# Patient Record
Sex: MALE | Race: WHITE | NOT HISPANIC OR LATINO | Employment: UNEMPLOYED | URBAN - METROPOLITAN AREA
[De-identification: names, ages, dates, MRNs, and addresses within clinical notes are randomized per-mention and may not be internally consistent; named-entity substitution may affect disease eponyms.]

---

## 2018-03-27 LAB
ABSOL LYMPHOCYTES (HISTORICAL): 1.2 K/UL (ref 0.5–4)
ALBUMIN SERPL BCP-MCNC: 3.5 G/DL (ref 3–5.2)
ALP SERPL-CCNC: 61 U/L (ref 43–122)
ALT SERPL W P-5'-P-CCNC: 19 U/L (ref 9–52)
ANION GAP SERPL CALCULATED.3IONS-SCNC: 11 MMOL/L (ref 5–14)
AST SERPL W P-5'-P-CCNC: 13 U/L (ref 17–59)
BASOPHILS # BLD AUTO: 0 % (ref 0–1)
BASOPHILS # BLD AUTO: 0 K/UL (ref 0–0.1)
BILIRUB SERPL-MCNC: 0.6 MG/DL
BUN SERPL-MCNC: 11 MG/DL (ref 5–25)
CALCIUM SERPL-MCNC: 9.6 MG/DL (ref 8.4–10.2)
CHLORIDE SERPL-SCNC: 100 MEQ/L (ref 97–108)
CO2 SERPL-SCNC: 25 MMOL/L (ref 22–30)
CREATINE, SERUM (HISTORICAL): 0.95 MG/DL (ref 0.7–1.5)
DEPRECATED RDW RBC AUTO: 15 %
EGFR (HISTORICAL): >60 ML/MIN/1.73 M2
EOSINOPHIL # BLD AUTO: 0.1 K/UL (ref 0–0.4)
EOSINOPHIL NFR BLD AUTO: 1 % (ref 0–6)
GLUCOSE SERPL-MCNC: 182 MG/DL (ref 70–99)
HCT VFR BLD AUTO: 33.3 % (ref 41–53)
HGB BLD-MCNC: 11.1 G/DL (ref 13.5–17.5)
LYMPHOCYTES NFR BLD AUTO: 9 % (ref 25–45)
MCH RBC QN AUTO: 29.1 PG (ref 26–34)
MCHC RBC AUTO-ENTMCNC: 33.2 % (ref 31–36)
MCV RBC AUTO: 87 FL (ref 80–100)
MONOCYTES # BLD AUTO: 0.7 K/UL (ref 0.2–0.9)
MONOCYTES NFR BLD AUTO: 5 % (ref 1–10)
NEUTROPHILS ABS COUNT (HISTORICAL): 11.8 K/UL (ref 1.8–7.8)
NEUTS SEG NFR BLD AUTO: 85 % (ref 45–65)
PLATELET # BLD AUTO: 305 K/MCL (ref 150–450)
POTASSIUM SERPL-SCNC: 4.1 MEQ/L (ref 3.6–5)
RBC # BLD AUTO: 3.81 M/MCL (ref 4.5–5.9)
SODIUM SERPL-SCNC: 136 MEQ/L (ref 137–147)
TOTAL PROTEIN (HISTORICAL): 6.6 G/DL (ref 5.9–8.4)
WBC # BLD AUTO: 13.9 K/MCL (ref 4.5–11)

## 2018-03-30 LAB
ABSOL LYMPHOCYTES (HISTORICAL): 1.6 K/UL (ref 0.5–4)
ANION GAP SERPL CALCULATED.3IONS-SCNC: 11 MMOL/L (ref 5–14)
BASOPHILS # BLD AUTO: 0 % (ref 0–1)
BASOPHILS # BLD AUTO: 0.1 K/UL (ref 0–0.1)
BUN SERPL-MCNC: 13 MG/DL (ref 5–25)
CALCIUM SERPL-MCNC: 9.4 MG/DL (ref 8.4–10.2)
CHLORIDE SERPL-SCNC: 102 MEQ/L (ref 97–108)
CO2 SERPL-SCNC: 26 MMOL/L (ref 22–30)
CREATINE, SERUM (HISTORICAL): 1.19 MG/DL (ref 0.7–1.5)
DEPRECATED RDW RBC AUTO: 15 %
EGFR (HISTORICAL): >60 ML/MIN/1.73 M2
EOSINOPHIL # BLD AUTO: 0.1 K/UL (ref 0–0.4)
EOSINOPHIL NFR BLD AUTO: 1 % (ref 0–6)
GLUCOSE SERPL-MCNC: 153 MG/DL (ref 70–99)
GLUCOSE SERPL-MCNC: 194 MG/DL (ref 70–99)
HCT VFR BLD AUTO: 35.2 % (ref 41–53)
HGB BLD-MCNC: 11.8 G/DL (ref 13.5–17.5)
LYMPHOCYTES NFR BLD AUTO: 13 % (ref 25–45)
MCH RBC QN AUTO: 29 PG (ref 26–34)
MCHC RBC AUTO-ENTMCNC: 33.5 % (ref 31–36)
MCV RBC AUTO: 87 FL (ref 80–100)
MONOCYTES # BLD AUTO: 0.8 K/UL (ref 0.2–0.9)
MONOCYTES NFR BLD AUTO: 7 % (ref 1–10)
NEUTROPHILS ABS COUNT (HISTORICAL): 10 K/UL (ref 1.8–7.8)
NEUTS SEG NFR BLD AUTO: 79 % (ref 45–65)
PLATELET # BLD AUTO: 324 K/MCL (ref 150–450)
POTASSIUM SERPL-SCNC: 4.3 MEQ/L (ref 3.6–5)
RBC # BLD AUTO: 4.06 M/MCL (ref 4.5–5.9)
SODIUM SERPL-SCNC: 139 MEQ/L (ref 137–147)
TROPONIN I SERPL-MCNC: <0.01 NG/ML (ref 0–0.03)
WBC # BLD AUTO: 12.6 K/MCL (ref 4.5–11)

## 2018-04-06 ENCOUNTER — HOSPITAL ENCOUNTER (INPATIENT)
Facility: HOSPITAL | Age: 63
LOS: 10 days | Discharge: HOME HEALTH | DRG: 025 | End: 2018-04-16
Attending: INTERNAL MEDICINE | Admitting: NEUROLOGICAL SURGERY
Payer: COMMERCIAL

## 2018-04-06 DIAGNOSIS — C34.90 MALIGNANT NEOPLASM OF LUNG, UNSPECIFIED LATERALITY, UNSPECIFIED PART OF LUNG (HCC): ICD-10-CM

## 2018-04-06 DIAGNOSIS — I26.09 OTHER ACUTE PULMONARY EMBOLISM WITH ACUTE COR PULMONALE (HCC): ICD-10-CM

## 2018-04-06 DIAGNOSIS — G93.89 BRAIN MASS: Primary | ICD-10-CM

## 2018-04-06 DIAGNOSIS — G93.89 BRAIN MASS: ICD-10-CM

## 2018-04-06 PROBLEM — F31.9 BIPOLAR DISORDER (HCC): Status: ACTIVE | Noted: 2018-04-06

## 2018-04-06 PROBLEM — Z72.0 TOBACCO ABUSE: Status: ACTIVE | Noted: 2018-04-06

## 2018-04-06 PROBLEM — I10 HYPERTENSION: Status: ACTIVE | Noted: 2018-04-06

## 2018-04-06 PROBLEM — F10.10 ALCOHOL ABUSE: Status: ACTIVE | Noted: 2018-04-06

## 2018-04-06 PROBLEM — E11.9 TYPE 2 DIABETES MELLITUS (HCC): Status: ACTIVE | Noted: 2018-04-06

## 2018-04-06 LAB
ABSOL LYMPHOCYTES (HISTORICAL): 1.6 K/UL (ref 0.5–4)
ALBUMIN SERPL BCP-MCNC: 4 G/DL (ref 3–5.2)
ALP SERPL-CCNC: 73 U/L (ref 43–122)
ALT SERPL W P-5'-P-CCNC: 23 U/L (ref 9–52)
AMPHETAMINE URINE (HISTORICAL): NEGATIVE
ANION GAP SERPL CALCULATED.3IONS-SCNC: 11 MMOL/L (ref 5–14)
ANION GAP SERPL CALCULATED.3IONS-SCNC: 6 MMOL/L (ref 4–13)
ANISOCYTOSIS BLD QL SMEAR: PRESENT
APTT PPP: 25 SEC (ref 23–31)
AST SERPL W P-5'-P-CCNC: 18 U/L (ref 17–59)
BARBITURATE URINE (HISTORICAL): NEGATIVE
BASOPHILS # BLD AUTO: 0.2 K/UL (ref 0–0.1)
BASOPHILS # BLD AUTO: 1 % (ref 0–1)
BASOPHILS # BLD MANUAL: 0 THOUSAND/UL (ref 0–0.1)
BASOPHILS NFR MAR MANUAL: 0 % (ref 0–1)
BENZODIAZEPINE URINE (HISTORICAL): NEGATIVE
BILIRUB SERPL-MCNC: 0.6 MG/DL
BILIRUB UR QL STRIP: NEGATIVE MG/DL
BUN SERPL-MCNC: 21 MG/DL (ref 5–25)
BUN SERPL-MCNC: 26 MG/DL (ref 5–25)
BURR CELLS BLD QL SMEAR: PRESENT
CA-I BLD-SCNC: 5 MG/DL (ref 4.5–5.3)
CALCIUM SERPL-MCNC: 10.3 MG/DL (ref 8.4–10.2)
CALCIUM SERPL-MCNC: 9.1 MG/DL (ref 8.3–10.1)
CHLORIDE SERPL-SCNC: 101 MMOL/L (ref 100–108)
CHLORIDE SERPL-SCNC: 98 MEQ/L (ref 97–108)
CK SERPL-CCNC: 77 U/L (ref 55–170)
CLARITY UR: CLEAR
CO2 SERPL-SCNC: 23 MMOL/L (ref 21–32)
CO2 SERPL-SCNC: 24 MMOL/L (ref 22–30)
COCAINE (METAB.), URINE (HISTORICAL): POSITIVE
COLOR UR: YELLOW
COMMENT (HISTORICAL): ABNORMAL
CREAT SERPL-MCNC: 1.29 MG/DL (ref 0.6–1.3)
CREATINE, SERUM (HISTORICAL): 0.91 MG/DL (ref 0.7–1.5)
DEPRECATED RDW RBC AUTO: 15.1 %
DRUG COMMENT (HISTORICAL): ABNORMAL
EGFR (HISTORICAL): >60 ML/MIN/1.73 M2
EOSINOPHIL # BLD AUTO: 0.1 K/UL (ref 0–0.4)
EOSINOPHIL # BLD MANUAL: 0 THOUSAND/UL (ref 0–0.4)
EOSINOPHIL NFR BLD AUTO: 1 % (ref 0–6)
EOSINOPHIL NFR BLD MANUAL: 0 % (ref 0–6)
ERYTHROCYTE [DISTWIDTH] IN BLOOD BY AUTOMATED COUNT: 14.2 % (ref 11.6–15.1)
EST. AVERAGE GLUCOSE BLD GHB EST-MCNC: 183 MG/DL
GFR SERPL CREATININE-BSD FRML MDRD: 60 ML/MIN/1.73SQ M
GLUCOSE SERPL-MCNC: 209 MG/DL (ref 70–99)
GLUCOSE SERPL-MCNC: 223 MG/DL (ref 70–99)
GLUCOSE SERPL-MCNC: 261 MG/DL (ref 70–99)
GLUCOSE SERPL-MCNC: 353 MG/DL (ref 65–140)
GLUCOSE SERPL-MCNC: 377 MG/DL (ref 65–140)
GLUCOSE UR STRIP-MCNC: NEGATIVE MG/DL
HBA1C MFR BLD: 8 % (ref 4.2–6.3)
HCT VFR BLD AUTO: 33.6 % (ref 36.5–49.3)
HCT VFR BLD AUTO: 35.8 % (ref 41–53)
HCT VFR BLD CALC: 33 % (ref 38–51)
HGB BLD-MCNC: 11.3 G/DL (ref 12–17)
HGB BLD-MCNC: 11.8 G/DL (ref 13.5–17.5)
HGB BLDA-MCNC: 11.2 G/DL (ref 12–17)
HGB UR QL STRIP.AUTO: ABNORMAL
I-STAT TROPONIN (HISTORICAL): 0.01 NG/ML (ref 0–0.08)
KETONES UR STRIP-MCNC: NEGATIVE MG/DL
LACTATE SERPL-SCNC: 1.4 MMOL/L (ref 0.7–2.1)
LEUKOCYTE ESTERASE UR QL STRIP: NEGATIVE
LYMPHOCYTES # BLD AUTO: 0.37 THOUSAND/UL (ref 0.6–4.47)
LYMPHOCYTES # BLD AUTO: 3 % (ref 14–44)
LYMPHOCYTES NFR BLD AUTO: 12 % (ref 25–45)
Lab: 1 MG/DL (ref 0.6–1.3)
Lab: 102 MEQ/L (ref 97–108)
Lab: 22 MG/DL (ref 5–25)
MAGNESIUM SERPL-MCNC: 1.5 MG/DL (ref 1.6–2.6)
MCH RBC QN AUTO: 28.9 PG (ref 26.8–34.3)
MCH RBC QN AUTO: 28.9 PG (ref 26–34)
MCHC RBC AUTO-ENTMCNC: 32.9 % (ref 31–36)
MCHC RBC AUTO-ENTMCNC: 33.6 G/DL (ref 31.4–37.4)
MCV RBC AUTO: 86 FL (ref 82–98)
MCV RBC AUTO: 88 FL (ref 80–100)
MDMA (GC/MS) (HISTORICAL): NEGATIVE
METHADONE URINE (HISTORICAL): NEGATIVE
METHAMPHETAMINE URINE (HISTORICAL): NEGATIVE
METHYL ALCOHOL (HISTORICAL): NEGATIVE MG/DL
MONOCYTES # BLD AUTO: 0.25 THOUSAND/UL (ref 0–1.22)
MONOCYTES # BLD AUTO: 1 K/UL (ref 0.2–0.9)
MONOCYTES NFR BLD AUTO: 7 % (ref 1–10)
MONOCYTES NFR BLD: 2 % (ref 4–12)
NEUTROPHILS # BLD MANUAL: 11.85 THOUSAND/UL (ref 1.85–7.62)
NEUTROPHILS ABS COUNT (HISTORICAL): 10.6 K/UL (ref 1.8–7.8)
NEUTS SEG NFR BLD AUTO: 79 % (ref 45–65)
NEUTS SEG NFR BLD AUTO: 95 % (ref 43–75)
NITRITE UR QL STRIP: NEGATIVE
NRBC BLD AUTO-RTO: 0 /100 WBCS
OPIATES (HISTORICAL): NEGATIVE
OVALOCYTES BLD QL SMEAR: PRESENT
OXYCODONE (HISTORICAL): NEGATIVE
PCO2 BLD: 25 MMOL/L (ref 24–34)
PH UR STRIP.AUTO: 5 [PH] (ref 4.5–8)
PHENCYCLIDINE URINE (HISTORICAL): NEGATIVE
PLATELET # BLD AUTO: 307 K/MCL (ref 150–450)
PLATELET # BLD AUTO: 330 THOUSANDS/UL (ref 149–390)
PLATELET BLD QL SMEAR: ADEQUATE
PMV BLD AUTO: 11.4 FL (ref 8.9–12.7)
POIKILOCYTOSIS BLD QL SMEAR: PRESENT
POTASSIUM BLD-SCNC: 5.5 MEQ/L (ref 3.6–5)
POTASSIUM SERPL-SCNC: 5.1 MEQ/L (ref 3.6–5)
POTASSIUM SERPL-SCNC: 5.7 MMOL/L (ref 3.5–5.3)
PROT UR STRIP-MCNC: 15 MG/DL
PT - I.N. RATIO (HISTORICAL): 1 RATIO(INR)
PT, PATIENT (HISTORICAL): 10.2 SEC (ref 9.2–11.1)
RBC # BLD AUTO: 3.91 MILLION/UL (ref 3.88–5.62)
RBC # BLD AUTO: 4.07 M/MCL (ref 4.5–5.9)
RBC MORPH BLD: PRESENT
SODIUM BLD-SCNC: 134 MEQ/L (ref 137–147)
SODIUM SERPL-SCNC: 130 MMOL/L (ref 136–145)
SODIUM SERPL-SCNC: 133 MEQ/L (ref 137–147)
SP GR UR STRIP.AUTO: 1.01 (ref 1–1.04)
THC URINE (HISTORICAL): NEGATIVE
TOTAL PROTEIN (HISTORICAL): 7.3 G/DL (ref 5.9–8.4)
TRICYCLICS URINE (HISTORICAL): NEGATIVE
TROPONIN I SERPL-MCNC: 0.03 NG/ML (ref 0–0.03)
TROPONIN I SERPL-MCNC: <0.02 NG/ML
UROBILINOGEN UR QL STRIP.AUTO: NEGATIVE MG/DL (ref 0–1)
WBC # BLD AUTO: 12.47 THOUSAND/UL (ref 4.31–10.16)
WBC # BLD AUTO: 13.5 K/MCL (ref 4.5–11)

## 2018-04-06 PROCEDURE — 85027 COMPLETE CBC AUTOMATED: CPT | Performed by: PHYSICIAN ASSISTANT

## 2018-04-06 PROCEDURE — 93005 ELECTROCARDIOGRAM TRACING: CPT | Performed by: INTERNAL MEDICINE

## 2018-04-06 PROCEDURE — 83735 ASSAY OF MAGNESIUM: CPT | Performed by: PHYSICIAN ASSISTANT

## 2018-04-06 PROCEDURE — 82948 REAGENT STRIP/BLOOD GLUCOSE: CPT

## 2018-04-06 PROCEDURE — 84484 ASSAY OF TROPONIN QUANT: CPT | Performed by: PHYSICIAN ASSISTANT

## 2018-04-06 PROCEDURE — 83036 HEMOGLOBIN GLYCOSYLATED A1C: CPT | Performed by: PHYSICIAN ASSISTANT

## 2018-04-06 PROCEDURE — 80048 BASIC METABOLIC PNL TOTAL CA: CPT | Performed by: PHYSICIAN ASSISTANT

## 2018-04-06 PROCEDURE — 85007 BL SMEAR W/DIFF WBC COUNT: CPT | Performed by: PHYSICIAN ASSISTANT

## 2018-04-06 PROCEDURE — 99223 1ST HOSP IP/OBS HIGH 75: CPT | Performed by: INTERNAL MEDICINE

## 2018-04-06 RX ORDER — BUPROPION HYDROCHLORIDE 75 MG/1
75 TABLET ORAL 2 TIMES DAILY
COMMUNITY

## 2018-04-06 RX ORDER — FOLIC ACID 1 MG/1
1 TABLET ORAL DAILY
Status: DISCONTINUED | OUTPATIENT
Start: 2018-04-07 | End: 2018-04-16 | Stop reason: HOSPADM

## 2018-04-06 RX ORDER — THIAMINE MONONITRATE (VIT B1) 100 MG
100 TABLET ORAL DAILY
Status: DISCONTINUED | OUTPATIENT
Start: 2018-04-07 | End: 2018-04-16 | Stop reason: HOSPADM

## 2018-04-06 RX ORDER — LISINOPRIL 5 MG/1
5 TABLET ORAL DAILY
COMMUNITY

## 2018-04-06 RX ORDER — LISINOPRIL 5 MG/1
5 TABLET ORAL DAILY
Status: DISCONTINUED | OUTPATIENT
Start: 2018-04-07 | End: 2018-04-16 | Stop reason: HOSPADM

## 2018-04-06 RX ORDER — BUDESONIDE AND FORMOTEROL FUMARATE DIHYDRATE 160; 4.5 UG/1; UG/1
2 AEROSOL RESPIRATORY (INHALATION) 2 TIMES DAILY
COMMUNITY

## 2018-04-06 RX ORDER — OMEPRAZOLE 20 MG/1
20 CAPSULE, DELAYED RELEASE ORAL DAILY
Status: ON HOLD | COMMUNITY
End: 2018-04-16

## 2018-04-06 RX ORDER — ARIPIPRAZOLE 10 MG/1
10 TABLET ORAL DAILY
COMMUNITY

## 2018-04-06 RX ORDER — SODIUM CHLORIDE 1000 MG
1 TABLET, SOLUBLE MISCELLANEOUS 2 TIMES DAILY
COMMUNITY

## 2018-04-06 RX ORDER — SODIUM CHLORIDE 1000 MG
1 TABLET, SOLUBLE MISCELLANEOUS 2 TIMES DAILY
Status: DISCONTINUED | OUTPATIENT
Start: 2018-04-06 | End: 2018-04-16 | Stop reason: HOSPADM

## 2018-04-06 RX ORDER — DEXAMETHASONE SODIUM PHOSPHATE 4 MG/ML
4 INJECTION, SOLUTION INTRA-ARTICULAR; INTRALESIONAL; INTRAMUSCULAR; INTRAVENOUS; SOFT TISSUE EVERY 6 HOURS SCHEDULED
Status: DISCONTINUED | OUTPATIENT
Start: 2018-04-07 | End: 2018-04-09

## 2018-04-06 RX ORDER — MULTIVITAMIN
1 CAPSULE ORAL DAILY
COMMUNITY

## 2018-04-06 RX ORDER — LANOLIN ALCOHOL/MO/W.PET/CERES
800 CREAM (GRAM) TOPICAL DAILY
Status: DISCONTINUED | OUTPATIENT
Start: 2018-04-07 | End: 2018-04-06

## 2018-04-06 RX ORDER — THIAMINE MONONITRATE (VIT B1) 100 MG
100 TABLET ORAL DAILY
Status: ON HOLD | COMMUNITY
End: 2018-04-16

## 2018-04-06 RX ORDER — BUPROPION HYDROCHLORIDE 75 MG/1
75 TABLET ORAL EVERY 12 HOURS SCHEDULED
Status: DISCONTINUED | OUTPATIENT
Start: 2018-04-06 | End: 2018-04-13

## 2018-04-06 RX ORDER — ACETAMINOPHEN 325 MG/1
650 TABLET ORAL EVERY 6 HOURS PRN
Status: DISCONTINUED | OUTPATIENT
Start: 2018-04-06 | End: 2018-04-16 | Stop reason: HOSPADM

## 2018-04-06 RX ORDER — ASPIRIN 81 MG/1
81 TABLET, CHEWABLE ORAL DAILY
Status: DISCONTINUED | OUTPATIENT
Start: 2018-04-07 | End: 2018-04-09

## 2018-04-06 RX ORDER — ALBUTEROL SULFATE 90 UG/1
2 AEROSOL, METERED RESPIRATORY (INHALATION) EVERY 6 HOURS PRN
COMMUNITY

## 2018-04-06 RX ORDER — ALBUTEROL SULFATE 90 UG/1
2 AEROSOL, METERED RESPIRATORY (INHALATION) EVERY 6 HOURS PRN
Status: DISCONTINUED | OUTPATIENT
Start: 2018-04-06 | End: 2018-04-16 | Stop reason: HOSPADM

## 2018-04-06 RX ORDER — BUDESONIDE AND FORMOTEROL FUMARATE DIHYDRATE 160; 4.5 UG/1; UG/1
2 AEROSOL RESPIRATORY (INHALATION) EVERY 12 HOURS SCHEDULED
Status: DISCONTINUED | OUTPATIENT
Start: 2018-04-06 | End: 2018-04-16 | Stop reason: HOSPADM

## 2018-04-06 RX ORDER — NICOTINE 21 MG/24HR
1 PATCH, TRANSDERMAL 24 HOURS TRANSDERMAL DAILY
Status: DISCONTINUED | OUTPATIENT
Start: 2018-04-06 | End: 2018-04-16 | Stop reason: HOSPADM

## 2018-04-06 RX ORDER — ASPIRIN 81 MG/1
81 TABLET, CHEWABLE ORAL DAILY
COMMUNITY
End: 2018-04-16 | Stop reason: HOSPADM

## 2018-04-06 RX ORDER — CALCIUM CARBONATE 200(500)MG
1000 TABLET,CHEWABLE ORAL DAILY PRN
Status: DISCONTINUED | OUTPATIENT
Start: 2018-04-06 | End: 2018-04-16 | Stop reason: HOSPADM

## 2018-04-06 RX ORDER — LORAZEPAM 2 MG/ML
1 INJECTION INTRAMUSCULAR EVERY 4 HOURS PRN
Status: DISCONTINUED | OUTPATIENT
Start: 2018-04-06 | End: 2018-04-16 | Stop reason: HOSPADM

## 2018-04-06 RX ORDER — DOCUSATE SODIUM 100 MG/1
100 CAPSULE, LIQUID FILLED ORAL 2 TIMES DAILY
Status: DISCONTINUED | OUTPATIENT
Start: 2018-04-06 | End: 2018-04-13 | Stop reason: SDUPTHER

## 2018-04-06 RX ORDER — ARIPIPRAZOLE 10 MG/1
10 TABLET ORAL DAILY
Status: DISCONTINUED | OUTPATIENT
Start: 2018-04-07 | End: 2018-04-16 | Stop reason: HOSPADM

## 2018-04-06 RX ORDER — PANTOPRAZOLE SODIUM 40 MG/1
40 TABLET, DELAYED RELEASE ORAL
Status: DISCONTINUED | OUTPATIENT
Start: 2018-04-07 | End: 2018-04-16 | Stop reason: HOSPADM

## 2018-04-06 RX ADMIN — SODIUM CHLORIDE TAB 1 GM 1 G: 1 TAB at 20:21

## 2018-04-06 RX ADMIN — NICOTINE 1 PATCH: 21 PATCH, EXTENDED RELEASE TRANSDERMAL at 20:21

## 2018-04-06 RX ADMIN — LORAZEPAM 1 MG: 2 INJECTION INTRAMUSCULAR; INTRAVENOUS at 22:13

## 2018-04-06 RX ADMIN — BUDESONIDE AND FORMOTEROL FUMARATE DIHYDRATE 2 PUFF: 160; 4.5 AEROSOL RESPIRATORY (INHALATION) at 22:04

## 2018-04-06 RX ADMIN — INSULIN LISPRO 3 UNITS: 100 INJECTION, SOLUTION INTRAVENOUS; SUBCUTANEOUS at 22:03

## 2018-04-06 RX ADMIN — BUPROPION HYDROCHLORIDE 75 MG: 75 TABLET, FILM COATED ORAL at 22:04

## 2018-04-06 RX ADMIN — DOCUSATE SODIUM 100 MG: 100 CAPSULE, LIQUID FILLED ORAL at 20:21

## 2018-04-06 NOTE — ASSESSMENT & PLAN NOTE
· 2 5 cm mass left parietal lobe with surrounding vasogenic edema  · ? Metastatic from lung cancer vs primary  · Decadron  · Neuro checks  · Neurosurgery evaluation

## 2018-04-06 NOTE — ASSESSMENT & PLAN NOTE
· Started on Wellbutrin and Abilify earlier this week- hasn't gotten them filled yet  Will start  · Also started on third medication ?hydroxyzine  Patient unsure  Would need to confirm

## 2018-04-06 NOTE — ASSESSMENT & PLAN NOTE
· Diagnosed December 2017- Stage IV with mets to lymph nodes and pelvis per patient  · Currently receiving immunotherapy  · Follows with Dr Mabel Cunningham

## 2018-04-06 NOTE — H&P
H&P- Aris Nearing 1955, 63 y o  male MRN: [id number]    Unit/Bed#: [facility] 909-01 Encounter: [id number]    Primary Care Provider: No primary care provider on file  Date and time admitted to hospital: 4/6/2018  5:10 PM    * Brain mass   Assessment & Plan    · 2 5 cm mass left parietal lobe with surrounding vasogenic edema  · ? Metastatic from lung cancer vs primary  · Decadron  · Neuro checks  · Neurosurgery evaluation        Lung cancer [facility])   Assessment & Plan    · Diagnosed December 2017- Stage IV with mets to lymph nodes and pelvis per patient  · Currently receiving immunotherapy  · Follows with Dr Allison White        Bipolar disorder [facility])   Assessment & Plan    · Started on Wellbutrin and Abilify earlier this week- hasn't gotten them filled yet  Will start  · Also started on third medication ?hydroxyzine  Patient unsure  Would need to confirm  Type 2 diabetes mellitus (HCC)   Assessment & Plan    · Takes only Humalog sliding scale at home  · Check A1C  · Sliding scale for now- adjust as needed        Hypertension   Assessment & Plan    · Continue lisinopril        Alcohol abuse   Assessment & Plan    · 2L liquor consumed weekly- last drink 4/5  · Thiamine, folic acid, multivitamin  · Ativan PRN        Tobacco abuse   Assessment & Plan    · Nicotine patch          VTE Prophylaxis: Pharmacologic VTE Prophylaxis contraindicated due to brain mets  / sequential compression device   Code Status: DNR/DNI  POLST: There is no POLST form on file for this patient (pre-hospital)      Anticipated Length of Stay:  Patient will be admitted on an Inpatient basis with an anticipated length of stay of  > 2 midnights  Justification for Hospital Stay: Brain mass requiring further workup    Total Time for Visit, including Counseling / Coordination of Care: 1 hour  Greater than 50% of this total time spent on direct patient counseling and coordination of care  Chief Complaint:   Right-sided numbness      History of Present Illness:    Bibiana Marcos is a 63 y o  male with a history of HTN, DM, tobacco abuse, alcohol abuse, and metastatic lung cancer who presents with right-sided numbness starting this morning  He thought he was having a stroke  Patient presented originally at [facility]  Imaging reviewed 2 5 cm mass in the left parietal lobe with vasogenic edema and patient was transferred to [facility] for neurosurgery evaluation  Patient tells me he was diagnosed with lung cancer in Dec 2017  At time of presentation, it was stage IV and had metastasized lymph nodes and pelvis  He started immunotherapy (not sure of name) and has been getting that every 3 weeks over the past 9-10 weeks  He follows with Dr Precious Mireles  Patient has been smoking 1-2 ppd since age 17 and continues to smoke daily  He also reports frequent alcohol abuse and has been in and out of rehab  He drinks 2L of liquor weekly  He doesn't always drink daily- sometimes he skips a few days  He does get withdrawal symptoms but has never had a withdrawal seizure  Last drink was yesterday  His UDS at [facility] was positive for cocaine  Reports history of bipolar disorder and was recently prescribed Wellbutrin, Abilify, and ?hydroxyzine  He has not yet filled these prescriptions  Currently, his numbness is resolved  He complains only of right sided rib pain  States he fell earlier this week when he was drunk  Imaging and [place]:  CT brain: 2 5 cm mass left parietal lobe with surrounding vasogenic edema  CT c/a/p showed: bulky mediastinal adenopathy, right hilar mass with obstructive atelectasis of right middle lobe mass which has progressed in the interval   Review of Systems:    Review of Systems   Constitutional: Negative  HENT: Negative  Eyes: Negative for visual disturbance  Respiratory: Positive for shortness of breath  Chronic SOB at baseline   Cardiovascular: Negative  Gastrointestinal: Negative  Endocrine: Negative  Genitourinary: Negative  Musculoskeletal:        Right-sided rib pain s/p fall   Skin: Negative  Allergic/Immunologic: Negative  Neurological: Positive for numbness  Negative for facial asymmetry, speech difficulty and weakness  Hematological: Negative  Psychiatric/Behavioral: Negative  Past Medical and Surgical History:     No past medical history on file  No past surgical history on file  Meds/Allergies:    Prior to Admission medications    Not on File     I have reviewed home medications with patient personally  Allergies: Allergies not on file    Social History:     Marital Status: Single   Occupation: None  Patient Pre-hospital Living Situation: Lives at home alone  Patient Pre-hospital Level of Mobility: Ambulates independently  Patient Pre-hospital Diet Restrictions: None  Substance Use History:   History   Alcohol use Not on file     History   Smoking Status    Not on file   Smokeless Tobacco    Not on file     History   Drug use: Unknown       Family History:    non-contributory    Physical Exam:     Vitals:   Blood Pressure: (!) 186/78 (04/06/18 1715)  Pulse: 84 (04/06/18 1715)  Temperature: 98 °F (36 7 °C) (04/06/18 1715)  Temp Source: Oral (04/06/18 1715)  Respirations: 17 (04/06/18 1715)  SpO2: 97 % (04/06/18 1715)    Physical Exam   Constitutional: He is oriented to person, place, and time  No distress  HENT:   Poor dentition   Eyes: EOM are normal  Pupils are equal, round, and reactive to light  No scleral icterus  Neck: Normal range of motion  Neck supple  Cardiovascular: Normal rate, regular rhythm and normal heart sounds  Pulmonary/Chest: Effort normal and breath sounds normal  No respiratory distress  He has no wheezes  He has no rales  Port left chest wall   Abdominal: Soft  Bowel sounds are normal  He exhibits no distension  There is no tenderness  There is no rebound     Musculoskeletal:   Right lower ribs tender to palpation   Neurological: He is alert and oriented to person, place, and time  No cranial nerve deficit  Coordination normal    Normal strength throughout  Sensation to light touch in tact  Slight resting tremor B/L (chronic per patient from prior Abilify use)  Skin: Skin is warm and dry  No rash noted  He is not diaphoretic  No erythema  Psychiatric: He has a normal mood and affect  His behavior is normal        Additional Data:     Lab Results: Reviewed labs from [address] input(s): LABALBU        Imaging: Reviewed all imaging from [facility]    No orders to display       EKG, Pathology, and Other Studies Reviewed on Admission:   · EKG: Sinus rhythm with occasional PACs    Allscripts / Epic Records Reviewed: Yes     ** Please Note: This note has been constructed using a voice recognition system   **

## 2018-04-07 ENCOUNTER — APPOINTMENT (INPATIENT)
Dept: RADIOLOGY | Facility: HOSPITAL | Age: 63
DRG: 025 | End: 2018-04-07
Payer: COMMERCIAL

## 2018-04-07 PROBLEM — E87.5 HYPERKALEMIA: Status: ACTIVE | Noted: 2018-04-07

## 2018-04-07 PROBLEM — D72.829 LEUKOCYTOSIS: Status: ACTIVE | Noted: 2018-04-07

## 2018-04-07 LAB
ANION GAP SERPL CALCULATED.3IONS-SCNC: 8 MMOL/L (ref 4–13)
ATRIAL RATE: 69 BPM
BUN SERPL-MCNC: 31 MG/DL (ref 5–25)
CALCIUM SERPL-MCNC: 9.3 MG/DL (ref 8.3–10.1)
CHLORIDE SERPL-SCNC: 102 MMOL/L (ref 100–108)
CO2 SERPL-SCNC: 23 MMOL/L (ref 21–32)
CREAT SERPL-MCNC: 1.23 MG/DL (ref 0.6–1.3)
ERYTHROCYTE [DISTWIDTH] IN BLOOD BY AUTOMATED COUNT: 14.2 % (ref 11.6–15.1)
GFR SERPL CREATININE-BSD FRML MDRD: 63 ML/MIN/1.73SQ M
GLUCOSE SERPL-MCNC: 266 MG/DL (ref 65–140)
GLUCOSE SERPL-MCNC: 397 MG/DL (ref 65–140)
GLUCOSE SERPL-MCNC: 429 MG/DL (ref 65–140)
GLUCOSE SERPL-MCNC: 482 MG/DL (ref 65–140)
GLUCOSE SERPL-MCNC: 485 MG/DL (ref 65–140)
HCT VFR BLD AUTO: 33.5 % (ref 36.5–49.3)
HGB BLD-MCNC: 11.4 G/DL (ref 12–17)
MAGNESIUM SERPL-MCNC: 1.6 MG/DL (ref 1.6–2.6)
MCH RBC QN AUTO: 29.5 PG (ref 26.8–34.3)
MCHC RBC AUTO-ENTMCNC: 34 G/DL (ref 31.4–37.4)
MCV RBC AUTO: 87 FL (ref 82–98)
P AXIS: 29 DEGREES
PLATELET # BLD AUTO: 311 THOUSANDS/UL (ref 149–390)
PMV BLD AUTO: 11.5 FL (ref 8.9–12.7)
POTASSIUM SERPL-SCNC: 5 MMOL/L (ref 3.5–5.3)
PR INTERVAL: 130 MS
QRS AXIS: 10 DEGREES
QRSD INTERVAL: 102 MS
QT INTERVAL: 412 MS
QTC INTERVAL: 441 MS
RBC # BLD AUTO: 3.87 MILLION/UL (ref 3.88–5.62)
SODIUM SERPL-SCNC: 133 MMOL/L (ref 136–145)
T WAVE AXIS: 16 DEGREES
VENTRICULAR RATE: 69 BPM
WBC # BLD AUTO: 12.26 THOUSAND/UL (ref 4.31–10.16)

## 2018-04-07 PROCEDURE — 80048 BASIC METABOLIC PNL TOTAL CA: CPT | Performed by: PHYSICIAN ASSISTANT

## 2018-04-07 PROCEDURE — A9577 INJ MULTIHANCE: HCPCS | Performed by: INTERNAL MEDICINE

## 2018-04-07 PROCEDURE — 85027 COMPLETE CBC AUTOMATED: CPT | Performed by: PHYSICIAN ASSISTANT

## 2018-04-07 PROCEDURE — 82948 REAGENT STRIP/BLOOD GLUCOSE: CPT

## 2018-04-07 PROCEDURE — 70553 MRI BRAIN STEM W/O & W/DYE: CPT

## 2018-04-07 PROCEDURE — 99233 SBSQ HOSP IP/OBS HIGH 50: CPT | Performed by: INTERNAL MEDICINE

## 2018-04-07 PROCEDURE — 83735 ASSAY OF MAGNESIUM: CPT | Performed by: PHYSICIAN ASSISTANT

## 2018-04-07 RX ORDER — GUAIFENESIN/DEXTROMETHORPHAN 100-10MG/5
10 SYRUP ORAL EVERY 4 HOURS PRN
Status: DISCONTINUED | OUTPATIENT
Start: 2018-04-07 | End: 2018-04-16 | Stop reason: HOSPADM

## 2018-04-07 RX ORDER — INSULIN GLARGINE 100 [IU]/ML
5 INJECTION, SOLUTION SUBCUTANEOUS
Status: DISCONTINUED | OUTPATIENT
Start: 2018-04-07 | End: 2018-04-08

## 2018-04-07 RX ADMIN — DEXAMETHASONE SODIUM PHOSPHATE 4 MG: 4 INJECTION, SOLUTION INTRAMUSCULAR; INTRAVENOUS at 12:04

## 2018-04-07 RX ADMIN — INSULIN LISPRO 15 UNITS: 100 INJECTION, SOLUTION INTRAVENOUS; SUBCUTANEOUS at 18:07

## 2018-04-07 RX ADMIN — INSULIN GLARGINE 5 UNITS: 100 INJECTION, SOLUTION SUBCUTANEOUS at 21:19

## 2018-04-07 RX ADMIN — INSULIN LISPRO 2 UNITS: 100 INJECTION, SOLUTION INTRAVENOUS; SUBCUTANEOUS at 21:20

## 2018-04-07 RX ADMIN — DOCUSATE SODIUM 100 MG: 100 CAPSULE, LIQUID FILLED ORAL at 18:06

## 2018-04-07 RX ADMIN — INSULIN LISPRO 5 UNITS: 100 INJECTION, SOLUTION INTRAVENOUS; SUBCUTANEOUS at 12:04

## 2018-04-07 RX ADMIN — LORAZEPAM 1 MG: 2 INJECTION INTRAMUSCULAR; INTRAVENOUS at 21:30

## 2018-04-07 RX ADMIN — ASPIRIN 81 MG 81 MG: 81 TABLET ORAL at 09:59

## 2018-04-07 RX ADMIN — ARIPIPRAZOLE 10 MG: 10 TABLET ORAL at 10:00

## 2018-04-07 RX ADMIN — PANTOPRAZOLE SODIUM 40 MG: 40 TABLET, DELAYED RELEASE ORAL at 05:58

## 2018-04-07 RX ADMIN — INSULIN LISPRO 4 UNITS: 100 INJECTION, SOLUTION INTRAVENOUS; SUBCUTANEOUS at 10:01

## 2018-04-07 RX ADMIN — FOLIC ACID 1 MG: 1 TABLET ORAL at 09:59

## 2018-04-07 RX ADMIN — DOCUSATE SODIUM 100 MG: 100 CAPSULE, LIQUID FILLED ORAL at 09:59

## 2018-04-07 RX ADMIN — GUAIFENESIN AND DEXTROMETHORPHAN 10 ML: 100; 10 SYRUP ORAL at 18:51

## 2018-04-07 RX ADMIN — SODIUM CHLORIDE TAB 1 GM 1 G: 1 TAB at 09:59

## 2018-04-07 RX ADMIN — GADOBENATE DIMEGLUMINE 18 ML: 529 INJECTION, SOLUTION INTRAVENOUS at 08:35

## 2018-04-07 RX ADMIN — BUPROPION HYDROCHLORIDE 75 MG: 75 TABLET, FILM COATED ORAL at 20:33

## 2018-04-07 RX ADMIN — Medication 100 MG: at 09:59

## 2018-04-07 RX ADMIN — Medication 1 TABLET: at 09:59

## 2018-04-07 RX ADMIN — SODIUM CHLORIDE TAB 1 GM 1 G: 1 TAB at 18:06

## 2018-04-07 RX ADMIN — DEXAMETHASONE SODIUM PHOSPHATE 4 MG: 4 INJECTION, SOLUTION INTRAMUSCULAR; INTRAVENOUS at 05:58

## 2018-04-07 RX ADMIN — DEXAMETHASONE SODIUM PHOSPHATE 4 MG: 4 INJECTION, SOLUTION INTRAMUSCULAR; INTRAVENOUS at 00:09

## 2018-04-07 RX ADMIN — NICOTINE 1 PATCH: 21 PATCH, EXTENDED RELEASE TRANSDERMAL at 10:00

## 2018-04-07 RX ADMIN — BUDESONIDE AND FORMOTEROL FUMARATE DIHYDRATE 2 PUFF: 160; 4.5 AEROSOL RESPIRATORY (INHALATION) at 20:33

## 2018-04-07 RX ADMIN — BUDESONIDE AND FORMOTEROL FUMARATE DIHYDRATE 2 PUFF: 160; 4.5 AEROSOL RESPIRATORY (INHALATION) at 10:00

## 2018-04-07 RX ADMIN — LORAZEPAM 1 MG: 2 INJECTION INTRAMUSCULAR; INTRAVENOUS at 08:10

## 2018-04-07 RX ADMIN — ACETAMINOPHEN 650 MG: 325 TABLET, FILM COATED ORAL at 21:19

## 2018-04-07 RX ADMIN — LISINOPRIL 5 MG: 5 TABLET ORAL at 09:59

## 2018-04-07 RX ADMIN — BUPROPION HYDROCHLORIDE 75 MG: 75 TABLET, FILM COATED ORAL at 10:00

## 2018-04-07 RX ADMIN — DEXAMETHASONE SODIUM PHOSPHATE 4 MG: 4 INJECTION, SOLUTION INTRAMUSCULAR; INTRAVENOUS at 18:51

## 2018-04-07 NOTE — ASSESSMENT & PLAN NOTE
- neurosurgery following - await decision on intervention during this week - MRI reveals a 2 4 cm mass of the left parietal vertex  - c/w IV Decadron for vasogenic edema/swelling (optimize insulin regimen due to history of diabetes mellitus)   - patient notes right-sided weakness improved  - PRN pain control and supportive care otherwise  -has underlying NSCLC, s/p chemo last Feb 2018  -filling defect in RLL - suggests PE  -patient refused to have CTA of chest today-will await the brain mass procedure  -no AC for now since he is awaiting surgery

## 2018-04-07 NOTE — ASSESSMENT & PLAN NOTE
- per patient, lung and his metastatic to lymph nodes and pelvis  - PRN pain control/supportive care   -patient confirms he is interested in having brain mass resected, as well as chemo for further tx

## 2018-04-07 NOTE — ASSESSMENT & PLAN NOTE
- HgA1c of 8 0- poorly controlled    - optimize SSI coverage to higher algorithm due to concurrent IV steroid administration -Lantus 15U QHS

## 2018-04-07 NOTE — CONSULTS
Consultation - Neurosurgery   Wilbert Dung 63 y o  male MRN: [id number]  Unit/Bed#: [facility] 909-01 Encounter: [id number]      Assessment/Plan     Assessment:  1  2 5cm left parietal brain mass with vasogenic edema  2  Metastatic lung cancer  3  ETOH abuse  4  Tobacco use  5  Diabetes  andHTN    Plan:  - solitary brain mass in the setting of known metastatic lung cancer  - he is treated at [facility] and the records are not available  - his symptoms have resolved except he has a right sided pronator drift  - consider surgical intervention if he is an acceptable candidate medically  - check coags -   - review with neurosurgery attending- see attestation    History of Present Illness   Inpatient consult to Neurosurgery  Consult performed by: Alireza Streeter ordered by: Joe Burr        HPI: Asim Couch is a 63y o  year old male with a history of metastatic lung cancer  He presented to the [facility] ER with an episode of right sided numbness which lasted for about 15 minutes  He thought he was having a stroke  Which is why  He came in  He drink 2 quarts of whiskey per week ( used to be 4 quarts ) and smokes 1-2 pack of cigarettes per day  The cat scan and subsequent MRI shows a 2 5 cm left parietal enhancing mass with vasogenic edema  Consults    Review of Systems   Constitutional: Negative  HENT: Negative  Eyes: Negative  Respiratory: Negative  Cardiovascular: Negative  Gastrointestinal: Negative  Endocrine: Negative  Genitourinary: Negative  Musculoskeletal: Negative  Skin: Negative  Allergic/Immunologic: Negative  Neurological: Positive for numbness  Hematological: Negative  Psychiatric/Behavioral: Negative          Historical Information   Past Medical History:   Diagnosis Date    Diabetes mellitus ([facility] [facility] )     Hypertension     [facility] ([facility] )     Psychiatric disorder      Past Surgical History:   Procedure Laterality Date    TONSILLECTOMY History   Alcohol Use    Yes     Comment: 2L liquor weekly     History   Drug use: Unknown     History   Smoking Status    Current Every Day Smoker    Packs/day: 1 50    Years: 37 00   Smokeless Tobacco    Never Used     History reviewed  No pertinent family history  Meds/Allergies   all current active meds have been reviewed  Allergies   Allergen Reactions    Other      Bee stings       Objective     Intake/Output Summary (Last 24 hours) at 04/07/18 1133  Last data filed at 04/07/18 1105   Gross per 24 hour   Intake             1080 ml   Output              700 ml   Net              380 ml       Physical Exam   Constitutional: He is oriented to person, place, and time  He appears well-developed and well-nourished  HENT:   Head: Normocephalic and atraumatic  Eyes: EOM are normal  Pupils are equal, round, and reactive to light  Neck: Normal range of motion  Cardiovascular: Normal rate and regular rhythm  Pulmonary/Chest: Effort normal    Abdominal: Soft  Musculoskeletal: Normal range of motion  Neurological: He is alert and oriented to person, place, and time  He has normal strength  He has a normal Finger-Nose-Finger Test and a normal Heel to [employer]    Skin: Skin is warm and dry  Psychiatric: He has a normal mood and affect  His speech is normal      Neurologic Exam     Mental Status   Oriented to person, place, and time  Attention: normal  Concentration: normal    Speech: speech is normal   Level of consciousness: alert  Knowledge: good  Cranial Nerves   Cranial nerves II through XII intact  CN III, IV, VI   Pupils are equal, round, and reactive to light  Extraocular motions are normal      Motor Exam   Overall muscle tone: normal  Right arm tone: normal  Left arm tone: normal  Right arm pronator drift: present    Strength   Strength 5/5 throughout       Sensory Exam   Light touch normal      Gait, Coordination, and Reflexes     Coordination   Finger to nose coordination: normal  Heel to shin coordination: normal       Vitals:Blood pressure 165/86, pulse 84, temperature 98 1 °F (36 7 °C), temperature source Oral, resp  rate 18, SpO2 96 %  ,There is no height or weight on file to calculate BMI  Lab Results: I have personally reviewed pertinent results        Imaging Studies: I have personally reviewed pertinent films in PACS        VTE Prophylaxis: Sequential compression device (Venodyne)

## 2018-04-07 NOTE — SOCIAL WORK
CM met Pt with an introduction and explanation of role  Pt reported residing alone in a 2nd floor apt with no use of DME and has a flight of steps to enter  Pt reported being independent with ADLs with no hx of VNA, SNF, or drug/alcohol placements  Pt reported being treated for Bipolar disorder, in the process of changing is psychiatrist and a recent psychiatric placement in 12/17 at [address]  Pt reported using [facility] in [facility]  CM reviewed d/c planning process including the following: identifying help at home, patient preference for d/c planning needs, Discharge Lounge, [facility] Meds to Bed program, availability of treatment team to discuss questions or concerns patient and/or family may have regarding understanding medications and recognizing signs and symptoms once discharged  CM also encouraged patient to follow up with all recommended appointments after discharge  Patient advised of importance for patient and family to participate in managing patients medical well being

## 2018-04-07 NOTE — PROGRESS NOTES
[facility] Hospitalist Service - Internal Medicine Progress Note       PATIENT INFORMATION      Patient: Pryor Baumgarten 63 y o  male   MRN: [id number]  PCP: No primary care provider on file    Unit/Bed#: [facility] 909-01 Encounter: [id number]  Date Of Visit: 04/07/18       ASSESSMENTS & PLAN     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    Assessment & Plan    - neurosurgery following - await decision on intervention - MRI reveals a 2 4 cm mass of the left parietal vertex  - c/w IV Decadron for vasogenic edema/swelling (optimize insulin regimen due to history of diabetes mellitus)   - patient notes right-sided weakness improved today  - PRN pain control and supportive care otherwise  - possibility of solitary metastasis from pre-existing metastatic lung cancer is possible      Lung cancer    Assessment & Plan    - per patient, lung and his metastatic to lymph nodes and pelvis  - PRN pain control/supportive care   - will appreciate oncology evaluation      Tobacco abuse   Assessment & Plan    - still smokes despite metastatic lung cancer diagnosis  - counseled on cessation - transdermal nicotine patch on board      Leukocytosis   Assessment & Plan    - likely fluctuating secondary to IV Decadron - monitor WBC count  - remains afebrile      Alcohol abuse   Assessment & Plan    - counseled on cessation although unlikely to comply - per patient, last drink was on 4/5  - monitor for signs/symptoms of withdrawal - PRN IV Ativan on board   - started on folic acid/thiamine supplementation       Hyperkalemia   Assessment & Plan    - serum potassium normalized today  - monitor BMP      Insulin-dependent diabetes mellitus   Assessment & Plan    - HgA1c of 8 0  - optimize SSI coverage to higher algorithm due to concurrent IV steroid administration - add low-dose Lantus QHS       Hypertension   Assessment & Plan    - low-sodium diet encouraged  - continue Zestril      Bipolar disorder   Assessment & Plan    - continue Abilify/Wellbutrin regimen        VTE Prophylaxis:  SCDs      SUBJECTIVE     Seen and examined earlier today  States his right-sided weakness has improved today  He remains in hopeful spirits regarding his long-term prognosis  Denies any headaches or visual disturbances at this time  He is somewhat anxious  OBJECTIVE     Vitals:   Temp (24hrs), Av °F (36 7 °C), Min:98 °F (36 7 °C), Max:98 1 °F (36 7 °C)    HR:  [84-90] 84  Resp:  [17-18] 18  BP: (144-186)/(67-86) 165/86  SpO2:  [96 %-97 %] 96 %  There is no height or weight on file to calculate BMI  Input and Output Summary (last 24 hours):        Intake/Output Summary (Last 24 hours) at 18 1316  Last data filed at 18 1105   Gross per 24 hour   Intake             1080 ml   Output              700 ml   Net              380 ml       Physical Exam:     GENERAL:  Disheveled but well-developed/nourished - no current distress  HEAD:  Normocephalic - atraumatic  EYES: PERRL - EOMI   MOUTH:  Mucosa moist  NECK:  Supple - full range of motion  CARDIAC:  Regular rate/rhythm - S1/S2 positive  PULMONARY:  Clear to auscultation bilaterally - nonlabored respirations  ABDOMEN:  Soft - nontender/nondistended - active bowel sounds  MUSCULOSKELETAL:  Motor strength/range of motion intact  NEUROLOGIC:  Alert/oriented x 3 - no nystagmus noted  SKIN:  Chronic wrinkles/blemishes   PSYCHIATRIC:  Mood/affect flat      ADDITIONAL DATA       Labs & Recent Cultures:       Results from last 7 days  Lab Units 18  0445 18  2120   WBC Thousand/uL 12 26* 12 47*   HEMOGLOBIN g/dL 11 4* 11 3*   HEMATOCRIT % 33 5* 33 6*   PLATELETS Thousands/uL 311 330   LYMPHO PCT %  --  3*   MONO PCT MAN %  --  2*   EOSINO PCT MANUAL %  --  0       Results from last 7 days  Lab Units 18  0445   SODIUM mmol/L 133*   POTASSIUM mmol/L 5 0   CHLORIDE mmol/L 102   CO2 mmol/L 23   BUN mg/dL 31*   CREATININE mg/dL 1 23   CALCIUM mg/dL 9 3   GLUCOSE RANDOM mg/dL 429* Last 24 Hours Medication List:     Current Facility-Administered Medications:  acetaminophen 650 mg Oral Q6H PRN Nieves Shepherd PA-C   albuterol 2 puff Inhalation Q6H PRN Nieves Shepherd PA-C   ARIPiprazole 10 mg Oral Daily Nieves Shepherd PA-C   aspirin 81 mg Oral Daily Nieves Shepherd PA-C   budesonide-formoterol 2 puff Inhalation Q12H [facility] Nieves Shepherd PA-C   buPROPion 75 mg Oral Q12H [facility] Nieves Shepherd PA-C   calcium carbonate 1,000 mg Oral Daily PRN Nieves Shepherd PA-C   dexamethasone 4 mg Intravenous Q6H [facility] Nieves Shepherd PA-C   docusate sodium 100 mg Oral BID Nieves Shepherd PA-C   folic acid 1 mg Oral Daily Yamini Johnson MD   insulin glargine 5 Units Subcutaneous HS Yamini Johnson MD   insulin lispro 1-5 Units Subcutaneous 4x Daily (AC & HS) Yamini Johnson MD   lisinopril 5 mg Oral Daily Nieves Shepherd PA-C   LORazepam 1 mg Intravenous Q4H PRN Nieves Shepherd PA-C   multivitamin-minerals 1 tablet Oral Daily Nieves Shepherd PA-C   nicotine 1 patch Transdermal Daily Nieves Shepherd PA-C   pantoprazole 40 mg Oral Early Morning Nieves Shepherd PA-C   sodium chloride 1 g Oral BID Nieves Shepherd PA-C   thiamine 100 mg Oral Daily Nieves Shepherd PA-C          Time Spent for Care:  38 minutes  More than 50% of total time spent on counseling and coordination of care as described above  Current Length of Stay: 1 day(s)      Code Status: Level 3 - DNAR and DNI         ** Please Note: This note is constructed using a voice recognition dictation system   **

## 2018-04-07 NOTE — ASSESSMENT & PLAN NOTE
- still smokes despite metastatic lung cancer diagnosis  - counseled on cessation - transdermal nicotine patch

## 2018-04-07 NOTE — ASSESSMENT & PLAN NOTE
- counseled on cessation although unlikely to comply - per patient, last drink was on 4/5  - monitor for signs/symptoms of withdrawal - PRN IV Ativan   - started on folic acid/thiamine supplementation   -no signs of Alcohol withdrawal

## 2018-04-08 LAB
APTT PPP: 22 SECONDS (ref 23–35)
GLUCOSE SERPL-MCNC: 263 MG/DL (ref 65–140)
GLUCOSE SERPL-MCNC: 313 MG/DL (ref 65–140)
GLUCOSE SERPL-MCNC: 351 MG/DL (ref 65–140)
GLUCOSE SERPL-MCNC: 382 MG/DL (ref 65–140)
INR PPP: 1.03 (ref 0.86–1.16)
PROTHROMBIN TIME: 13.5 SECONDS (ref 12.1–14.4)

## 2018-04-08 PROCEDURE — 85730 THROMBOPLASTIN TIME PARTIAL: CPT | Performed by: PHYSICIAN ASSISTANT

## 2018-04-08 PROCEDURE — 99233 SBSQ HOSP IP/OBS HIGH 50: CPT | Performed by: INTERNAL MEDICINE

## 2018-04-08 PROCEDURE — 82948 REAGENT STRIP/BLOOD GLUCOSE: CPT

## 2018-04-08 PROCEDURE — 85610 PROTHROMBIN TIME: CPT | Performed by: PHYSICIAN ASSISTANT

## 2018-04-08 RX ORDER — INSULIN GLARGINE 100 [IU]/ML
10 INJECTION, SOLUTION SUBCUTANEOUS
Status: DISCONTINUED | OUTPATIENT
Start: 2018-04-08 | End: 2018-04-09

## 2018-04-08 RX ADMIN — BUDESONIDE AND FORMOTEROL FUMARATE DIHYDRATE 2 PUFF: 160; 4.5 AEROSOL RESPIRATORY (INHALATION) at 08:21

## 2018-04-08 RX ADMIN — DOCUSATE SODIUM 100 MG: 100 CAPSULE, LIQUID FILLED ORAL at 18:05

## 2018-04-08 RX ADMIN — INSULIN LISPRO 2 UNITS: 100 INJECTION, SOLUTION INTRAVENOUS; SUBCUTANEOUS at 21:22

## 2018-04-08 RX ADMIN — FOLIC ACID 1 MG: 1 TABLET ORAL at 08:21

## 2018-04-08 RX ADMIN — DEXAMETHASONE SODIUM PHOSPHATE 4 MG: 4 INJECTION, SOLUTION INTRAMUSCULAR; INTRAVENOUS at 00:31

## 2018-04-08 RX ADMIN — INSULIN LISPRO 4 UNITS: 100 INJECTION, SOLUTION INTRAVENOUS; SUBCUTANEOUS at 18:07

## 2018-04-08 RX ADMIN — INSULIN GLARGINE 10 UNITS: 100 INJECTION, SOLUTION SUBCUTANEOUS at 21:23

## 2018-04-08 RX ADMIN — DOCUSATE SODIUM 100 MG: 100 CAPSULE, LIQUID FILLED ORAL at 08:21

## 2018-04-08 RX ADMIN — GUAIFENESIN AND DEXTROMETHORPHAN 10 ML: 100; 10 SYRUP ORAL at 18:28

## 2018-04-08 RX ADMIN — DEXAMETHASONE SODIUM PHOSPHATE 4 MG: 4 INJECTION, SOLUTION INTRAMUSCULAR; INTRAVENOUS at 06:37

## 2018-04-08 RX ADMIN — SODIUM CHLORIDE TAB 1 GM 1 G: 1 TAB at 18:05

## 2018-04-08 RX ADMIN — ASPIRIN 81 MG 81 MG: 81 TABLET ORAL at 08:21

## 2018-04-08 RX ADMIN — DEXAMETHASONE SODIUM PHOSPHATE 4 MG: 4 INJECTION, SOLUTION INTRAMUSCULAR; INTRAVENOUS at 18:28

## 2018-04-08 RX ADMIN — INSULIN LISPRO 3 UNITS: 100 INJECTION, SOLUTION INTRAVENOUS; SUBCUTANEOUS at 08:22

## 2018-04-08 RX ADMIN — LISINOPRIL 5 MG: 5 TABLET ORAL at 08:21

## 2018-04-08 RX ADMIN — SODIUM CHLORIDE TAB 1 GM 1 G: 1 TAB at 08:21

## 2018-04-08 RX ADMIN — INSULIN LISPRO 5 UNITS: 100 INJECTION, SOLUTION INTRAVENOUS; SUBCUTANEOUS at 12:02

## 2018-04-08 RX ADMIN — BUDESONIDE AND FORMOTEROL FUMARATE DIHYDRATE 2 PUFF: 160; 4.5 AEROSOL RESPIRATORY (INHALATION) at 21:22

## 2018-04-08 RX ADMIN — ACETAMINOPHEN 650 MG: 325 TABLET, FILM COATED ORAL at 16:07

## 2018-04-08 RX ADMIN — NICOTINE 1 PATCH: 21 PATCH, EXTENDED RELEASE TRANSDERMAL at 08:21

## 2018-04-08 RX ADMIN — LORAZEPAM 1 MG: 2 INJECTION INTRAMUSCULAR; INTRAVENOUS at 08:28

## 2018-04-08 RX ADMIN — LORAZEPAM 1 MG: 2 INJECTION INTRAMUSCULAR; INTRAVENOUS at 16:08

## 2018-04-08 RX ADMIN — INSULIN LISPRO 4 UNITS: 100 INJECTION, SOLUTION INTRAVENOUS; SUBCUTANEOUS at 12:02

## 2018-04-08 RX ADMIN — Medication 1 TABLET: at 08:21

## 2018-04-08 RX ADMIN — Medication 100 MG: at 08:21

## 2018-04-08 RX ADMIN — ARIPIPRAZOLE 10 MG: 10 TABLET ORAL at 08:21

## 2018-04-08 RX ADMIN — PANTOPRAZOLE SODIUM 40 MG: 40 TABLET, DELAYED RELEASE ORAL at 06:37

## 2018-04-08 RX ADMIN — BUPROPION HYDROCHLORIDE 75 MG: 75 TABLET, FILM COATED ORAL at 08:21

## 2018-04-08 RX ADMIN — INSULIN LISPRO 5 UNITS: 100 INJECTION, SOLUTION INTRAVENOUS; SUBCUTANEOUS at 08:22

## 2018-04-08 RX ADMIN — BUPROPION HYDROCHLORIDE 75 MG: 75 TABLET, FILM COATED ORAL at 21:23

## 2018-04-08 RX ADMIN — DEXAMETHASONE SODIUM PHOSPHATE 4 MG: 4 INJECTION, SOLUTION INTRAMUSCULAR; INTRAVENOUS at 12:02

## 2018-04-08 NOTE — PROGRESS NOTES
[address] Hospitalist Service - Internal Medicine Progress Note       PATIENT INFORMATION      Patient: Fabinaa Burr 63 y o  male   MRN: [id number]  PCP: No primary care provider on file    Unit/Bed#: [facility] 909-01 Encounter: [id number]  Date Of Visit: 04/08/18       ASSESSMENTS & PLAN     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    Assessment & Plan    - neurosurgery following - awaiting oncology records from [facility] prior to termination of treatment/course of action   - MRI reveals a 2 4 cm mass of the left parietal vertex  - on IV Decadron for vasogenic edema/swelling (optimized insulin regimen due to history of diabetes mellitus)    - patient notes right-sided weakness improves daily  - PRN pain control and supportive care otherwise  - possibility of solitary metastasis from pre-existing metastatic lung cancer is possible      Lung cancer    Assessment & Plan    - per patient, he has metastasis to his lymph nodes and pelvis and has only received immunotherapy in the past   - PRN pain control/supportive care   - will appreciate our [facility] oncology evaluation and awaiting oncology records from [employer]    - still smokes despite metastatic lung cancer diagnosis  - counseled on cessation - transdermal nicotine patch on board      Leukocytosis   Assessment & Plan    - likely fluctuating secondary to IV Decadron - monitor WBC count  - remains afebrile      Alcohol abuse   Assessment & Plan    - counseled on cessation although unlikely to comply - per patient, last drink was on 4/5  - monitor for signs/symptoms of withdrawal - PRN IV Ativan on board   - started on folic acid/thiamine supplementation       Hyperkalemia   Assessment & Plan    - serum potassium normalized yesterday  - monitor BMP      Insulin-dependent diabetes mellitus   Assessment & Plan    - HgA1c of 8 0  - will continue to optimize SSI coverage as needed due to concurrent IV steroid administration with addition of fixed prandial short-acting insulin doses - added long-acting basal Lantus QHS as well      Hypertension   Assessment & Plan    - low-sodium diet encouraged  - continue Zestril      Bipolar disorder   Assessment & Plan    - continue Abilify/Wellbutrin regimen        VTE Prophylaxis:  SCDs      SUBJECTIVE     Seen/examined this morning  No new complaints at this time  Continues to report improvement in his right-sided weakness  He is somewhat anxious about his future prognosis and course of action for the brain mass  He understands that we are awaiting records from [facility] in regards to his metastatic lung cancer  OBJECTIVE     Vitals:   Temp (24hrs), Av 4 °F (36 9 °C), Min:98 °F (36 7 °C), Max:98 7 °F (37 1 °C)    HR:  [] 95  Resp:  [19-20] 20  BP: (149-156)/(75-92) 150/76  SpO2:  [92 %-93 %] 93 %  Body mass index is 28 65 kg/m²  Input and Output Summary (last 24 hours):        Intake/Output Summary (Last 24 hours) at 18 1301  Last data filed at 18 1217   Gross per 24 hour   Intake              540 ml   Output             3425 ml   Net            -2885 ml       Physical Exam:     GENERAL:  Disheveled but well-developed/nourished - no active distress  HEAD:  Normocephalic - atraumatic  EYES: PERRL - EOMI   MOUTH:  Mucosa moist  NECK:  Supple - full range of motion  CARDIAC:  Regular rate/rhythm - S1/S2 positive  PULMONARY:  Clear breath sounds bilaterally - nonlabored respirations  ABDOMEN:  Soft - nontender/nondistended - active bowel sounds  MUSCULOSKELETAL:  Motor strength/range of motion currently intact  NEUROLOGIC:  Alert/oriented x 3   SKIN:  Chronic wrinkles/blemishes       ADDITIONAL DATA       Labs & Recent Cultures:       Results from last 7 days  Lab Units 18  0445 18  2120   WBC Thousand/uL 12 26* 12 47*   HEMOGLOBIN g/dL 11 4* 11 3*   HEMATOCRIT % 33 5* 33 6*   PLATELETS Thousands/uL 311 330   LYMPHO PCT %  --  3*   MONO PCT MAN %  --  2*   EOSINO PCT MANUAL %  --  0       Results from last 7 days  Lab Units 04/07/18  0445   SODIUM mmol/L 133*   POTASSIUM mmol/L 5 0   CHLORIDE mmol/L 102   CO2 mmol/L 23   BUN mg/dL 31*   CREATININE mg/dL 1 23   CALCIUM mg/dL 9 3   GLUCOSE RANDOM mg/dL 429*         Last 24 Hours Medication List:     Current Facility-Administered Medications:  acetaminophen 650 mg Oral Q6H PRN [facility], PA-TARSHA   albuterol 2 puff Inhalation Q6H PRN [facility], PA-C   ARIPiprazole 10 mg Oral Daily [facility], PA-TARSHA   aspirin 81 mg Oral Daily [facility], PA-TARSHA   budesonide-formoterol 2 puff Inhalation Q12H [address] [facility], PA-TARSHA   buPROPion 75 mg Oral Q12H [address] [facility], PA-TARSHA   calcium carbonate 1,000 mg Oral Daily PRN [facility], PA-TARSHA   dexamethasone 4 mg Intravenous Q6H [address] [facility], PA-TARSHA   dextromethorphan-guaiFENesin 10 mL Oral Q4H PRN Mary Sewell MD   docusate sodium 100 mg Oral BID [facility]   folic acid 1 mg Oral Daily Mary Sewell MD   insulin glargine 10 Units Subcutaneous HS Mary Sewell MD   insulin lispro 1-5 Units Subcutaneous 4x Daily (AC & HS) Mary Sewell MD   insulin lispro 10 Units Subcutaneous TID With Meals Mary Sewell MD   lisinopril 5 mg [facility]   LORazepam 1 mg Intravenous Q4H PRN [facility], PA-TARSHA   multivitamin-minerals 1 tablet Oral Daily [facility], PA-TARSHA   nicotine 1 patch Transdermal [facility]   pantoprazole 40 mg Oral Early Morning [facility], PA-C   sodium chloride 1 g Oral BID [facility], PA-TARSHA   thiamine 100 mg Oral [facility]          Time Spent for Care:  37 minutes  More than 50% of total time spent on counseling and coordination of care as described above        Current Length of Stay: 2 day(s)      Code Status: Level 3 - DNAR and DNI         ** Please Note: This note is constructed using a voice recognition dictation system   **

## 2018-04-08 NOTE — PROGRESS NOTES
Progress Note - Neurosurgery   Wilbert Razo 63 y o  male MRN: [id number]  Unit/Bed#: [facility] 909-01 Encounter: [id number]    Assessment:  1  2 5 cm left parietal brain mass with vasogenic edema  2  Metastatic lung cancer  3  ETOH abuse  4  Tobacco use  5  Diabetes  and HTN      Plan:  - seen with Dr Amador Vargas  - need records from [facility] oncologist  - medical and cardiology clearence   - blood glucose control  - consideration for surgical intervention later this week vs out patient follow up for XRT / chemo      Subjective/Objective     "They are not giving me enough insulin"        Invasive Devices     Peripheral Intravenous Line            Peripheral IV 04/06/18 Left Antecubital 1 day    Peripheral IV 04/06/18 Right Antecubital 1 day                Physical Exam:     Vitals: Blood pressure 150/76, pulse 95, temperature 98 °F (36 7 °C), temperature source Oral, resp  rate 20, height 5' 9" (1 753 m), weight 88 kg (194 lb), SpO2 93 %  ,Body mass index is 28 65 kg/m²  Awake and alert  Moves all extremities  Pronator drift less prominent  Facial features symmetric  Tongue midline  PERRLA  Lungs clear  Heart regular  Abdomen soft        Lab Results: I have personally reviewed pertinent results        Imaging Studies: I have personally reviewed pertinent films in PACS      VTE Pharmacologic Prophylaxis: Reason for no pharmacologic prophylaxis brain mass    VTE Mechanical Prophylaxis: sequential compression device

## 2018-04-08 NOTE — PROGRESS NOTES
Rounding with Dr Disha Johnson, awaiting medical records from [facility], neurosx recommendations   BS still elevated, will adjust insulin doses

## 2018-04-08 NOTE — CASE MANAGEMENT
Initial Clinical Review    Admission: Date/Time/Statement: 4/6/18 @ 1710     Orders Placed This Encounter   Procedures    Inpatient Admission     Standing Status:   Standing     Number of Occurrences:   1     Order Specific Question:   Admitting Physician     Answer:   Brandy Tong     Order Specific Question:   Level of Care     Answer:   Med Surg [16]     Order Specific Question:   Estimated length of stay     Answer:   More than 2 Midnights     Order Specific Question:   Certification     Answer:   I certify that inpatient services are medically necessary for this patient for a duration of greater than two midnights  See H&P and MD Progress Notes for additional information about the patient's course of treatment  ED: Date/Time/Mode of Arrival:  Tx from [facility] 4/6 ~ 5 pm    Chief Complaint: Right-sided numbness  History of Illness:  63 y o  male with a history of HTN, DM, tobacco abuse, alcohol abuse, and metastatic lung cancer who presents with right-sided numbness starting this morning  He thought he was having a stroke  Patient presented originally at [facility]  Imaging reviewed 2 5 cm mass in the left parietal lobe with vasogenic edema and patient was transferred to [facility] for neurosurgery evaluation       Patient tells me he was diagnosed with lung cancer in Dec 2017  At time of presentation, it was stage IV and had metastasized to lymph nodes and pelvis  He started immunotherapy (not sure of name) and has been getting that every 3 weeks over the past 9-10 weeks  He follows with Dr Maris Juares      Patient has been smoking 1-2 ppd since age 17 and continues to smoke daily  He also reports frequent alcohol abuse and has been in and out of rehab  He drinks 2L of liquor weekly  He doesn't always drink daily- sometimes he skips a few days  He does get withdrawal symptoms but has never had a withdrawal seizure  Last drink was yesterday   His UDS at [facility] was positive for cocaine  Reports history of bipolar disorder and was recently prescribed Wellbutrin, Abilify, and ?hydroxyzine  He has not yet filled these prescriptions      Currently, his numbness is resolved  He complains only of right sided rib pain  States he fell earlier this week when he was drunk  Exam:  Musculoskeletal: Right lower ribs tender to palpation   Neurological: He is alert and oriented to person, place, and time  No cranial nerve deficit  Coordination normal    Normal strength throughout  Sensation to light touch in tact  Slight resting tremor B/L (chronic per patient from prior Abilify use)  Vital Signs:  04/07 0701  04/08 0700 04/08 0701  04/08 1513  Most Recent     Temperature (°F) 98  598 7 98  98 (36 7)    Pulse 94101 95  95    Respirations 1920 20  20    Blood Pressure 149/92156/75 150/76  150/76    SpO2 (%) 9293 93  93        Abnormal Labs/Diagnostic Test Results:     Imaging and [place]:  CT brain: 2 5 cm mass left parietal lobe with surrounding vasogenic edema  CT c/a/p showed: bulky mediastinal adenopathy, right hilar mass with obstructive atelectasis of right middle lobe mass which has progressed in the interval  EKG: Sinus rhythm with occasional PACs    4/7 -- MRI brain -- Enhancing 2 4 cm mass with vasogenic edema in the left parietal vertex centered at the gray-white matter junction extending to the cortical surface  Findings may represent a solitary metastasis in this patient with a history of lung cancer  Differential diagnosis includes primary brain tumor  Neurosurgical assessment recommended  Past Medical/Surgical History:   Past Medical History:   Diagnosis Date    [facility] mellitus [facility] [address] )     Hypertension     Lung cancer [facility] [address] [facility]     Psychiatric disorder        Admitting Diagnosis: Brain mass [G93 9]    Age/Sex: 63 y o  male    Assessment/Plan:   * Brain mass   Assessment & Plan     · 2 5 cm mass left parietal lobe with surrounding vasogenic edema  · ? Metastatic from lung cancer vs primary  · Decadron  · Neuro checks  · Neurosurgery evaluation          Lung cancer [facility])   Assessment & Plan     · Diagnosed December 2017- Stage IV with mets to lymph nodes and pelvis per patient  · Currently receiving immunotherapy  · Follows with Dr Fang Tavares          Bipolar disorder [facility])   Assessment & Plan     · Started on Wellbutrin and Abilify earlier this week- hasn't gotten them filled yet  Will start  · Also started on third medication ?hydroxyzine  Patient unsure   Would need to confirm           Type 2 diabetes mellitus ([facility] [address] )   Assessment & Plan     · Takes only Humalog sliding scale at home  · Check A1C  · Sliding scale for now- adjust as needed          Hypertension   Assessment & Plan     · Continue lisinopril          Alcohol abuse   Assessment & Plan     · 2L liquor consumed weekly- last drink 4/5  · Thiamine, folic acid, multivitamin  · Ativan PRN          Tobacco abuse   Assessment & Plan     · Nicotine patch         Admission Orders:  Admit to M/S unit  Consult oncology, Neurosurgery  Cons carb diet  accuchecks qid w/ ssi  SCD's  Up & oob as tolerated    Scheduled Meds:   Current Facility-Administered Medications:  acetaminophen 650 mg Oral Q6H PRN Tristian Hernandez PA-C   albuterol 2 puff Inhalation Q6H PRN Tristian Hernandez PA-C   ARIPiprazole 10 mg Oral Daily Tristian Hernandez PA-C   aspirin 81 mg Oral Daily Tristian Hernandez PA-C   budesonide-formoterol 2 puff Inhalation Q12H [address] Tristian Hernandez PA-C   buPROPion 75 mg Oral Q12H [address] Tristian Hernandez PA-C   calcium carbonate 1,000 mg Oral Daily PRN Tristian Hernandez PA-C   dexamethasone 4 mg Intravenous Q6H [address] Tristian Hernandez PA-C   dextromethorphan-guaiFENesin 10 mL Oral Q4H PRN Eliu Mercado MD   docusate sodium 100 mg Oral BID Tristian Hernandez PA-C   folic acid 1 mg Oral Daily Eliu Mercado MD   insulin glargine 10 Units Subcutaneous HS Eliu Mercado MD   insulin lispro 1-5 Units Subcutaneous 4x Daily (AC & HS) Christin Bocanegra MD   insulin lispro 10 Units Subcutaneous TID With Meals Christin Bocanegra MD   lisinopril 5 mg Oral Daily Triciaana GT Dasilva   LORazepam 1 mg Intravenous Q4H PRN Llana Dasilva, PA-C   multivitamin-minerals 1 tablet Oral Daily Llana Dasilva, PA-C   nicotine 1 patch Transdermal Daily Llana Dasilva, PA-C   pantoprazole 40 mg Oral Early Morning Llana Dasilva, PA-C   sodium chloride 1 g Oral BID Llana Dasilva, PA-C   thiamine 100 mg Oral Daily Llana Dasilva, PA-C     PRN Meds:   acetaminophen    albuterol    calcium carbonate    dextromethorphan-guaiFENesin    LORazepam      NS consult 4/7 --  Assessment:  1  2 5cm left parietal brain mass with vasogenic edema  2  Metastatic lung cancer  3  ETOH abuse  4  Tobacco use  5   Diabetes  andHTN     Plan:  - solitary brain mass in the setting of known metastatic lung cancer  - he is treated at [address] [place] [address] and the records are not available  - his symptoms have resolved except he has a right sided pronator drift  - consider surgical intervention if he is an acceptable candidate medically  - check coags -   - review with neurosurgery attending- see attestation

## 2018-04-09 ENCOUNTER — APPOINTMENT (INPATIENT)
Dept: RADIOLOGY | Facility: HOSPITAL | Age: 63
DRG: 025 | End: 2018-04-09
Payer: COMMERCIAL

## 2018-04-09 LAB
ANION GAP SERPL CALCULATED.3IONS-SCNC: 8 MMOL/L (ref 4–13)
BASOPHILS # BLD MANUAL: 0 THOUSAND/UL (ref 0–0.1)
BASOPHILS NFR MAR MANUAL: 0 % (ref 0–1)
BUN SERPL-MCNC: 35 MG/DL (ref 5–25)
CALCIUM SERPL-MCNC: 9.1 MG/DL (ref 8.3–10.1)
CHLORIDE SERPL-SCNC: 98 MMOL/L (ref 100–108)
CO2 SERPL-SCNC: 24 MMOL/L (ref 21–32)
CREAT SERPL-MCNC: 1.1 MG/DL (ref 0.6–1.3)
EOSINOPHIL # BLD MANUAL: 0 THOUSAND/UL (ref 0–0.4)
EOSINOPHIL NFR BLD MANUAL: 0 % (ref 0–6)
ERYTHROCYTE [DISTWIDTH] IN BLOOD BY AUTOMATED COUNT: 14.2 % (ref 11.6–15.1)
GFR SERPL CREATININE-BSD FRML MDRD: 73 ML/MIN/1.73SQ M
GLUCOSE SERPL-MCNC: 112 MG/DL (ref 65–140)
GLUCOSE SERPL-MCNC: 295 MG/DL (ref 65–140)
GLUCOSE SERPL-MCNC: 300 MG/DL (ref 65–140)
GLUCOSE SERPL-MCNC: 353 MG/DL (ref 65–140)
GLUCOSE SERPL-MCNC: 70 MG/DL (ref 65–140)
HCT VFR BLD AUTO: 33.6 % (ref 36.5–49.3)
HGB BLD-MCNC: 11.3 G/DL (ref 12–17)
LYMPHOCYTES # BLD AUTO: 1.19 THOUSAND/UL (ref 0.6–4.47)
LYMPHOCYTES # BLD AUTO: 6 % (ref 14–44)
MCH RBC QN AUTO: 29 PG (ref 26.8–34.3)
MCHC RBC AUTO-ENTMCNC: 33.6 G/DL (ref 31.4–37.4)
MCV RBC AUTO: 86 FL (ref 82–98)
MONOCYTES # BLD AUTO: 0.2 THOUSAND/UL (ref 0–1.22)
MONOCYTES NFR BLD: 1 % (ref 4–12)
NEUTROPHILS # BLD MANUAL: 18.52 THOUSAND/UL (ref 1.85–7.62)
NEUTS SEG NFR BLD AUTO: 93 % (ref 43–75)
NRBC BLD AUTO-RTO: 0 /100 WBCS
PLATELET # BLD AUTO: 284 THOUSANDS/UL (ref 149–390)
PLATELET BLD QL SMEAR: ADEQUATE
PMV BLD AUTO: 11.6 FL (ref 8.9–12.7)
POTASSIUM SERPL-SCNC: 4.8 MMOL/L (ref 3.5–5.3)
RBC # BLD AUTO: 3.89 MILLION/UL (ref 3.88–5.62)
SODIUM SERPL-SCNC: 130 MMOL/L (ref 136–145)
WBC # BLD AUTO: 19.91 THOUSAND/UL (ref 4.31–10.16)

## 2018-04-09 PROCEDURE — 82948 REAGENT STRIP/BLOOD GLUCOSE: CPT

## 2018-04-09 PROCEDURE — 85027 COMPLETE CBC AUTOMATED: CPT | Performed by: INTERNAL MEDICINE

## 2018-04-09 PROCEDURE — 80048 BASIC METABOLIC PNL TOTAL CA: CPT | Performed by: INTERNAL MEDICINE

## 2018-04-09 PROCEDURE — 85007 BL SMEAR W/DIFF WBC COUNT: CPT | Performed by: INTERNAL MEDICINE

## 2018-04-09 PROCEDURE — 99253 IP/OBS CNSLTJ NEW/EST LOW 45: CPT | Performed by: INTERNAL MEDICINE

## 2018-04-09 PROCEDURE — 74177 CT ABD & PELVIS W/CONTRAST: CPT

## 2018-04-09 PROCEDURE — 99232 SBSQ HOSP IP/OBS MODERATE 35: CPT | Performed by: PHYSICIAN ASSISTANT

## 2018-04-09 PROCEDURE — 99233 SBSQ HOSP IP/OBS HIGH 50: CPT | Performed by: INTERNAL MEDICINE

## 2018-04-09 PROCEDURE — 71260 CT THORAX DX C+: CPT

## 2018-04-09 RX ORDER — INSULIN GLARGINE 100 [IU]/ML
15 INJECTION, SOLUTION SUBCUTANEOUS
Status: DISCONTINUED | OUTPATIENT
Start: 2018-04-09 | End: 2018-04-16 | Stop reason: HOSPADM

## 2018-04-09 RX ORDER — DEXAMETHASONE SODIUM PHOSPHATE 4 MG/ML
4 INJECTION, SOLUTION INTRA-ARTICULAR; INTRALESIONAL; INTRAMUSCULAR; INTRAVENOUS; SOFT TISSUE EVERY 12 HOURS SCHEDULED
Status: DISCONTINUED | OUTPATIENT
Start: 2018-04-09 | End: 2018-04-13

## 2018-04-09 RX ADMIN — BUPROPION HYDROCHLORIDE 75 MG: 75 TABLET, FILM COATED ORAL at 08:44

## 2018-04-09 RX ADMIN — BUDESONIDE AND FORMOTEROL FUMARATE DIHYDRATE 2 PUFF: 160; 4.5 AEROSOL RESPIRATORY (INHALATION) at 20:11

## 2018-04-09 RX ADMIN — FOLIC ACID 1 MG: 1 TABLET ORAL at 08:44

## 2018-04-09 RX ADMIN — BUPROPION HYDROCHLORIDE 75 MG: 75 TABLET, FILM COATED ORAL at 20:11

## 2018-04-09 RX ADMIN — LORAZEPAM 1 MG: 2 INJECTION INTRAMUSCULAR; INTRAVENOUS at 08:54

## 2018-04-09 RX ADMIN — Medication 1 TABLET: at 08:44

## 2018-04-09 RX ADMIN — PANTOPRAZOLE SODIUM 40 MG: 40 TABLET, DELAYED RELEASE ORAL at 05:23

## 2018-04-09 RX ADMIN — LORAZEPAM 1 MG: 2 INJECTION INTRAMUSCULAR; INTRAVENOUS at 20:16

## 2018-04-09 RX ADMIN — DEXAMETHASONE SODIUM PHOSPHATE 4 MG: 4 INJECTION, SOLUTION INTRAMUSCULAR; INTRAVENOUS at 12:27

## 2018-04-09 RX ADMIN — DEXAMETHASONE SODIUM PHOSPHATE 4 MG: 4 INJECTION, SOLUTION INTRAMUSCULAR; INTRAVENOUS at 00:54

## 2018-04-09 RX ADMIN — BUDESONIDE AND FORMOTEROL FUMARATE DIHYDRATE 2 PUFF: 160; 4.5 AEROSOL RESPIRATORY (INHALATION) at 08:45

## 2018-04-09 RX ADMIN — DEXAMETHASONE SODIUM PHOSPHATE 4 MG: 4 INJECTION, SOLUTION INTRAMUSCULAR; INTRAVENOUS at 05:22

## 2018-04-09 RX ADMIN — INSULIN LISPRO 1 UNITS: 100 INJECTION, SOLUTION INTRAVENOUS; SUBCUTANEOUS at 18:29

## 2018-04-09 RX ADMIN — SODIUM CHLORIDE TAB 1 GM 1 G: 1 TAB at 08:44

## 2018-04-09 RX ADMIN — DOCUSATE SODIUM 100 MG: 100 CAPSULE, LIQUID FILLED ORAL at 08:44

## 2018-04-09 RX ADMIN — DEXAMETHASONE SODIUM PHOSPHATE 4 MG: 4 INJECTION, SOLUTION INTRAMUSCULAR; INTRAVENOUS at 23:54

## 2018-04-09 RX ADMIN — LISINOPRIL 5 MG: 5 TABLET ORAL at 08:44

## 2018-04-09 RX ADMIN — DOCUSATE SODIUM 100 MG: 100 CAPSULE, LIQUID FILLED ORAL at 18:27

## 2018-04-09 RX ADMIN — NICOTINE 1 PATCH: 21 PATCH, EXTENDED RELEASE TRANSDERMAL at 08:50

## 2018-04-09 RX ADMIN — INSULIN LISPRO 4 UNITS: 100 INJECTION, SOLUTION INTRAVENOUS; SUBCUTANEOUS at 08:46

## 2018-04-09 RX ADMIN — GUAIFENESIN AND DEXTROMETHORPHAN 10 ML: 100; 10 SYRUP ORAL at 08:44

## 2018-04-09 RX ADMIN — Medication 100 MG: at 08:44

## 2018-04-09 RX ADMIN — INSULIN LISPRO 3 UNITS: 100 INJECTION, SOLUTION INTRAVENOUS; SUBCUTANEOUS at 12:26

## 2018-04-09 RX ADMIN — ARIPIPRAZOLE 10 MG: 10 TABLET ORAL at 08:43

## 2018-04-09 RX ADMIN — IOHEXOL 100 ML: 350 INJECTION, SOLUTION INTRAVENOUS at 17:05

## 2018-04-09 RX ADMIN — SODIUM CHLORIDE TAB 1 GM 1 G: 1 TAB at 18:27

## 2018-04-09 NOTE — PROGRESS NOTES
[address] Hospitalist Service - Internal Medicine Progress Note       PATIENT INFORMATION      Patient: Eli Phan 63 y o  male   MRN: [id number]  PCP: Luis Alfredo Hunt MD  Unit/Bed#: [facility] 909-01 Encounter: [id number]  Date Of Visit: 04/09/18       ASSESSMENTS & PLAN     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    Assessment & Plan    - neurosurgery following - anticipated plan for mass resection later this week   - MRI reveals a 2 4 cm mass of the left parietal vertex  - on IV Decadron for vasogenic edema/swelling (optimizing insulin regimen daily due to history of diabetes mellitus)    - patient notes right-sided weakness progressively resolving  - PRN pain control and supportive care otherwise  - possibility of solitary metastasis from pre-existing metastatic lung cancer is possible - await resection/biopsy      Lung cancer    Assessment & Plan    - per patient, he has metastasis to his lymph nodes and pelvis and has only received immunotherapy in the past (Keytruda)  - PRN pain control/supportive care   - appreciate [facility] oncology evaluation - per his outpatient oncologist at [facility] (per neurosurgery documentation today), his lung cancer pathology is non small cell in nature with characteristics of squamous cell carcinoma with right-sided med/pleural effusion       Tobacco abuse   Assessment & Plan    - still smokes despite metastatic lung cancer diagnosis  - counseled on cessation - transdermal nicotine patch on board      Leukocytosis   Assessment & Plan    - likely fluctuating secondary to IV Decadron - monitor WBC count  - remains afebrile      Alcohol abuse   Assessment & Plan    - counseled on cessation although unlikely to comply - per patient, last drink was on 4/5  - monitor for signs/symptoms of withdrawal - PRN IV Ativan on board   - started on folic acid/thiamine supplementation       Hyperkalemia   Assessment & Plan    - serum potassium normalized on 4/7   - monitor BMP Insulin-dependent diabetes mellitus   Assessment & Plan    - HgA1c of 8 0  - will continue to optimize SSI coverage as needed on a daily basis due to concurrent IV steroid administration with addition of fixed prandial short-acting insulin doses - added long-acting basal Lantus QHS as well      Hypertension   Assessment & Plan    - low-sodium diet encouraged  - continue Zestril      Bipolar disorder   Assessment & Plan    - continue Abilify/Wellbutrin regimen        VTE Prophylaxis:  SCDs      SUBJECTIVE     Seen/examined this morning  No new complaints this time  Denies dizziness or visual disturbances  He states his right-sided weakness continues to improve each day with the Decadron regimen  Anticipating mass resection later in the week  OBJECTIVE     Vitals:   Temp (24hrs), Av 9 °F (36 6 °C), Min:97 6 °F (36 4 °C), Max:98 1 °F (36 7 °C)    HR:  [] 100  Resp:  [18-20] 18  BP: (141-165)/(72-96) 142/81  SpO2:  [96 %-98 %] 97 %  Body mass index is 28 65 kg/m²  Input and Output Summary (last 24 hours):        Intake/Output Summary (Last 24 hours) at 18 1819  Last data filed at 18 1330   Gross per 24 hour   Intake             1060 ml   Output             1650 ml   Net             -590 ml       Physical Exam:     GENERAL:  Disheveled but well-developed/nourished - no immediate distress  HEAD:  Normocephalic - atraumatic  EYES: PERRL - EOMI   MOUTH:  Mucosa moist  NECK:  Supple - full range of motion  CARDIAC:  Regular rate/rhythm - S1/S2 positive  PULMONARY:  Clear to auscultation bilaterally - nonlabored respirations  ABDOMEN:  Soft - nontender/nondistended - active bowel sounds  MUSCULOSKELETAL:  Motor strength/range of motion remains intact  NEUROLOGIC:  Alert/oriented x 3   SKIN:  Age-appropriate wrinkles/blemishes - mild excoriation/redness on tip of nose      ADDITIONAL DATA       Labs & Recent Cultures:       Results from last 7 days  Lab Units 18  0451   WBC Thousand/uL 19 91*   HEMOGLOBIN g/dL 11 3*   HEMATOCRIT % 33 6*   PLATELETS Thousands/uL 284   LYMPHO PCT % 6*   MONO PCT MAN % 1*   EOSINO PCT MANUAL % 0       Results from last 7 days  Lab Units 04/09/18  0451   SODIUM mmol/L 130*   POTASSIUM mmol/L 4 8   CHLORIDE mmol/L 98*   CO2 mmol/L 24   BUN mg/dL 35*   CREATININE mg/dL 1 10   CALCIUM mg/dL 9 1   GLUCOSE RANDOM mg/dL 300*         Last 24 Hours Medication List:     Current Facility-Administered Medications:  acetaminophen 650 mg Oral Q6H PRN Cj Wesley PA-C   albuterol 2 puff Inhalation Q6H PRN Cj Wesley PA-C   ARIPiprazole 10 mg Oral Daily Cj Wesley PA-C   budesonide-formoterol 2 puff Inhalation Q12H [facility] Cj Wesley PA-C   buPROPion 75 mg Oral Q12H [facility] Cj Wesley PA-C   calcium carbonate 1,000 mg Oral Daily PRN Cj Wesley PA-C   [START ON 4/10/2018] dexamethasone 4 mg Intravenous Q12H [facility] Gabriela Grewal MD   dextromethorphan-guaiFENesin 10 mL Oral Q4H PRN Gabriela Grewal MD   docusate sodium 100 mg Oral BID Cj Wesley PA-C   folic acid 1 mg Oral Daily Gabriela Grewal MD   insulin glargine 15 Units Subcutaneous HS Gabriela Grewal MD   insulin lispro 1-5 Units Subcutaneous 4x Daily (AC & HS) Gabriela Grewal MD   insulin lispro 20 Units Subcutaneous TID With Meals Gabriela Grewal MD   lisinopril 5 mg Oral Daily Cj Wesley PA-C   LORazepam 1 mg Intravenous Q4H PRN Cj Wesley PA-C   multivitamin-minerals 1 tablet Oral Daily Cj Wesley PA-C   nicotine 1 patch Transdermal Daily Cj Wesley PA-C   pantoprazole 40 mg Oral Early Morning Cj Wesley PA-C   sodium chloride 1 g Oral BID Cj Wesley PA-C   thiamine 100 mg Oral Daily Cj Wesley PA-C          Time Spent for Care:  36 minutes  More than 50% of total time spent on counseling and coordination of care as described above        Current Length of Stay: 3 day(s)      Code Status: Level 3 - DNAR and DNI         ** Please Note: This note is constructed using a voice recognition dictation system   **

## 2018-04-09 NOTE — PROGRESS NOTES
Pt alert and able to make needs known  In bed with call bell and bedside table within reach  Voices no c/o's pain or discomfort at this time  Will continue to monitor

## 2018-04-09 NOTE — CONSULTS
Oncology Consult Note  Ash Balderas 63 y o  male MRN: [id number]  Unit/Bed#: [facility] 909-01 Encounter: [id number]      Presenting Complaint:  Newly diagnosed 2 5 cm left parietal enhancing mass with vasogenic edema in the setting of stage IV lung cancer    History of Presenting Illness: The patient is a pleasant 12year-old male with metastatic lung cancer currently on immunotherapy every 3 weeks who had some right-sided numbness lasting about 15 minutes  He ended up having imaging which showed a 2 5 cm left parietal enhancing mass with vasogenic edema  Neurosurgery consulted  Right now he is at baseline  Apparently has a heavy drinking history having around 2 quarts of whiskey per week and smokes I to 2 packs of cigarettes per day  Denies any nausea denies any vomiting currently denies any constipation  The rest of his 14 point review of systems today was negative  Review of Systems - As stated in the HPI otherwise the fourteen point review of systems was negative  Past Medical History:   Diagnosis Date    Diabetes mellitus (RUST 75 )     Hypertension     Lung cancer (RUST 75 )     Psychiatric disorder        Social History     Social History    Marital status: Single     Spouse name: N/A    Number of children: N/A    Years of education: N/A     Social History Main Topics    Smoking status: Current Every Day Smoker     Packs/day: 1 50     Years: 37 00    Smokeless tobacco: Never Used    Alcohol use Yes      Comment: 2L liquor weekly    Drug use: Unknown    Sexual activity: Not Asked     Other Topics Concern    None     Social History Narrative    None       History reviewed  No pertinent family history      Allergies   Allergen Reactions    Other      Bee stings         Current Facility-Administered Medications:     acetaminophen (TYLENOL) tablet 650 mg, 650 mg, Oral, Q6H PRN, Cinthia Bence, PA-C, 650 mg at 04/08/18 1607    albuterol (PROVENTIL HFA,VENTOLIN HFA) inhaler 2 puff, 2 puff, Inhalation, Q6H PRN, Oliver Villanueva PA-C    ARIPiprazole (ABILIFY) tablet 10 mg, 10 mg, Oral, Daily, Oliver Dole, PA-C, 10 mg at 04/09/18 0843    budesonide-formoterol (SYMBICORT) 160-4 5 mcg/act inhaler 2 puff, 2 puff, Inhalation, Q12H [address], Oliver Dole, PA-C, 2 puff at 04/09/18 0845    buPROPion [facility]) tablet 75 mg, 75 mg, Oral, Q12H [address], Oliver Dole, PA-C, 75 mg at 04/09/18 0844    calcium carbonate (TUMS) chewable tablet 1,000 mg, 1,000 mg, Oral, Daily PRN, Oliverneetu Villanueva PA-C    dexamethasone (DECADRON) injection 4 mg, 4 mg, Intravenous, Q6H [address], Oliver Rich PA-C, 4 mg at 04/09/18 0522    dextromethorphan-guaiFENesin (ROBITUSSIN DM)  mg/5 mL oral syrup 10 mL, 10 mL, Oral, Q4H PRN, Vallorie Fabry, MD, 10 mL at 04/09/18 0844    docusate sodium (COLACE) capsule 100 mg, 100 mg, Oral, BID, Oliver Rich, PA-C, 100 mg at 66/67/11 2371    folic acid (FOLVITE) tablet 1 mg, 1 mg, Oral, Daily, Vallorie Fabry, MD, 1 mg at 04/09/18 0844    insulin glargine (LANTUS) subcutaneous injection 15 Units, 15 Units, Subcutaneous, HS, Vallorie Fabry, MD    insulin lispro (HumaLOG) 100 units/mL subcutaneous injection 1-5 Units, 1-5 Units, Subcutaneous, 4x Daily (AC & HS), 4 Units at 04/09/18 0846 **AND** Fingerstick Glucose (POCT), , , 4x Daily AC and at bedtime, Vallorie Fabry, MD    insulin lispro (HumaLOG) 100 units/mL subcutaneous injection 20 Units, 20 Units, Subcutaneous, TID With Meals, Vallorie Fabry, MD    lisinopril (ZESTRIL) tablet 5 mg, 5 mg, Oral, Daily, Oliver Villanueva PA-C, 5 mg at 04/09/18 0844    LORazepam (ATIVAN) 2 mg/mL injection 1 mg, 1 mg, Intravenous, Q4H PRN, Oliver Villanueva PA-C, 1 mg at 04/09/18 0854    multivitamin-minerals (CENTRUM) tablet 1 tablet, 1 tablet, Oral, Daily, BALTAZAR Saucedo-C, 1 tablet at 04/09/18 0844    nicotine (NICODERM CQ) 21 mg/24 hr TD 24 hr patch 1 patch, 1 patch, Transdermal, Daily, Oliver Villanueva PA-C, 1 patch at 04/09/18 0850    pantoprazole (PROTONIX) EC tablet 40 mg, 40 mg, Oral, Early Morning, BALTAZAR Barth-C, 40 mg at 04/09/18 8099    sodium chloride tablet 1 g, 1 g, Oral, BID, BALTAZAR Barth-C, 1 g at 04/09/18 0844    thiamine (VITAMIN B1) tablet 100 mg, 100 mg, Oral, Daily, BALTAZAR Barth-C, 100 mg at 04/09/18 0844      /96 (BP Location: Right arm)   Pulse 76   Temp 97 7 °F (36 5 °C) (Oral)   Resp 18   Ht 5' 9" (1 753 m)   Wt 88 kg (194 lb)   SpO2 98%   BMI 28 65 kg/m²     General Appearance:    Alert, oriented        Eyes:    PERRL   Ears:    Normal external ear canals, both ears   Nose:   Nares normal, septum midline   Throat:   Mucosa moist  Pharynx without injection  Neck:   Supple       Lungs:     Clear to auscultation bilaterally   Chest Wall:    No tenderness or deformity    Heart:    Regular rate and rhythm       Abdomen:     Soft, non-tender, bowel sounds +, no organomegaly           Extremities:   Extremities no cyanosis or edema       Skin:   no rash or icterus      Lymph nodes:   Cervical, supraclavicular, and axillary nodes normal   Neurologic:   CNII-XII intact, normal strength, sensation and reflexes     Throughout               Recent Results (from the past 48 hour(s))   Fingerstick Glucose (POCT)    Collection Time: 04/07/18  5:09 PM   Result Value Ref Range    POC Glucose 485 (H) 65 - 140 mg/dl   Fingerstick Glucose (POCT)    Collection Time: 04/07/18  9:10 PM   Result Value Ref Range    POC Glucose 266 (H) 65 - 140 mg/dl   Protime-INR    Collection Time: 04/08/18  4:47 AM   Result Value Ref Range    Protime 13 5 12 1 - 14 4 seconds    INR 1 03 0 86 - 1 16   APTT    Collection Time: 04/08/18  4:47 AM   Result Value Ref Range    PTT 22 (L) 23 - 35 seconds   Fingerstick Glucose (POCT)    Collection Time: 04/08/18  7:40 AM   Result Value Ref Range    POC Glucose 313 (H) 65 - 140 mg/dl   Fingerstick Glucose (POCT)    Collection Time: 04/08/18 11:27 AM Result Value Ref Range    POC Glucose 351 (H) 65 - 140 mg/dl   Fingerstick Glucose (POCT)    Collection Time: 04/08/18  5:09 PM   Result Value Ref Range    POC Glucose 382 (H) 65 - 140 mg/dl   Fingerstick Glucose (POCT)    Collection Time: 04/08/18  8:26 PM   Result Value Ref Range    POC Glucose 263 (H) 65 - 140 mg/dl   CBC and differential    Collection Time: 04/09/18  4:51 AM   Result Value Ref Range    WBC 19 91 (H) 4 31 - 10 16 Thousand/uL    RBC 3 89 3 88 - 5 62 Million/uL    Hemoglobin 11 3 (L) 12 0 - 17 0 g/dL    Hematocrit 33 6 (L) 36 5 - 49 3 %    MCV 86 82 - 98 fL    MCH 29 0 26 8 - 34 3 pg    MCHC 33 6 31 4 - 37 4 g/dL    RDW 14 2 11 6 - 15 1 %    MPV 11 6 8 9 - 12 7 fL    Platelets 040 290 - 197 Thousands/uL    nRBC 0 /100 WBCs   Basic metabolic panel    Collection Time: 04/09/18  4:51 AM   Result Value Ref Range    Sodium 130 (L) 136 - 145 mmol/L    Potassium 4 8 3 5 - 5 3 mmol/L    Chloride 98 (L) 100 - 108 mmol/L    CO2 24 21 - 32 mmol/L    Anion Gap 8 4 - 13 mmol/L    BUN 35 (H) 5 - 25 mg/dL    Creatinine 1 10 0 60 - 1 30 mg/dL    Glucose 300 (H) 65 - 140 mg/dL    Calcium 9 1 8 3 - 10 1 mg/dL    eGFR 73 ml/min/1 73sq m   Manual Differential(PHLEBS Do Not Order)    Collection Time: 04/09/18  4:51 AM   Result Value Ref Range    Segmented % 93 (H) 43 - 75 %    Lymphocytes % 6 (L) 14 - 44 %    Monocytes % 1 (L) 4 - 12 %    Eosinophils % 0 0 - 6 %    Basophils % 0 0 - 1 %    Absolute Neutrophils 18 52 (H) 1 85 - 7 62 Thousand/uL    Lymphocytes Absolute 1 19 0 60 - 4 47 Thousand/uL    Monocytes Absolute 0 20 0 00 - 1 22 Thousand/uL    Eosinophils Absolute 0 00 0 00 - 0 40 Thousand/uL    Basophils Absolute 0 00 0 00 - 0 10 Thousand/uL    Total Counted      Platelet Estimate Adequate Adequate   Fingerstick Glucose (POCT)    Collection Time: 04/09/18  8:22 AM   Result Value Ref Range    POC Glucose 353 (H) 65 - 140 mg/dl         Mri Brain W Wo Contrast    Result Date: 4/7/2018  Narrative: MRI BRAIN WITH AND WITHOUT CONTRAST INDICATION:   mass  Abnormal head CT COMPARISON:  10/3/2008 TECHNIQUE: Sagittal T1, axial T2, axial FLAIR, axial T1, axial [place], axial diffusion  Sagittal, axial and coronal T1 postcontrast   Axial BRAVO post contrast   IV Contrast:  gadobenate dimeglumine (MULTIHANCE) injection (MULTI-DOSE) 18 mL   IMAGE QUALITY:   Diagnostic  FINDINGS: BRAIN PARENCHYMA:  In the left parietal vertex at the gray-white matter junction there is an irregularly enhancing, centrally necrotic intra-axial mass measuring 2 4 cm  The tumor extends to the cortical surface and has associated local vasogenic edema with local sulcal effacement in the parasagittal left parietal lobe posteriorly  No additional enhancing masses lesions detected  Diffusion imaging demonstrates high signal in the periphery of the tumor likely reflective of associated internal cellularity  Susceptibility weighted imaging demonstrates small fluid fluid levels, suggesting internal hemorrhagic elements  Separately in the parasagittal aspect of the right frontal lobe superiorly there is a somewhat stellate region of blooming artifact on image 80, series 6, possibly sequela of previous hemorrhage  No abnormal enhancement associated with the 2nd area of blooming artifact  No extra-axial fluid collection  Cerebellar tonsils normally positioned  VENTRICLES:  Normal  SELLA AND PITUITARY GLAND:  Normal  ORBITS:  Normal  PARANASAL SINUSES:  Normal  VASCULATURE:  Evaluation of the major intracranial vasculature demonstrates appropriate flow voids  CALVARIUM AND SKULL BASE:  Normal  EXTRACRANIAL SOFT TISSUES:  Normal      Impression: Enhancing 2 4 cm mass with vasogenic edema in the left parietal vertex centered at the gray-white matter junction extending to the cortical surface  Findings may represent a solitary metastasis in this patient with a history of lung cancer  Differential diagnosis includes primary brain tumor    Neurosurgical assessment recommended  Workstation performed: [id number]       Assessment and Plan: This is a pleasant 27-year-old male with what appears to be metastatic lung cancer who is currently on immunotherapy every 3 weeks with his primary oncologist at [facility]  He was admitted to the hospital after an episode of right-sided numbness lasting 15 minutes after which a workup was done and 2 5 cm left parietal enhancing mass with vasogenic edema was found  This is likely metastatic disease from his lung cancer  At this point he is being seen by Neurosurgery and they will decide how to address this mass i e  resection versus possibly SRS although it is 2 5 cm  Once the mass has been addressed for plan has been made he will be discharged to follow up with his outpatient oncologist   It appears he is due for imaging and this will be done as an outpatient  I suspect if his disease below the brain is stable immunotherapy will be continued  If not therapy will obviously be changed  There is no new recommendation from an oncologic standpoint for his systemic disease at this point and this will all be managed by his primary oncologist upon discharge after reimaging  Acute management of the brain lesion will be per Neurosurgery

## 2018-04-09 NOTE — PROGRESS NOTES
Round with Dr Fabienne Cardenas  Awaiting oncology consult, discussed insulin adjustments for increased blood sugars  No other changes at this time  Possible surgical intervention later this week vs outpatient depending oncology input

## 2018-04-09 NOTE — PROGRESS NOTES
Progress Note - Neurosurgery   Wilbert Razo 63 y o  male MRN: [id number]  Unit/Bed#: [facility] 909-01 Encounter: [id number]    Assessment:  1  Left parietal enhancing brain mass  2  Cerebral edema  3  Metastatic squamous cell carcinoma of the lung  4  Alcohol abuse  5  Tobacco abuse  6  Diabetes  7  Hypertension  8  History of hepatitis-C    Plan:  · Exam reveals GCS 15  No focal findings  Continue frequent neurological checks  · Imaging reviewed personally and by attending  Final results as below  · MRI brain with without contrast 4/7/18:  Solitary irregularly enhancing 2 4 cm left parietal mass with associated vasogenic edema  · Ordered CT chest abdomen pelvis for restaging  · Personally contacted patient's oncologist, Dr Chiara Hutson,  office at [facility] to obtain last of this note and pathology  · Bronchial biopsies 12/12/2017:  Non-small cell carcinoma with features consistent with moderately to poorly differentiated squamous cell carcinoma  Staining for P63 is negative while staining for TTF-1 is negative  · Patient had stage in Imaging on January 15, 2018 which showed 6 5 cm right middle lobe hypermetabolic mass with a 1 3 cm anterior right lobe region pulmonary metastasis with trace right pleural effusion  He was also noted to have adenopathy in the right subclavicular, right superior mediastinum, right anterior mediastinal, right hilar, left mid thoracic and paraesophageal all consistent with metastatic disease  There is also a lesion noted in his right supra-acetabular iliac bone suspicious for osseous metastasis  · As documented, patient underwent an MRI brain on January 25, 2018 which was negative for metastatic disease  · Patient refused any type of palliative chemotherapy    He was started on immunotherapy with [facility] first cycle on February 16, 2018 along with Kandace Schwab  · Pain control per primary team  · Mobilize as tolerated with assistance  · DVT PPX:  Bilateral SCDs on during exam   · Hold all anti-platelet anticoagulation medications  Patient was on aspirin 81 mg which was discontinued today  Last dose 4/8/18 at 0821  · Continue decadron 4mg Q 6H   · Will continue to follow  Tentative surgical resection of brain mass later this week  Subjective/Objective   Chief Complaint: "I'm good"/follow-up brain metastasis    Subjective:  Patient admits to significant improvement from the time of presentation  He is currently without complaints  Denies any headache, vision or speech changes  Denies any weakness  Denies any difficulties with ambulation  Tolerating oral intake  Denies chest pain or shortness of breath  Patient admits to baseline left upper extremity tremors which he relates to prior high dose of Abilify for extended period of time which was used to treat bipolar  Patient does have a outpatient  he wishes to keep updated of his plan of care  Objective:  Lying in bed  NAD  I/O       04/07 0701 - 04/08 0700 04/08 0701 - 04/09 0700 04/09 0701 - 04/10 0700    P  O  1260 1200 580    Total Intake(mL/kg) 1260 (14 3) 1200 (13 6) 580 (6 6)    Urine (mL/kg/hr) 3425 (1 6) 2350 (1 1) 400 (0 5)    Total Output 3425 2350 400    Net -2165 -1150 +180                 Invasive Devices     Peripheral Intravenous Line            Peripheral IV 04/06/18 Left Antecubital 2 days                Physical Exam:  Vitals: Blood pressure 142/81, pulse 100, temperature 98 °F (36 7 °C), temperature source Oral, resp  rate 18, height 5' 9" (1 753 m), weight 88 kg (194 lb), SpO2 97 %  ,Body mass index is 28 65 kg/m²      General appearance: alert, appears stated age, cooperative and no distress  Head: Normocephalic, without obvious abnormality, atraumatic  Eyes: EOMI, PERRL  Neck: supple, symmetrical, trachea midline   Lungs: non labored breathing  Heart: regular heart rate  Neurologic:   Mental status: Alert, oriented, thought content appropriate, good calculations  Cranial nerves: grossly intact (Cranial nerves II-XII)  Sensory: normal to LT  Motor: moving all extremities without focal weakness   Coordination: finger to nose difficulties bilaterally, no drift bilaterally    Lab Results:    Results from last 7 days  Lab Units 04/09/18 0451 04/07/18 0445 04/06/18  2120   WBC Thousand/uL 19 91* 12 26* 12 47*   HEMOGLOBIN g/dL 11 3* 11 4* 11 3*   HEMATOCRIT % 33 6* 33 5* 33 6*   PLATELETS Thousands/uL 284 311 330   MONO PCT MAN % 1*  --  2*       Results from last 7 days  Lab Units 04/09/18  0451 04/07/18  0445 04/06/18  2120   SODIUM mmol/L 130* 133* 130*   POTASSIUM mmol/L 4 8 5 0 5 7*   CHLORIDE mmol/L 98* 102 101   CO2 mmol/L 24 23 23   BUN mg/dL 35* 31* 26*   CREATININE mg/dL 1 10 1 23 1 29   CALCIUM mg/dL 9 1 9 3 9 1   GLUCOSE RANDOM mg/dL 300* 429* 377*       Results from last 7 days  Lab Units 04/07/18 0445 04/06/18 2120   MAGNESIUM mg/dL 1 6 1 5*           Results from last 7 days  Lab Units 04/08/18  0447   INR  1 03   PTT seconds 22*     No results found for: TROPONINT  ABG:No results found for: PHART, QMT5TFK, PO2ART, QJP0DYT, S0LVKHCN, BEART, SOURCE    Imaging Studies: I have personally reviewed pertinent reports  and I have personally reviewed pertinent films in PACS    Mri Brain W Wo Contrast    Result Date: 4/7/2018  Narrative: MRI BRAIN WITH AND WITHOUT CONTRAST INDICATION:   mass  Abnormal head CT COMPARISON:  10/3/2008 TECHNIQUE: Sagittal T1, axial T2, axial FLAIR, axial T1, axial [place], axial diffusion  Sagittal, axial and coronal T1 postcontrast   Axial BRAVO post contrast   IV Contrast:  gadobenate dimeglumine (MULTIHANCE) injection (MULTI-DOSE) 18 mL   IMAGE QUALITY:   Diagnostic  FINDINGS: BRAIN PARENCHYMA:  In the left parietal vertex at the gray-white matter junction there is an irregularly enhancing, centrally necrotic intra-axial mass measuring 2 4 cm    The tumor extends to the cortical surface and has associated local vasogenic edema with local sulcal effacement in the parasagittal left parietal lobe posteriorly  No additional enhancing masses lesions detected  Diffusion imaging demonstrates high signal in the periphery of the tumor likely reflective of associated internal cellularity  Susceptibility weighted imaging demonstrates small fluid fluid levels, suggesting internal hemorrhagic elements  Separately in the parasagittal aspect of the right frontal lobe superiorly there is a somewhat stellate region of blooming artifact on image 80, series 6, possibly sequela of previous hemorrhage  No abnormal enhancement associated with the 2nd area of blooming artifact  No extra-axial fluid collection  Cerebellar tonsils normally positioned  VENTRICLES:  Normal  SELLA AND PITUITARY GLAND:  Normal  ORBITS:  Normal  PARANASAL SINUSES:  Normal  VASCULATURE:  Evaluation of the major intracranial vasculature demonstrates appropriate flow voids  CALVARIUM AND SKULL BASE:  Normal  EXTRACRANIAL SOFT TISSUES:  Normal      Impression: Enhancing 2 4 cm mass with vasogenic edema in the left parietal vertex centered at the gray-white matter junction extending to the cortical surface  Findings may represent a solitary metastasis in this patient with a history of lung cancer  Differential diagnosis includes primary brain tumor  Neurosurgical assessment recommended  Workstation performed: [id number]     EKG, Pathology, and Other Studies: I have personally reviewed pertinent reports  VTE Pharmacologic Prophylaxis:  None currently ordered      VTE Mechanical Prophylaxis: sequential compression device

## 2018-04-10 ENCOUNTER — APPOINTMENT (INPATIENT)
Dept: NON INVASIVE DIAGNOSTICS | Facility: HOSPITAL | Age: 63
DRG: 025 | End: 2018-04-10
Payer: COMMERCIAL

## 2018-04-10 ENCOUNTER — APPOINTMENT (INPATIENT)
Dept: RADIOLOGY | Facility: HOSPITAL | Age: 63
DRG: 025 | End: 2018-04-10
Payer: COMMERCIAL

## 2018-04-10 PROBLEM — E87.5 HYPERKALEMIA: Status: RESOLVED | Noted: 2018-04-07 | Resolved: 2018-04-10

## 2018-04-10 LAB
ANION GAP SERPL CALCULATED.3IONS-SCNC: 5 MMOL/L (ref 4–13)
BASOPHILS # BLD MANUAL: 0 THOUSAND/UL (ref 0–0.1)
BASOPHILS NFR MAR MANUAL: 0 % (ref 0–1)
BUN SERPL-MCNC: 33 MG/DL (ref 5–25)
CALCIUM SERPL-MCNC: 9.5 MG/DL
CHLORIDE SERPL-SCNC: 100 MMOL/L (ref 100–108)
CO2 SERPL-SCNC: 27 MMOL/L (ref 21–32)
CREAT SERPL-MCNC: 1.08 MG/DL (ref 0.6–1.3)
EOSINOPHIL # BLD MANUAL: 0 THOUSAND/UL (ref 0–0.4)
EOSINOPHIL NFR BLD MANUAL: 0 % (ref 0–6)
ERYTHROCYTE [DISTWIDTH] IN BLOOD BY AUTOMATED COUNT: 14.1 % (ref 11.6–15.1)
GFR SERPL CREATININE-BSD FRML MDRD: 74 ML/MIN/1.73SQ M
GLUCOSE SERPL-MCNC: 186 MG/DL (ref 65–140)
GLUCOSE SERPL-MCNC: 192 MG/DL (ref 65–140)
GLUCOSE SERPL-MCNC: 193 MG/DL (ref 65–140)
GLUCOSE SERPL-MCNC: 219 MG/DL (ref 65–140)
HCT VFR BLD AUTO: 35.2 % (ref 36.5–49.3)
HGB BLD-MCNC: 11.9 G/DL (ref 12–17)
LYMPHOCYTES # BLD AUTO: 0.41 THOUSAND/UL (ref 0.6–4.47)
LYMPHOCYTES # BLD AUTO: 2 % (ref 14–44)
MCH RBC QN AUTO: 29.2 PG (ref 26.8–34.3)
MCHC RBC AUTO-ENTMCNC: 33.8 G/DL (ref 31.4–37.4)
MCV RBC AUTO: 86 FL (ref 82–98)
MONOCYTES # BLD AUTO: 0.62 THOUSAND/UL (ref 0–1.22)
MONOCYTES NFR BLD: 3 % (ref 4–12)
NEUTROPHILS # BLD MANUAL: 19.55 THOUSAND/UL (ref 1.85–7.62)
NEUTS SEG NFR BLD AUTO: 95 % (ref 43–75)
NRBC BLD AUTO-RTO: 0 /100 WBCS
PLATELET # BLD AUTO: 294 THOUSANDS/UL (ref 149–390)
PLATELET BLD QL SMEAR: ADEQUATE
PMV BLD AUTO: 11.6 FL (ref 8.9–12.7)
POTASSIUM SERPL-SCNC: 4.9 MMOL/L (ref 3.5–5.3)
RBC # BLD AUTO: 4.08 MILLION/UL (ref 3.88–5.62)
RBC MORPH BLD: NORMAL
SODIUM SERPL-SCNC: 132 MMOL/L (ref 136–145)
WBC # BLD AUTO: 20.58 THOUSAND/UL (ref 4.31–10.16)

## 2018-04-10 PROCEDURE — 93970 EXTREMITY STUDY: CPT

## 2018-04-10 PROCEDURE — 85027 COMPLETE CBC AUTOMATED: CPT | Performed by: INTERNAL MEDICINE

## 2018-04-10 PROCEDURE — 99232 SBSQ HOSP IP/OBS MODERATE 35: CPT | Performed by: HOSPITALIST

## 2018-04-10 PROCEDURE — 85007 BL SMEAR W/DIFF WBC COUNT: CPT | Performed by: INTERNAL MEDICINE

## 2018-04-10 PROCEDURE — 99232 SBSQ HOSP IP/OBS MODERATE 35: CPT | Performed by: PHYSICIAN ASSISTANT

## 2018-04-10 PROCEDURE — 71275 CT ANGIOGRAPHY CHEST: CPT

## 2018-04-10 PROCEDURE — 82948 REAGENT STRIP/BLOOD GLUCOSE: CPT

## 2018-04-10 PROCEDURE — 80048 BASIC METABOLIC PNL TOTAL CA: CPT | Performed by: INTERNAL MEDICINE

## 2018-04-10 PROCEDURE — 99232 SBSQ HOSP IP/OBS MODERATE 35: CPT | Performed by: INTERNAL MEDICINE

## 2018-04-10 RX ORDER — SODIUM CHLORIDE 9 MG/ML
75 INJECTION, SOLUTION INTRAVENOUS CONTINUOUS
Status: DISCONTINUED | OUTPATIENT
Start: 2018-04-10 | End: 2018-04-13

## 2018-04-10 RX ADMIN — LORAZEPAM 1 MG: 2 INJECTION INTRAMUSCULAR; INTRAVENOUS at 11:58

## 2018-04-10 RX ADMIN — IOHEXOL 85 ML: 350 INJECTION, SOLUTION INTRAVENOUS at 16:55

## 2018-04-10 RX ADMIN — Medication 1 TABLET: at 08:22

## 2018-04-10 RX ADMIN — FOLIC ACID 1 MG: 1 TABLET ORAL at 08:22

## 2018-04-10 RX ADMIN — DEXAMETHASONE SODIUM PHOSPHATE 4 MG: 4 INJECTION, SOLUTION INTRAMUSCULAR; INTRAVENOUS at 23:06

## 2018-04-10 RX ADMIN — PANTOPRAZOLE SODIUM 40 MG: 40 TABLET, DELAYED RELEASE ORAL at 05:31

## 2018-04-10 RX ADMIN — INSULIN LISPRO 1 UNITS: 100 INJECTION, SOLUTION INTRAVENOUS; SUBCUTANEOUS at 08:23

## 2018-04-10 RX ADMIN — GUAIFENESIN AND DEXTROMETHORPHAN 10 ML: 100; 10 SYRUP ORAL at 08:22

## 2018-04-10 RX ADMIN — SODIUM CHLORIDE 75 ML/HR: 0.9 INJECTION, SOLUTION INTRAVENOUS at 17:26

## 2018-04-10 RX ADMIN — ARIPIPRAZOLE 10 MG: 10 TABLET ORAL at 08:22

## 2018-04-10 RX ADMIN — SODIUM CHLORIDE 500 ML: 0.9 INJECTION, SOLUTION INTRAVENOUS at 14:25

## 2018-04-10 RX ADMIN — INSULIN GLARGINE 15 UNITS: 100 INJECTION, SOLUTION SUBCUTANEOUS at 23:06

## 2018-04-10 RX ADMIN — LISINOPRIL 5 MG: 5 TABLET ORAL at 08:22

## 2018-04-10 RX ADMIN — Medication 100 MG: at 08:22

## 2018-04-10 RX ADMIN — LORAZEPAM 1 MG: 2 INJECTION INTRAMUSCULAR; INTRAVENOUS at 23:13

## 2018-04-10 RX ADMIN — BUPROPION HYDROCHLORIDE 75 MG: 75 TABLET, FILM COATED ORAL at 23:07

## 2018-04-10 RX ADMIN — DEXAMETHASONE SODIUM PHOSPHATE 4 MG: 4 INJECTION, SOLUTION INTRAMUSCULAR; INTRAVENOUS at 11:59

## 2018-04-10 RX ADMIN — SODIUM CHLORIDE TAB 1 GM 1 G: 1 TAB at 17:26

## 2018-04-10 RX ADMIN — DOCUSATE SODIUM 100 MG: 100 CAPSULE, LIQUID FILLED ORAL at 17:27

## 2018-04-10 RX ADMIN — BUDESONIDE AND FORMOTEROL FUMARATE DIHYDRATE 2 PUFF: 160; 4.5 AEROSOL RESPIRATORY (INHALATION) at 08:21

## 2018-04-10 RX ADMIN — BUDESONIDE AND FORMOTEROL FUMARATE DIHYDRATE 2 PUFF: 160; 4.5 AEROSOL RESPIRATORY (INHALATION) at 23:07

## 2018-04-10 RX ADMIN — NICOTINE 1 PATCH: 21 PATCH, EXTENDED RELEASE TRANSDERMAL at 08:27

## 2018-04-10 RX ADMIN — SODIUM CHLORIDE TAB 1 GM 1 G: 1 TAB at 08:22

## 2018-04-10 RX ADMIN — BUPROPION HYDROCHLORIDE 75 MG: 75 TABLET, FILM COATED ORAL at 08:21

## 2018-04-10 RX ADMIN — INSULIN LISPRO 2 UNITS: 100 INJECTION, SOLUTION INTRAVENOUS; SUBCUTANEOUS at 11:59

## 2018-04-10 NOTE — PROGRESS NOTES
Patient had CT chest, abdomen, and pelvis with IV contrast done today for an indication of brain cancer/neoplasm, staging  Per the results report, there is an infiltrating right middle lobe lung mass with mediastinal metastatic lymph nodes and questionable right lower lobe pulmonary artery filling defect/embolism which could also be artifactual  I personally discussed the above results with the patient this evening  He reports that he feels short of breath but that he is a smoker and has some chronic shortness of breath, worse with walking uphill, and denies any acute changes in his respiratory status  He reports right-sided chest wall pain and states that he has had this pain since he fell and hit this area and denies any new chest pain symptoms  He is satting well on room air  I discussed with the patient that the questionable embolism could be artifactual per the results report but that we could consider a CT PE study for definitive diagnosis  However, the patient's CT of the chest, abdomen, and pelvis today was with IV contrast, and the risks and benefits of another CT with IV contrast were discussed with the patient  We also discussed that if the patient was discovered to have a pulmonary embolism, treatment for which would be anticoagulation, and given his brain mass this would put him at risk for intracranial bleeding  The risks of anticoagulation, including but not limited to intracranial hemorrhage and death were discussed with the patient  The patient verbalized understanding of the risks and benefits discussed  He told me that he does not want a CT tonight to evaluate for possible pulmonary embolism and wants to focus on his brain mass  He expressed that he would like to think about his options overnight and discuss them tomorrow   The patient already has a repeat BMP ordered for the AM  If renal function stable and patient decides at that time that he would like to pursue work up of questionable embolism, consider CT PE study

## 2018-04-10 NOTE — PROGRESS NOTES
Oncology Progress Note  Wilbert Pirl 63 y o  male MRN: [id number]  Unit/Bed#: [facility] 909-01 Encounter: [id number]      /76 (BP Location: Right arm)   Pulse 86   Temp 98 7 °F (37 1 °C) (Oral)   Resp 18   Ht 5' 9" (1 753 m)   Wt 88 kg (194 lb)   SpO2 99%   BMI 28 65 kg/m²     Subjective:  No new complaints    Objective:    General Appearance:    Alert, oriented        Eyes:    PERRL   Ears:    Normal external ear canals, both ears   Nose:   Nares normal, septum midline   Throat:   Mucosa moist  Pharynx without injection  Neck:   Supple       Lungs:     Clear to auscultation bilaterally   Chest Wall:    No tenderness or deformity    Heart:    Regular rate and rhythm       Abdomen:     Soft, non-tender, bowel sounds +, no organomegaly           Extremities:   Extremities no cyanosis or edema       Skin:   no rash or icterus      Lymph nodes:   Cervical, supraclavicular, and axillary nodes normal   Neurologic:   CNII-XII intact, normal strength, sensation and reflexes     throughout        Recent Results (from the past 48 hour(s))   Fingerstick Glucose (POCT)    Collection Time: 04/08/18  5:09 PM   Result Value Ref Range    POC Glucose 382 (H) 65 - 140 mg/dl   Fingerstick Glucose (POCT)    Collection Time: 04/08/18  8:26 PM   Result Value Ref Range    POC Glucose 263 (H) 65 - 140 mg/dl   CBC and differential    Collection Time: 04/09/18  4:51 AM   Result Value Ref Range    WBC 19 91 (H) 4 31 - 10 16 Thousand/uL    RBC 3 89 3 88 - 5 62 Million/uL    Hemoglobin 11 3 (L) 12 0 - 17 0 g/dL    Hematocrit 33 6 (L) 36 5 - 49 3 %    MCV 86 82 - 98 fL    MCH 29 0 26 8 - 34 3 pg    MCHC 33 6 31 4 - 37 4 g/dL    RDW 14 2 11 6 - 15 1 %    MPV 11 6 8 9 - 12 7 fL    Platelets 694 662 - 291 Thousands/uL    nRBC 0 /100 WBCs   Basic metabolic panel    Collection Time: 04/09/18  4:51 AM   Result Value Ref Range    Sodium 130 (L) 136 - 145 mmol/L    Potassium 4 8 3 5 - 5 3 mmol/L    Chloride 98 (L) 100 - 108 mmol/L    CO2 24 21 - 32 mmol/L    Anion Gap 8 4 - 13 mmol/L    BUN 35 (H) 5 - 25 mg/dL    Creatinine 1 10 0 60 - 1 30 mg/dL    Glucose 300 (H) 65 - 140 mg/dL    Calcium 9 1 8 3 - 10 1 mg/dL    eGFR 73 ml/min/1 73sq m   Manual Differential(PHLEBS Do Not Order)    Collection Time: 04/09/18  4:51 AM   Result Value Ref Range    Segmented % 93 (H) 43 - 75 %    Lymphocytes % 6 (L) 14 - 44 %    Monocytes % 1 (L) 4 - 12 %    Eosinophils % 0 0 - 6 %    Basophils % 0 0 - 1 %    Absolute Neutrophils 18 52 (H) 1 85 - 7 62 Thousand/uL    Lymphocytes Absolute 1 19 0 60 - 4 47 Thousand/uL    Monocytes Absolute 0 20 0 00 - 1 22 Thousand/uL    Eosinophils Absolute 0 00 0 00 - 0 40 Thousand/uL    Basophils Absolute 0 00 0 00 - 0 10 Thousand/uL    Total Counted      Platelet Estimate Adequate Adequate   Fingerstick Glucose (POCT)    Collection Time: 04/09/18  8:22 AM   Result Value Ref Range    POC Glucose 353 (H) 65 - 140 mg/dl   Fingerstick Glucose (POCT)    Collection Time: 04/09/18 12:00 PM   Result Value Ref Range    POC Glucose 295 (H) 65 - 140 mg/dl   Fingerstick Glucose (POCT)    Collection Time: 04/09/18  6:23 PM   Result Value Ref Range    POC Glucose 192 (H) 65 - 140 mg/dl   Fingerstick Glucose (POCT)    Collection Time: 04/09/18  9:41 PM   Result Value Ref Range    POC Glucose 70 65 - 140 mg/dl   Fingerstick Glucose (POCT)    Collection Time: 04/09/18 10:55 PM   Result Value Ref Range    POC Glucose 112 65 - 140 mg/dl   CBC and differential    Collection Time: 04/10/18  4:32 AM   Result Value Ref Range    WBC 20 58 (H) 4 31 - 10 16 Thousand/uL    RBC 4 08 3 88 - 5 62 Million/uL    Hemoglobin 11 9 (L) 12 0 - 17 0 g/dL    Hematocrit 35 2 (L) 36 5 - 49 3 %    MCV 86 82 - 98 fL    MCH 29 2 26 8 - 34 3 pg    MCHC 33 8 31 4 - 37 4 g/dL    RDW 14 1 11 6 - 15 1 %    MPV 11 6 8 9 - 12 7 fL    Platelets 117 375 - 955 Thousands/uL    nRBC 0 /100 WBCs   Basic metabolic panel    Collection Time: 04/10/18  4:32 AM   Result Value Ref Range    Sodium 132 (L) 136 - 145 mmol/L    Potassium 4 9 3 5 - 5 3 mmol/L    Chloride 100 100 - 108 mmol/L    CO2 27 21 - 32 mmol/L    Anion Gap 5 4 - 13 mmol/L    BUN 33 (H) 5 - 25 mg/dL    Creatinine 1 08 0 60 - 1 30 mg/dL    Glucose 186 (H) 65 - 140 mg/dL    Calcium 9 5 mg/dL    eGFR 74 ml/min/1 73sq m   Manual Differential(PHLEBS Do Not Order)    Collection Time: 04/10/18  4:32 AM   Result Value Ref Range    Segmented % 95 (H) 43 - 75 %    Lymphocytes % 2 (L) 14 - 44 %    Monocytes % 3 (L) 4 - 12 %    Eosinophils % 0 0 - 6 %    Basophils % 0 0 - 1 %    Absolute Neutrophils 19 55 (H) 1 85 - 7 62 Thousand/uL    Lymphocytes Absolute 0 41 (L) 0 60 - 4 47 Thousand/uL    Monocytes Absolute 0 62 0 00 - 1 22 Thousand/uL    Eosinophils Absolute 0 00 0 00 - 0 40 Thousand/uL    Basophils Absolute 0 00 0 00 - 0 10 Thousand/uL    Total Counted      RBC Morphology Normal     Platelet Estimate Adequate Adequate   Fingerstick Glucose (POCT)    Collection Time: 04/10/18  7:45 AM   Result Value Ref Range    POC Glucose 193 (H) 65 - 140 mg/dl   Fingerstick Glucose (POCT)    Collection Time: 04/10/18 11:14 AM   Result Value Ref Range    POC Glucose 219 (H) 65 - 140 mg/dl         Mri Brain W Wo Contrast    Result Date: 4/7/2018  Narrative: MRI BRAIN WITH AND WITHOUT CONTRAST INDICATION:   mass  Abnormal head CT COMPARISON:  10/3/2008 TECHNIQUE: Sagittal T1, axial T2, axial FLAIR, axial T1, axial [place], axial diffusion  Sagittal, axial and coronal T1 postcontrast   Axial BRAVO post contrast   IV Contrast:  gadobenate dimeglumine (MULTIHANCE) injection (MULTI-DOSE) 18 mL   IMAGE QUALITY:   Diagnostic  FINDINGS: BRAIN PARENCHYMA:  In the left parietal vertex at the gray-white matter junction there is an irregularly enhancing, centrally necrotic intra-axial mass measuring 2 4 cm  The tumor extends to the cortical surface and has associated local vasogenic edema with local sulcal effacement in the parasagittal left parietal lobe posteriorly     No additional enhancing masses lesions detected  Diffusion imaging demonstrates high signal in the periphery of the tumor likely reflective of associated internal cellularity  Susceptibility weighted imaging demonstrates small fluid fluid levels, suggesting internal hemorrhagic elements  Separately in the parasagittal aspect of the right frontal lobe superiorly there is a somewhat stellate region of blooming artifact on image 80, series 6, possibly sequela of previous hemorrhage  No abnormal enhancement associated with the 2nd area of blooming artifact  No extra-axial fluid collection  Cerebellar tonsils normally positioned  VENTRICLES:  Normal  SELLA AND PITUITARY GLAND:  Normal  ORBITS:  Normal  PARANASAL SINUSES:  Normal  VASCULATURE:  Evaluation of the major intracranial vasculature demonstrates appropriate flow voids  CALVARIUM AND SKULL BASE:  Normal  EXTRACRANIAL SOFT TISSUES:  Normal      Impression: Enhancing 2 4 cm mass with vasogenic edema in the left parietal vertex centered at the gray-white matter junction extending to the cortical surface  Findings may represent a solitary metastasis in this patient with a history of lung cancer  Differential diagnosis includes primary brain tumor  Neurosurgical assessment recommended  Workstation performed: [id number]     Ct Chest Abdomen Pelvis W Contrast    Result Date: 4/9/2018  Narrative: CT CHEST, ABDOMEN AND PELVIS WITH IV CONTRAST INDICATION:   Brain cancer/neoplasm, staging  COMPARISON: None  TECHNIQUE: CT examination of the chest, abdomen and pelvis was performed  Axial, sagittal, and coronal 2D reformatted images were created from the source data and submitted for interpretation  Radiation dose length product (DLP) for this visit:  931 98 mGy-cm     This examination, like all CT scans performed in the [facility], was performed utilizing techniques to minimize radiation dose exposure, including the use of iterative  reconstruction and automated exposure control  IV Contrast:  100 mL of iohexol (OMNIPAQUE) Enteric Contrast: Enteric contrast was administered  FINDINGS: CHEST LUNGS:  Right middle lobe 4 4 x 3 4 cm mass lesion/neoplasm obstructing the right middle lobe bronchus with distal atelectasis  Tiny 3 mm nodule right upper lobe series 3 image 25 possibly a metastatic nodule  PLEURA:  Unremarkable  HEART/GREAT VESSELS:  There is infiltration of the right middle lobe lung mass into the right middle lobe pulmonary artery  There is questionable right lower lobe pulmonary embolus  MEDIASTINUM AND MERVIN:  Metastatic lymph nodes identified in the mediastinum including the anterior retrosternal space, right paratracheal space, subcarinal space  There is also a left-sided subcarinal 2 5 cm metastatic lymph node  Questionable partially visualized right supraclavicular 1 5 cm lymph node  CHEST WALL AND LOWER NECK:   There is questionable right lower lobe pulmonary embolus  ABDOMEN LIVER/BILIARY TREE:  Unremarkable  GALLBLADDER:  No calcified gallstones  No pericholecystic inflammatory change  SPLEEN:  Unremarkable  PANCREAS:  Unremarkable  ADRENAL GLANDS:  Unremarkable  KIDNEYS/URETERS:  Unremarkable  No hydronephrosis  STOMACH AND BOWEL:  There is colonic diverticulosis without evidence of acute diverticulitis  APPENDIX:  No findings to suggest appendicitis  ABDOMINOPELVIC CAVITY:  No ascites or free intraperitoneal air  No lymphadenopathy  VESSELS:  Unremarkable for patient's age  PELVIS REPRODUCTIVE ORGANS:  Unremarkable for patient's age  URINARY BLADDER:  Unremarkable  ABDOMINAL WALL/INGUINAL REGIONS:  Unremarkable  OSSEOUS STRUCTURES:  No acute fracture or destructive osseous lesion  Impression: Infiltrating right middle lobe lung mass with mediastinal metastatic lymph nodes as described above   There is questionable right lower lobe pulmonary artery filling defect/embolism which could also be artifactual  Workstation performed: [id number] Assessment and Plan : This is an unfortunate 62-year-old male with a past medical history of stage IV lung cancer who came in with a newly diagnosed brain metastases  He had a CT scan of the chest abdomen pelvis done yesterday which showed a questionable filling defect  A CT with PE protocol was recommended but he initially refused yesterday and again this morning  We had a discussion I explained to would be hard for our colleagues in Neurosurgery to take him for surgery and put him under anesthesia without knowing his clot burden and after discussion he agree to have a CT with PE protocol  I agree with doing a Doppler ultrasound of lower extremities and if indeed he does have a DVT then placing an IVC filter as he cannot be anticoagulated fully at this point because of his brain metastases and impending surgery  Once he has had a brain metastases addressed he should go back to see his primary oncologist at [facility] and they will resume therapy after comparing his imaging from prior to starting immunotherapy to the imaging he had now and if he has had a response continuing the same therapy if he is progressing considering alternative therapy  Please call with any questions

## 2018-04-10 NOTE — PROGRESS NOTES
Progress Note - Roberta Singletary 1955, 63 y o  male MRN: [id number]    Unit/Bed#: [facility] 909-01 Encounter: [id number]    Primary Care Provider: Nicolle Red MD   Date and time admitted to hospital: 4/6/2018  5:10 PM        Leukocytosis   Assessment & Plan    - likely fluctuating secondary to IV Decadron - monitor WBC count  -WBC 20 58  - remains afebrile        Bipolar disorder   Assessment & Plan    - continue Abilify/Wellbutrin regimen  -mood stable        Insulin-dependent diabetes mellitus   Assessment & Plan    - HgA1c of 8 0- poorly controlled    - optimize SSI coverage to higher algorithm due to concurrent IV steroid administration -Lantus 15U QHS           Hypertension   Assessment & Plan    - low-sodium diet encouraged  -BP elevated  - continue Zestril        Alcohol abuse   Assessment & Plan    - counseled on cessation although unlikely to comply - per patient, last drink was on 4/5  - monitor for signs/symptoms of withdrawal - PRN IV Ativan   - started on folic acid/thiamine supplementation   -no signs of Alcohol withdrawal        Tobacco abuse   Assessment & Plan    - still smokes despite metastatic lung cancer diagnosis  - counseled on cessation - transdermal nicotine patch         Lung cancer    Assessment & Plan    - per patient, lung and his metastatic to lymph nodes and pelvis  - PRN pain control/supportive care   -patient confirms he is interested in having brain mass resected, as well as chemo for further tx            * 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    Assessment & Plan    - neurosurgery following - await decision on intervention during this week - MRI reveals a 2 4 cm mass of the left parietal vertex  - c/w IV Decadron for vasogenic edema/swelling (optimize insulin regimen due to history of diabetes mellitus)   - patient notes right-sided weakness improved  - PRN pain control and supportive care otherwise  CT chest/abdomen/pelvis with contrast 4/9:  Infiltrating right middle lobe lung mass with mediastinal metastatic lymph nodes as described above  There is questionable right lower lobe pulmonary artery filling defect/embolism which could also be artifactual     -has underlying NSCLC, s/p chemo last 2018  -filling defect in RLL - suggests PE  -patient refused to have CTA of chest today-will await the brain mass procedure  -no AC for now since he is awaiting surgery  Hyperkalemiaresolved as of 4/10/2018   Assessment & Plan    - serum potassium normalized today  - monitor BMP          VTE Pharmacologic Prophylaxis:   Pharmacologic: Pharmacologic VTE Prophylaxis contraindicated due to awaiting brain mass resection  Mechanical VTE Prophylaxis in Place: Yes    Patient Centered Rounds: I have performed bedside rounds with nursing staff today  Discussions with Specialists or Other Care Team Provider: yes    Education and Discussions with Family / Patient:yes  Time Spent for Care: 45 minutes  More than 50% of total time spent on counseling and coordination of care as described above  Current Length of Stay: 4 day(s)    Current Patient Status: Inpatient   Certification Statement: The patient will continue to require additional inpatient hospital stay due to med mgt    Discharge Plan: rehab    Code Status: Level 3 - DNAR and DNI      Subjective:   Reported he lost 120lbs over the past year nonintentionally  Denied any weakness, seizure activity, hallucinations, tremors  ROS negative otherwise  Objective:     Vitals:   Temp (24hrs), Av 3 °F (36 8 °C), Min:97 6 °F (36 4 °C), Max:98 8 °F (37 1 °C)    HR:  [] 86  Resp:  [17-18] 18  BP: (141-157)/(76-92) 142/76  SpO2:  [96 %-99 %] 99 %  Body mass index is 28 65 kg/m²  Input and Output Summary (last 24 hours):        Intake/Output Summary (Last 24 hours) at 04/10/18 1233  Last data filed at 04/10/18 0849   Gross per 24 hour   Intake             1050 ml   Output                0 ml   Net             1050 ml Physical Exam:     Physical Exam   Constitutional: He is oriented to person, place, and time  No distress  HENT:   Head: Normocephalic and atraumatic  Mouth/Throat: Oropharynx is clear and moist  No oropharyngeal exudate  Poor oral dentition   Eyes: EOM are normal  Pupils are equal, round, and reactive to light  Neck: Normal range of motion  No JVD present  Cardiovascular: Normal rate  Exam reveals no gallop and no friction rub  No murmur heard  Pulmonary/Chest: Effort normal and breath sounds normal  No respiratory distress  He has no wheezes  Abdominal: Soft  Bowel sounds are normal  He exhibits no distension  There is no tenderness  Musculoskeletal: Normal range of motion  He exhibits no edema, tenderness or deformity  Neurological: He is alert and oriented to person, place, and time  No cranial nerve deficit  Skin: Skin is warm  No erythema  Psychiatric: He has a normal mood and affect  His behavior is normal          Additional Data:     Labs:      Results from last 7 days  Lab Units 04/10/18  0432   WBC Thousand/uL 20 58*   HEMOGLOBIN g/dL 11 9*   HEMATOCRIT % 35 2*   PLATELETS Thousands/uL 294   LYMPHO PCT % 2*   MONO PCT MAN % 3*   EOSINO PCT MANUAL % 0       Results from last 7 days  Lab Units 04/10/18  0432   SODIUM mmol/L 132*   POTASSIUM mmol/L 4 9   CHLORIDE mmol/L 100   CO2 mmol/L 27   BUN mg/dL 33*   CREATININE mg/dL 1 08   CALCIUM mg/dL 9 5   GLUCOSE RANDOM mg/dL 186*       Results from last 7 days  Lab Units 04/08/18  0447   INR  1 03       * I Have Reviewed All Lab Data Listed Above  * Additional Pertinent Lab Tests Reviewed:  [facility] Admission Reviewed    Imaging:    Imaging Reports Reviewed Today  Recent Cultures (last 7 days):           Last 24 Hours Medication List:     Current Facility-Administered Medications:  acetaminophen 650 mg Oral Q6H PRN Sherrell Presser, PA-C   albuterol 2 puff Inhalation Q6H PRN Sherrell Presser, PA-C ARIPiprazole 10 mg Oral Daily Rosa Correa PA-C   budesonide-formoterol 2 puff Inhalation Q12H [address] Rosa Correa PA-C   buPROPion 75 mg Oral Q12H [address] Rosa Correa PA-C   calcium carbonate 1,000 mg Oral Daily PRN Rosa Correa PA-C   dexamethasone 4 mg Intravenous Q12H [address] Sandie Clayton MD   dextromethorphan-guaiFENesin 10 mL Oral Q4H PRN Sandie Clayton MD   docusate sodium 100 mg Oral BID Rosa Correa PA-C   folic acid 1 mg Oral Daily Sandie Clayton MD   insulin glargine 15 Units Subcutaneous HS Sandie Clayton MD   insulin lispro 1-5 Units Subcutaneous 4x Daily (AC & HS) Sandie Clayton MD   insulin lispro 20 Units Subcutaneous TID With Meals Sandie Clayton MD   lisinopril 5 mg Oral Daily Rosa Correa PA-C   LORazepam 1 mg Intravenous Q4H PRN Rosa Correa PA-C   multivitamin-minerals 1 tablet Oral Daily Rosa Correa PA-C   nicotine 1 patch Transdermal Daily Rosa Correa PA-C   pantoprazole 40 mg Oral Early Morning Rosa Correa PA-C   sodium chloride 1 g Oral BID Rosa Correa PA-C   thiamine 100 mg Oral Daily Rosa Correa PA-C        Today, Patient Was Seen By: Farrah Simmons MD    ** Please Note: Dictation voice to text software may have been used in the creation of this document   **

## 2018-04-10 NOTE — PROGRESS NOTES
I was notified by radiology of CTA chest PE study results:  Small acute occlusive focus of left upper lobe pulmonary arterial embolism, and small foci of recanalized right lower lobe pulmonary arterial embolism are likely subacute  SLIM attending Dr [facility] made aware

## 2018-04-10 NOTE — PROGRESS NOTES
04/10/18 1000   Plan of Care   Comments  introduces self and begins to establish a caring relationship through which pt is helped   offered a safe space for the pt to talk  Pt had a bad experience with the [facility], but is open to pastoral care     Assessment Completed by: Unit visit

## 2018-04-10 NOTE — CASE MANAGEMENT
Continued Stay Review    Date: 4/10    Vital Signs: /76 (BP Location: Right arm)   Pulse 86   Temp 98 7 °F (37 1 °C) (Oral)   Resp 18   Ht 5' 9" (1 753 m)   Wt 88 kg (194 lb)   SpO2 99%   BMI 28 65 kg/m²     Medications:   Scheduled Meds:   Current Facility-Administered Medications:  ARIPiprazole 10 mg Oral Daily   budesonide-formoterol 2 puff Inhalation Q12H [facility]   buPROPion 75 mg Oral Q12H [facility]   dexamethasone 4 mg Intravenous Q12H [facility]   docusate sodium 100 mg Oral BID   folic acid 1 mg Oral Daily   insulin glargine 15 Units Subcutaneous HS   insulin lispro 1-5 Units Subcutaneous 4x Daily (AC & HS)   insulin lispro 20 Units Subcutaneous TID With Meals   lisinopril 5 mg Oral Daily   multivitamin-minerals 1 tablet Oral Daily   nicotine 1 patch Transdermal Daily   pantoprazole 40 mg Oral Early Morning   sodium chloride 500 mL Intravenous Once   sodium chloride 1 g Oral BID   thiamine 100 mg Oral Daily     Continuous Infusions:    PRN Meds:   acetaminophen    albuterol    calcium carbonate    dextromethorphan-guaiFENesin    LORazepam Iv x1    Abnormal Labs/Diagnostic Results:   VAS lower limb venous duplex study - pending    4/10 CT Chest/ Abd/ Pelvis - Infiltrating right middle lobe lung mass with mediastinal metastatic lymph nodes    There is questionable right lower lobe pulmonary artery filling defect/embolism which could also be artifactual      04/10/18 0432    WBC 4 31 - 10 16 Thousand/uL 20 58     RBC 3 88 - 5 62 Million/uL 4 08    Hemoglobin 12 0 - 17 0 g/dL 11 9     Hematocrit 36 5 - 49 3 % 35 2        04/10/18 0432    Sodium 136 - 145 mmol/L 132       Age/Sex: 63 y o  male     Assessment/Plan:   Leukocytosis   Assessment & Plan     - likely fluctuating secondary to IV Decadron - monitor WBC count  -WBC 20 58  - remains afebrile          Bipolar disorder   Assessment & Plan     - continue Abilify/Wellbutrin regimen  -mood stable          Insulin-dependent diabetes mellitus   Assessment & Plan   - HgA1c of 8 0- poorly controlled  - optimize SSI coverage to higher algorithm due to concurrent IV steroid administration -Lantus 15U QHS              Hypertension   Assessment & Plan     - low-sodium diet encouraged  -BP elevated  - continue Zestril          Alcohol abuse   Assessment & Plan     - counseled on cessation although unlikely to comply - per patient, last drink was on 4/5  - monitor for signs/symptoms of withdrawal - PRN IV Ativan   - started on folic acid/thiamine supplementation   -no signs of Alcohol withdrawal          Tobacco abuse   Assessment & Plan     - still smokes despite metastatic lung cancer diagnosis  - counseled on cessation - transdermal nicotine patch           Lung cancer    Assessment & Plan     - per patient, lung and his metastatic to lymph nodes and pelvis  - PRN pain control/supportive care   -patient confirms he is interested in having brain mass resected, as well as chemo for further tx                * 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    Assessment & Plan     - neurosurgery following - await decision on intervention during this week - MRI reveals a 2 4 cm mass of the left parietal vertex  - c/w IV Decadron for vasogenic edema/swelling (optimize insulin regimen due to history of diabetes mellitus)   - patient notes right-sided weakness improved  - PRN pain control and supportive care otherwise  CT chest/abdomen/pelvis with contrast 4/9:  Infiltrating right middle lobe lung mass with mediastinal metastatic lymph nodes as described above   There is questionable right lower lobe pulmonary artery filling defect/embolism which could also be artifactual      -has underlying NSCLC, s/p chemo last Feb 2018  -filling defect in RLL - suggests PE  -patient refused to have CTA of chest today-will await the brain mass procedure  -no AC for now since he is awaiting surgery              Hyperkalemiaresolved as of 4/10/2018   Assessment & Plan     - serum potassium normalized today  - monitor BMP             VTE Pharmacologic Prophylaxis:   Pharmacologic: Pharmacologic VTE Prophylaxis contraindicated due to awaiting brain mass resection  Mechanical VTE Prophylaxis in Place: Yes    Current Length of Stay: 4 day(s)     Current Patient Status: Inpatient   Certification Statement: The patient will continue to require additional inpatient hospital stay due to med mgt    Discharge Plan: TBD

## 2018-04-10 NOTE — CASE MANAGEMENT
Initial Clinical Review    Admission: Date/Time/Statement: 4/6/18 @ 1710     Orders Placed This Encounter   Procedures    Inpatient Admission     Standing Status:   Standing     Number of Occurrences:   1     Order Specific Question:   Admitting Physician     Answer:   Chelle Howell     Order Specific Question:   Level of Care     Answer:   Med Surg [16]     Order Specific Question:   Estimated length of stay     Answer:   More than 2 Midnights     Order Specific Question:   Certification     Answer:   I certify that inpatient services are medically necessary for this patient for a duration of greater than two midnights  See H&P and MD Progress Notes for additional information about the patient's course of treatment  ED: Date/Time/Mode of Arrival:  Tx from [facility] 4/6 ~ 5 pm    Chief Complaint: Right-sided numbness  History of Illness:  63 y o  male with a history of HTN, DM, tobacco abuse, alcohol abuse, and metastatic lung cancer who presents with right-sided numbness starting this morning  He thought he was having a stroke  Patient presented originally at [facility]  Imaging reviewed 2 5 cm mass in the left parietal lobe with vasogenic edema and patient was transferred to [facility] for neurosurgery evaluation       Patient tells me he was diagnosed with lung cancer in Dec 2017  At time of presentation, it was stage IV and had metastasized to lymph nodes and pelvis  He started immunotherapy (not sure of name) and has been getting that every 3 weeks over the past 9-10 weeks  He follows with Dr Lindsay Sanchez      Patient has been smoking 1-2 ppd since age 17 and continues to smoke daily  He also reports frequent alcohol abuse and has been in and out of rehab  He drinks 2L of liquor weekly  He doesn't always drink daily- sometimes he skips a few days  He does get withdrawal symptoms but has never had a withdrawal seizure  Last drink was yesterday   His UDS at [facility] was positive for cocaine  Reports history of bipolar disorder and was recently prescribed Wellbutrin, Abilify, and ?hydroxyzine  He has not yet filled these prescriptions      Currently, his numbness is resolved  He complains only of right sided rib pain  States he fell earlier this week when he was drunk  Exam:  Musculoskeletal: Right lower ribs tender to palpation   Neurological: He is alert and oriented to person, place, and time  No cranial nerve deficit  Coordination normal    Normal strength throughout  Sensation to light touch in tact  Slight resting tremor B/L (chronic per patient from prior Abilify use)  Vital Signs:  04/07 0701  04/08 0700 04/08 0701  04/08 1513  Most Recent     Temperature (°F) 98  598 7 98  98 (36 7)    Pulse 94101 95  95    Respirations 1920 20  20    Blood Pressure 149/92156/75 150/76  150/76    SpO2 (%) 9293 93  93        Abnormal Labs/Diagnostic Test Results:     Imaging and [place]:  CT brain: 2 5 cm mass left parietal lobe with surrounding vasogenic edema  CT c/a/p showed: bulky mediastinal adenopathy, right hilar mass with obstructive atelectasis of right middle lobe mass which has progressed in the interval  EKG: Sinus rhythm with occasional PACs    4/7 -- MRI brain -- Enhancing 2 4 cm mass with vasogenic edema in the left parietal vertex centered at the gray-white matter junction extending to the cortical surface  Findings may represent a solitary metastasis in this patient with a history of lung cancer  Differential diagnosis includes primary brain tumor  Neurosurgical assessment recommended        Past Medical/Surgical History:   Past Medical History:   Diagnosis Date    Diabetes mellitus ([facility] [address] )     Hypertension     Lung cancer ([facility] [address] [facility]     Psychiatric disorder        Admitting Diagnosis: Brain mass [G93 9]    Age/Sex: 63 y o  male    Assessment/Plan:       * Brain mass   Assessment & Plan     · 2 5 cm mass left parietal lobe with surrounding vasogenic edema  · ? Metastatic from lung cancer vs primary  · Decadron  · Neuro checks  · Neurosurgery evaluation          Lung cancer [facility])   Assessment & Plan     · Diagnosed December 2017- Stage IV with mets to lymph nodes and pelvis per patient  · Currently receiving immunotherapy  · Follows with Dr Steff Pascual          Bipolar disorder [facility])   Assessment & Plan     · Started on Wellbutrin and Abilify earlier this week- hasn't gotten them filled yet  Will start  · Also started on third medication ?hydroxyzine  Patient unsure   Would need to confirm           Type 2 diabetes mellitus (HCC)   Assessment & Plan     · Takes only Humalog sliding scale at home  · Check A1C  · Sliding scale for now- adjust as needed          Hypertension   Assessment & Plan     · Continue lisinopril          Alcohol abuse   Assessment & Plan     · 2L liquor consumed weekly- last drink 4/5  · Thiamine, folic acid, multivitamin  · Ativan PRN          Tobacco abuse   Assessment & Plan     · Nicotine patch     Admission Orders:  Admit to M/S unit  Consult oncology, Neurosurgery  Cons carb diet  accuchecks qid w/ ssi  SCD's  Up & oob as tolerated           Admission Orders:  Admit to M/S unit  Consult oncology, Neurosurgery  Cons carb diet  accuchecks qid w/ ssi  SCD's  Up & oob as tolerated    Scheduled Meds:   Scheduled Meds:   Current Facility-Administered Medications:  ARIPiprazole 10 mg Oral Daily   aspirin 81 mg Oral Daily   budesonide-formoterol 2 puff Inhalation Q12H [address]   buPROPion 75 mg Oral Q12H [address]   dexamethasone 4 mg Intravenous Q6H [address]   dextromethorphan-guaiFENesin 10 mL Oral Q4H PRN   docusate sodium 100 mg Oral BID   folic acid 1 mg Oral Daily   insulin glargine 10 Units Subcutaneous HS   insulin lispro 1-5 Units Subcutaneous 4x Daily (AC & HS)   insulin lispro 10 Units Subcutaneous TID With Meals   lisinopril 5 mg Oral Daily   multivitamin-minerals 1 tablet Oral Daily   nicotine 1 patch Transdermal Daily   pantoprazole 40 mg Oral Early Morning   sodium chloride 1 g Oral BID   thiamine 100 mg Oral Daily       PRN Meds:   acetaminophen    albuterol    calcium carbonate    dextromethorphan-guaiFENesin    LORazepam      NS consult 4/7 --  Assessment:  1  2 5cm left parietal brain mass with vasogenic edema  2  Metastatic lung cancer  3  ETOH abuse  4  Tobacco use  5   Diabetes  andHTN     Plan:  - solitary brain mass in the setting of known metastatic lung cancer  - he is treated at [facility] and the records are not available  - his symptoms have resolved except he has a right sided pronator drift  - consider surgical intervention if he is an acceptable candidate medically  - check coags -   - review with neurosurgery attending- see attestation

## 2018-04-10 NOTE — PROGRESS NOTES
Progress Note - Neurosurgery   Wilbert Razo 63 y o  male MRN: [id number]  Unit/Bed#: [facility] 909-01 Encounter: [id number]    Assessment:  1  Left parietal enhancing brain mass  2  Cerebral edema  3  Metastatic squamous cell carcinoma of the lung  4  Alcohol abuse  5  Tobacco abuse  6  Diabetes  7  Hypertension  8  History of hepatitis-C    Plan:  · Exam reveals GCS 15  No focal findings  Continue frequent neurological checks  · Imaging reviewed personally and by attending  Final results as below  · MRI brain with without contrast 4/7/18:  Solitary irregularly enhancing 2 4 cm left parietal mass with associated vasogenic edema  · CT chest abdomen pelvis 4/9/18: Known right infiltrating RML lung mass with metastatic lymph nodes  Possible RLL embolism  · Personally contacted patient's oncologist, Dr Mei Baker,  office at [facility] to obtain last of this note and pathology  Documentation now upload to media tab  · Bronchial biopsies 12/12/2017:  Non-small cell carcinoma with features consistent with moderately to poorly differentiated squamous cell carcinoma  Staining for P63 is negative while staining for TTF-1 is negative  · Patient had stage in Imaging on January 15, 2018 which showed 6 5 cm right middle lobe hypermetabolic mass with a 1 3 cm anterior right lobe region pulmonary metastasis with trace right pleural effusion  He was also noted to have adenopathy in the right subclavicular, right superior mediastinum, right anterior mediastinal, right hilar, left mid thoracic and paraesophageal all consistent with metastatic disease  There is also a lesion noted in his right supra-acetabular iliac bone suspicious for osseous metastasis  · As documented, patient underwent an MRI brain on January 25, 2018 which was negative for metastatic disease  · Patient refused any type of palliative chemotherapy    He was started on immunotherapy with Fransisco Karma first cycle on February 16, 2018 along with Xgeva  · Ordered BLE dopplers  If positive, will need IVC filter placed  · Consider formal eval for possible PE  Kidney function appropriate for further dye imaging  · Recommend for full work-up of clot burden prior to surgical intervention  · Medical management - glucose control (A1C 8 0, 24H glucose ), correct lytes (Na 130->132), Monitor alcohol withdrawl, anxiety  Blood pressure - continue ACE, Etc  · Pain control per primary team  · Mobilize as tolerated with assistance  · DVT PPX:  Bilateral SCDs off during exam  Await results of dopplers  Okay HSQ but will need to be stopped prior to surgery  · Hold all anti-platelet anticoagulation medications  Patient was on aspirin 81 mg which was discontinued today  Last dose 4/8/18 at 0821  · Continue decadron 4mg Q 6H   · Will continue to follow  Tentative surgical resection of brain mass later this week  Subjective/Objective   Chief Complaint: "I'm not doing as well today"/folllow-up brain mass    Subjective: Patient states overall mood difficulty today  Concerned regarding kidneys and additional testing  Admits to 1L/2 days and history of withdrawal without seizures in the past  Mild tremor now which he relates to prior Abilify dose for bipolar  Denies HA, vision or speech complaints  Denies CP/SOB  Notes pain along right inferior anterior ribcage  No N/V/sz  Objective: Sitting up in chair  Anxious  I/O       04/08 0701 - 04/09 0700 04/09 0701 - 04/10 0700 04/10 0701 - 04/11 0700    P  O  1200 910 960    Total Intake(mL/kg) 1200 (13 6) 910 (10 3) 960 (10 9)    Urine (mL/kg/hr) 2350 (1 1) 400 (0 2)     Total Output 2350 400      Net -1150 +510 +960           Unmeasured Urine Occurrence  2 x           Invasive Devices     Peripheral Intravenous Line            Peripheral IV 04/10/18 Dorsal (posterior); Right Forearm less than 1 day                Physical Exam:  Vitals: Blood pressure 142/76, pulse 86, temperature 98 7 °F (37 1 °C), temperature source Oral, resp  rate 18, height 5' 9" (1 753 m), weight 88 kg (194 lb), SpO2 99 %  ,Body mass index is 28 65 kg/m²  General appearance: alert, appears stated age, cooperative and no distress  Head: Normocephalic, without obvious abnormality, atraumatic  Eyes: EOMI, PERRL, poor acuity  Able to count fingers but not read board or TV  Mouth: Very poor dentition with few teeth present  Neck: supple, symmetrical, trachea midline   Lungs: non labored breathing  Heart: regular heart rate  Neurologic:   Mental status: Alert, oriented, thought content appropriate  Cranial nerves: grossly intact (Cranial nerves II-XII)  Sensory: normal to LT  Motor: moving all extremities without focal weakness   Coordination: finger to nose minimal difficulty bilaterally, no drift bilaterally    Lab Results:    Results from last 7 days  Lab Units 04/10/18  0432 04/09/18  0451 04/07/18  0445 04/06/18  2120   WBC Thousand/uL 20 58* 19 91* 12 26* 12 47*   HEMOGLOBIN g/dL 11 9* 11 3* 11 4* 11 3*   HEMATOCRIT % 35 2* 33 6* 33 5* 33 6*   PLATELETS Thousands/uL 294 284 311 330   MONO PCT MAN % 3* 1*  --  2*       Results from last 7 days  Lab Units 04/10/18  0432 04/09/18  0451 04/07/18  0445   SODIUM mmol/L 132* 130* 133*   POTASSIUM mmol/L 4 9 4 8 5 0   CHLORIDE mmol/L 100 98* 102   CO2 mmol/L 27 24 23   BUN mg/dL 33* 35* 31*   CREATININE mg/dL 1 08 1 10 1 23   CALCIUM mg/dL 9 5 9 1 9 3   GLUCOSE RANDOM mg/dL 186* 300* 429*       Results from last 7 days  Lab Units 04/07/18  0445 04/06/18  2120   MAGNESIUM mg/dL 1 6 1 5*           Results from last 7 days  Lab Units 04/08/18  0447   INR  1 03   PTT seconds 22*     No results found for: TROPONINT  ABG:No results found for: PHART, EPF4WDV, PO2ART, NVU1HIL, J8DRUQBZ, BEART, SOURCE    Imaging Studies: I have personally reviewed pertinent reports     and I have personally reviewed pertinent films in PACS    Mri Brain W Wo Contrast    Result Date: 4/7/2018  Narrative: MRI BRAIN WITH AND WITHOUT CONTRAST INDICATION:   mass  Abnormal head CT COMPARISON:  10/3/2008 TECHNIQUE: Sagittal T1, axial T2, axial FLAIR, axial T1, axial [place], axial diffusion  Sagittal, axial and coronal T1 postcontrast   Axial BRAVO post contrast   IV Contrast:  gadobenate dimeglumine (MULTIHANCE) injection (MULTI-DOSE) 18 mL   IMAGE QUALITY:   Diagnostic  FINDINGS: BRAIN PARENCHYMA:  In the left parietal vertex at the gray-white matter junction there is an irregularly enhancing, centrally necrotic intra-axial mass measuring 2 4 cm  The tumor extends to the cortical surface and has associated local vasogenic edema with local sulcal effacement in the parasagittal left parietal lobe posteriorly  No additional enhancing masses lesions detected  Diffusion imaging demonstrates high signal in the periphery of the tumor likely reflective of associated internal cellularity  Susceptibility weighted imaging demonstrates small fluid fluid levels, suggesting internal hemorrhagic elements  Separately in the parasagittal aspect of the right frontal lobe superiorly there is a somewhat stellate region of blooming artifact on image 80, series 6, possibly sequela of previous hemorrhage  No abnormal enhancement associated with the 2nd area of blooming artifact  No extra-axial fluid collection  Cerebellar tonsils normally positioned  VENTRICLES:  Normal  SELLA AND PITUITARY GLAND:  Normal  ORBITS:  Normal  PARANASAL SINUSES:  Normal  VASCULATURE:  Evaluation of the major intracranial vasculature demonstrates appropriate flow voids  CALVARIUM AND SKULL BASE:  Normal  EXTRACRANIAL SOFT TISSUES:  Normal      Impression: Enhancing 2 4 cm mass with vasogenic edema in the left parietal vertex centered at the gray-white matter junction extending to the cortical surface  Findings may represent a solitary metastasis in this patient with a history of lung cancer  Differential diagnosis includes primary brain tumor    Neurosurgical assessment recommended  Workstation performed: [id number]     Ct Chest Abdomen Pelvis W Contrast    Result Date: 4/9/2018  Narrative: CT CHEST, ABDOMEN AND PELVIS WITH IV CONTRAST INDICATION:   Brain cancer/neoplasm, staging  COMPARISON: None  TECHNIQUE: CT examination of the chest, abdomen and pelvis was performed  Axial, sagittal, and coronal 2D reformatted images were created from the source data and submitted for interpretation  Radiation dose length product (DLP) for this visit:  931 98 mGy-cm   This examination, like all CT scans performed in the [facility], was performed utilizing techniques to minimize radiation dose exposure, including the use of iterative  reconstruction and automated exposure control  IV Contrast:  100 mL of iohexol (OMNIPAQUE) Enteric Contrast: Enteric contrast was administered  FINDINGS: CHEST LUNGS:  Right middle lobe 4 4 x 3 4 cm mass lesion/neoplasm obstructing the right middle lobe bronchus with distal atelectasis  Tiny 3 mm nodule right upper lobe series 3 image 25 possibly a metastatic nodule  PLEURA:  Unremarkable  HEART/GREAT VESSELS:  There is infiltration of the right middle lobe lung mass into the right middle lobe pulmonary artery  There is questionable right lower lobe pulmonary embolus  MEDIASTINUM AND MERVIN:  Metastatic lymph nodes identified in the mediastinum including the anterior retrosternal space, right paratracheal space, subcarinal space  There is also a left-sided subcarinal 2 5 cm metastatic lymph node  Questionable partially visualized right supraclavicular 1 5 cm lymph node  CHEST WALL AND LOWER NECK:   There is questionable right lower lobe pulmonary embolus  ABDOMEN LIVER/BILIARY TREE:  Unremarkable  GALLBLADDER:  No calcified gallstones  No pericholecystic inflammatory change  SPLEEN:  Unremarkable  PANCREAS:  Unremarkable  ADRENAL GLANDS:  Unremarkable  KIDNEYS/URETERS:  Unremarkable  No hydronephrosis   STOMACH AND BOWEL:  There is colonic diverticulosis without evidence of acute diverticulitis  APPENDIX:  No findings to suggest appendicitis  ABDOMINOPELVIC CAVITY:  No ascites or free intraperitoneal air  No lymphadenopathy  VESSELS:  Unremarkable for patient's age  PELVIS REPRODUCTIVE ORGANS:  Unremarkable for patient's age  URINARY BLADDER:  Unremarkable  ABDOMINAL WALL/INGUINAL REGIONS:  Unremarkable  OSSEOUS STRUCTURES:  No acute fracture or destructive osseous lesion  Impression: Infiltrating right middle lobe lung mass with mediastinal metastatic lymph nodes as described above  There is questionable right lower lobe pulmonary artery filling defect/embolism which could also be artifactual  Workstation performed: [id number]     EKG, Pathology, and Other Studies: I have personally reviewed pertinent reports  VTE Pharmacologic Prophylaxis: okay for HSQ but will need to be stopped pre-op       VTE Mechanical Prophylaxis: reason for no mechanical VTE prophylaxis r/o dvt

## 2018-04-11 ENCOUNTER — APPOINTMENT (INPATIENT)
Dept: RADIOLOGY | Facility: HOSPITAL | Age: 63
DRG: 025 | End: 2018-04-11
Attending: INTERNAL MEDICINE
Payer: COMMERCIAL

## 2018-04-11 PROBLEM — G93.6 CEREBRAL EDEMA (HCC): Status: ACTIVE | Noted: 2018-04-11

## 2018-04-11 PROBLEM — I82.4Z9 LOWER LEG DVT (DEEP VENOUS THROMBOSIS) (HCC): Status: ACTIVE | Noted: 2018-04-11

## 2018-04-11 PROBLEM — I26.99 PE (PULMONARY THROMBOEMBOLISM) (HCC): Status: ACTIVE | Noted: 2018-04-11

## 2018-04-11 LAB
ALBUMIN SERPL BCP-MCNC: 2.5 G/DL (ref 3.5–5)
ALP SERPL-CCNC: 51 U/L (ref 46–116)
ALT SERPL W P-5'-P-CCNC: 9 U/L (ref 12–78)
ANION GAP SERPL CALCULATED.3IONS-SCNC: 6 MMOL/L (ref 4–13)
AST SERPL W P-5'-P-CCNC: 8 U/L (ref 5–45)
BASOPHILS # BLD AUTO: 0.01 THOUSANDS/ΜL (ref 0–0.1)
BASOPHILS NFR BLD AUTO: 0 % (ref 0–1)
BILIRUB SERPL-MCNC: 0.38 MG/DL (ref 0.2–1)
BUN SERPL-MCNC: 32 MG/DL (ref 5–25)
CALCIUM SERPL-MCNC: 8.8 MG/DL
CHLORIDE SERPL-SCNC: 103 MMOL/L (ref 100–108)
CO2 SERPL-SCNC: 25 MMOL/L (ref 21–32)
CREAT SERPL-MCNC: 1.04 MG/DL (ref 0.6–1.3)
EOSINOPHIL # BLD AUTO: 0 THOUSAND/ΜL (ref 0–0.61)
EOSINOPHIL NFR BLD AUTO: 0 % (ref 0–6)
ERYTHROCYTE [DISTWIDTH] IN BLOOD BY AUTOMATED COUNT: 14.2 % (ref 11.6–15.1)
GFR SERPL CREATININE-BSD FRML MDRD: 78 ML/MIN/1.73SQ M
GLUCOSE SERPL-MCNC: 110 MG/DL (ref 65–140)
GLUCOSE SERPL-MCNC: 132 MG/DL (ref 65–140)
GLUCOSE SERPL-MCNC: 132 MG/DL (ref 65–140)
GLUCOSE SERPL-MCNC: 174 MG/DL (ref 65–140)
GLUCOSE SERPL-MCNC: 194 MG/DL (ref 65–140)
GLUCOSE SERPL-MCNC: 247 MG/DL (ref 65–140)
GLUCOSE SERPL-MCNC: 256 MG/DL (ref 65–140)
GLUCOSE SERPL-MCNC: 72 MG/DL (ref 65–140)
HCT VFR BLD AUTO: 35.9 % (ref 36.5–49.3)
HGB BLD-MCNC: 11.8 G/DL (ref 12–17)
LYMPHOCYTES # BLD AUTO: 1.49 THOUSANDS/ΜL (ref 0.6–4.47)
LYMPHOCYTES NFR BLD AUTO: 8 % (ref 14–44)
MCH RBC QN AUTO: 29.1 PG (ref 26.8–34.3)
MCHC RBC AUTO-ENTMCNC: 32.9 G/DL (ref 31.4–37.4)
MCV RBC AUTO: 89 FL (ref 82–98)
MONOCYTES # BLD AUTO: 0.69 THOUSAND/ΜL (ref 0.17–1.22)
MONOCYTES NFR BLD AUTO: 4 % (ref 4–12)
NEUTROPHILS # BLD AUTO: 16.93 THOUSANDS/ΜL (ref 1.85–7.62)
NEUTS SEG NFR BLD AUTO: 88 % (ref 43–75)
NRBC BLD AUTO-RTO: 0 /100 WBCS
PLATELET # BLD AUTO: 275 THOUSANDS/UL (ref 149–390)
PMV BLD AUTO: 11.3 FL (ref 8.9–12.7)
POTASSIUM SERPL-SCNC: 4.7 MMOL/L (ref 3.5–5.3)
PROT SERPL-MCNC: 6.3 G/DL (ref 6.4–8.2)
RBC # BLD AUTO: 4.05 MILLION/UL (ref 3.88–5.62)
SODIUM SERPL-SCNC: 134 MMOL/L (ref 136–145)
WBC # BLD AUTO: 19.24 THOUSAND/UL (ref 4.31–10.16)

## 2018-04-11 PROCEDURE — C1894 INTRO/SHEATH, NON-LASER: HCPCS

## 2018-04-11 PROCEDURE — 82948 REAGENT STRIP/BLOOD GLUCOSE: CPT

## 2018-04-11 PROCEDURE — 99232 SBSQ HOSP IP/OBS MODERATE 35: CPT | Performed by: HOSPITALIST

## 2018-04-11 PROCEDURE — 37191 INS ENDOVAS VENA CAVA FILTR: CPT

## 2018-04-11 PROCEDURE — 99232 SBSQ HOSP IP/OBS MODERATE 35: CPT | Performed by: PHYSICIAN ASSISTANT

## 2018-04-11 PROCEDURE — C1880 VENA CAVA FILTER: HCPCS

## 2018-04-11 PROCEDURE — C1769 GUIDE WIRE: HCPCS

## 2018-04-11 PROCEDURE — 99232 SBSQ HOSP IP/OBS MODERATE 35: CPT | Performed by: INTERNAL MEDICINE

## 2018-04-11 PROCEDURE — 93970 EXTREMITY STUDY: CPT | Performed by: SURGERY

## 2018-04-11 PROCEDURE — 85025 COMPLETE CBC W/AUTO DIFF WBC: CPT | Performed by: INTERNAL MEDICINE

## 2018-04-11 PROCEDURE — 06H03DZ INSERTION OF INTRALUMINAL DEVICE INTO INFERIOR VENA CAVA, PERCUTANEOUS APPROACH: ICD-10-PCS | Performed by: RADIOLOGY

## 2018-04-11 PROCEDURE — 37191 INS ENDOVAS VENA CAVA FILTR: CPT | Performed by: RADIOLOGY

## 2018-04-11 PROCEDURE — 80053 COMPREHEN METABOLIC PANEL: CPT | Performed by: HOSPITALIST

## 2018-04-11 RX ADMIN — PANTOPRAZOLE SODIUM 40 MG: 40 TABLET, DELAYED RELEASE ORAL at 06:11

## 2018-04-11 RX ADMIN — Medication 1 TABLET: at 08:18

## 2018-04-11 RX ADMIN — FOLIC ACID 1 MG: 1 TABLET ORAL at 08:18

## 2018-04-11 RX ADMIN — IOHEXOL 25 ML: 300 INJECTION, SOLUTION INTRAVENOUS at 19:26

## 2018-04-11 RX ADMIN — SODIUM CHLORIDE 75 ML/HR: 0.9 INJECTION, SOLUTION INTRAVENOUS at 04:35

## 2018-04-11 RX ADMIN — INSULIN LISPRO 2 UNITS: 100 INJECTION, SOLUTION INTRAVENOUS; SUBCUTANEOUS at 21:26

## 2018-04-11 RX ADMIN — BUDESONIDE AND FORMOTEROL FUMARATE DIHYDRATE 2 PUFF: 160; 4.5 AEROSOL RESPIRATORY (INHALATION) at 20:04

## 2018-04-11 RX ADMIN — INSULIN LISPRO 1 UNITS: 100 INJECTION, SOLUTION INTRAVENOUS; SUBCUTANEOUS at 07:57

## 2018-04-11 RX ADMIN — SODIUM CHLORIDE TAB 1 GM 1 G: 1 TAB at 08:18

## 2018-04-11 RX ADMIN — NICOTINE 1 PATCH: 21 PATCH, EXTENDED RELEASE TRANSDERMAL at 08:20

## 2018-04-11 RX ADMIN — LISINOPRIL 5 MG: 5 TABLET ORAL at 08:20

## 2018-04-11 RX ADMIN — DEXAMETHASONE SODIUM PHOSPHATE 4 MG: 4 INJECTION, SOLUTION INTRAMUSCULAR; INTRAVENOUS at 11:17

## 2018-04-11 RX ADMIN — LORAZEPAM 1 MG: 2 INJECTION INTRAMUSCULAR; INTRAVENOUS at 10:56

## 2018-04-11 RX ADMIN — DEXAMETHASONE SODIUM PHOSPHATE 4 MG: 4 INJECTION, SOLUTION INTRAMUSCULAR; INTRAVENOUS at 21:26

## 2018-04-11 RX ADMIN — LORAZEPAM 1 MG: 2 INJECTION INTRAMUSCULAR; INTRAVENOUS at 21:25

## 2018-04-11 RX ADMIN — BUPROPION HYDROCHLORIDE 75 MG: 75 TABLET, FILM COATED ORAL at 20:03

## 2018-04-11 RX ADMIN — ACETAMINOPHEN 650 MG: 325 TABLET, FILM COATED ORAL at 08:35

## 2018-04-11 RX ADMIN — Medication 100 MG: at 08:18

## 2018-04-11 RX ADMIN — BUDESONIDE AND FORMOTEROL FUMARATE DIHYDRATE 2 PUFF: 160; 4.5 AEROSOL RESPIRATORY (INHALATION) at 08:21

## 2018-04-11 RX ADMIN — SODIUM CHLORIDE 75 ML/HR: 0.9 INJECTION, SOLUTION INTRAVENOUS at 20:03

## 2018-04-11 RX ADMIN — INSULIN GLARGINE 15 UNITS: 100 INJECTION, SOLUTION SUBCUTANEOUS at 21:25

## 2018-04-11 RX ADMIN — ARIPIPRAZOLE 10 MG: 10 TABLET ORAL at 08:22

## 2018-04-11 RX ADMIN — SODIUM CHLORIDE TAB 1 GM 1 G: 1 TAB at 20:03

## 2018-04-11 RX ADMIN — BUPROPION HYDROCHLORIDE 75 MG: 75 TABLET, FILM COATED ORAL at 08:22

## 2018-04-11 NOTE — ASSESSMENT & PLAN NOTE
Metastatic squamous cell carcinoma of the lung  · Personally contacted patient's oncologist, Dr Phong Robbins,  office at [facility] to obtain last of this note and pathology  Documentation now upload to media tab  ? Bronchial biopsies 12/12/2017:  Non-small cell carcinoma with features consistent with moderately to poorly differentiated squamous cell carcinoma  Staining for P63 is negative while staining for TTF-1 is negative  ? Patient had stage in Imaging on January 15, 2018 which showed 6 5 cm right middle lobe hypermetabolic mass with a 1 3 cm anterior right lobe region pulmonary metastasis with trace right pleural effusion  He was also noted to have adenopathy in the right subclavicular, right superior mediastinum, right anterior mediastinal, right hilar, left mid thoracic and paraesophageal all consistent with metastatic disease  There is also a lesion noted in his right supra-acetabular iliac bone suspicious for osseous metastasis  ? As documented, patient underwent an MRI brain on January 25, 2018 which was negative for metastatic disease  ? Patient refused any type of palliative chemotherapy    He was started on immunotherapy with Noshae Ketchum first cycle on February 16, 2018 along with Doimnic Ortega

## 2018-04-11 NOTE — ASSESSMENT & PLAN NOTE
Doppler with subacute to chronic DVT  Recommend IVC filter placement as patient at high risk for additional clots

## 2018-04-11 NOTE — ASSESSMENT & PLAN NOTE
CTA chest revealed small foci OLIVIA and RLL PE  · D/w medicine  Plan for pulmonary consult and input regarding anticoagulation and appropriate timing for surgical intervention

## 2018-04-11 NOTE — ASSESSMENT & PLAN NOTE
· Exam reveals GCS 15  No focal findings  Continue frequent neurological checks  · Imaging reviewed personally and by attending  Final results as below  ? MRI brain with without contrast 4/7/18:  Solitary irregularly enhancing 2 4 cm left parietal mass with associated vasogenic edema  ? CT chest abdomen pelvis 4/9/18: Known right infiltrating RML lung mass with metastatic lymph nodes  Possible RLL embolism  · Pain control per primary team  · Mobilize as tolerated with assistance  · DVT PPX:  Bilateral SCDs off during exam  Await results of dopplers  Okay HSQ but will need to be stopped prior to surgery  · Hold all anti-platelet anticoagulation medications  Patient was on aspirin 81 mg which was discontinued today  Last dose 4/8/18 at 0821  · Continue decadron 4mg Q 6H   · Will continue to follow  Tentative surgical resection of brain mass later this week  Tentative plan for Friday

## 2018-04-11 NOTE — PLAN OF CARE
Present in room with Dr Jack Vanegas to discuss plan of care for today to continue IV steroids at this time  No concerns from patient at this time

## 2018-04-11 NOTE — MEDICAL STUDENT
Consultation Note - Pulmonary   Wilbert Razo 63 y o  male MRN: [id number]  Unit/Bed#: [facility] 909-01 Encounter: [id number]    Assessment/Plan:  1) Pulmonary embolism - patient has acute left upper lobe PE seen on pulmonary angiography and subacute right lower lobe PEs that have recanalized, with LLE DVTs seen on doppler  Hypercoagulable state due to malignancy is the likely etiology  -recommend holding anticoagulation but placing IVC filter prior to surgery   -recommend resuming long-term anticoagulation when deemed safe by surgical team post-op    2) Stage IV Lung Cancer - patient has terminal diagnosis with multiple metastases, and a new left-sided brain mass with surrounding vasogenic edema concerning for metastatic lung ca vs primary brain malignancy with acute onset of right sided neurological deficits last Friday and subsequent resolution of symptoms  Patient is currently receiving immunotherapy with Keytruda  Was initially diagnosed December 2017 via bronchial biopsy as consistent with squamous cell carcinoma  Is followed by Dr Jess Valdes at [facility]    -new brain lesion is being evaluated by neurosurgery with possible surgery in 2 days  Chief Complaint:   Right sided weakness    Subjective: The patient is a pleasant 63year old male who presented 5 days ago for new onset right numbness that quickly resolved, with new left sided brain lesion with surrounding vasogenic edema seen on CT  He has a history stage IV metastatic lung cancer diagnosed 4 months ago, being treated with immunotherapy  He has an extensive history of alcohol and tobacco abuse  CT pulmonary angiography demonstrated acute and subacute PEs and duplex study demonstrating left sided DVTs  Denies any new complaints today  ROS was unremarkable aside from chronic productive cough and some chest wall pain from a recent fall  Objective:     Vitals: Blood pressure 134/78, pulse 73, temperature 97 5 °F (36 4 °C), temperature source Oral, resp  rate 18, height 5' 9" (1 753 m), weight 88 kg (194 lb), SpO2 95 %  ,Body mass index is 28 65 kg/m²  Intake/Output Summary (Last 24 hours) at 04/11/18 1302  Last data filed at 04/11/18 1140   Gross per 24 hour   Intake           2577 5 ml   Output             2500 ml   Net             77 5 ml       Invasive Devices     Peripheral Intravenous Line            Peripheral IV 04/10/18 Dorsal (posterior); Right Forearm 1 day    Peripheral IV 04/10/18 Left Forearm less than 1 day                Physical Exam: General appearance: alert and oriented, in no acute distress  Head: Normocephalic, without obvious abnormality, atraumatic  Lungs: clear to auscultation bilaterally and inspiratory rhonchi on right lower lung field anteriorly  Heart: regular rate and rhythm, S1, S2 normal, no murmur, click, rub or gallop  Abdomen: normal findings: soft, non-tender  Lymph nodes: Cervical, supraclavicular, and axillary nodes normal   Neurologic: Grossly normal     Labs:   CBC:   Lab Results   Component Value Date    WBC 19 24 (H) 04/11/2018    HGB 11 8 (L) 04/11/2018    HCT 35 9 (L) 04/11/2018    MCV 89 04/11/2018     04/11/2018    MCH 29 1 04/11/2018    MCHC 32 9 04/11/2018    RDW 14 2 04/11/2018    MPV 11 3 04/11/2018    NRBC 0 04/11/2018   , CMP:   Lab Results   Component Value Date     (L) 04/11/2018    K 4 7 04/11/2018     04/11/2018    CO2 25 04/11/2018    ANIONGAP 6 04/11/2018    BUN 32 (H) 04/11/2018    CREATININE 1 04 04/11/2018    GLUCOSE 174 (H) 04/11/2018    CALCIUM 8 8 04/11/2018    AST 8 04/11/2018    ALT 9 (L) 04/11/2018    ALKPHOS 51 04/11/2018    PROT 6 3 (L) 04/11/2018    BILITOT 0 38 04/11/2018    EGFR 78 04/11/2018     Imaging and other studies: I have personally reviewed pertinent films in PACS

## 2018-04-11 NOTE — PROGRESS NOTES
Patient seen and examined    His MRI was carefully reviewed and shown to the patient  I explained in detail the position of the mass and the fact that this is a solitary right parietal lobe based mass with compression of the underlying brain  I also reviewed his chart in detail  We spoke about deep venous thromboses and pulmonary emboli and the need for an IVC filter  He asked informed questions reflecting is understanding of his current situation  We spoke about the relationship of a mass to seizure foci and how this fits in with his presenting signs and symptoms of numbness and tingling on the left side of his body followed by a seizure which was focal   He asked informed questions reflecting is understanding of this  I have recommended the following surgical intervention:  Right parietal image guided craniotomy for resection of brain mass  The risks, benefits and complications of surgery were explained in detail to [facility] including hemorrhage, infection, CSF leakage, wound problems, pain, weakness, numbness, dysesthesiae, paralysis, coma, and death  Also, the possibility  of further surgery being required was emphasized  Other potential medical complications were outlined, including deep venous thrombosis, pulmonary embolism, pneumonia, urinary tract infection, myocardial infarction,  and stroke  The need for physical therapy, occupational therapy, and rehabilitation was also mentioned  The alternatives to surgery were discussed  [facility] asked relevant questions and asked that we proceed with arrangements for surgery

## 2018-04-11 NOTE — ASSESSMENT & PLAN NOTE
- per patient, lung and his metastatic to lymph nodes and pelvis  -stage 4  - PRN pain control/supportive care   -patient confirms he is interested in having brain mass resected, as well as chemo for further tx

## 2018-04-11 NOTE — CONSULTS
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  5   COPD   6   Occlusion of the right middle lobe likely from hilar mass known to be non-small cell lung CA  7   History of bipolar disorder  Plan:   1  Agree with solitary cerebral nodule resection, as there is survival benefit even in stage IV non-small cell lung CA  Although it is a small  2   IVC filter  The patient cannot go on anticoagulation at the present time  3   Post craniotomy, once the patient is cleared by Neurosurgery, he should be on anticoagulation for life  4   Oncology follow-up  5   Agree with Decadron  6   Leukocytosis secondary to 5  History of Present Illness   Physician Requesting Consult: Ale Badillo MD  Reason for Consult / Principal Problem:  PE   Hx and PE limited by:  Patient is fairly good informant  HPI:     Dear Dr Pittman Plater: Thank you for your kind referral of Mr Esther Herrera to pulmonary service  This is 62-year-old gentleman with history of active smoking more than 40 pack year in the past, history of bipolar disorder, recently was diagnosed with non-small cell lung CA with left hilar mass, the patient was stage IV at the diagnosis  The patient was started on immunotherapy with Keytruda since December 2017  The patient presented to outside hospital with focal deficit in the right upper extremity, underwent a workup which revealed left occipital parietal lesion with vasogenic edema  Patient was transferred to our institution for further monitoring      The patient underwent also CT scan of the chest due to occasional shortness of breath and wheezing accompanied with cough  The patient was noted to have occlusion of the right middle lobe as well as large amount of lymphadenopathy  He did have 2 PEs which are small 1 in the superior branch of the pulmonary artery on the left and the other on the inferior branch of the right pulmonary artery  No RV strain pattern was reported or noted  Both PEs were small  The patient also was tested positive for DVT  The patient denies any leg pain, denies any increased swelling in his lower extremities, he does have occasional pain mainly in his right upper quadrant due to recent fall and rib injury  He did not have any hemoptysis, no chest pain at the moment, the patient is aware of the intracranial lesion  The patient has been evaluated by other services and in discussion with the patient, he is willing to go for a craniotomy, and I have discussed with him the option of placement of IVC filter which she will need  The presence of a is a genic edema and intracranial lesion preclude him from having anticoagulation  Consults    Review of Systems    Historical Information   Past Medical History:   Diagnosis Date    Diabetes mellitus ([facility]     Hypertension     Lung cancer ([facility] )     Psychiatric disorder      Past Surgical History:   Procedure Laterality Date    TONSILLECTOMY       Social History   History   Alcohol Use    Yes     Comment: 2L liquor weekly     History   Drug use: Unknown     History   Smoking Status    Current Every Day Smoker    Packs/day: 1 50    Years: 37 00   Smokeless Tobacco    Never Used     Occupational History: Active smoker with over than 40 pack year history  He lives alone  History of alcoholism also      Family History: non-contributory    Meds/Allergies   all current active meds have been reviewed    Allergies   Allergen Reactions    Other      Bee stings       Objective   Vitals: Blood pressure 134/78, pulse 73, temperature 97 5 °F (36 4 °C), temperature source Oral, resp  rate 18, height 5' 9" (1 753 m), weight 88 kg (194 lb), SpO2 95 %  ,Body mass index is 28 65 kg/m²  Intake/Output Summary (Last 24 hours) at 04/11/18 1425  Last data filed at 04/11/18 1340   Gross per 24 hour   Intake           2247 5 ml   Output             2700 ml   Net           -452 5 ml     Invasive Devices     Peripheral Intravenous Line            Peripheral IV 04/10/18 Dorsal (posterior); Right Forearm 1 day    Peripheral IV 04/10/18 Left Forearm less than 1 day                Physical Exam physical exam revealed stable vital signs, S1-S2 regular rate and rhythm, distant breath sounds, minimal pain in the right upper quadrant due to recent fall and rib injury, Venkat sign is negative, no edema in the periphery, neurologically does not appear to be weak to my exam  And nonfocal at this point  Lab Results: I have personally reviewed pertinent lab results  Imaging Studies: I have personally reviewed pertinent reports  EKG, Pathology, and Other Studies: I have personally reviewed pertinent reports      VTE Prophylaxis: Sequential compression device Cheryle Tabor)     Code Status: Level 3 - DNAR and DNI  Advance Directive and Living Will:      Power of :    POLST:      None

## 2018-04-11 NOTE — PROGRESS NOTES
Progress Note - Nesha Dutta 1955, 63 y o  male MRN: [id number]    Unit/Bed#: [facility] 909-01 Encounter: [id number]    Primary Care Provider: Anthony Ugarte MD   Date and time admitted to hospital: 4/6/2018  5:10 PM        Cerebral edema ([facility] [address] )   Assessment & Plan    Continue decadron  F/u NS        PE (pulmonary thromboembolism) ([facility] )   Assessment & Plan    No AC for now  Needs IVC filter  Appreciate pulmonary recs        Lower leg DVT (deep venous thrombosis) (HCC)   Assessment & Plan    Positive subacute DVT in LLE  Needs IVC filter  Lifelong AC post brain biopsy        Leukocytosis   Assessment & Plan    - likely fluctuating secondary to IV Decadron - monitor WBC count  -WBC 19 24  - remains afebrile        Bipolar disorder   Assessment & Plan    - continue Abilify/Wellbutrin regimen  -mood stable        Insulin-dependent diabetes mellitus   Assessment & Plan    - HgA1c of 8 0- poorly controlled    - optimize SSI coverage to higher algorithm due to concurrent IV steroid administration -Lantus 15U QHS           Hypertension   Assessment & Plan    - low-sodium diet encouraged  -/78  - continue Zestril        Alcohol abuse   Assessment & Plan    - counseled on cessation although unlikely to comply - per patient, last drink was on 4/5  - monitor for signs/symptoms of withdrawal - PRN IV Ativan   - started on folic acid/thiamine supplementation   -no signs of Alcohol withdrawal        Tobacco abuse   Assessment & Plan    - still smokes despite metastatic lung cancer diagnosis  - counseled on cessation - transdermal nicotine patch         Lung cancer    Assessment & Plan    - per patient, lung and his metastatic to lymph nodes and pelvis  -stage 4  - PRN pain control/supportive care   -patient confirms he is interested in having brain mass resected, as well as chemo for further tx            * 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    Assessment & Plan    - neurosurgery following -  MRI reveals a 2 4 cm mass of the left parietal vertex  - c/w IV Decadron for vasogenic edema/swelling (optimize insulin regimen due to history of diabetes mellitus)   - patient notes right-sided weakness improved  - PRN pain control and supportive care otherwise  -has underlying NSCLC, s/p chemo last 2018  -pulmonary embolism confirmed on CTA   -no AC for now since he is awaiting surgery  -IVC filter will be discussed  -case discussed with pulmonary medicine  VTE Pharmacologic Prophylaxis:   Pharmacologic: Pharmacologic VTE Prophylaxis contraindicated due to brain biospy pending  Mechanical VTE Prophylaxis in Place: Yes    Patient Centered Rounds: I have performed bedside rounds with nursing staff today  Discussions with Specialists or Other Care Team Provider: yes    Education and Discussions with Family / Patient:yes  Time Spent for Care: 45 minutes  More than 50% of total time spent on counseling and coordination of care as described above  Current Length of Stay: 5 day(s)    Current Patient Status: Inpatient   Certification Statement: The patient will continue to require additional inpatient hospital stay due to med mgt    Discharge Plan: rehab    Code Status: Level 3 - DNAR and DNI      Subjective:   Patient was seen and examined bedside in no apparent clinical distress  Imaging result discussed of chest    Review of systems negative for chest pain, shortness of breath, changes in bowel urinary habits, fever, cough  Objective:     Vitals:   Temp (24hrs), Av 8 °F (36 6 °C), Min:97 5 °F (36 4 °C), Max:98 1 °F (36 7 °C)    HR:  [73-76] 73  Resp:  [18] 18  BP: (134-146)/(78-79) 134/78  SpO2:  [95 %-97 %] 95 %  Body mass index is 28 65 kg/m²  Input and Output Summary (last 24 hours):        Intake/Output Summary (Last 24 hours) at 18 1630  Last data filed at 18 1340   Gross per 24 hour   Intake           2687 5 ml   Output             2700 ml   Net            -12 5 ml       Physical Exam: Physical Exam   Constitutional: He is oriented to person, place, and time  No distress  HENT:   Head: Normocephalic and atraumatic  Mouth/Throat: Oropharynx is clear and moist    Eyes: Pupils are equal, round, and reactive to light  Neck: Normal range of motion  Neck supple  No JVD present  Cardiovascular: Normal rate  Exam reveals no gallop and no friction rub  No murmur heard  Pulmonary/Chest: Effort normal and breath sounds normal  No respiratory distress  He has no wheezes  Abdominal: Soft  Bowel sounds are normal  He exhibits no distension  There is no tenderness  There is no rebound and no guarding  Musculoskeletal: Normal range of motion  He exhibits no edema, tenderness or deformity  Neurological: He is alert and oriented to person, place, and time  No cranial nerve deficit  Coordination normal    Skin: Skin is warm  No erythema  Psychiatric: He has a normal mood and affect  His behavior is normal          Additional Data:     Labs:      Results from last 7 days  Lab Units 04/11/18  0459   WBC Thousand/uL 19 24*   HEMOGLOBIN g/dL 11 8*   HEMATOCRIT % 35 9*   PLATELETS Thousands/uL 275   NEUTROS PCT % 88*   LYMPHS PCT % 8*   MONOS PCT % 4   EOS PCT % 0       Results from last 7 days  Lab Units 04/11/18  0459   SODIUM mmol/L 134*   POTASSIUM mmol/L 4 7   CHLORIDE mmol/L 103   CO2 mmol/L 25   BUN mg/dL 32*   CREATININE mg/dL 1 04   CALCIUM mg/dL 8 8   TOTAL PROTEIN g/dL 6 3*   BILIRUBIN TOTAL mg/dL 0 38   ALK PHOS U/L 51   ALT U/L 9*   AST U/L 8   GLUCOSE RANDOM mg/dL 174*       Results from last 7 days  Lab Units 04/08/18  0447   INR  1 03       * I Have Reviewed All Lab Data Listed Above  * Additional Pertinent Lab Tests Reviewed:  [facility] Admission Reviewed    Imaging:    Imaging Reports Reviewed Today   Recent Cultures (last 7 days):           Last 24 Hours Medication List:     Current Facility-Administered Medications:  acetaminophen 650 mg Oral Q6H PRN Ludivina Fuller PA-C    albuterol 2 puff Inhalation Q6H PRN BALTAZAR Iglesias-TARSHA    ARIPiprazole 10 mg Oral Daily BALTAZAR Iglesias-TARSHA    budesonide-formoterol 2 puff Inhalation Q12H [facility] BALTAZAR Iglesias-TARSHA    buPROPion 75 mg Oral Q12H [facility] Ludivina Fuller PA-C    calcium carbonate 1,000 mg Oral Daily PRN Ludivina Fuller PA-C    dexamethasone 4 mg Intravenous Q12H [facility] Jorge A Samson MD    dextromethorphan-guaiFENesin 10 mL Oral Q4H PRN Jorge A Samson MD    docusate sodium 100 mg Oral BID Ludivina Fuller PA-C    folic acid 1 mg Oral Daily Jorge A Samson MD    insulin glargine 15 Units Subcutaneous HS Jorge A Samson MD    insulin lispro 1-5 Units Subcutaneous 4x Daily (AC & HS) Jorge A Samson MD    insulin lispro 20 Units Subcutaneous TID With Meals Jorge A Samson MD    lisinopril 5 mg Oral Daily Ludivina Fuller PA-C    LORazepam 1 mg Intravenous Q4H PRN Ludivina Fuller PA-C    multivitamin-minerals 1 tablet Oral Daily Ludivina Fuller PA-C    nicotine 1 patch Transdermal Daily ABLTAZAR Iglesias-TARSHA    pantoprazole 40 mg Oral Early Morning Ludivina Fuller PA-C    sodium chloride 75 mL/hr Intravenous Continuous Rachel Darling MD Last Rate: 75 mL/hr (04/11/18 0435)   sodium chloride 1 g Oral BID Ludivina Fuller PA-C    thiamine 100 mg Oral Daily Ludivina Fuller PA-C         Today, Patient Was Seen By: Rachel Darling MD    ** Please Note: Dictation voice to text software may have been used in the creation of this document   **

## 2018-04-11 NOTE — BRIEF OP NOTE (RAD/CATH)
IVC filter placement procedure note    PATIENT NAME: Reymundo Adame  : 1955  MRN: [id number]     Pre-op Diagnosis:   1  Brain mass    2  Malignant neoplasm of lung, unspecified laterality, unspecified part of lung ([facility] [address] )    3  Other acute pulmonary embolism with acute cor pulmonale (HCC)      Post-op Diagnosis:   1  Brain mass    2  Malignant neoplasm of lung, unspecified laterality, unspecified part of lung ([facility] [address] )    3  Other acute pulmonary embolism with acute cor pulmonale ([facility] )        Surgeon:   Rosio Felder DO  Assistants:     No qualified resident was available  Estimated Blood Loss:  Less than 5 mL  Findings:  Patent non duplicated IVC  Convertable [employer] infrarenal IVC filter placed  Specimens:  None  Complications:  None      Anesthesia: Local    Rosio Felder DO     Date: 2018  Time: 7:25 PM

## 2018-04-11 NOTE — ASSESSMENT & PLAN NOTE
- neurosurgery following - await decision on intervention during this week - MRI reveals a 2 4 cm mass of the left parietal vertex  - c/w IV Decadron for vasogenic edema/swelling (optimize insulin regimen due to history of diabetes mellitus)   - patient notes right-sided weakness improved  - PRN pain control and supportive care otherwise  -has underlying NSCLC, s/p chemo last Feb 2018  -pulmonary embolism confirmed on CTA   -no AC for now since he is awaiting surgery  -IVC filter will be discussed  -case discussed with pulmonary medicine

## 2018-04-11 NOTE — PROGRESS NOTES
Progress Note - Krys Vera 1955, 63 y o  male MRN: [id number]    Unit/Bed#: [facility] 909-01 Encounter: [id number]    Primary Care Provider: Ele Venegas MD   Date and time admitted to hospital: 4/6/2018  5:10 PM        * 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    Assessment & Plan    · Exam reveals GCS 15  No focal findings  Continue frequent neurological checks  · Imaging reviewed personally and by attending  Final results as below  ? MRI brain with without contrast 4/7/18:  Solitary irregularly enhancing 2 4 cm left parietal mass with associated vasogenic edema  ? CT chest abdomen pelvis 4/9/18: Known right infiltrating RML lung mass with metastatic lymph nodes  Possible RLL embolism  · Pain control per primary team  · Mobilize as tolerated with assistance  · DVT PPX:  Bilateral SCDs off during exam  Await results of dopplers  Okay HSQ but will need to be stopped prior to surgery  · Hold all anti-platelet anticoagulation medications  Patient was on aspirin 81 mg which was discontinued today  Last dose 4/8/18 at 0821  · Continue decadron 4mg Q 6H   · Will continue to follow  Tentative surgical resection of brain mass later this week  Tentative plan for Friday  Cerebral edema (HCC)   Assessment & Plan    Secondary to mass  · Continue decadron 4Q6        Lung cancer    Assessment & Plan    Metastatic squamous cell carcinoma of the lung  · Personally contacted patient's oncologist, Dr Lawanda Garay,  office at [facility] to obtain last of this note and pathology  Documentation now upload to media tab  ? Bronchial biopsies 12/12/2017:  Non-small cell carcinoma with features consistent with moderately to poorly differentiated squamous cell carcinoma  Staining for P63 is negative while staining for TTF-1 is negative  ?  Patient had stage in Imaging on January 15, 2018 which showed 6 5 cm right middle lobe hypermetabolic mass with a 1 3 cm anterior right lobe region pulmonary metastasis with trace right pleural effusion  He was also noted to have adenopathy in the right subclavicular, right superior mediastinum, right anterior mediastinal, right hilar, left mid thoracic and paraesophageal all consistent with metastatic disease  There is also a lesion noted in his right supra-acetabular iliac bone suspicious for osseous metastasis  ? As documented, patient underwent an MRI brain on January 25, 2018 which was negative for metastatic disease  ? Patient refused any type of palliative chemotherapy  He was started on immunotherapy with [employer] first cycle on February 16, 2018 along with Xgeva          Lower leg DVT (deep venous thrombosis) (HCC)   Assessment & Plan    Doppler with subacute to chronic DVT  Recommend IVC filter placement as patient at high risk for additional clots        PE (pulmonary thromboembolism) ([facility] [address] )   Assessment & Plan    CTA chest revealed small foci OLIVIA and RLL PE  · D/w medicine  Plan for pulmonary consult and input regarding anticoagulation and appropriate timing for surgical intervention  Tobacco abuse   Assessment & Plan    Continue nicotine patch  Discussed cessation         Alcohol abuse   Assessment & Plan    Discussed cessation  Patient admits to 1L alcohol every 2 days  Insulin-dependent diabetes mellitus   Assessment & Plan    Continue Accucheck and ISS  A1C 8 0              Subjective/Objective   Chief Complaint: "I'm okay"/follow-up left parietal brain mass    Subjective:  Patient is without complaint  Admits to ongoing intermittent tenderness anterior right ribcage  Denies any headaches vision or speech changes  Denies any weakness  Denies any gait difficulties  Objective:  Lying in bed  NAD  I/O       04/09 0701 - 04/10 0700 04/10 0701 - 04/11 0700 04/11 0701 - 04/12 0700    P  O  910 1690     I V  (mL/kg)  992 5 (11 3)     Total Intake(mL/kg) 910 (10 3) 2682 5 (30 5)     Urine (mL/kg/hr) 400 (0 2) 1325 (0 6) 775 (2 4) Total Output 400 1325 775    Net +510 +1357 5 -775           Unmeasured Urine Occurrence 2 x 1 x           Invasive Devices     Peripheral Intravenous Line            Peripheral IV 04/10/18 Dorsal (posterior); Right Forearm 1 day    Peripheral IV 04/10/18 Left Forearm less than 1 day                Physical Exam:  Vitals: Blood pressure 134/78, pulse 73, temperature 97 5 °F (36 4 °C), temperature source Oral, resp  rate 18, height 5' 9" (1 753 m), weight 88 kg (194 lb), SpO2 95 %  ,Body mass index is 28 65 kg/m²      General appearance: alert, appears stated age, cooperative and no distress  Head: Normocephalic, without obvious abnormality, atraumatic  Eyes: EOMI, PERRL  Neck: supple, symmetrical, trachea midline and NT  Back: no kyphosis present, no tenderness to percussion or palpation  Lungs: non labored breathing  Heart: regular heart rate  Neurologic:   Mental status: Alert, oriented x3, thought content appropriate  Cranial nerves: grossly intact (Cranial nerves II-XII)  Sensory: normal to LT  Motor: moving all extremities without focal weakness   Reflexes: 2+ and symmetric  Coordination: finger to nose abnormal R>L, no drift bilaterally      Lab Results:    Results from last 7 days  Lab Units 04/11/18  0459 04/10/18  0432 04/09/18  0451   WBC Thousand/uL 19 24* 20 58* 19 91*   HEMOGLOBIN g/dL 11 8* 11 9* 11 3*   HEMATOCRIT % 35 9* 35 2* 33 6*   PLATELETS Thousands/uL 275 294 284   NEUTROS PCT % 88*  --   --    MONOS PCT % 4  --   --    MONO PCT MAN %  --  3* 1*       Results from last 7 days  Lab Units 04/11/18  0459 04/10/18  0432 04/09/18  0451   SODIUM mmol/L 134* 132* 130*   POTASSIUM mmol/L 4 7 4 9 4 8   CHLORIDE mmol/L 103 100 98*   CO2 mmol/L 25 27 24   BUN mg/dL 32* 33* 35*   CREATININE mg/dL 1 04 1 08 1 10   CALCIUM mg/dL 8 8 9 5 9 1   TOTAL PROTEIN g/dL 6 3*  --   --    BILIRUBIN TOTAL mg/dL 0 38  --   --    ALK PHOS U/L 51  --   --    ALT U/L 9*  --   --    AST U/L 8  --   --    GLUCOSE RANDOM mg/dL 174* 186* 300*       Results from last 7 days  Lab Units 04/07/18 0445 04/06/18  2120   MAGNESIUM mg/dL 1 6 1 5*           Results from last 7 days  Lab Units 04/08/18  0447   INR  1 03   PTT seconds 22*     No results found for: TROPONINT  ABG:No results found for: PHART, [id number], PO2ART, RKI3KQY, E3FCWQHE, BEART, SOURCE    Imaging Studies: I have personally reviewed pertinent reports  and I have personally reviewed pertinent films in PACS    Mri Brain W Wo Contrast    Result Date: 4/7/2018  Narrative: MRI BRAIN WITH AND WITHOUT CONTRAST INDICATION:   mass  Abnormal head CT COMPARISON:  10/3/2008 TECHNIQUE: Sagittal T1, axial T2, axial FLAIR, axial T1, axial [place], axial diffusion  Sagittal, axial and coronal T1 postcontrast   Axial BRAVO post contrast   IV Contrast:  gadobenate dimeglumine (MULTIHANCE) injection (MULTI-DOSE) 18 mL   IMAGE QUALITY:   Diagnostic  FINDINGS: BRAIN PARENCHYMA:  In the left parietal vertex at the gray-white matter junction there is an irregularly enhancing, centrally necrotic intra-axial mass measuring 2 4 cm  The tumor extends to the cortical surface and has associated local vasogenic edema with local sulcal effacement in the parasagittal left parietal lobe posteriorly  No additional enhancing masses lesions detected  Diffusion imaging demonstrates high signal in the periphery of the tumor likely reflective of associated internal cellularity  Susceptibility weighted imaging demonstrates small fluid fluid levels, suggesting internal hemorrhagic elements  Separately in the parasagittal aspect of the right frontal lobe superiorly there is a somewhat stellate region of blooming artifact on image 80, series 6, possibly sequela of previous hemorrhage  No abnormal enhancement associated with the 2nd area of blooming artifact  No extra-axial fluid collection  Cerebellar tonsils normally positioned   VENTRICLES:  Normal  SELLA AND PITUITARY GLAND:  Normal  ORBITS:  Normal  PARANASAL SINUSES:  Normal  VASCULATURE:  Evaluation of the major intracranial vasculature demonstrates appropriate flow voids  CALVARIUM AND SKULL BASE:  Normal  EXTRACRANIAL SOFT TISSUES:  Normal      Impression: Enhancing 2 4 cm mass with vasogenic edema in the left parietal vertex centered at the gray-white matter junction extending to the cortical surface  Findings may represent a solitary metastasis in this patient with a history of lung cancer  Differential diagnosis includes primary brain tumor  Neurosurgical assessment recommended  Workstation performed: [id number]     Ct Chest Abdomen Pelvis W Contrast    Result Date: 4/9/2018  Narrative: CT CHEST, ABDOMEN AND PELVIS WITH IV CONTRAST INDICATION:   Brain cancer/neoplasm, staging  COMPARISON: None  TECHNIQUE: CT examination of the chest, abdomen and pelvis was performed  Axial, sagittal, and coronal 2D reformatted images were created from the source data and submitted for interpretation  Radiation dose length product (DLP) for this visit:  931 98 mGy-cm   This examination, like all CT scans performed in the [place], was performed utilizing techniques to minimize radiation dose exposure, including the use of iterative  reconstruction and automated exposure control  IV Contrast:  100 mL of iohexol (OMNIPAQUE) Enteric Contrast: Enteric contrast was administered  FINDINGS: CHEST LUNGS:  Right middle lobe 4 4 x 3 4 cm mass lesion/neoplasm obstructing the right middle lobe bronchus with distal atelectasis  Tiny 3 mm nodule right upper lobe series 3 image 25 possibly a metastatic nodule  PLEURA:  Unremarkable  HEART/GREAT VESSELS:  There is infiltration of the right middle lobe lung mass into the right middle lobe pulmonary artery  There is questionable right lower lobe pulmonary embolus  MEDIASTINUM AND MERVIN:  Metastatic lymph nodes identified in the mediastinum including the anterior retrosternal space, right paratracheal space, subcarinal space  There is also a left-sided subcarinal 2 5 cm metastatic lymph node  Questionable partially visualized right supraclavicular 1 5 cm lymph node  CHEST WALL AND LOWER NECK:   There is questionable right lower lobe pulmonary embolus  ABDOMEN LIVER/BILIARY TREE:  Unremarkable  GALLBLADDER:  No calcified gallstones  No pericholecystic inflammatory change  SPLEEN:  Unremarkable  PANCREAS:  Unremarkable  ADRENAL GLANDS:  Unremarkable  KIDNEYS/URETERS:  Unremarkable  No hydronephrosis  STOMACH AND BOWEL:  There is colonic diverticulosis without evidence of acute diverticulitis  APPENDIX:  No findings to suggest appendicitis  ABDOMINOPELVIC CAVITY:  No ascites or free intraperitoneal air  No lymphadenopathy  VESSELS:  Unremarkable for patient's age  PELVIS REPRODUCTIVE ORGANS:  Unremarkable for patient's age  URINARY BLADDER:  Unremarkable  ABDOMINAL WALL/INGUINAL REGIONS:  Unremarkable  OSSEOUS STRUCTURES:  No acute fracture or destructive osseous lesion  Impression: Infiltrating right middle lobe lung mass with mediastinal metastatic lymph nodes as described above  There is questionable right lower lobe pulmonary artery filling defect/embolism which could also be artifactual  Workstation performed: [id number]     Cta Chest Pe Study    Result Date: 4/10/2018  Narrative: CTA - CHEST WITH IV CONTRAST - PULMONARY ANGIOGRAM INDICATION:   Chest pain, acute, PE suspected, high pretest prob  Excerpt from oncology progress note from earlier the same day: " past medical history of stage IV lung cancer who came in with a newly diagnosed brain metastases  He had a CT scan of the  chest abdomen pelvis done yesterday which showed a questionable filling defect" COMPARISON: CT chest and pelvis 4/9/2018 describing a potential right lower lobe pulmonary embolus  TECHNIQUE: CTA examination of the chest was performed using angiographic technique according to a protocol specifically tailored to evaluate for pulmonary embolism    Axial, sagittal, and coronal 2D reformatted images were created from the source data and  submitted for interpretation  In addition, coronal 3D MIP postprocessing was performed on the acquisition scanner  Radiation dose length product (DLP) for this visit:  600 87 mGy-cm   This examination, like all CT scans performed in the [facility], was performed utilizing techniques to minimize radiation dose exposure, including the use of iterative  reconstruction and automated exposure control  IV Contrast:  85 mL of iohexol (OMNIPAQUE)  350 Multi-dose  FINDINGS: PULMONARY ARTERIAL TREE: Obstructive, small focus of left upper lobe pulmonary arterial embolism in keeping with acute embolus  2 small foci in the right lower lobe are recanalized and more likely subacute than acute  The below described right middle lobe mass compresses but does not appear to extend into the right middle lobe artery  No definite intraluminal enhancing tumor thrombus  LUNGS:  Stable right middle lobe mass with postobstructive pneumonitis unchanged from yesterday's study  Previously described 3 mm right upper lobe nodule is stable  PLEURA:  Unremarkable  HEART/AORTA:  Unremarkable for patient's age  MEDIASTINUM AND MERVIN:  Advanced mediastinal adenopathy unchanged from yesterday's study  CHEST WALL AND LOWER NECK:   Partially imaged left chest wall port  VISUALIZED STRUCTURES IN THE UPPER ABDOMEN:  Unremarkable  OSSEOUS STRUCTURES:  No acute fracture or destructive osseous lesion  Impression: Small acute occlusive focus of left upper lobe pulmonary arterial embolism  Small foci of recanalized right lower lobe pulmonary arterial embolism are likely subacute  Compression of the right middle lobe pulmonary artery by surrounding mass  Stable right middle lobe mass with postobstructive pneumonitis  Stable 3 mm right upper lobe pulmonary nodule  Stable advanced mediastinal adenopathy   I personally discussed this study Suman Pepper on 4/10/2018 at 5:35 PM  Workstation performed: [id number]     Vas Lower Limb Venous Duplex Study, Complete Bilateral    Result Date: 4/11/2018  Narrative:  [facility] REPORT CLINICAL: Indications: Patient presents with bilateral lower extremity edema  Risk Factors: The patient has history of DVT and malignancy  FINDINGS:  Segment    Right            Left                  Impression       Impression                                  CFV        Normal (Patent)                                              FV Mid                      Subacute vs Chronic Non-occlusive Thrombus  FV Dist                     Subacute vs Chronic Non-occlusive Thrombus  Popliteal                   Subacute vs Chronic Non-occlusive Thrombus     CONCLUSION: Impression: RIGHT LOWER LIMB: No evidence of acute or chronic deep vein thrombosis  No evidence of superficial thrombophlebitis noted  Doppler evaluation shows a normal response to augmentation maneuvers  Popliteal, posterior tibial and anterior tibial arterial Doppler waveforms are triphasic  There is a well defined hypoechoic non-vascularized structure noted in the popliteal fossa consistent with a Bakers cyst   LEFT LOWER LIMB: Evidence of subacute vs chronic non-occlusive deep vein thrombosis in the mid femoral, distal femoral and popliteal veins  Evidence of superficial thrombophlebitis noted  Doppler evaluation shows a normal response to augmentation maneuvers  Popliteal, posterior tibial and anterior tibial arterial Doppler waveforms are triphasic  Technical findings were given to Meryle Gill (RN) at the time of the exam   SIGNATURE: Electronically Signed by: Sammy Richard on 2018-04-11 12:20:37 PM    EKG, Pathology, and Other Studies: I have personally reviewed pertinent reports        VTE Pharmacologic Prophylaxis: okay for heparin    VTE Mechanical Prophylaxis: sequential compression device

## 2018-04-12 LAB
ALBUMIN SERPL BCP-MCNC: 2.4 G/DL (ref 3.5–5)
ALP SERPL-CCNC: 51 U/L (ref 46–116)
ALT SERPL W P-5'-P-CCNC: 11 U/L (ref 12–78)
ANION GAP SERPL CALCULATED.3IONS-SCNC: 7 MMOL/L (ref 4–13)
AST SERPL W P-5'-P-CCNC: 7 U/L (ref 5–45)
BASOPHILS # BLD AUTO: 0 THOUSANDS/ΜL (ref 0–0.1)
BASOPHILS NFR BLD AUTO: 0 % (ref 0–1)
BILIRUB SERPL-MCNC: 0.21 MG/DL (ref 0.2–1)
BUN SERPL-MCNC: 28 MG/DL (ref 5–25)
CALCIUM SERPL-MCNC: 8.6 MG/DL
CHLORIDE SERPL-SCNC: 106 MMOL/L (ref 100–108)
CO2 SERPL-SCNC: 24 MMOL/L (ref 21–32)
CREAT SERPL-MCNC: 0.99 MG/DL (ref 0.6–1.3)
EOSINOPHIL # BLD AUTO: 0.01 THOUSAND/ΜL (ref 0–0.61)
EOSINOPHIL NFR BLD AUTO: 0 % (ref 0–6)
ERYTHROCYTE [DISTWIDTH] IN BLOOD BY AUTOMATED COUNT: 14.3 % (ref 11.6–15.1)
GFR SERPL CREATININE-BSD FRML MDRD: 82 ML/MIN/1.73SQ M
GLUCOSE SERPL-MCNC: 137 MG/DL (ref 65–140)
GLUCOSE SERPL-MCNC: 178 MG/DL (ref 65–140)
GLUCOSE SERPL-MCNC: 307 MG/DL (ref 65–140)
GLUCOSE SERPL-MCNC: 331 MG/DL (ref 65–140)
GLUCOSE SERPL-MCNC: 62 MG/DL (ref 65–140)
HCT VFR BLD AUTO: 33.1 % (ref 36.5–49.3)
HGB BLD-MCNC: 11 G/DL (ref 12–17)
LYMPHOCYTES # BLD AUTO: 1.02 THOUSANDS/ΜL (ref 0.6–4.47)
LYMPHOCYTES NFR BLD AUTO: 6 % (ref 14–44)
MCH RBC QN AUTO: 29.3 PG (ref 26.8–34.3)
MCHC RBC AUTO-ENTMCNC: 33.2 G/DL (ref 31.4–37.4)
MCV RBC AUTO: 88 FL (ref 82–98)
MONOCYTES # BLD AUTO: 0.69 THOUSAND/ΜL (ref 0.17–1.22)
MONOCYTES NFR BLD AUTO: 4 % (ref 4–12)
NEUTROPHILS # BLD AUTO: 14.54 THOUSANDS/ΜL (ref 1.85–7.62)
NEUTS SEG NFR BLD AUTO: 90 % (ref 43–75)
NRBC BLD AUTO-RTO: 0 /100 WBCS
PLATELET # BLD AUTO: 237 THOUSANDS/UL (ref 149–390)
PMV BLD AUTO: 10.9 FL (ref 8.9–12.7)
POTASSIUM SERPL-SCNC: 4.4 MMOL/L (ref 3.5–5.3)
PROT SERPL-MCNC: 6 G/DL (ref 6.4–8.2)
RBC # BLD AUTO: 3.75 MILLION/UL (ref 3.88–5.62)
SODIUM SERPL-SCNC: 137 MMOL/L (ref 136–145)
WBC # BLD AUTO: 16.36 THOUSAND/UL (ref 4.31–10.16)

## 2018-04-12 PROCEDURE — 82948 REAGENT STRIP/BLOOD GLUCOSE: CPT

## 2018-04-12 PROCEDURE — 80053 COMPREHEN METABOLIC PANEL: CPT | Performed by: HOSPITALIST

## 2018-04-12 PROCEDURE — 99232 SBSQ HOSP IP/OBS MODERATE 35: CPT | Performed by: PHYSICIAN ASSISTANT

## 2018-04-12 PROCEDURE — 99232 SBSQ HOSP IP/OBS MODERATE 35: CPT | Performed by: INTERNAL MEDICINE

## 2018-04-12 PROCEDURE — 85025 COMPLETE CBC W/AUTO DIFF WBC: CPT | Performed by: HOSPITALIST

## 2018-04-12 PROCEDURE — 99232 SBSQ HOSP IP/OBS MODERATE 35: CPT | Performed by: HOSPITALIST

## 2018-04-12 RX ORDER — DEXAMETHASONE SODIUM PHOSPHATE 4 MG/ML
INJECTION, SOLUTION INTRA-ARTICULAR; INTRALESIONAL; INTRAMUSCULAR; INTRAVENOUS; SOFT TISSUE
Status: DISPENSED
Start: 2018-04-12 | End: 2018-04-13

## 2018-04-12 RX ORDER — INSULIN GLARGINE 100 [IU]/ML
INJECTION, SOLUTION SUBCUTANEOUS
Status: DISPENSED
Start: 2018-04-12 | End: 2018-04-13

## 2018-04-12 RX ORDER — LORAZEPAM 2 MG/ML
INJECTION INTRAMUSCULAR
Status: DISPENSED
Start: 2018-04-12 | End: 2018-04-13

## 2018-04-12 RX ADMIN — INSULIN GLARGINE 15 UNITS: 100 INJECTION, SOLUTION SUBCUTANEOUS at 21:24

## 2018-04-12 RX ADMIN — LORAZEPAM 1 MG: 2 INJECTION INTRAMUSCULAR; INTRAVENOUS at 21:16

## 2018-04-12 RX ADMIN — SODIUM CHLORIDE TAB 1 GM 1 G: 1 TAB at 10:23

## 2018-04-12 RX ADMIN — SODIUM CHLORIDE TAB 1 GM 1 G: 1 TAB at 17:36

## 2018-04-12 RX ADMIN — BUDESONIDE AND FORMOTEROL FUMARATE DIHYDRATE 2 PUFF: 160; 4.5 AEROSOL RESPIRATORY (INHALATION) at 10:30

## 2018-04-12 RX ADMIN — SODIUM CHLORIDE 75 ML/HR: 0.9 INJECTION, SOLUTION INTRAVENOUS at 21:16

## 2018-04-12 RX ADMIN — BUDESONIDE AND FORMOTEROL FUMARATE DIHYDRATE 2 PUFF: 160; 4.5 AEROSOL RESPIRATORY (INHALATION) at 21:16

## 2018-04-12 RX ADMIN — BUPROPION HYDROCHLORIDE 75 MG: 75 TABLET, FILM COATED ORAL at 21:17

## 2018-04-12 RX ADMIN — INSULIN LISPRO 3 UNITS: 100 INJECTION, SOLUTION INTRAVENOUS; SUBCUTANEOUS at 13:06

## 2018-04-12 RX ADMIN — BUPROPION HYDROCHLORIDE 75 MG: 75 TABLET, FILM COATED ORAL at 10:29

## 2018-04-12 RX ADMIN — DEXAMETHASONE SODIUM PHOSPHATE 4 MG: 4 INJECTION, SOLUTION INTRAMUSCULAR; INTRAVENOUS at 10:25

## 2018-04-12 RX ADMIN — LORAZEPAM 1 MG: 2 INJECTION INTRAMUSCULAR; INTRAVENOUS at 10:33

## 2018-04-12 RX ADMIN — LORAZEPAM 1 MG: 2 INJECTION INTRAMUSCULAR; INTRAVENOUS at 15:54

## 2018-04-12 RX ADMIN — PANTOPRAZOLE SODIUM 40 MG: 40 TABLET, DELAYED RELEASE ORAL at 05:23

## 2018-04-12 RX ADMIN — INSULIN LISPRO 4 UNITS: 100 INJECTION, SOLUTION INTRAVENOUS; SUBCUTANEOUS at 21:21

## 2018-04-12 RX ADMIN — Medication 1 TABLET: at 10:24

## 2018-04-12 RX ADMIN — LISINOPRIL 5 MG: 5 TABLET ORAL at 10:28

## 2018-04-12 RX ADMIN — INSULIN LISPRO 1 UNITS: 100 INJECTION, SOLUTION INTRAVENOUS; SUBCUTANEOUS at 10:26

## 2018-04-12 RX ADMIN — DEXAMETHASONE SODIUM PHOSPHATE 4 MG: 4 INJECTION, SOLUTION INTRAMUSCULAR; INTRAVENOUS at 22:02

## 2018-04-12 RX ADMIN — ARIPIPRAZOLE 10 MG: 10 TABLET ORAL at 10:29

## 2018-04-12 RX ADMIN — NICOTINE 1 PATCH: 21 PATCH, EXTENDED RELEASE TRANSDERMAL at 10:27

## 2018-04-12 RX ADMIN — Medication 100 MG: at 10:24

## 2018-04-12 RX ADMIN — FOLIC ACID 1 MG: 1 TABLET ORAL at 10:25

## 2018-04-12 NOTE — RESTORATIVE TECHNICIAN NOTE
Restorative Specialist Mobility Note       Activity: Other (Comment) (Attempted to transfer patient to bedside recliner for lunch, but patient received a cell phone call at that moment and asked to come back later )

## 2018-04-12 NOTE — PROGRESS NOTES
Progress Note - Pulmonary   Wilbert Pirl 63 y o  male MRN: [id number]  Unit/Bed#: [facility] 909-01 Encounter: [id number]    Assessment:  1  Multiple PE, status post IVC filter  2   DVT as above  3   Non-small cell lung CA, stage IV on Keytruda  4   COPD on Symbicort and albuterol  5   Solitary cerebral lesion for craniotomy by Neurosurgery  6   Bipolar disorder  7   Right middle lobe occlusion  Right hilar mass  Plan:  1  Planned for craniotomy in the morning  2   Continue Symbicort and bronchodilators  3   IVC filter in place  4   Once cleared by Neurosurgery for anticoagulation, the patient will need anticoagulation for the rest of his life  5   Leukocytosis secondary to steroids  6   Continue Decadron  7   Appreciate all notes involved in this case  Chief Complaint:   Asymptomatic at the moment from pulmonary standpoint, the numbness in his right upper extremity also resolved  Subjective:   As above  Objective:     Vitals: Blood pressure 137/76, pulse 89, temperature 97 7 °F (36 5 °C), temperature source Oral, resp  rate 18, height 5' 9" (1 753 m), weight 88 kg (194 lb), SpO2 96 %  ,Body mass index is 28 65 kg/m²  Intake/Output Summary (Last 24 hours) at 04/12/18 1818  Last data filed at 04/12/18 1300   Gross per 24 hour   Intake             1160 ml   Output             1000 ml   Net              160 ml       Invasive Devices     Peripheral Intravenous Line            Peripheral IV 04/10/18 Dorsal (posterior); Right Forearm 2 days    Peripheral IV 04/10/18 Left Forearm 2 days                Physical Exam:  Vital signs are stable, S1-S2 regular rate and rhythm, lungs are clear, abdomen is benign, no edema, neurologically nonfocal       Labs: I have personally reviewed pertinent lab results  Imaging and other studies: I have personally reviewed pertinent reports

## 2018-04-12 NOTE — PROGRESS NOTES
04/12/18 1100   Plan of Care   Comments  provided a safe space for the pt to express his thoughts and feelings      Assessment Completed by: Unit visit

## 2018-04-12 NOTE — ASSESSMENT & PLAN NOTE
· Exam reveals GCS 15  No focal findings  Continue frequent neurological checks  · Imaging reviewed personally and by attending  Final results as below  ? MRI brain with without contrast 4/7/18:  Solitary irregularly enhancing 2 4 cm left parietal mass with associated vasogenic edema  ? CT chest abdomen pelvis 4/9/18: Known right infiltrating RML lung mass with metastatic lymph nodes  Possible RLL embolism  · Pain control per primary team  · Mobilize as tolerated with assistance  · DVT PPX: defer for surgery tomorrow  · Hold all anti-platelet/anticoagulation medications  Patient was on aspirin 81 mg which was discontinued  Last dose 4/8/18 at 0821  · Continue decadron 4mg Q 6H   · Will continue to follow  Plan for surgical resection of brain mass tomorrow

## 2018-04-12 NOTE — PROGRESS NOTES
Progress Note - Joe Sampson 1955, 63 y o  male MRN: [id number]    Unit/Bed#: [facility] 909-01 Encounter: [id number]    Primary Care Provider: Belinda Brown MD   Date and time admitted to hospital: 4/6/2018  5:10 PM        * 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    Assessment & Plan    · Exam reveals GCS 15  No focal findings  Continue frequent neurological checks  · Imaging reviewed personally and by attending  Final results as below  ? MRI brain with without contrast 4/7/18:  Solitary irregularly enhancing 2 4 cm left parietal mass with associated vasogenic edema  ? CT chest abdomen pelvis 4/9/18: Known right infiltrating RML lung mass with metastatic lymph nodes  Possible RLL embolism  · Pain control per primary team  · Mobilize as tolerated with assistance  · DVT PPX: defer for surgery tomorrow  · Hold all anti-platelet/anticoagulation medications  Patient was on aspirin 81 mg which was discontinued  Last dose 4/8/18 at 0821  · Continue decadron 4mg Q 6H   · Will continue to follow  Plan for surgical resection of brain mass tomorrow  Cerebral edema (HCC)   Assessment & Plan    Secondary to mass  · Continue decadron 4Q6        Lower leg DVT (deep venous thrombosis) (HCC)   Assessment & Plan    Doppler with subacute to chronic LLE DVT  IVF filter placed 4/11/18          PE (pulmonary thromboembolism) ([facility] [address] )   Assessment & Plan    CTA chest revealed small foci OLIVIA and RLL PE  · Appreciate pulmonary consult          Tobacco abuse   Assessment & Plan    Continue nicotine patch  Discussed cessation         Alcohol abuse   Assessment & Plan    Discussed cessation  Patient admits to 1L alcohol every 2 days  Insulin-dependent diabetes mellitus   Assessment & Plan    Continue Accucheck and ISS (glucose )  A1C 8 0            Subjective/Objective   Chief Complaint: "I need Ativan"/postoperative follow-up    Subjective: Patient is without complaints   Denies headaches, vision or speech changes  Denies weakness, tingling or pain  Denies SOB/CP  Chronic cough  Intermittent right anterior chest wall tenderness  Objective: Lying in bed  NAD  I/O       04/10 0701 - 04/11 0700 04/11 0701 - 04/12 0700 04/12 0701 - 04/13 0700    P  O  1690 880     I V  (mL/kg) 992 5 (11 3) 525 (6)     Total Intake(mL/kg) 2682 5 (30 5) 1405 (16)     Urine (mL/kg/hr) 1325 (0 6) 2375 (1 1)     Total Output 1325 2375      Net +1357 5 -970             Unmeasured Urine Occurrence 1 x            Invasive Devices     Peripheral Intravenous Line            Peripheral IV 04/10/18 Dorsal (posterior); Right Forearm 1 day    Peripheral IV 04/10/18 Left Forearm 1 day                Physical Exam:  Vitals: Blood pressure 157/78, pulse 78, temperature 98 2 °F (36 8 °C), temperature source Oral, resp  rate 18, height 5' 9" (1 753 m), weight 88 kg (194 lb), SpO2 96 %  ,Body mass index is 28 65 kg/m²      General appearance: alert, appears stated age, cooperative and no distress  Head: Normocephalic, without obvious abnormality, atraumatic  Eyes: EOMI, PERRL  Neck: supple, symmetrical, trachea midline   Lungs: non labored breathing  Heart: regular heart rate  Neurologic:   Mental status: Alert, oriented, thought content appropriate  Cranial nerves: grossly intact (Cranial nerves II-XII)  Sensory: normal to LT  Motor: moving all extremities without focal weakness  Reflexes: 1+ and symmetric, no clonus  Coordination: finger to nose abnormal bilaterally, no drift bilaterally    Lab Results:    Results from last 7 days  Lab Units 04/12/18  0447 04/11/18  0459 04/10/18  0432   WBC Thousand/uL 16 36* 19 24* 20 58*   HEMOGLOBIN g/dL 11 0* 11 8* 11 9*   HEMATOCRIT % 33 1* 35 9* 35 2*   PLATELETS Thousands/uL 237 275 294   NEUTROS PCT % 90* 88*  --    MONOS PCT % 4 4  --    MONO PCT MAN %  --   --  3*       Results from last 7 days  Lab Units 04/12/18  0447 04/11/18  0459 04/10/18  0432   SODIUM mmol/L 137 134* 132*   POTASSIUM mmol/L 4 4 4 7 4 9   CHLORIDE mmol/L 106 103 100   CO2 mmol/L 24 25 27   BUN mg/dL 28* 32* 33*   CREATININE mg/dL 0 99 1 04 1 08   CALCIUM mg/dL 8 6 8 8 9 5   TOTAL PROTEIN g/dL 6 0* 6 3*  --    BILIRUBIN TOTAL mg/dL 0 21 0 38  --    ALK PHOS U/L 51 51  --    ALT U/L 11* 9*  --    AST U/L 7 8  --    GLUCOSE RANDOM mg/dL 137 174* 186*       Results from last 7 days  Lab Units 04/07/18  0445 04/06/18  2120   MAGNESIUM mg/dL 1 6 1 5*           Results from last 7 days  Lab Units 04/08/18  0447   INR  1 03   PTT seconds 22*     No results found for: TROPONINT  ABG:No results found for: PHART, [id number], PO2ART, ECJ1HCU, P1CSGPEM, BEART, SOURCE    Imaging Studies: I have personally reviewed pertinent reports  and I have personally reviewed pertinent films in PACS    EKG, Pathology, and Other Studies: I have personally reviewed pertinent reports        VTE Pharmacologic Prophylaxis: none currently ordered    VTE Mechanical Prophylaxis: sequential compression device

## 2018-04-13 ENCOUNTER — ANESTHESIA EVENT (INPATIENT)
Dept: PERIOP | Facility: HOSPITAL | Age: 63
DRG: 025 | End: 2018-04-13
Payer: COMMERCIAL

## 2018-04-13 ENCOUNTER — ANESTHESIA (INPATIENT)
Dept: PERIOP | Facility: HOSPITAL | Age: 63
DRG: 025 | End: 2018-04-13
Payer: COMMERCIAL

## 2018-04-13 PROBLEM — F17.210 DEPENDENCE ON NICOTINE FROM CIGARETTES: Chronic | Status: ACTIVE | Noted: 2018-04-06

## 2018-04-13 PROBLEM — I82.4Z2 DEEP VEIN THROMBOSIS (DVT) OF DISTAL VEIN OF LEFT LOWER EXTREMITY (HCC): Status: ACTIVE | Noted: 2018-04-11

## 2018-04-13 LAB
ABO GROUP BLD: NORMAL
BASE EXCESS BLDA CALC-SCNC: -9 MMOL/L (ref -2–3)
BLD GP AB SCN SERPL QL: NEGATIVE
CA-I BLD-SCNC: 0.87 MMOL/L (ref 1.12–1.32)
GLUCOSE SERPL-MCNC: 118 MG/DL (ref 65–140)
GLUCOSE SERPL-MCNC: 157 MG/DL (ref 65–140)
GLUCOSE SERPL-MCNC: 200 MG/DL (ref 65–140)
GLUCOSE SERPL-MCNC: 200 MG/DL (ref 65–140)
GLUCOSE SERPL-MCNC: 259 MG/DL (ref 65–140)
GLUCOSE SERPL-MCNC: 268 MG/DL (ref 65–140)
GLUCOSE SERPL-MCNC: 279 MG/DL (ref 65–140)
GLUCOSE SERPL-MCNC: 50 MG/DL (ref 65–140)
GLUCOSE SERPL-MCNC: 68 MG/DL (ref 65–140)
GLUCOSE SERPL-MCNC: 76 MG/DL (ref 65–140)
HCO3 BLDA-SCNC: 15.1 MMOL/L (ref 22–28)
HCT VFR BLD CALC: 19 % (ref 36.5–49.3)
HGB BLDA-MCNC: 6.5 G/DL (ref 12–17)
PCO2 BLD: 16 MMOL/L (ref 21–32)
PCO2 BLD: 26.2 MM HG (ref 36–44)
PH BLD: 7.37 [PH] (ref 7.35–7.45)
PO2 BLD: 145 MM HG (ref 75–129)
POTASSIUM BLD-SCNC: 2.8 MMOL/L (ref 3.5–5.3)
RH BLD: NEGATIVE
SAO2 % BLD FROM PO2: 99 % (ref 95–98)
SODIUM BLD-SCNC: 144 MMOL/L (ref 136–145)
SPECIMEN EXPIRATION DATE: NORMAL
SPECIMEN SOURCE: ABNORMAL

## 2018-04-13 PROCEDURE — 86901 BLOOD TYPING SEROLOGIC RH(D): CPT | Performed by: NEUROLOGICAL SURGERY

## 2018-04-13 PROCEDURE — 88307 TISSUE EXAM BY PATHOLOGIST: CPT | Performed by: PATHOLOGY

## 2018-04-13 PROCEDURE — 88341 IMHCHEM/IMCYTCHM EA ADD ANTB: CPT | Performed by: PATHOLOGY

## 2018-04-13 PROCEDURE — 85014 HEMATOCRIT: CPT

## 2018-04-13 PROCEDURE — 88342 IMHCHEM/IMCYTCHM 1ST ANTB: CPT | Performed by: PATHOLOGY

## 2018-04-13 PROCEDURE — 61510 CRNEC TREPH EXC BRN TUM STTL: CPT | Performed by: NEUROLOGICAL SURGERY

## 2018-04-13 PROCEDURE — 84132 ASSAY OF SERUM POTASSIUM: CPT

## 2018-04-13 PROCEDURE — 82948 REAGENT STRIP/BLOOD GLUCOSE: CPT

## 2018-04-13 PROCEDURE — 95940 IONM IN OPERATNG ROOM 15 MIN: CPT | Performed by: NEUROLOGICAL SURGERY

## 2018-04-13 PROCEDURE — 86900 BLOOD TYPING SEROLOGIC ABO: CPT | Performed by: NEUROLOGICAL SURGERY

## 2018-04-13 PROCEDURE — 88331 PATH CONSLTJ SURG 1 BLK 1SPC: CPT | Performed by: PATHOLOGY

## 2018-04-13 PROCEDURE — 82330 ASSAY OF CALCIUM: CPT

## 2018-04-13 PROCEDURE — 84295 ASSAY OF SERUM SODIUM: CPT

## 2018-04-13 PROCEDURE — 61781 SCAN PROC CRANIAL INTRA: CPT | Performed by: NEUROLOGICAL SURGERY

## 2018-04-13 PROCEDURE — C1713 ANCHOR/SCREW BN/BN,TIS/BN: HCPCS | Performed by: NEUROLOGICAL SURGERY

## 2018-04-13 PROCEDURE — 00B70ZX EXCISION OF CEREBRAL HEMISPHERE, OPEN APPROACH, DIAGNOSTIC: ICD-10-PCS | Performed by: NEUROLOGICAL SURGERY

## 2018-04-13 PROCEDURE — 99291 CRITICAL CARE FIRST HOUR: CPT | Performed by: ANESTHESIOLOGY

## 2018-04-13 PROCEDURE — 82803 BLOOD GASES ANY COMBINATION: CPT

## 2018-04-13 PROCEDURE — 82947 ASSAY GLUCOSE BLOOD QUANT: CPT

## 2018-04-13 PROCEDURE — 86850 RBC ANTIBODY SCREEN: CPT | Performed by: NEUROLOGICAL SURGERY

## 2018-04-13 DEVICE — IMPLANTABLE DEVICE
Type: IMPLANTABLE DEVICE | Site: CRANIAL | Status: FUNCTIONAL
Brand: THINFLAP SYSTEM

## 2018-04-13 DEVICE — IMPLANTABLE DEVICE
Type: IMPLANTABLE DEVICE | Site: CRANIAL | Status: FUNCTIONAL
Brand: "1.5MM" SYSTEM

## 2018-04-13 DEVICE — IMPLANTABLE DEVICE
Type: IMPLANTABLE DEVICE | Site: CRANIAL | Status: FUNCTIONAL
Brand: THINFLAP

## 2018-04-13 RX ORDER — SODIUM CHLORIDE 9 MG/ML
75 INJECTION, SOLUTION INTRAVENOUS CONTINUOUS
Status: DISCONTINUED | OUTPATIENT
Start: 2018-04-13 | End: 2018-04-14 | Stop reason: ALTCHOICE

## 2018-04-13 RX ORDER — SODIUM CHLORIDE, SODIUM LACTATE, POTASSIUM CHLORIDE, CALCIUM CHLORIDE 600; 310; 30; 20 MG/100ML; MG/100ML; MG/100ML; MG/100ML
INJECTION, SOLUTION INTRAVENOUS CONTINUOUS PRN
Status: DISCONTINUED | OUTPATIENT
Start: 2018-04-13 | End: 2018-04-13 | Stop reason: SURG

## 2018-04-13 RX ORDER — SUCCINYLCHOLINE CHLORIDE 20 MG/ML
INJECTION INTRAMUSCULAR; INTRAVENOUS AS NEEDED
Status: DISCONTINUED | OUTPATIENT
Start: 2018-04-13 | End: 2018-04-13 | Stop reason: SURG

## 2018-04-13 RX ORDER — DOCUSATE SODIUM 100 MG/1
100 CAPSULE, LIQUID FILLED ORAL 2 TIMES DAILY
Status: DISCONTINUED | OUTPATIENT
Start: 2018-04-13 | End: 2018-04-16 | Stop reason: HOSPADM

## 2018-04-13 RX ORDER — DEXAMETHASONE SODIUM PHOSPHATE 10 MG/ML
2 INJECTION, SOLUTION INTRAMUSCULAR; INTRAVENOUS EVERY 12 HOURS SCHEDULED
Status: DISCONTINUED | OUTPATIENT
Start: 2018-04-21 | End: 2018-04-16 | Stop reason: HOSPADM

## 2018-04-13 RX ORDER — CHLORHEXIDINE GLUCONATE 0.12 MG/ML
15 RINSE ORAL ONCE
Status: COMPLETED | OUTPATIENT
Start: 2018-04-13 | End: 2018-04-13

## 2018-04-13 RX ORDER — DEXAMETHASONE SODIUM PHOSPHATE 10 MG/ML
2 INJECTION, SOLUTION INTRAMUSCULAR; INTRAVENOUS EVERY 24 HOURS
Status: DISCONTINUED | OUTPATIENT
Start: 2018-04-23 | End: 2018-04-16 | Stop reason: HOSPADM

## 2018-04-13 RX ORDER — EPHEDRINE SULFATE 50 MG/ML
INJECTION, SOLUTION INTRAVENOUS AS NEEDED
Status: DISCONTINUED | OUTPATIENT
Start: 2018-04-13 | End: 2018-04-13 | Stop reason: SURG

## 2018-04-13 RX ORDER — DEXAMETHASONE SODIUM PHOSPHATE 10 MG/ML
4 INJECTION, SOLUTION INTRAMUSCULAR; INTRAVENOUS EVERY 6 HOURS SCHEDULED
Status: COMPLETED | OUTPATIENT
Start: 2018-04-13 | End: 2018-04-15

## 2018-04-13 RX ORDER — OXAZEPAM 10 MG
10 CAPSULE ORAL EVERY 8 HOURS SCHEDULED
Status: DISCONTINUED | OUTPATIENT
Start: 2018-04-13 | End: 2018-04-16 | Stop reason: HOSPADM

## 2018-04-13 RX ORDER — LABETALOL HYDROCHLORIDE 5 MG/ML
10 INJECTION, SOLUTION INTRAVENOUS ONCE
Status: DISCONTINUED | OUTPATIENT
Start: 2018-04-13 | End: 2018-04-13 | Stop reason: HOSPADM

## 2018-04-13 RX ORDER — LIDOCAINE HYDROCHLORIDE 10 MG/ML
INJECTION, SOLUTION INFILTRATION; PERINEURAL AS NEEDED
Status: DISCONTINUED | OUTPATIENT
Start: 2018-04-13 | End: 2018-04-13 | Stop reason: HOSPADM

## 2018-04-13 RX ORDER — MAGNESIUM SULFATE 500 MG/ML
VIAL (ML) INJECTION AS NEEDED
Status: DISCONTINUED | OUTPATIENT
Start: 2018-04-13 | End: 2018-04-13 | Stop reason: SURG

## 2018-04-13 RX ORDER — LIDOCAINE HYDROCHLORIDE AND EPINEPHRINE 10; 10 MG/ML; UG/ML
INJECTION, SOLUTION INFILTRATION; PERINEURAL AS NEEDED
Status: DISCONTINUED | OUTPATIENT
Start: 2018-04-13 | End: 2018-04-13 | Stop reason: HOSPADM

## 2018-04-13 RX ORDER — POTASSIUM CHLORIDE 14.9 MG/ML
INJECTION INTRAVENOUS CONTINUOUS PRN
Status: DISCONTINUED | OUTPATIENT
Start: 2018-04-13 | End: 2018-04-13 | Stop reason: SURG

## 2018-04-13 RX ORDER — ONDANSETRON 2 MG/ML
4 INJECTION INTRAMUSCULAR; INTRAVENOUS EVERY 4 HOURS PRN
Status: DISCONTINUED | OUTPATIENT
Start: 2018-04-13 | End: 2018-04-16 | Stop reason: HOSPADM

## 2018-04-13 RX ORDER — FENTANYL CITRATE 50 UG/ML
25 INJECTION, SOLUTION INTRAMUSCULAR; INTRAVENOUS
Status: DISCONTINUED | OUTPATIENT
Start: 2018-04-13 | End: 2018-04-16 | Stop reason: HOSPADM

## 2018-04-13 RX ORDER — PROPOFOL 10 MG/ML
INJECTION, EMULSION INTRAVENOUS CONTINUOUS PRN
Status: DISCONTINUED | OUTPATIENT
Start: 2018-04-13 | End: 2018-04-13 | Stop reason: SURG

## 2018-04-13 RX ORDER — DEXMEDETOMIDINE HYDROCHLORIDE 100 UG/ML
INJECTION, SOLUTION INTRAVENOUS AS NEEDED
Status: DISCONTINUED | OUTPATIENT
Start: 2018-04-13 | End: 2018-04-13

## 2018-04-13 RX ORDER — MANNITOL 250 MG/ML
INJECTION, SOLUTION INTRAVENOUS AS NEEDED
Status: DISCONTINUED | OUTPATIENT
Start: 2018-04-13 | End: 2018-04-13 | Stop reason: SURG

## 2018-04-13 RX ORDER — DEXAMETHASONE SODIUM PHOSPHATE 10 MG/ML
2 INJECTION, SOLUTION INTRAMUSCULAR; INTRAVENOUS EVERY 8 HOURS
Status: DISCONTINUED | OUTPATIENT
Start: 2018-04-19 | End: 2018-04-16 | Stop reason: HOSPADM

## 2018-04-13 RX ORDER — DEXAMETHASONE SODIUM PHOSPHATE 10 MG/ML
4 INJECTION, SOLUTION INTRAMUSCULAR; INTRAVENOUS EVERY 8 HOURS
Status: DISCONTINUED | OUTPATIENT
Start: 2018-04-15 | End: 2018-04-16 | Stop reason: HOSPADM

## 2018-04-13 RX ORDER — PROPOFOL 10 MG/ML
INJECTION, EMULSION INTRAVENOUS AS NEEDED
Status: DISCONTINUED | OUTPATIENT
Start: 2018-04-13 | End: 2018-04-13 | Stop reason: SURG

## 2018-04-13 RX ORDER — MAGNESIUM HYDROXIDE 1200 MG/15ML
LIQUID ORAL AS NEEDED
Status: DISCONTINUED | OUTPATIENT
Start: 2018-04-13 | End: 2018-04-13 | Stop reason: HOSPADM

## 2018-04-13 RX ORDER — ONDANSETRON 2 MG/ML
4 INJECTION INTRAMUSCULAR; INTRAVENOUS ONCE AS NEEDED
Status: DISCONTINUED | OUTPATIENT
Start: 2018-04-13 | End: 2018-04-13 | Stop reason: HOSPADM

## 2018-04-13 RX ORDER — LABETALOL HYDROCHLORIDE 5 MG/ML
10 INJECTION, SOLUTION INTRAVENOUS ONCE AS NEEDED
Status: COMPLETED | OUTPATIENT
Start: 2018-04-13 | End: 2018-04-13

## 2018-04-13 RX ORDER — LIDOCAINE HYDROCHLORIDE 10 MG/ML
INJECTION, SOLUTION INFILTRATION; PERINEURAL AS NEEDED
Status: DISCONTINUED | OUTPATIENT
Start: 2018-04-13 | End: 2018-04-13 | Stop reason: SURG

## 2018-04-13 RX ORDER — BUPIVACAINE HYDROCHLORIDE AND EPINEPHRINE 5; 5 MG/ML; UG/ML
INJECTION, SOLUTION EPIDURAL; INTRACAUDAL; PERINEURAL AS NEEDED
Status: DISCONTINUED | OUTPATIENT
Start: 2018-04-13 | End: 2018-04-13 | Stop reason: HOSPADM

## 2018-04-13 RX ORDER — ALBUTEROL SULFATE 2.5 MG/3ML
2.5 SOLUTION RESPIRATORY (INHALATION) ONCE AS NEEDED
Status: DISCONTINUED | OUTPATIENT
Start: 2018-04-13 | End: 2018-04-13 | Stop reason: HOSPADM

## 2018-04-13 RX ORDER — HEPARIN SODIUM 5000 [USP'U]/ML
5000 INJECTION, SOLUTION INTRAVENOUS; SUBCUTANEOUS EVERY 12 HOURS SCHEDULED
Status: DISCONTINUED | OUTPATIENT
Start: 2018-04-14 | End: 2018-04-16 | Stop reason: HOSPADM

## 2018-04-13 RX ORDER — SODIUM CHLORIDE, SODIUM LACTATE, POTASSIUM CHLORIDE, CALCIUM CHLORIDE 600; 310; 30; 20 MG/100ML; MG/100ML; MG/100ML; MG/100ML
50 INJECTION, SOLUTION INTRAVENOUS CONTINUOUS
Status: DISCONTINUED | OUTPATIENT
Start: 2018-04-13 | End: 2018-04-13

## 2018-04-13 RX ORDER — ONDANSETRON 2 MG/ML
INJECTION INTRAMUSCULAR; INTRAVENOUS AS NEEDED
Status: DISCONTINUED | OUTPATIENT
Start: 2018-04-13 | End: 2018-04-13 | Stop reason: SURG

## 2018-04-13 RX ORDER — FENTANYL CITRATE/PF 50 MCG/ML
50 SYRINGE (ML) INJECTION
Status: DISCONTINUED | OUTPATIENT
Start: 2018-04-13 | End: 2018-04-13 | Stop reason: HOSPADM

## 2018-04-13 RX ORDER — SODIUM CHLORIDE 9 MG/ML
INJECTION, SOLUTION INTRAVENOUS CONTINUOUS PRN
Status: DISCONTINUED | OUTPATIENT
Start: 2018-04-13 | End: 2018-04-13 | Stop reason: SURG

## 2018-04-13 RX ORDER — CALCIUM CHLORIDE 100 MG/ML
INJECTION INTRAVENOUS; INTRAVENTRICULAR AS NEEDED
Status: DISCONTINUED | OUTPATIENT
Start: 2018-04-13 | End: 2018-04-13 | Stop reason: SURG

## 2018-04-13 RX ORDER — FUROSEMIDE 10 MG/ML
INJECTION INTRAMUSCULAR; INTRAVENOUS AS NEEDED
Status: DISCONTINUED | OUTPATIENT
Start: 2018-04-13 | End: 2018-04-13 | Stop reason: SURG

## 2018-04-13 RX ORDER — DEXAMETHASONE SODIUM PHOSPHATE 10 MG/ML
2 INJECTION, SOLUTION INTRAMUSCULAR; INTRAVENOUS EVERY 6 HOURS SCHEDULED
Status: DISCONTINUED | OUTPATIENT
Start: 2018-04-17 | End: 2018-04-16 | Stop reason: HOSPADM

## 2018-04-13 RX ORDER — METOCLOPRAMIDE HYDROCHLORIDE 5 MG/ML
10 INJECTION INTRAMUSCULAR; INTRAVENOUS ONCE AS NEEDED
Status: DISCONTINUED | OUTPATIENT
Start: 2018-04-13 | End: 2018-04-13 | Stop reason: HOSPADM

## 2018-04-13 RX ORDER — CEFAZOLIN SODIUM 1 G/3ML
INJECTION, POWDER, FOR SOLUTION INTRAMUSCULAR; INTRAVENOUS AS NEEDED
Status: DISCONTINUED | OUTPATIENT
Start: 2018-04-13 | End: 2018-04-13 | Stop reason: SURG

## 2018-04-13 RX ORDER — GLYCOPYRROLATE 0.2 MG/ML
INJECTION INTRAMUSCULAR; INTRAVENOUS AS NEEDED
Status: DISCONTINUED | OUTPATIENT
Start: 2018-04-13 | End: 2018-04-13 | Stop reason: SURG

## 2018-04-13 RX ORDER — PROMETHAZINE HYDROCHLORIDE 25 MG/ML
12.5 INJECTION, SOLUTION INTRAMUSCULAR; INTRAVENOUS ONCE AS NEEDED
Status: DISCONTINUED | OUTPATIENT
Start: 2018-04-13 | End: 2018-04-13 | Stop reason: HOSPADM

## 2018-04-13 RX ORDER — OXYCODONE HYDROCHLORIDE 10 MG/1
10 TABLET ORAL EVERY 4 HOURS PRN
Status: DISCONTINUED | OUTPATIENT
Start: 2018-04-13 | End: 2018-04-16 | Stop reason: HOSPADM

## 2018-04-13 RX ADMIN — FENTANYL CITRATE 50 MCG: 50 INJECTION, SOLUTION INTRAMUSCULAR; INTRAVENOUS at 12:11

## 2018-04-13 RX ADMIN — EPHEDRINE SULFATE 5 MG: 50 INJECTION, SOLUTION INTRAMUSCULAR; INTRAVENOUS; SUBCUTANEOUS at 08:16

## 2018-04-13 RX ADMIN — Medication 0.25 MCG/KG/MIN: at 08:04

## 2018-04-13 RX ADMIN — SODIUM CHLORIDE 75 ML/HR: 0.9 INJECTION, SOLUTION INTRAVENOUS at 13:47

## 2018-04-13 RX ADMIN — DOCUSATE SODIUM 100 MG: 100 CAPSULE, LIQUID FILLED ORAL at 17:40

## 2018-04-13 RX ADMIN — SUCCINYLCHOLINE CHLORIDE 100 MG: 20 INJECTION, SOLUTION INTRAMUSCULAR; INTRAVENOUS at 07:55

## 2018-04-13 RX ADMIN — HYDROMORPHONE HYDROCHLORIDE 1 MG: 1 INJECTION, SOLUTION INTRAMUSCULAR; INTRAVENOUS; SUBCUTANEOUS at 19:15

## 2018-04-13 RX ADMIN — INSULIN GLARGINE 15 UNITS: 100 INJECTION, SOLUTION SUBCUTANEOUS at 23:57

## 2018-04-13 RX ADMIN — LORAZEPAM 1 MG: 2 INJECTION INTRAMUSCULAR; INTRAVENOUS at 19:17

## 2018-04-13 RX ADMIN — INSULIN HUMAN 10 UNITS: 100 INJECTION, SOLUTION PARENTERAL at 13:24

## 2018-04-13 RX ADMIN — DEXAMETHASONE SODIUM PHOSPHATE 4 MG: 10 INJECTION, SOLUTION INTRAMUSCULAR; INTRAVENOUS at 17:40

## 2018-04-13 RX ADMIN — MAGNESIUM SULFATE HEPTAHYDRATE 2 G: 500 INJECTION, SOLUTION INTRAMUSCULAR; INTRAVENOUS at 10:41

## 2018-04-13 RX ADMIN — EPHEDRINE SULFATE 5 MG: 50 INJECTION, SOLUTION INTRAMUSCULAR; INTRAVENOUS; SUBCUTANEOUS at 09:18

## 2018-04-13 RX ADMIN — DEXAMETHASONE SODIUM PHOSPHATE 4 MG: 10 INJECTION, SOLUTION INTRAMUSCULAR; INTRAVENOUS at 23:11

## 2018-04-13 RX ADMIN — DEXAMETHASONE SODIUM PHOSPHATE 4 MG: 10 INJECTION, SOLUTION INTRAMUSCULAR; INTRAVENOUS at 13:50

## 2018-04-13 RX ADMIN — CEFAZOLIN 2000 MG: 1 INJECTION, POWDER, FOR SOLUTION INTRAVENOUS at 08:49

## 2018-04-13 RX ADMIN — SODIUM CHLORIDE TAB 1 GM 1 G: 1 TAB at 18:25

## 2018-04-13 RX ADMIN — MANNITOL 12.5 G: 12.5 INJECTION, SOLUTION INTRAVENOUS at 09:14

## 2018-04-13 RX ADMIN — OXAZEPAM 10 MG: 10 CAPSULE, GELATIN COATED ORAL at 23:09

## 2018-04-13 RX ADMIN — LABETALOL 20 MG/4 ML (5 MG/ML) INTRAVENOUS SYRINGE 10 MG: at 11:54

## 2018-04-13 RX ADMIN — CEFAZOLIN SODIUM 1000 MG: 1 SOLUTION INTRAVENOUS at 16:49

## 2018-04-13 RX ADMIN — PANTOPRAZOLE SODIUM 40 MG: 40 TABLET, DELAYED RELEASE ORAL at 06:24

## 2018-04-13 RX ADMIN — SODIUM CHLORIDE, SODIUM LACTATE, POTASSIUM CHLORIDE, AND CALCIUM CHLORIDE: .6; .31; .03; .02 INJECTION, SOLUTION INTRAVENOUS at 07:40

## 2018-04-13 RX ADMIN — POTASSIUM CHLORIDE: 200 INJECTION, SOLUTION INTRAVENOUS at 10:41

## 2018-04-13 RX ADMIN — GLYCOPYRROLATE 0.2 MG: 0.2 INJECTION, SOLUTION INTRAMUSCULAR; INTRAVENOUS at 09:21

## 2018-04-13 RX ADMIN — SODIUM CHLORIDE: 0.9 INJECTION, SOLUTION INTRAVENOUS at 08:46

## 2018-04-13 RX ADMIN — Medication 0.2 MCG: at 08:04

## 2018-04-13 RX ADMIN — HYDROMORPHONE HYDROCHLORIDE 1 MG: 1 INJECTION, SOLUTION INTRAMUSCULAR; INTRAVENOUS; SUBCUTANEOUS at 23:55

## 2018-04-13 RX ADMIN — LEVETIRACETAM 1500 MG: 100 INJECTION, SOLUTION INTRAVENOUS at 23:13

## 2018-04-13 RX ADMIN — LIDOCAINE HYDROCHLORIDE 50 MG: 10 INJECTION, SOLUTION INFILTRATION; PERINEURAL at 07:55

## 2018-04-13 RX ADMIN — DEXAMETHASONE SODIUM PHOSPHATE 10 MG: 10 INJECTION INTRAMUSCULAR; INTRAVENOUS at 08:31

## 2018-04-13 RX ADMIN — BUDESONIDE AND FORMOTEROL FUMARATE DIHYDRATE 2 PUFF: 160; 4.5 AEROSOL RESPIRATORY (INHALATION) at 23:45

## 2018-04-13 RX ADMIN — LISINOPRIL 5 MG: 5 TABLET ORAL at 06:24

## 2018-04-13 RX ADMIN — ACETAMINOPHEN 650 MG: 325 TABLET, FILM COATED ORAL at 16:49

## 2018-04-13 RX ADMIN — PROPOFOL 200 MG: 10 INJECTION, EMULSION INTRAVENOUS at 07:55

## 2018-04-13 RX ADMIN — INSULIN LISPRO 1 UNITS: 100 INJECTION, SOLUTION INTRAVENOUS; SUBCUTANEOUS at 18:22

## 2018-04-13 RX ADMIN — PHENYLEPHRINE HYDROCHLORIDE 30 MCG/MIN: 10 INJECTION INTRAVENOUS at 09:12

## 2018-04-13 RX ADMIN — ACETAMINOPHEN 650 MG: 325 TABLET, FILM COATED ORAL at 03:56

## 2018-04-13 RX ADMIN — Medication 0.2 MCG/KG/HR: at 08:04

## 2018-04-13 RX ADMIN — FUROSEMIDE 20 MG: 10 INJECTION, SOLUTION INTRAMUSCULAR; INTRAVENOUS at 09:14

## 2018-04-13 RX ADMIN — PROPOFOL 140 MCG/KG/MIN: 10 INJECTION, EMULSION INTRAVENOUS at 08:04

## 2018-04-13 RX ADMIN — ONDANSETRON 4 MG: 2 INJECTION INTRAMUSCULAR; INTRAVENOUS at 08:31

## 2018-04-13 RX ADMIN — CHLORHEXIDINE GLUCONATE 15 ML: 1.2 RINSE ORAL at 07:08

## 2018-04-13 RX ADMIN — CEFAZOLIN SODIUM 1000 MG: 1 SOLUTION INTRAVENOUS at 23:46

## 2018-04-13 RX ADMIN — CALCIUM CHLORIDE 1 G: 100 INJECTION PARENTERAL at 10:41

## 2018-04-13 NOTE — ANESTHESIA PROCEDURE NOTES
Arterial Line Insertion  Date/Time: 4/13/2018 8:13 AM  Performed by: Jean Paul Garg by: Yoni Cunningham   Consent: Verbal consent obtained  Written consent obtained  Risks and benefits: risks, benefits and alternatives were discussed  Consent given by: patient  Patient understanding: patient states understanding of the procedure being performed  Patient consent: the patient's understanding of the procedure matches consent given  Procedure consent: procedure consent matches procedure scheduled  Relevant documents: relevant documents present and verified  Test results: test results available and properly labeled  Site marked: the operative site was marked  Imaging studies: imaging studies available  Required items: required blood products, implants, devices, and special equipment available  Patient identity confirmed: arm band  Time out: Immediately prior to procedure a "time out" was called to verify the correct patient, procedure, equipment, support staff and site/side marked as required  Preparation: Patient was prepped and draped in the usual sterile fashion    Indications: multiple ABGs, respiratory failure and hemodynamic monitoring  Orientation:  RightLocation: radial artery  Needle gauge: 20  Seldinger technique: Seldinger technique used  Number of attempts: 1  Post-procedure: dressing applied  Patient tolerance: Patient tolerated the procedure well with no immediate complications

## 2018-04-13 NOTE — ANESTHESIA POSTPROCEDURE EVALUATION
Post-Op Assessment Note      CV Status:  Stable    Mental Status:  Alert and awake    Hydration Status:  Euvolemic    PONV Controlled:  Controlled    Airway Patency:  Patent  Airway: intubated    Post Op Vitals Reviewed: Yes          Staff: Anesthesiologist           BP   158/71   Temp   97 1 F   Pulse  80   Resp 18   SpO2   99

## 2018-04-13 NOTE — H&P
History and Physical - Critical Care   Wilbert Razo 63 y o  male MRN: [id number]  Unit/Bed#: ICU 6 Encounter: [id number]    Reason for Admission / Chief Complaint: S/P left brain mass resection    History of Present Illness:  Bianka Nielson is a 63 y o  male who presents after resection of a left parietal mass on 4/13/18 following a complicated hospital stay  Pt originally presented on 4/6 to [facility] with complaint of r-sided numbness and was found to have a left parietal mass with vasogenic edema on CT scan  MRI of the brain confirmed presence of parietal mass suspicious for metastatic disease  He has a known hx of Stage IV non-small cell lung cancer with mets to lymph nodes and pelvis, first diagnosed in 12/2017 for which he follows up as an outpatient with Dr Dena Mireles and was undergoing immunotherapy with Peggy Reyes  Also has hx of COPD and bipolar  In addition, he is a long-term current cigarette smoker and drinks about 2L of whiskey a week and admits to still doing crack  He has hx of cocaine, heroine, and marijuana use, but denies current use of these  Says he has hx of "trembling" when he withdraws from alcohol, but denies hx of withdrawal seizures  He was admitted under SLIM and started on decadron with plans to consult neurosurgery  Original plan was to pursue a craniotomy for resection, as such his aspirin was dc'd on 4/8  However, a CT on 4/9 showed findings concerning for PE   PE study on 4/10 demonstrated 2 small PE's and a duplex that same day confirmed presence of LLE DVT  An IVC filter was placed on 4/11  He underwent resection of the left parietal mass on 4/13 and subsequently transferred to the ICU  On exam, pt is awake and alert, in no acute distress  Resting comfortably in bed  His only complaint is a new onset 5/10 left parietal headache since his procedure  History obtained from chart review and the patient      Past Medical History:  Past Medical History:   Diagnosis Date    Diabetes mellitus ([facility] )     Hypertension     Lung cancer ([facility] )     Psychiatric disorder        Past Surgical History:  Past Surgical History:   Procedure Laterality Date    TONSILLECTOMY         Past Family History:  History reviewed  No pertinent family history      Social History:  History   Smoking Status    Current Every Day Smoker    Packs/day: 1 50    Years: 37 00   Smokeless Tobacco    Never Used     History   Alcohol Use    Yes     Comment: 2L liquor weekly     History   Drug use: Unknown     Marital Status: Single    Medications:  Current Facility-Administered Medications   Medication Dose Route Frequency    acetaminophen (TYLENOL) tablet 650 mg  650 mg Oral Q6H PRN    albuterol (PROVENTIL HFA,VENTOLIN HFA) inhaler 2 puff  2 puff Inhalation Q6H PRN    ARIPiprazole (ABILIFY) tablet 10 mg  10 mg Oral Daily    budesonide-formoterol (SYMBICORT) 160-4 5 mcg/act inhaler 2 puff  2 puff Inhalation Q12H [facility]    buPROPion (WELLBUTRIN) tablet 75 mg  75 mg Oral Q12H [facility]    calcium carbonate (TUMS) chewable tablet 1,000 mg  1,000 mg Oral Daily PRN    ceFAZolin (ANCEF) IVPB (premix) 1,000 mg  1,000 mg Intravenous Q8H    dexamethasone (PF) (DECADRON) injection 4 mg  4 mg Intravenous Q6H [facility]    Followed by   Wild Garcia ON 4/15/2018] dexamethasone (PF) (DECADRON) injection 4 mg  4 mg Intravenous Q8H    Followed by   Wild Garcia ON 4/17/2018] dexamethasone (PF) (DECADRON) injection 2 mg  2 mg Intravenous Q6H [facility]    Followed by   Wild Garcia ON 4/19/2018] dexamethasone (PF) (DECADRON) injection 2 mg  2 mg Intravenous Q8H    Followed by   Wild Garcia ON 4/21/2018] dexamethasone (PF) (DECADRON) injection 2 mg  2 mg Intravenous Q12H [facility]    Followed by   Wild Garcia ON 4/23/2018] dexamethasone (PF) (DECADRON) injection 2 mg  2 mg Intravenous Q24H    dextromethorphan-guaiFENesin (ROBITUSSIN DM)  mg/5 mL oral syrup 10 mL  10 mL Oral Q4H PRN    docusate sodium (COLACE) capsule 100 mg  100 mg Oral BID    fentanyl citrate (PF) 100 MCG/2ML 25 mcg  25 mcg Intravenous V6R PRN    folic acid (FOLVITE) tablet 1 mg  1 mg Oral Daily    [START ON 4/14/2018] heparin (porcine) subcutaneous injection 5,000 Units  5,000 Units Subcutaneous Q12H [address]    HYDROmorphone (DILAUDID) injection 1 mg  1 mg Intravenous Q4H PRN    insulin glargine (LANTUS) subcutaneous injection 15 Units  15 Units Subcutaneous HS    insulin lispro (HumaLOG) 100 units/mL subcutaneous injection 1-5 Units  1-5 Units Subcutaneous 4x Daily (AC & HS)    insulin lispro (HumaLOG) 100 units/mL subcutaneous injection 20 Units  20 Units Subcutaneous TID With Meals    lactated ringers infusion  50 mL/hr Intravenous Continuous    lisinopril (ZESTRIL) tablet 5 mg  5 mg Oral Daily    LORazepam (ATIVAN) 2 mg/mL injection 1 mg  1 mg Intravenous Q4H PRN    magnesium hydroxide (MILK OF MAGNESIA) 400 mg/5 mL oral suspension 30 mL  30 mL Oral Daily PRN    multivitamin-minerals (CENTRUM) tablet 1 tablet  1 tablet Oral Daily    nicotine (NICODERM CQ) 21 mg/24 hr TD 24 hr patch 1 patch  1 patch Transdermal Daily    ondansetron (ZOFRAN) injection 4 mg  4 mg Intravenous Q4H PRN    oxyCODONE (ROXICODONE) immediate release tablet 10 mg  10 mg Oral Q4H PRN    pantoprazole (PROTONIX) EC tablet 40 mg  40 mg Oral Early Morning    sodium chloride 0 9 % infusion  75 mL/hr Intravenous Continuous    sodium chloride 0 9 % infusion  75 mL/hr Intravenous Continuous    sodium chloride tablet 1 g  1 g Oral BID    thiamine (VITAMIN B1) tablet 100 mg  100 mg Oral Daily     Home medications:  Prior to Admission medications    Medication Sig Start Date End Date Taking?  Authorizing Provider   albuterol (PROVENTIL HFA,VENTOLIN HFA) 90 mcg/act inhaler Inhale 2 puffs every 6 (six) hours as needed for wheezing   Yes Historical Provider, MD   aspirin 81 mg chewable tablet Chew 81 mg daily   Yes Historical Provider, MD   budesonide-formoterol (SYMBICORT) 160-4 5 mcg/act inhaler Inhale 2 puffs 2 (two) times a day   Yes Historical Provider, MD   buPROPion [facility]) 75 mg tablet Take 75 mg by mouth 2 (two) times a day   Yes Historical Provider, MD   lisinopril (ZESTRIL) 5 mg tablet Take 5 mg by mouth daily   Yes Historical Provider, MD   Multiple Vitamin (MULTIVITAMIN) capsule Take 1 capsule by mouth daily   Yes Historical Provider, MD   omeprazole (PriLOSEC) 20 mg delayed release capsule Take 20 mg by mouth daily   Yes Historical Provider, MD   sodium chloride 1 g tablet Take 1 g by mouth 2 (two) times a day   Yes Historical Provider, MD   thiamine (VITAMIN B1) 100 mg tablet Take 100 mg by mouth daily   Yes Historical Provider, MD   ARIPiprazole (ABILIFY) 10 mg tablet Take 10 mg by mouth daily    Historical Provider, MD     Allergies: Allergies   Allergen Reactions    Other      Bee stings       ROS:   Review of Systems   Constitutional: Negative for chills and fever  HENT: Negative for congestion and sore throat  Eyes: Positive for visual disturbance (chronic b/l cataracts)  Negative for pain  Respiratory: Negative for cough, chest tightness and shortness of breath  Cardiovascular: Negative for chest pain, palpitations and leg swelling  Gastrointestinal: Negative for abdominal distention, abdominal pain, blood in stool, constipation, diarrhea and nausea  Endocrine: Negative  Genitourinary: Negative for dysuria and hematuria  Musculoskeletal: Negative for arthralgias, myalgias and neck pain  Skin: Negative for color change  Neurological: Positive for tremors and headaches  Negative for dizziness, seizures, speech difficulty, light-headedness and numbness  Hematological: Positive for adenopathy  Psychiatric/Behavioral: Negative for confusion and hallucinations  The patient is nervous/anxious          Vitals:  Vitals:    04/13/18 1354 04/13/18 1400 04/13/18 1430 04/13/18 1600   BP:  151/79     Pulse: 66 66     Resp: (!) 24 (!) 24     Temp: 98 4 °F (36 9 °C)  97 5 °F (36 4 °C) 98 °F (36 7 °C)   TempSrc:   Oral Oral   SpO2: 99% 98%     Weight:       Height:         Temperature:   Temp (24hrs), Av 9 °F (36 6 °C), Min:97 1 °F (36 2 °C), Max:98 4 °F (36 9 °C)    Current Temperature: 98 °F (36 7 °C)    Weights:   IBW: 70 7 kg  Body mass index is 28 65 kg/m²  Hemodynamic Monitoring:  PAP:  , PAP mean:   , CVP:  , SvO2:   , ICP Mean:       Non-Invasive/Invasive Ventilation Settings:  Respiratory    Lab Data (Last 4 hours)    None         O2/Vent Data (Last 4 hours)    None              No results found for: PHART, KVS5RAA, PO2ART, QNQ3QSZ, W6KUPMTH, BEART, SOURCE  SpO2: SpO2: 98 %, Capnography:       Physical Exam:  Physical Exam   Constitutional: He is oriented to person, place, and time  He appears well-developed and well-nourished  He is cooperative  No distress  HENT:   Head: Normocephalic  Head is with laceration  Mouth/Throat: Oropharynx is clear and moist  Abnormal dentition  Left parietal incision from surgical procedure  Sutures intact  No drainage  Poor dentition   Eyes: Conjunctivae and EOM are normal  Pupils are equal, round, and reactive to light  Neck: Trachea normal and normal range of motion  Neck supple  Cardiovascular: Normal rate, regular rhythm, normal heart sounds, intact distal pulses and normal pulses  No murmur heard  Pulmonary/Chest: Effort normal  No respiratory distress  He has wheezes (bilateral)  Abdominal: Soft  Normal appearance and bowel sounds are normal  He exhibits no ascites  There is no tenderness  Genitourinary:   Genitourinary Comments: Figuerao in place, draining yellow urine   Musculoskeletal: Normal range of motion  He exhibits no edema, tenderness or deformity  Lymphadenopathy:        Head (right side): Submandibular and preauricular adenopathy present  Neurological: He is alert and oriented to person, place, and time  He has normal reflexes  No cranial nerve deficit or sensory deficit  He exhibits normal muscle tone  Coordination normal  GCS eye subscore is 4  GCS verbal subscore is 5  GCS motor subscore is 6  Strength 4+/5 in UE's   4+/5 LE's  Skin: Skin is warm and dry  No rash noted  He is not diaphoretic  No erythema  Psychiatric: He has a normal mood and affect  Vitals reviewed  Labs:    Results from last 7 days  Lab Units 04/12/18  0447 04/11/18  0459 04/10/18  0432 04/09/18  0451 04/07/18  0445 04/06/18  2120   WBC Thousand/uL 16 36* 19 24* 20 58* 19 91* 12 26* 12 47*   HEMOGLOBIN g/dL 11 0* 11 8* 11 9* 11 3* 11 4* 11 3*   HEMATOCRIT % 33 1* 35 9* 35 2* 33 6* 33 5* 33 6*   PLATELETS Thousands/uL 237 275 294 284 311 330   NEUTROS PCT % 90* 88*  --   --   --   --    MONOS PCT % 4 4  --   --   --   --    MONO PCT MAN %  --   --  3* 1*  --  2*       Results from last 7 days  Lab Units 04/12/18  0447 04/11/18  0459 04/10/18  0432 04/09/18  0451 04/07/18  0445 04/06/18  2120   SODIUM mmol/L 137 134* 132* 130* 133* 130*   POTASSIUM mmol/L 4 4 4 7 4 9 4 8 5 0 5 7*   CHLORIDE mmol/L 106 103 100 98* 102 101   CO2 mmol/L 24 25 27 24 23 23   BUN mg/dL 28* 32* 33* 35* 31* 26*   CREATININE mg/dL 0 99 1 04 1 08 1 10 1 23 1 29   CALCIUM mg/dL 8 6 8 8 9 5 9 1 9 3 9 1   ANION GAP mmol/L 7 6 5 8 8 6   ALBUMIN g/dL 2 4* 2 5*  --   --   --   --    BILIRUBIN TOTAL mg/dL 0 21 0 38  --   --   --   --    ALK PHOS U/L 51 51  --   --   --   --    ALT U/L 11* 9*  --   --   --   --    AST U/L 7 8  --   --   --   --    GLUCOSE RANDOM mg/dL 137 174* 186* 300* 429* 377*       Results from last 7 days  Lab Units 04/07/18 0445 04/06/18  2120   MAGNESIUM mg/dL 1 6 1 5*        Results from last 7 days  Lab Units 04/08/18  0447   INR  1 03   PTT seconds 22*       Results from last 7 days  Lab Units 04/06/18 2120   TROPONIN I ng/mL <0 02         ABG:No results found for: PHART, SOF3DIF, PO2ART, MUZ9CXK, V1BWWBUP, BEART, SOURCE  VBG:      Imaging:  I have personally reviewed pertinent reports  EKG: This was personally reviewed by myself  Micro: No results found for: Maria A Marcelo Nip    ______________________________________________________________________    Assessment:   Principal Problem:    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   Active Problems:    Lung cancer     Tobacco abuse    Alcohol abuse    Hypertension    Insulin-dependent diabetes mellitus    Bipolar disorder    Leukocytosis    Lower leg DVT (deep venous thrombosis) (HCC)    PE (pulmonary thromboembolism) (HCC)    Cerebral edema (HCC)      Plan:     Neuro:   Left parietal brain mass w/ vasogenic edema; s/p resection via craniotomy on 4/13  · Likely metastatic from lung disease  · Finish cefazolin x2 doses  · Continue ICU care  · Continue decadron w/ taper per neurosurgery recommendations  · Monitor neuro checks every 1 hour  · Pain control w/ tylenol, oxycodone, dilaudid, fentanyl  · Goal SBP < 180; continue prn labetalol    Alcohol abuse  · Drinks approx 2L of whiskey per week  Last drink prior to admission  · Monitor on tele  · Folic acid and thiamine daily  · Ativan q4h prn for seizures/withdrawal    Tobacco dependence  · Continue nicotine patch  · Continue to educate on importance of abstinence    Bipolar disorder  · Abilify daily   · On Wellbutrin BID - caution as this can lower seizure threshold  Consider discontinuing       CV:   HTN  · Stable for now; continue lisinopril  · Goal SBP <180; continue prn labetalol    Pulm:   Pulmonary embolism, s/p IVC on 4/11  · Hold pharm ppx due to recent procedure  · Monitor for signs of hypoxia, respiratory distress  · Will likely need lifelong AC after brain procedure    Stage IV metastatic lung cancer, right lung  · Confirmed on CT with mediastinal lymph mets  · Normally follows Dr Brennan Denton as outpatient; was receiving Keytruda  · Recommend continued follow-up as outpatient    COPD  · Albuterol prn  · Symbicort Q12H  · Robitussin prn for cough  · Monitor O2 sats    GI:   GERD  · Continue protonix, TUMS    Constipation  · Colace BID, milk of magnesia prn    :   · Continue scott catheter, remove on 4/11  · Monitor intake/output    F/E/N:   Hyponatremia  · Continue salt tabs  · Continue normal saline for hydration    ID: No acute issues  Will monitor  Heme:   Leukocytosis  · Suspect from cancer and decadron  Do not suspect infection  · Monitor  DVT:  · continue SCD's as anticoagulation currently contraindicated  · Recommend starting anticoagulation as soon as medically able    Endo: Insulin dependent type 2 DM  · Last A1C 8 0% on 4/6  · Continue prandial insulin; humalog 20 units TID w/ meals  · Lantus 15 units HS  · Correctional insulin w/ meals, glucose checks    Msk/Skin: Regular repositioning, offloading to prevent skin breakdown  Disposition: Admit ICU, monitor overnight  Transition back to floor when able  Counseling / Coordination of Care  Total Critical Care time spent 45 minutes excluding procedures, teaching and family updates  ______________________________________________________________________    VTE Pharmacologic Prophylaxis: Pharmacologic VTE Prophylaxis contraindicated due to brain mass; recent procedure  VTE Mechanical Prophylaxis: sequential compression device    Invasive lines and devices: Invasive Devices     Peripheral Intravenous Line            Peripheral IV 04/13/18 Right Hand less than 1 day          Arterial Line            Arterial Line 04/13/18 Right Radial less than 1 day          Drain            Urethral Catheter Latex 16 Fr  less than 1 day                Code Status: Level 3 - DNAR and DNI  POA:    POLST:      Given critical illness, patient length of stay will require greater than two midnights  Portions of the record may have been created with voice recognition software  Occasional wrong word or "sound a like" substitutions may have occurred due to the inherent limitations of voice recognition software    Read the chart carefully and recognize, using context, where substitutions have occurred        Dagmar Norris, DO

## 2018-04-13 NOTE — ANESTHESIA PREPROCEDURE EVALUATION
Review of Systems/Medical History  Patient summary reviewed  Chart reviewed  No history of anesthetic complications     Cardiovascular  EKG reviewed, Hypertension , DVT (sp ivc filter)  Comment: Normal sinus rhythm  Nonspecific T wave abnormality  Abnormal ECG  When compared with ECG of 03-OCT-2008 20:52,  Vent  rate has decreased BY  35 BPM  Nonspecific T wave abnormality now evident in Inferior leads  Nonspecific T wave abnormality, worse in Lateral leads  Confirmed by [facility] STIVEN MANCUSO ([id number]) on 4/7/2018 7:14:45 AM, PE,  Pulmonary  Smoker cigarette smoker  , Tobacco cessation counseling given Cumulative Pack Years: 54,   Comment: H/o lung cancer metastatic     GI/Hepatic            Endo/Other  Diabetes poorly controlled type 2 Insulin,      GYN       Hematology   Musculoskeletal       Neurology    CNS neoplasm (left parietal brain mass with edema) , intracranial mass,    Psychology   Psychiatric history (bipolar disorder),     Comment: Alcohol abuse         Physical Exam    Airway    Mallampati score: II  TM Distance: >3 FB  Neck ROM: full     Dental   Comment: Missing all but two teeth  Both loose, No notable dental hx     Cardiovascular  Cardiovascular exam normal    Pulmonary  Pulmonary exam normal Breath sounds clear to auscultation,     Other Findings        Anesthesia Plan  ASA Score- 4     Anesthesia Type- general with ASA Monitors  Additional Monitors: arterial line and central venous line  Airway Plan: ETT  Plan Factors- Patient instructed to abstain from smoking on day of procedure       Induction- intravenous  Postoperative Plan- Plan for postoperative opioid use  Planned trial extubation    Informed Consent- Anesthetic plan and risks discussed with patient  I personally reviewed this patient with the CRNA  Discussed and agreed on the Anesthesia Plan with the CRNA           Lab Results   Component Value Date    WBC 16 36 (H) 04/12/2018    HGB 11 0 (L) 04/12/2018    HCT 33 1 (L) 04/12/2018    MCV 88 04/12/2018     04/12/2018     Lab Results   Component Value Date    GLUCOSE 137 04/12/2018    CALCIUM 8 6 04/12/2018     04/12/2018    K 4 4 04/12/2018    CO2 24 04/12/2018     04/12/2018    BUN 28 (H) 04/12/2018    CREATININE 0 99 04/12/2018     Lab Results   Component Value Date    INR 1 03 04/08/2018    PROTIME 13 5 04/08/2018     Lab Results   Component Value Date    PTT 22 (L) 04/08/2018           I, Dr Stanley Matias, the attending physician, have personally seen and evaluated the patient prior to anesthetic care  I have reviewed the pre-anesthetic record, and other medical records if appropriate to the anesthetic care  If a CRNA is involved in the case, I have reviewed the CRNA assessment, if present, and agree  The patient is in a suitable condition to proceed with my formulated anesthetic plan

## 2018-04-13 NOTE — OP NOTE
OPERATIVE REPORT  PATIENT NAME: Gigi Pinedo    :  1955  MRN: [id number]  Pt Location: BE OR ROOM 17    SURGERY DATE: 2018    Surgeon(s) and Role:     * Renae Renteria MD - Primary    Preop Diagnosis:  Brain mass [G93 9]    Post-Op Diagnosis Codes:     * Brain mass [G93 9]    Procedure(s) (LRB):  Left image guided parietal craniotomy for resection of tumor (Left)    Specimen(s):  ID Type Source Tests Collected by Time Destination   1 : Left parietal mass Tissue Brain TISSUE EXAM Renae Renteria MD 2018  9:38 AM    2 : Left parietal mass Tissue Brain TISSUE EXAM Renae Renteria MD 2018 10:13 AM        Estimated Blood Loss:   50 mL    Drains:  Urethral Catheter Latex 16 Fr  (Active)   Collection Container Standard drainage bag 2018  9:13 AM   Number of days: 0       Anesthesia Type:   General    Operative Indications:  Brain mass [G93 9]      Operative Findings:  Non small cell carcinoma-metastatic    Complications:   None    Procedure and Technique:fter adequate general endotracheal anesthesia the patient was placed supine on the operating table  Head was turned to the right  The head was placed into 3 point head fixation  The stealth neuro navigational system was registered and calibrated  Image guidance with the use of the [address] was used throughout this case  Images were carefully loaded into the system and used for preoperative planning as well as intraoperative guidance it was critically useful for navigation and avoidance of critically important structures  In the region of the surgical approach as dictated by the stealth and preoperative planning, the hair was clipped in the scalp was prepped with Betadine soap then DuraPrep  Double layer drapes were placed in normal fashion and a Betadine impregnated sticky drape was placed over these  A time-out was called and all parameters a time-out were followed      Skin in the left parietal region was injected with 1% lidocaine with 1 100,000 epinephrine  A 15  Blade was used to incise the skin  Bovie and bipolar were used throughout the procedure to maintain hemostasis  The Bovie and a cutting setting was used to divide the tissues to the para cranium  Kelli clips were placed on the scalp edges to maintain hemostasis  A cerebellar style we later retractor was used to maintain operative exposure  Again stealth navigation was utilized to identify the optimal position of the cranial flap  Two bur holes were made and the full craniotomy flap was turned  This was elevated from the underlying dura without of breach of the dura  Next the dura was opened in a cruciate fashion  Four 0 Nurolon sutures were used to maintain operative exposure  Immediately the tumor could be identified  Utilizing the intraoperative microscope is at very screws and magnification a circumferential dissection was made  Bipolar coagulation and sharp dissection was utilized to remove the arachnoid over the tumor  Portions of this were sent to the laboratory as specimen  On frozen specimen non-small cell carcinoma-metastatic was identified  This was verified with tele pathology  Next a tedious circumferential microdissection was carried out with the use of the intraoperative microscope and very screws and magnification to aid in the identification, illumination, necessary to perform this procedure  The Cavitron ultrasonic aspirated was utilized to decompress the tumor from within  The portions of the tumor were then debulked and sent to the laboratory as specimen  At the conclusion of this a complete dissection was carried out incomplete removal of the tumor was achieved  Copious quantities of irrigation were used to cleanse out the region  FloSeal and Surgicel were placed along the resection cavity  Valsalva x2 produced no bleeding  Accordingly the dura was approximated with interrupted 4 0 Nurolon suture    A running continuous 4 0 Nurolon suture was used to close the dura watertight fashion  DuraSeal was placed over this  The bone flap was replaced with the Biomet bone fixation system  A small piece of Gel-Foam was placed under this  The area was thoroughly irrigated with antibiotic irrigation  The galea was then approximated with interrupted inverted 2-0 Vicryl suture  The skin was closed with a running 4 0 Monocryl  History Crohn was placed over this  Patient was taken out of three-point head fixation device extubated and taken to the recovery room having tolerated the procedure well     I was present for the entire procedure    Patient Disposition:  PACU     SIGNATURE: Elba Gomez MD  DATE: April 13, 2018  TIME: 11:23 AM

## 2018-04-14 ENCOUNTER — APPOINTMENT (INPATIENT)
Dept: RADIOLOGY | Facility: HOSPITAL | Age: 63
DRG: 025 | End: 2018-04-14
Payer: COMMERCIAL

## 2018-04-14 ENCOUNTER — APPOINTMENT (INPATIENT)
Dept: NON INVASIVE DIAGNOSTICS | Facility: HOSPITAL | Age: 63
DRG: 025 | End: 2018-04-14
Payer: COMMERCIAL

## 2018-04-14 LAB
ANION GAP SERPL CALCULATED.3IONS-SCNC: 7 MMOL/L (ref 4–13)
ANION GAP SERPL CALCULATED.3IONS-SCNC: 7 MMOL/L (ref 4–13)
BASOPHILS # BLD MANUAL: 0 THOUSAND/UL (ref 0–0.1)
BASOPHILS NFR MAR MANUAL: 0 % (ref 0–1)
BUN SERPL-MCNC: 27 MG/DL (ref 5–25)
BUN SERPL-MCNC: 28 MG/DL (ref 5–25)
CALCIUM SERPL-MCNC: 8.5 MG/DL
CALCIUM SERPL-MCNC: 8.8 MG/DL
CHLORIDE SERPL-SCNC: 102 MMOL/L (ref 100–108)
CHLORIDE SERPL-SCNC: 104 MMOL/L (ref 100–108)
CO2 SERPL-SCNC: 25 MMOL/L (ref 21–32)
CO2 SERPL-SCNC: 25 MMOL/L (ref 21–32)
CREAT SERPL-MCNC: 1.1 MG/DL (ref 0.6–1.3)
CREAT SERPL-MCNC: 1.1 MG/DL (ref 0.6–1.3)
EOSINOPHIL # BLD MANUAL: 0 THOUSAND/UL (ref 0–0.4)
EOSINOPHIL NFR BLD MANUAL: 0 % (ref 0–6)
ERYTHROCYTE [DISTWIDTH] IN BLOOD BY AUTOMATED COUNT: 14.4 % (ref 11.6–15.1)
GFR SERPL CREATININE-BSD FRML MDRD: 73 ML/MIN/1.73SQ M
GFR SERPL CREATININE-BSD FRML MDRD: 73 ML/MIN/1.73SQ M
GLUCOSE SERPL-MCNC: 128 MG/DL (ref 65–140)
GLUCOSE SERPL-MCNC: 174 MG/DL (ref 65–140)
GLUCOSE SERPL-MCNC: 219 MG/DL (ref 65–140)
GLUCOSE SERPL-MCNC: 248 MG/DL (ref 65–140)
GLUCOSE SERPL-MCNC: 249 MG/DL (ref 65–140)
GLUCOSE SERPL-MCNC: 253 MG/DL (ref 65–140)
GLUCOSE SERPL-MCNC: 255 MG/DL (ref 65–140)
GLUCOSE SERPL-MCNC: 318 MG/DL (ref 65–140)
GLUCOSE SERPL-MCNC: 351 MG/DL (ref 65–140)
HCT VFR BLD AUTO: 32.1 % (ref 36.5–49.3)
HGB BLD-MCNC: 10.7 G/DL (ref 12–17)
LYMPHOCYTES # BLD AUTO: 0.45 THOUSAND/UL (ref 0.6–4.47)
LYMPHOCYTES # BLD AUTO: 2 % (ref 14–44)
MAGNESIUM SERPL-MCNC: 1.8 MG/DL (ref 1.6–2.6)
MCH RBC QN AUTO: 28.9 PG (ref 26.8–34.3)
MCHC RBC AUTO-ENTMCNC: 33.3 G/DL (ref 31.4–37.4)
MCV RBC AUTO: 87 FL (ref 82–98)
MONOCYTES # BLD AUTO: 0.68 THOUSAND/UL (ref 0–1.22)
MONOCYTES NFR BLD: 3 % (ref 4–12)
NEUTROPHILS # BLD MANUAL: 21.49 THOUSAND/UL (ref 1.85–7.62)
NEUTS SEG NFR BLD AUTO: 95 % (ref 43–75)
NRBC BLD AUTO-RTO: 0 /100 WBCS
PHOSPHATE SERPL-MCNC: 4.3 MG/DL (ref 2.3–4.1)
PLATELET # BLD AUTO: 230 THOUSANDS/UL (ref 149–390)
PLATELET # BLD AUTO: 232 THOUSANDS/UL (ref 149–390)
PLATELET BLD QL SMEAR: ADEQUATE
PMV BLD AUTO: 11 FL (ref 8.9–12.7)
PMV BLD AUTO: 11.1 FL (ref 8.9–12.7)
POTASSIUM SERPL-SCNC: 5.1 MMOL/L (ref 3.5–5.3)
POTASSIUM SERPL-SCNC: 5.1 MMOL/L (ref 3.5–5.3)
RBC # BLD AUTO: 3.7 MILLION/UL (ref 3.88–5.62)
SODIUM SERPL-SCNC: 134 MMOL/L (ref 136–145)
SODIUM SERPL-SCNC: 136 MMOL/L (ref 136–145)
WBC # BLD AUTO: 22.62 THOUSAND/UL (ref 4.31–10.16)

## 2018-04-14 PROCEDURE — 70450 CT HEAD/BRAIN W/O DYE: CPT

## 2018-04-14 PROCEDURE — 83735 ASSAY OF MAGNESIUM: CPT | Performed by: INTERNAL MEDICINE

## 2018-04-14 PROCEDURE — 93306 TTE W/DOPPLER COMPLETE: CPT

## 2018-04-14 PROCEDURE — 80048 BASIC METABOLIC PNL TOTAL CA: CPT | Performed by: NEUROLOGICAL SURGERY

## 2018-04-14 PROCEDURE — 82948 REAGENT STRIP/BLOOD GLUCOSE: CPT

## 2018-04-14 PROCEDURE — 85049 AUTOMATED PLATELET COUNT: CPT | Performed by: NEUROLOGICAL SURGERY

## 2018-04-14 PROCEDURE — 85027 COMPLETE CBC AUTOMATED: CPT | Performed by: INTERNAL MEDICINE

## 2018-04-14 PROCEDURE — 99291 CRITICAL CARE FIRST HOUR: CPT | Performed by: ANESTHESIOLOGY

## 2018-04-14 PROCEDURE — 84100 ASSAY OF PHOSPHORUS: CPT | Performed by: INTERNAL MEDICINE

## 2018-04-14 PROCEDURE — 93306 TTE W/DOPPLER COMPLETE: CPT | Performed by: INTERNAL MEDICINE

## 2018-04-14 PROCEDURE — 85007 BL SMEAR W/DIFF WBC COUNT: CPT | Performed by: INTERNAL MEDICINE

## 2018-04-14 PROCEDURE — 80048 BASIC METABOLIC PNL TOTAL CA: CPT | Performed by: INTERNAL MEDICINE

## 2018-04-14 RX ORDER — MAGNESIUM SULFATE HEPTAHYDRATE 40 MG/ML
2 INJECTION, SOLUTION INTRAVENOUS ONCE
Status: COMPLETED | OUTPATIENT
Start: 2018-04-14 | End: 2018-04-14

## 2018-04-14 RX ORDER — LEVETIRACETAM 500 MG/1
500 TABLET ORAL EVERY 12 HOURS SCHEDULED
Status: DISCONTINUED | OUTPATIENT
Start: 2018-04-14 | End: 2018-04-16 | Stop reason: HOSPADM

## 2018-04-14 RX ADMIN — PANTOPRAZOLE SODIUM 40 MG: 40 TABLET, DELAYED RELEASE ORAL at 05:16

## 2018-04-14 RX ADMIN — HYDROMORPHONE HYDROCHLORIDE 1 MG: 1 INJECTION, SOLUTION INTRAMUSCULAR; INTRAVENOUS; SUBCUTANEOUS at 13:15

## 2018-04-14 RX ADMIN — SODIUM CHLORIDE TAB 1 GM 1 G: 1 TAB at 18:21

## 2018-04-14 RX ADMIN — INSULIN LISPRO 4 UNITS: 100 INJECTION, SOLUTION INTRAVENOUS; SUBCUTANEOUS at 13:02

## 2018-04-14 RX ADMIN — BUDESONIDE AND FORMOTEROL FUMARATE DIHYDRATE 2 PUFF: 160; 4.5 AEROSOL RESPIRATORY (INHALATION) at 08:04

## 2018-04-14 RX ADMIN — HEPARIN SODIUM 5000 UNITS: 5000 INJECTION, SOLUTION INTRAVENOUS; SUBCUTANEOUS at 21:05

## 2018-04-14 RX ADMIN — DEXAMETHASONE SODIUM PHOSPHATE 4 MG: 10 INJECTION, SOLUTION INTRAMUSCULAR; INTRAVENOUS at 23:51

## 2018-04-14 RX ADMIN — NICOTINE 1 PATCH: 21 PATCH, EXTENDED RELEASE TRANSDERMAL at 08:05

## 2018-04-14 RX ADMIN — OXAZEPAM 10 MG: 10 CAPSULE, GELATIN COATED ORAL at 21:05

## 2018-04-14 RX ADMIN — MAGNESIUM SULFATE HEPTAHYDRATE 2 G: 40 INJECTION, SOLUTION INTRAVENOUS at 15:52

## 2018-04-14 RX ADMIN — OXYCODONE HYDROCHLORIDE 10 MG: 10 TABLET ORAL at 11:06

## 2018-04-14 RX ADMIN — ARIPIPRAZOLE 10 MG: 10 TABLET ORAL at 08:04

## 2018-04-14 RX ADMIN — DOCUSATE SODIUM 100 MG: 100 CAPSULE, LIQUID FILLED ORAL at 18:21

## 2018-04-14 RX ADMIN — OXYCODONE HYDROCHLORIDE 10 MG: 10 TABLET ORAL at 18:24

## 2018-04-14 RX ADMIN — HYDROMORPHONE HYDROCHLORIDE 1 MG: 1 INJECTION, SOLUTION INTRAMUSCULAR; INTRAVENOUS; SUBCUTANEOUS at 04:49

## 2018-04-14 RX ADMIN — INSULIN LISPRO 20 UNITS: 100 INJECTION, SOLUTION INTRAVENOUS; SUBCUTANEOUS at 13:52

## 2018-04-14 RX ADMIN — DEXAMETHASONE SODIUM PHOSPHATE 4 MG: 10 INJECTION, SOLUTION INTRAMUSCULAR; INTRAVENOUS at 11:06

## 2018-04-14 RX ADMIN — SODIUM CHLORIDE TAB 1 GM 1 G: 1 TAB at 08:04

## 2018-04-14 RX ADMIN — INSULIN LISPRO 1 UNITS: 100 INJECTION, SOLUTION INTRAVENOUS; SUBCUTANEOUS at 18:18

## 2018-04-14 RX ADMIN — HYDROMORPHONE HYDROCHLORIDE 1 MG: 1 INJECTION, SOLUTION INTRAMUSCULAR; INTRAVENOUS; SUBCUTANEOUS at 21:04

## 2018-04-14 RX ADMIN — LEVETIRACETAM 500 MG: 500 TABLET ORAL at 21:05

## 2018-04-14 RX ADMIN — LISINOPRIL 5 MG: 5 TABLET ORAL at 08:05

## 2018-04-14 RX ADMIN — Medication 100 MG: at 08:04

## 2018-04-14 RX ADMIN — DEXAMETHASONE SODIUM PHOSPHATE 4 MG: 10 INJECTION, SOLUTION INTRAMUSCULAR; INTRAVENOUS at 18:24

## 2018-04-14 RX ADMIN — BUDESONIDE AND FORMOTEROL FUMARATE DIHYDRATE 2 PUFF: 160; 4.5 AEROSOL RESPIRATORY (INHALATION) at 21:02

## 2018-04-14 RX ADMIN — Medication 1 TABLET: at 08:04

## 2018-04-14 RX ADMIN — LEVETIRACETAM 500 MG: 100 INJECTION, SOLUTION INTRAVENOUS at 08:08

## 2018-04-14 RX ADMIN — INSULIN GLARGINE 15 UNITS: 100 INJECTION, SOLUTION SUBCUTANEOUS at 21:15

## 2018-04-14 RX ADMIN — DOCUSATE SODIUM 100 MG: 100 CAPSULE, LIQUID FILLED ORAL at 08:04

## 2018-04-14 RX ADMIN — LORAZEPAM 1 MG: 2 INJECTION INTRAMUSCULAR; INTRAVENOUS at 21:05

## 2018-04-14 RX ADMIN — INSULIN LISPRO 20 UNITS: 100 INJECTION, SOLUTION INTRAVENOUS; SUBCUTANEOUS at 18:18

## 2018-04-14 RX ADMIN — ACETAMINOPHEN 650 MG: 325 TABLET, FILM COATED ORAL at 07:43

## 2018-04-14 RX ADMIN — OXAZEPAM 10 MG: 10 CAPSULE, GELATIN COATED ORAL at 05:16

## 2018-04-14 RX ADMIN — FOLIC ACID 1 MG: 1 TABLET ORAL at 08:06

## 2018-04-14 RX ADMIN — OXAZEPAM 10 MG: 10 CAPSULE, GELATIN COATED ORAL at 13:09

## 2018-04-14 RX ADMIN — INSULIN LISPRO 2 UNITS: 100 INJECTION, SOLUTION INTRAVENOUS; SUBCUTANEOUS at 08:26

## 2018-04-14 RX ADMIN — DEXAMETHASONE SODIUM PHOSPHATE 4 MG: 10 INJECTION, SOLUTION INTRAMUSCULAR; INTRAVENOUS at 05:16

## 2018-04-14 NOTE — OCCUPATIONAL THERAPY NOTE
Occupational Therapy Cancellation Notice     OT orders received and chart review completed-Pt s/p craniotomy for mass resection and presents w/ acute B/L PE's  Spoke w/ Ga Green from neurosurgery, pt is scheduled to start anticoagulants for PE's tonight  As per PM&R policies and procedures, OT will hold therapy until 24 hours post start of anticoagulants  Will continue to follow pt peripherally and initiate OT eval at a later date   Thank you,      Serjio Flannery MS, OTR/L

## 2018-04-14 NOTE — PROGRESS NOTES
Critical Care Progress Note   Wilbert Razo 63 y o  male MRN: [id number]  Unit/Bed#: ICU 11 Encounter: [id number]  Code Status: Level 3 - DNAR and DNI    Assessment/Plan:  63-year-old male with metastatic disease now status post resection of left parietal brain mass, POD #1  Neuro:  Status post resection via craniotomy on 04/13  Suspected secondary to metastatic disease from lung cancer  · Continued ICU care with neuro checks per protocol  · Continue Decadron taper per Neurosurgery  · Pain control  · Blood pressure control with goal SBP less than 180   · Delirium prevention  · Regulate sleep-wake cycles      CV:  Hypertension   · Maintain MAP above 65  · Goal SBP less than 180  · Continue lisinopril and p r n  labetalol    Lung:  History of pulmonary embolism status post IVC filter placement on 04/11/2018  Also with stage IV metastatic lung cancer in the right lung  Confirmed on CT with mediastinal lymph node metastases and now suspected metastatic disease to the left parietal brain  Follows with Dr Sae Sánchez as outpatient and was undergoing Keytruda  Also with history of COPD  · Maintain oxygen saturations above 92%  · Albuterol p r n  his Symbicort q 12 hours  · Robitussin p r n  for cough  · Continued outpatient treatment for stage IV metastatic lung cancer    GI:  No acute issues  History of GERD and constipation  · Continue Protonix, Tums, Colace b i d , and milk of magnesia as needed  · Continue to monitor     F/E/N:  Mild hyponatremia noted on blood work with sodium 134  BUN 28  Magnesium slightly low at 1 8  · Replete electrolytes PRN, goal potassium at least 4, goal magnesium at least 2, goal phosphorus at least 3  · Discontinue IV fluids as patient is tolerating oral intake  · Continue salt tabs  · Carb controlled diet  · Monitor volume status     :  No acute issues  Patient complaining of irritation with Figueroa catheter      · Monitor UOP, goal at least 1 mL/kg/hr  · Discontinue Figueroa catheter  · Monitor renal function and urine output    ID:  No acute issues   · Monitor fever curve and WBC count    Heme:  Leukocytosis secondary to Decadron  Low suspicion for active infection  · DVT prophylaxis with SQH to begin this evening and SCDs   · Transfuse if hemoglobin falls below 7     Endo:  History of insulin-dependent type 2 diabetes mellitus  Hyperglycemic this morning  Last hemoglobin A1c on 2018 was 8%  One episode of hypoglycemia overnight with blood sugar of 50 which resolved quickly with crackers  · Avoid hyperglycemia, goal blood glucose below 180  · Resume Humalog 20 units t i d  with meals  · Continue to monitor blood sugars closely  · Continue Lantus 15 units q h s  · Continue with correctional scale insulin algorithm 2     MSK/Skin:  Surgical wound noted in right parietal scalp  No acute issues  · Frequent repositioning every 2 hours for pressure ulcer prophylaxis  · Wound care per Neurosurgery        Disposition:  Possible transfer to general medical floors pending neurosurgical evaluation postoperatively  ______________________________________________________________________    Subjective:  No acute issues overnight  Patient complains of pain in his left parietal scalp region of area recent surgery  Patient also complaining of Figueroa catheter and arterial line, which he wants removed immediately  24hr Events:  Patient started on Serax for alcohol withdrawal precautions    No other acute events   ______________________________________________________________________    Objective:    Vitals:    18 0100 18 0200 18 0300 18 0400   BP:       Pulse: 72 80 86 80   Resp: (!) 10 (!) 11 (!) 11 12   Temp:    98 1 °F (36 7 °C)   TempSrc:    Oral   SpO2: 94% 94% 94% 95%   Weight:       Height:          Temperature:   Temp (24hrs), Av °F (36 7 °C), Min:97 1 °F (36 2 °C), Max:98 8 °F (37 1 °C)    Temperature: 98 1 °F (36 7 °C)    Weights:   IBW: 70 7 kg    Body mass index is 28 65 kg/m²  Weight (last 2 days)     None        Physical Exam   Constitutional: He is oriented to person, place, and time  He appears well-developed  No distress  HENT:   Nose: Nose normal    Mouth/Throat: Oropharynx is clear and moist  No oropharyngeal exudate  Surgical wound on left parietal scalp region   Eyes: Conjunctivae and EOM are normal  Pupils are equal, round, and reactive to light  No scleral icterus  Neck: Neck supple  No JVD present  No tracheal deviation present  Cardiovascular: Normal rate, regular rhythm, normal heart sounds and intact distal pulses  Pulmonary/Chest: Effort normal  No respiratory distress  He has wheezes (End expiratory wheeze bilaterally)  He has no rales  He exhibits no tenderness  Abdominal: Soft  Bowel sounds are normal  He exhibits no distension  There is no tenderness  There is no rebound and no guarding  Musculoskeletal: He exhibits tenderness (In area of recent surgery on left parietal scalp)  He exhibits no edema or deformity  Neurological: He is alert and oriented to person, place, and time  Follows commands, answers questions appropriately, motor strength 4+/5 bilaterally in the upper and lower extremities, sensation intact, mildly tremulous in left upper extremity   Skin: Skin is warm and dry  No rash noted  He is not diaphoretic  No erythema  Surgical wound on left parietal scalp region C/D/I, sutures intact   Psychiatric: He has a normal mood and affect  His behavior is normal  Judgment and thought content normal    Nursing note and vitals reviewed      ______________________________________________________________________    Hemodynamic Monitoring:  N/A     Non-Invasive/Invasive Ventilation Settings:  Respiratory    Lab Data (Last 4 hours)    None         O2/Vent Data (Last 4 hours)    None              No results found for: PHART, PRV8LOV, PO2ART, SHB1GDK, M5LJIFYK, BEART, SOURCE  SpO2: SpO2: 97 %    Intake/Output:  I/O 04/12 0701 - 04/13 0700 04/13 0701 - 04/14 0700    P  O  720 960    I V  (mL/kg)  5805 (66)    IV Piggyback  250    Total Intake(mL/kg) 720 (8 2) 7015 (79 7)    Urine (mL/kg/hr)  5590 (2 6)    Blood  50 (0)    Total Output   5640    Net +720 +1375          Unmeasured Urine Occurrence 1 x         UOP: 2 6 mL/kg/hour     Nutrition:        Diet Orders            Start     Ordered    04/13/18 1436  Diet Jerry/CHO Controlled; Consistent Carbohydrate Diet Level 2 (5 carb servings/75 grams CHO/meal)  Diet effective 1400     Question Answer Comment   Diet Type Jerry/CHO Controlled    Jerry/CHO Controlled Consistent Carbohydrate Diet Level 2 (5 carb servings/75 grams CHO/meal)    RD to adjust diet per protocol? Yes        04/13/18 1435        Labs: I have personally reviewed pertinent reports        Results from last 7 days  Lab Units 04/14/18  0431 04/13/18  1036 04/12/18 0447 04/11/18  0459 04/10/18  0432   WBC Thousand/uL 22 62*  --  16 36* 19 24* 20 58*   HEMOGLOBIN g/dL 10 7*  --  11 0* 11 8* 11 9*   I STAT HEMOGLOBIN g/dl  --  6 5*  --   --   --    HEMATOCRIT % 32 1*  --  33 1* 35 9* 35 2*   PLATELETS Thousands/uL 232  230  --  237 275 294   NEUTROS PCT %  --   --  90* 88*  --    MONOS PCT %  --   --  4 4  --    MONO PCT MAN %  --   --   --   --  3*      Results from last 7 days  Lab Units 04/14/18  0431 04/13/18  1036 04/12/18 0447 04/11/18  0459   SODIUM mmol/L 134*  136  --  137 134*   POTASSIUM mmol/L 5 1  5 1  --  4 4 4 7   CHLORIDE mmol/L 102  104  --  106 103   CO2 mmol/L 25  25  --  24 25   BUN mg/dL 28*  27*  --  28* 32*   CREATININE mg/dL 1 10  1 10  --  0 99 1 04   CALCIUM mg/dL 8 8  8 5  --  8 6 8 8   TOTAL PROTEIN g/dL  --   --  6 0* 6 3*   BILIRUBIN TOTAL mg/dL  --   --  0 21 0 38   ALK PHOS U/L  --   --  51 51   ALT U/L  --   --  11* 9*   AST U/L  --   --  7 8   GLUCOSE RANDOM mg/dL 255*  253*  --  137 174*   GLUCOSE, ISTAT mg/dl  --  157*  --   --        Results from last 7 days  Lab Units 04/14/18  0431   MAGNESIUM mg/dL 1 8       Results from last 7 days  Lab Units 04/14/18  0431   PHOSPHORUS mg/dL 4 3*        Results from last 7 days  Lab Units 04/08/18  0447   INR  1 03   PTT seconds 22*               Imaging: I have personally reviewed pertinent reports  and I have personally reviewed pertinent films in PACS  Mri Brain W Wo Contrast    Result Date: 4/7/2018  Impression: Enhancing 2 4 cm mass with vasogenic edema in the left parietal vertex centered at the gray-white matter junction extending to the cortical surface  Findings may represent a solitary metastasis in this patient with a history of lung cancer  Differential diagnosis includes primary brain tumor  Neurosurgical assessment recommended  Workstation performed: [id number]     Ct Chest Abdomen Pelvis W Contrast    Result Date: 4/9/2018  Impression: Infiltrating right middle lobe lung mass with mediastinal metastatic lymph nodes as described above  There is questionable right lower lobe pulmonary artery filling defect/embolism which could also be artifactual  Workstation performed: [id number]     Cta Chest Pe Study    Result Date: 4/10/2018  Impression: Small acute occlusive focus of left upper lobe pulmonary arterial embolism  Small foci of recanalized right lower lobe pulmonary arterial embolism are likely subacute  Compression of the right middle lobe pulmonary artery by surrounding mass  Stable right middle lobe mass with postobstructive pneumonitis  Stable 3 mm right upper lobe pulmonary nodule  Stable advanced mediastinal adenopathy  I personally discussed this study Neel Morales on 4/10/2018 at 5:35 PM  Workstation performed: [id number]     Ir Ivc Optional Filter Placement    Result Date: 4/12/2018  Impression: Successful placement of a convertible [employer] infrarenal IVC filter  Workstation performed: [id number]     Micro:  No results found for: Myla Mae SPUTUMCULTUR    Allergies:    Allergies   Allergen Reactions    Other      Bee stings       Medications:     Current Facility-Administered Medications:  acetaminophen 650 mg Oral Q6H PRN Corina Ospina PA-C    albuterol 2 puff Inhalation Q6H PRN Corina Ospina PA-C    ARIPiprazole 10 mg Oral Daily Corina Ospina PA-C    budesonide-formoterol 2 puff Inhalation Q12H [address] Corina Ospina PA-C    calcium carbonate 1,000 mg Oral Daily PRN Corina Ospina PA-C    dexamethasone 4 mg Intravenous Q6H [address] Corina Ospina MD    Followed by        Nick Primes ON 4/15/2018] dexamethasone 4 mg Intravenous Q8H Corina Ospina MD    Followed by        Nick Primes ON 4/17/2018] dexamethasone 2 mg Intravenous Q6H [address] Corina Ospina MD    Followed by        Nick Primes ON 4/19/2018] dexamethasone 2 mg Intravenous Q8H Corina Ospina MD    Followed by        Nick Primes ON 4/21/2018] dexamethasone 2 mg Intravenous Q12H [address] Corina Ospina MD    Followed by        Nick Primes ON 4/23/2018] dexamethasone 2 mg Intravenous Q24H Corina Ospina MD    dextromethorphan-guaiFENesin 10 mL Oral Q4H PRN Lopez Sheikh MD    docusate sodium 100 mg Oral BID Corina Ospina MD    fentanyl citrate (PF) 25 mcg Intravenous Q1H PRN Corina Ospina MD    folic acid 1 mg Oral Daily Lopez Sheikh MD    heparin (porcine) 5,000 Units Subcutaneous Q12H [address] Corina Ospina MD    HYDROmorphone 1 mg Intravenous Q4H PRN Corina Ospina MD    insulin glargine 15 Units Subcutaneous HS Lopez Sheikh MD    insulin lispro 1-5 Units Subcutaneous 4x Daily (AC & HS) Lopez Sheikh MD    levETIRAcetam 500 mg Intravenous Q12H [address] Shiloh Heller MD    lisinopril 5 mg Oral Daily Corina Ospina PA-C    LORazepam 1 mg Intravenous Q4H PRN Corina Ospina PA-C    magnesium hydroxide 30 mL Oral Daily PRN Corina Ospina MD    multivitamin-minerals 1 tablet Oral Daily Corina Ospina PA-C    nicotine 1 patch Transdermal Daily Corina Ospina PA-C    ondansetron 4 mg Intravenous Q4H PRN Corina Ospina MD oxazepam 10 mg Oral Q8H [address] Hillary Menjivar MD    oxyCODONE 10 mg Oral Q4H PRN Sherrell Espinal MD    pantoprazole 40 mg Oral Early Morning Sherrell PresserGT    sodium chloride 75 mL/hr Intravenous Continuous Sherrell PressMD dariel Last Rate: 10 mL/hr (04/14/18 0200)   sodium chloride 1 g Oral BID Sherrell PresserGT    thiamine 100 mg Oral Daily Sherrell PresserGT        sodium chloride 75 mL/hr Last Rate: 10 mL/hr (04/14/18 0200)       acetaminophen 650 mg Q6H PRN   albuterol 2 puff Q6H PRN   calcium carbonate 1,000 mg Daily PRN   dextromethorphan-guaiFENesin 10 mL Q4H PRN   fentanyl citrate (PF) 25 mcg Q1H PRN   HYDROmorphone 1 mg Q4H PRN   LORazepam 1 mg Q4H PRN   magnesium hydroxide 30 mL Daily PRN   ondansetron 4 mg Q4H PRN   oxyCODONE 10 mg Q4H PRN       VTE Pharmacologic Prophylaxis: Heparin  VTE Mechanical Prophylaxis: sequential compression device    Invasive Lines and Devices: Invasive Devices     Peripheral Intravenous Line            Peripheral IV 04/13/18 Left Antecubital less than 1 day    Peripheral IV 04/13/18 Right Hand less than 1 day          Arterial Line            Arterial Line 04/13/18 Right Radial less than 1 day          Drain            Urethral Catheter Latex 16 Fr  less than 1 day                 ==  ALIE Walton    [id number] [facility]  Internal Medicine Resident PGY-1  Pager #: [phone]

## 2018-04-15 LAB
GLUCOSE SERPL-MCNC: 137 MG/DL (ref 65–140)
GLUCOSE SERPL-MCNC: 208 MG/DL (ref 65–140)
GLUCOSE SERPL-MCNC: 256 MG/DL (ref 65–140)
GLUCOSE SERPL-MCNC: 306 MG/DL (ref 65–140)

## 2018-04-15 PROCEDURE — G8987 SELF CARE CURRENT STATUS: HCPCS

## 2018-04-15 PROCEDURE — 97167 OT EVAL HIGH COMPLEX 60 MIN: CPT

## 2018-04-15 PROCEDURE — 82948 REAGENT STRIP/BLOOD GLUCOSE: CPT

## 2018-04-15 PROCEDURE — G8978 MOBILITY CURRENT STATUS: HCPCS

## 2018-04-15 PROCEDURE — G8988 SELF CARE GOAL STATUS: HCPCS

## 2018-04-15 PROCEDURE — 99232 SBSQ HOSP IP/OBS MODERATE 35: CPT | Performed by: HOSPITALIST

## 2018-04-15 PROCEDURE — G8979 MOBILITY GOAL STATUS: HCPCS

## 2018-04-15 PROCEDURE — 99232 SBSQ HOSP IP/OBS MODERATE 35: CPT | Performed by: INTERNAL MEDICINE

## 2018-04-15 PROCEDURE — 97163 PT EVAL HIGH COMPLEX 45 MIN: CPT

## 2018-04-15 RX ORDER — BUPROPION HYDROCHLORIDE 75 MG/1
75 TABLET ORAL 2 TIMES DAILY
Status: DISCONTINUED | OUTPATIENT
Start: 2018-04-15 | End: 2018-04-16 | Stop reason: HOSPADM

## 2018-04-15 RX ADMIN — OXAZEPAM 10 MG: 10 CAPSULE, GELATIN COATED ORAL at 13:46

## 2018-04-15 RX ADMIN — LISINOPRIL 5 MG: 5 TABLET ORAL at 09:26

## 2018-04-15 RX ADMIN — HYDROMORPHONE HYDROCHLORIDE 1 MG: 1 INJECTION, SOLUTION INTRAMUSCULAR; INTRAVENOUS; SUBCUTANEOUS at 13:46

## 2018-04-15 RX ADMIN — LORAZEPAM 1 MG: 2 INJECTION INTRAMUSCULAR; INTRAVENOUS at 20:02

## 2018-04-15 RX ADMIN — OXAZEPAM 10 MG: 10 CAPSULE, GELATIN COATED ORAL at 06:14

## 2018-04-15 RX ADMIN — LEVETIRACETAM 500 MG: 500 TABLET ORAL at 22:00

## 2018-04-15 RX ADMIN — INSULIN LISPRO 1 UNITS: 100 INJECTION, SOLUTION INTRAVENOUS; SUBCUTANEOUS at 17:27

## 2018-04-15 RX ADMIN — Medication 1 TABLET: at 09:26

## 2018-04-15 RX ADMIN — HEPARIN SODIUM 5000 UNITS: 5000 INJECTION, SOLUTION INTRAVENOUS; SUBCUTANEOUS at 09:21

## 2018-04-15 RX ADMIN — SODIUM CHLORIDE TAB 1 GM 1 G: 1 TAB at 09:26

## 2018-04-15 RX ADMIN — DEXAMETHASONE SODIUM PHOSPHATE 4 MG: 10 INJECTION, SOLUTION INTRAMUSCULAR; INTRAVENOUS at 20:06

## 2018-04-15 RX ADMIN — LORAZEPAM 1 MG: 2 INJECTION INTRAMUSCULAR; INTRAVENOUS at 11:33

## 2018-04-15 RX ADMIN — SODIUM CHLORIDE TAB 1 GM 1 G: 1 TAB at 17:25

## 2018-04-15 RX ADMIN — DEXAMETHASONE SODIUM PHOSPHATE 4 MG: 10 INJECTION, SOLUTION INTRAMUSCULAR; INTRAVENOUS at 06:16

## 2018-04-15 RX ADMIN — INSULIN LISPRO 3 UNITS: 100 INJECTION, SOLUTION INTRAVENOUS; SUBCUTANEOUS at 11:43

## 2018-04-15 RX ADMIN — OXYCODONE HYDROCHLORIDE 10 MG: 10 TABLET ORAL at 06:13

## 2018-04-15 RX ADMIN — OXAZEPAM 10 MG: 10 CAPSULE, GELATIN COATED ORAL at 22:00

## 2018-04-15 RX ADMIN — DOCUSATE SODIUM 100 MG: 100 CAPSULE, LIQUID FILLED ORAL at 09:26

## 2018-04-15 RX ADMIN — INSULIN GLARGINE 15 UNITS: 100 INJECTION, SOLUTION SUBCUTANEOUS at 22:00

## 2018-04-15 RX ADMIN — BUPROPION HYDROCHLORIDE 75 MG: 75 TABLET, FILM COATED ORAL at 17:25

## 2018-04-15 RX ADMIN — FOLIC ACID 1 MG: 1 TABLET ORAL at 09:26

## 2018-04-15 RX ADMIN — HYDROMORPHONE HYDROCHLORIDE 1 MG: 1 INJECTION, SOLUTION INTRAMUSCULAR; INTRAVENOUS; SUBCUTANEOUS at 17:32

## 2018-04-15 RX ADMIN — HYDROMORPHONE HYDROCHLORIDE 1 MG: 1 INJECTION, SOLUTION INTRAMUSCULAR; INTRAVENOUS; SUBCUTANEOUS at 09:30

## 2018-04-15 RX ADMIN — PANTOPRAZOLE SODIUM 40 MG: 40 TABLET, DELAYED RELEASE ORAL at 06:14

## 2018-04-15 RX ADMIN — DEXAMETHASONE SODIUM PHOSPHATE 4 MG: 10 INJECTION, SOLUTION INTRAMUSCULAR; INTRAVENOUS at 11:31

## 2018-04-15 RX ADMIN — HYDROMORPHONE HYDROCHLORIDE 1 MG: 1 INJECTION, SOLUTION INTRAMUSCULAR; INTRAVENOUS; SUBCUTANEOUS at 03:34

## 2018-04-15 RX ADMIN — BUDESONIDE AND FORMOTEROL FUMARATE DIHYDRATE 2 PUFF: 160; 4.5 AEROSOL RESPIRATORY (INHALATION) at 11:23

## 2018-04-15 RX ADMIN — NICOTINE 1 PATCH: 21 PATCH, EXTENDED RELEASE TRANSDERMAL at 09:27

## 2018-04-15 RX ADMIN — ARIPIPRAZOLE 10 MG: 10 TABLET ORAL at 11:22

## 2018-04-15 RX ADMIN — HEPARIN SODIUM 5000 UNITS: 5000 INJECTION, SOLUTION INTRAVENOUS; SUBCUTANEOUS at 22:00

## 2018-04-15 RX ADMIN — INSULIN LISPRO 20 UNITS: 100 INJECTION, SOLUTION INTRAVENOUS; SUBCUTANEOUS at 11:43

## 2018-04-15 RX ADMIN — LEVETIRACETAM 500 MG: 500 TABLET ORAL at 09:26

## 2018-04-15 RX ADMIN — BUDESONIDE AND FORMOTEROL FUMARATE DIHYDRATE 2 PUFF: 160; 4.5 AEROSOL RESPIRATORY (INHALATION) at 21:59

## 2018-04-15 RX ADMIN — INSULIN LISPRO 20 UNITS: 100 INJECTION, SOLUTION INTRAVENOUS; SUBCUTANEOUS at 17:26

## 2018-04-15 RX ADMIN — HYDROMORPHONE HYDROCHLORIDE 1 MG: 1 INJECTION, SOLUTION INTRAMUSCULAR; INTRAVENOUS; SUBCUTANEOUS at 23:56

## 2018-04-15 RX ADMIN — Medication 100 MG: at 09:26

## 2018-04-15 NOTE — ASSESSMENT & PLAN NOTE
- still smokes despite metastatic lung cancer diagnosis  - counseled on cessation - transdermal nicotine patch   Continue Wellbutrin

## 2018-04-15 NOTE — ASSESSMENT & PLAN NOTE
- neurosurgery  - MRI revealed a 2 4 cm mass of the left parietal vertex  Status post resection 4/13   CT 04/14/2018:  Postsurgical changes of craniotomy and resection of left posterior parietal mass with scattered foci of air and hemorrhage at the resection site and unchanged vasogenic edema   Pneumocephalus also noted along the left frontal lobe and adjacent to craniotomy site    - PRN pain control and supportive care otherwise  -has underlying NSCLC, s/p chemo last Feb 2018  -pulmonary embolism confirmed on CTA   Follow-up Neurosurgery  -IVC filter   -f/u pulmonary med  -lifelong AC needed   Vital signs monitoring  A m  labs CBC, CMP

## 2018-04-15 NOTE — PLAN OF CARE
Problem: OCCUPATIONAL THERAPY ADULT  Goal: Performs self-care activities at highest level of function for planned discharge setting  See evaluation for individualized goals  Treatment Interventions: ADL retraining, Functional transfer training, Endurance training, Cognitive reorientation, Patient/family training, Compensatory technique education, Activityengagement          See flowsheet documentation for full assessment, interventions and recommendations  Limitation: Decreased ADL status, Decreased Safe judgement during ADL, Decreased endurance, Decreased self-care trans, Decreased high-level ADLs  Prognosis: Good  Assessment: Pt is a 63 y o  male who was [facility] to [facility] on 4/6/2018 with Brain mass s/p craniotomy for mass resection - incidental finding of B PE's during workup s/p IVC filter 4/11   Pt's problem list also includes PMH of DM, underlying neurological disorder, previous surgery, COPD, cancer history and metastatic lung ca to mediastinum/pelvis, ETOH abuse, polysubstance abuse  At baseline pt was completing adls and mobility independently   Pt lives alone   Currently pt requires min to mod assist for overall ADLS and min assist for functional mobility/transfers  Pt currently presets with impairments in the following categories -steps to enter environment, limited home support, difficulty performing ADLS, difficulty performing IADLS , limited insight into deficits and health management  activity tolerance, endurance, standing balance/tolerance, insight, safety  and judgement    These impairments, as well as pt's fatigue, pain, impulsivity, decreased caregiver support and risk for falls  limit pt's ability to safely engage in all baseline areas of occupation, includingbathing, dressing, toileting, functional mobility/transfers, community mobility, laundry , driving, house maintenance, meal prep, cleaning, social participation  and leisure activities  From OT standpoint, recommend inpt rehab  upon D/C  OT will continue to follow to address the below stated goals        OT Discharge Recommendation: Short Term Rehab

## 2018-04-15 NOTE — OCCUPATIONAL THERAPY NOTE
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     Lung cancer     Dependence on nicotine from cigarettes    Alcohol abuse    Hypertension    Insulin-dependent diabetes mellitus    Bipolar disorder    Leukocytosis    Deep vein thrombosis (DVT) of distal vein of left lower extremity (HCC)    PE (pulmonary thromboembolism) (HCC)    Cerebral edema (HCC)     Past Medical History  Past Medical History:   Diagnosis Date    Diabetes mellitus ([facility] )     Hypertension     Lung cancer ([facility] )     Psychiatric disorder      Past Surgical History  Past Surgical History:   Procedure Laterality Date    TONSILLECTOMY           04/15/18 1000   Note Type   Note type Eval/Treat   Pain Assessment   Pain Assessment 0-10   Pain Score 6   Pain Type Acute pain   Pain Location Head   Hospital Pain Intervention(s) Repositioned; Ambulation/increased activity; Emotional support   Home Living   Type of [address] level;Stairs to enter with rails   Prior Function   Level of [place] Independent with ADLs and functional mobility   Lives With Alone   Receives Help From Friend(s)   ADL Assistance Independent   IADLs Independent   Falls in the last 6 months 1 to 4   Vocational Retired   [address] [place] [address] - I IADLS    Reciprocal Relationships LIMITED SUPPORT AVAILABLE - REPORTS HE HAS FRIENDS WHO HE CAN CALL FOR ASSIST   Service to Others REPORTS NOT WORKING SINCE DX WITH LUNG CA   Intrinsic Gratification MOSTLY SEDENTARY   Subjective   Subjective "I WANT  [facility] 9 DAYS ALREADY"   ADL   Eating Assistance 7  Independent   UB Bathing Assistance 4  Minimal Assistance   LB Bathing Assistance 4  Minimal Assistance   UB Dressing Assistance 4  Minimal Assistance   LB Dressing Assistance [address] [facility] Assistance  4 Minimal Assistance   Bed Mobility   Supine to Sit 5  Supervision   Transfers   Sit to Stand 4  Minimal assistance   Stand to Sit 4  Minimal assistance   Stand pivot 4  Minimal assistance   Functional Mobility   Functional Mobility 4  Minimal assistance   Additional items Rolling walker   Balance   Static Sitting Fair +   Dynamic Sitting Fair   Static Standing Fair -   Dynamic Standing Poor +   Ambulatory Poor +   Activity Tolerance   Activity Tolerance Patient limited by fatigue;Treatment limited secondary to medical complications (Comment)   RUE Assessment   RUE Assessment WFL   LUE Assessment   LUE Assessment WFL   Cognition   Overall Cognitive Status Impaired   Arousal/Participation Alert   Attention Attends with cues to redirect   Orientation Level Oriented X4   Memory Decreased short term memory;Decreased recall of precautions   Following Commands Follows one step commands with increased time or repetition   Comments DEMONSTRATES LIMITED INSIGHT/SAFETY AWARENESS   Assessment   Limitation Decreased ADL status; Decreased Safe judgement during ADL;Decreased endurance;Decreased self-care trans;Decreased high-level ADLs   Prognosis Good   Assessment Pt is a 63 y o  male who was [facility] to [facility] on 4/6/2018 with Brain mass s/p craniotomy for mass resection - incidental finding of B PE's during workup s/p IVC filter 4/11   Pt's problem list also includes PMH of DM, underlying neurological disorder, previous surgery, COPD, cancer history and metastatic lung ca to mediastinum/pelvis, ETOH abuse, polysubstance abuse  At baseline pt was completing adls and mobility independently   Pt lives alone   Currently pt requires min to mod assist for overall ADLS and min assist for functional mobility/transfers   Pt currently presets with impairments in the following categories -steps to enter environment, limited home support, difficulty performing ADLS, difficulty performing IADLS , limited insight into deficits and health management  activity tolerance, endurance, standing balance/tolerance, insight, safety  and judgement   These impairments, as well as pt's fatigue, pain, impulsivity, decreased caregiver support and risk for falls  limit pt's ability to safely engage in all baseline areas of occupation, includingbathing, dressing, toileting, functional mobility/transfers, community mobility, laundry , driving, house maintenance, meal prep, cleaning, social participation  and leisure activities  From OT standpoint, recommend inpt rehab  upon D/C  OT will continue to follow to address the below stated goals  Goals   Patient Goals go home   LTG Time Frame 10-14   Long Term Goal #1 refer to established goals below   Plan   Treatment Interventions ADL retraining;Functional transfer training; Endurance training;Cognitive reorientation;Patient/family training; Compensatory technique education; Activityengagement   Goal Expiration Date 04/29/18   OT Frequency 3-5x/wk   Recommendation   OT Discharge Recommendation Short Term Rehab   Barthel Index   Feeding 10   Bathing 0   Grooming Score 5   Dressing Score 5   Bladder Score 10   Bowels Score 10   Toilet Use Score 5   Transfers (Bed/Chair) Score 10   Mobility (Level Surface) Score 0   Stairs Score 0   Barthel Index Score 55       OCCUPATIONAL THERAPY GOALS:    *MOD I ADLS AFTER SETUP WITH USE OF ADAPTIVE DEVICES PRN  *MOD I TOILETING AND CLOTHING MANAGEMENT   *MOD I FUNCTIONAL MOB AND TRANSFERS TO ALL SURFACES WITH FAIR+ TO GOOD BALANCE/SAFETY FOR PARTICIPATION IN DYNAMIC ADLS   *DEMONSTRATE GOOD CARRYOVER WITH SAFE USE OF RW DURING FUNCTIONAL TASKS  *ASSESS DME NEEDS  *INCREASE ACTIVITY TOLERANCE TO 40-45 MIN FOR PARTICIPATION IN ADLS AND ENJOYABLE ACTIVITIES     *PT TO PARTICIPATE IN ONGOING FUNCTIONAL COGNITIVE ASSESSMENT WITH GOOD ATTENTION/PARTICIPATION TO ASSIST WITH SAFE D/C RECOMMENDATIONS     Savannah Lin, OT

## 2018-04-15 NOTE — PROGRESS NOTES
Progress Note - Pulmonary   Wilbert Pirl 63 y o  male MRN: [id number]  Unit/Bed#: [facility] 917-01 Encounter: [id number]    Assessment:  1   PE, status post IVC filter  2   DV T   3   Non-small cell lung CA, stage IV  On immunotherapy  4   COPD on Symbicort and albuterol  5   Solitary cerebral lesion status post craniotomy  6   Bipolar disorder  7   Right middle lobe occlusion  Plan:  1  Agree with current management  2   The patient should start on full anticoagulation for his PE and DVT for life once cleared by Neurosurgery  3   IVC filter in place  4   Can be followed as an outpatient in Pulmonary Clinic as well  Chief Complaint:   Asymptomatic from pulmonary standpoint  Subjective:   Somewhat frustrated and wants to go home  Objective:     Vitals: Blood pressure 127/76, pulse 77, temperature 97 5 °F (36 4 °C), temperature source Oral, resp  rate 18, height 5' 9" (1 753 m), weight 88 3 kg (194 lb 10 7 oz), SpO2 99 %  ,Body mass index is 28 75 kg/m²  Intake/Output Summary (Last 24 hours) at 04/15/18 1825  Last data filed at 04/15/18 1545   Gross per 24 hour   Intake             1280 ml   Output             1950 ml   Net             -670 ml       Invasive Devices     Peripheral Intravenous Line            Peripheral IV 04/13/18 Left Antecubital 2 days                Physical Exam:  Vital signs are stable, S1-S2 regular rate and rhythm, distant breath sounds bilaterally, neurologically seems to be intact  Labs: I have personally reviewed pertinent lab results  Imaging and other studies: I have personally reviewed pertinent reports

## 2018-04-15 NOTE — ASSESSMENT & PLAN NOTE
POD# 2 image guided left parietal partial craniotomy for mass resection   · Exam reveals GCS 15  No focal findings  Continue frequent neurological checks  · Imaging reviewed personally and by attending  Final results as below  ? MRI brain with without contrast 4/7/18:  Solitary irregularly enhancing 2 4 cm left parietal mass with associated vasogenic edema  ? 4/14/18 - CT head wo: expected postoperative changes  · STAT CT head without contrast if decline in GCS >2 pts/1 hr   · Pain control per primary team  · PT/OT: pending official evaluation  Mobilize as tolerated with assistance  · DVT PPX: SCDs, HSQ  · Patient was on aspirin 81 mg which was discontinued    Last dose 4/8/18 at 0821  · Continue decadron 4mg Q 6H    · Continue Keppra 500mg BID

## 2018-04-15 NOTE — PLAN OF CARE
Problem: PHYSICAL THERAPY ADULT  Goal: Performs mobility at highest level of function for planned discharge setting  See evaluation for individualized goals  Treatment/Interventions: Functional transfer training, LE strengthening/ROM, Elevations, Therapeutic exercise, Endurance training, Patient/family training, Equipment eval/education, Bed mobility, Gait training, Spoke to nursing, OT  Equipment Recommended: Gisel Pedersen       See flowsheet documentation for full assessment, interventions and recommendations  Outcome: Progressing  Prognosis: Good  Problem List: Decreased strength, Decreased endurance, Impaired balance, Decreased mobility, Decreased coordination, Decreased safety awareness, Decreased skin integrity, Orthopedic restrictions  Assessment: Pt is a 63year old male presenting with R sided numbness initially to [facility] and was [facility] to [facility] for neurosurgical evaluation  Pt underwent Left image guided parietal craniotomy for resection of tumor 4/13/18  PT consulted to assess functional mobility and assist with safe d/c planning  PT eval performed POD #2 with OOB orders  Pt with a problem list which includes L parietal brain mass with vasogenic edema, lung cancer, alcohol abuse, nicotine dependence, bipolar disorder, PE, DVT and cerebral edema  PMH includes DM, HTN, lung cancer and psychiatric disorder  PTA, pt living at home alone in a 1 floor apartment with [address]  Pt denies any use of DME  Pt lives alone and is fully (I); reports limited support  On eval, pt presenting with the following deficits: impaired balance, decreased strength and ROM, pain, poor tolerance to activity, impaired coordination and decreased overall functional mobility  Pt in supine upon arrival and agreeable to participate in PT session  Pt below baseline and will benefit from continued skilled PT intervention to progress functional mobility (I) and safety  Rehab recommended at this time     Barriers to Discharge: Inaccessible home environment, Decreased caregiver support  Barriers to Discharge Comments: lives alone; [address]   Recommendation: Short-term skilled PT          See flowsheet documentation for full assessment

## 2018-04-15 NOTE — PROGRESS NOTES
Progress Note - Rossana Sine 1955, 63 y o  male MRN: [id number]    Unit/Bed#: [facility] 917-01 Encounter: [id number]    Primary Care Provider: Jasiel Deras MD   Date and time admitted to hospital: 4/6/2018  5:10 PM        Cerebral edema ([facility] [address] )   Assessment & Plan    Continue decadron  F/u NS        PE (pulmonary thromboembolism) ([facility] [address] )   Assessment & Plan      IVC filter  Appreciate pulmonary recs        Deep vein thrombosis (DVT) of distal vein of left lower extremity (HCC)   Assessment & Plan    Positive subacute DVT in LLE  IVC filter  Lifelong AC post brain biopsy        Leukocytosis   Assessment & Plan    -steroid therapy  -WBC 22 62  - remains afebrile        Insulin-dependent diabetes mellitus   Assessment & Plan    - HgA1c of 8 0- poorly controlled    - optimize SSI coverage , Lantus 15U QHS           Hypertension   Assessment & Plan    - low-sodium diet encouraged  -BP stable  - continue Zestril        Alcohol abuse   Assessment & Plan    - counseled on cessation although unlikely to comply - per patient, last drink was on 4/5  - monitor for signs/symptoms of withdrawal - PRN IV Ativan   - started on folic acid/thiamine supplementation   -no signs of Alcohol withdrawal        Dependence on nicotine from cigarettes   Assessment & Plan    - still smokes despite metastatic lung cancer diagnosis  - counseled on cessation - transdermal nicotine patch   Continue Wellbutrin        Lung cancer    Assessment & Plan    - per patient, lung and his metastatic to lymph nodes and pelvis  -stage 4  - PRN pain control/supportive care   -patient confirms he is interested in having brain mass resected, as well as chemo for further tx            * 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    Assessment & Plan    - neurosurgery  - MRI revealed a 2 4 cm mass of the left parietal vertex  Status post resection 4/13   CT 04/14/2018:  Postsurgical changes of craniotomy and resection of left posterior parietal mass with scattered foci of air and hemorrhage at the resection site and unchanged vasogenic edema   Pneumocephalus also noted along the left frontal lobe and adjacent to craniotomy site    - PRN pain control and supportive care otherwise  -has underlying NSCLC, s/p chemo last 2018  -pulmonary embolism confirmed on CTA   Follow-up Neurosurgery  -IVC filter   -f/u pulmonary med  -lifelong AC needed   Vital signs monitoring  A m  labs CBC, CMP          VTE Pharmacologic Prophylaxis:   Pharmacologic: Heparin  Mechanical VTE Prophylaxis in Place: Yes    Patient Centered Rounds: I have performed bedside rounds with nursing staff today  Discussions with Specialists or Other Care Team Provider: yes    Education and Discussions with Family / Patient: yes    Time Spent for Care: 45 minutes  More than 50% of total time spent on counseling and coordination of care as described above  Current Length of Stay: 9 day(s)    Current Patient Status: Inpatient   Certification Statement: The patient will continue to require additional inpatient hospital stay due to med mgt    Discharge Plan: rehab (patient is unwilling to accept), wants to go home    Code Status: Level 3 - DNAR and DNI      Subjective:   Patient was seen and examined with nurse in no apparent clinical distress  Had mild headaches  Wellnutrin restarted  Ros neg otherwise  Objective:     Vitals:   Temp (24hrs), Av °F (36 7 °C), Min:97 6 °F (36 4 °C), Max:98 3 °F (36 8 °C)    HR:  [70-98] 85  Resp:  [12-25] 17  BP: (102-153)/(58-92) 122/58  SpO2:  [95 %-98 %] 98 %  Body mass index is 28 75 kg/m²  Input and Output Summary (last 24 hours): Intake/Output Summary (Last 24 hours) at 04/15/18 1301  Last data filed at 04/15/18 0618   Gross per 24 hour   Intake             1090 ml   Output             1500 ml   Net             -410 ml       Physical Exam:     Physical Exam   Constitutional: He is oriented to person, place, and time  No distress     HENT:   Head: Normocephalic  Mouth/Throat: Oropharynx is clear and moist    S/p craniotomy left    Eyes: EOM are normal  Pupils are equal, round, and reactive to light  Neck: Normal range of motion  No JVD present  Cardiovascular: Normal rate  Exam reveals no gallop and no friction rub  No murmur heard  Pulmonary/Chest: Effort normal and breath sounds normal  No respiratory distress  He has no wheezes  He has no rales  Abdominal: Soft  Bowel sounds are normal  He exhibits no distension  There is no tenderness  There is no rebound  Musculoskeletal: Normal range of motion  He exhibits no edema or tenderness  Neurological: He is alert and oriented to person, place, and time  No cranial nerve deficit  He exhibits abnormal muscle tone  Skin: Skin is warm  No erythema  Psychiatric: He has a normal mood and affect  His behavior is normal  Judgment and thought content normal          Additional Data:     Labs:      Results from last 7 days  Lab Units 04/14/18 0431 04/12/18 0447   WBC Thousand/uL 22 62*  --  16 36*   HEMOGLOBIN g/dL 10 7*  --  11 0*   I STAT HEMOGLOBIN   --   < >  --    HEMATOCRIT % 32 1*  --  33 1*   PLATELETS Thousands/uL 232  230  --  237   NEUTROS PCT %  --   --  90*   LYMPHS PCT %  --   --  6*   LYMPHO PCT % 2*  --   --    MONOS PCT %  --   --  4   MONO PCT MAN % 3*  --   --    EOS PCT %  --   --  0   EOSINO PCT MANUAL % 0  --   --    < > = values in this interval not displayed      Results from last 7 days  Lab Units 04/14/18 0431 04/12/18 0447   SODIUM mmol/L 134*  136  --  137   POTASSIUM mmol/L 5 1  5 1  --  4 4   CHLORIDE mmol/L 102  104  --  106   CO2 mmol/L 25  25  --  24   BUN mg/dL 28*  27*  --  28*   CREATININE mg/dL 1 10  1 10  --  0 99   CALCIUM mg/dL 8 8  8 5  --  8 6   TOTAL PROTEIN g/dL  --   --  6 0*   BILIRUBIN TOTAL mg/dL  --   --  0 21   ALK PHOS U/L  --   --  51   ALT U/L  --   --  11*   AST U/L  --   --  7   GLUCOSE RANDOM mg/dL 255*  253*  --  137   GLUCOSE, ISTAT   --   < >  --    < > = values in this interval not displayed  * I Have Reviewed All Lab Data Listed Above  * Additional Pertinent Lab Tests Reviewed:  [facility] Admission Reviewed    Imaging:    Imaging Reports Reviewed Today   Recent Cultures (last 7 days):           Last 24 Hours Medication List:     Current Facility-Administered Medications:  acetaminophen 650 mg Oral Q6H PRN Eather Curling, PA-TARSHA   albuterol 2 puff Inhalation Q6H PRN Eather Curling, PA-TARSHA   ARIPiprazole 10 mg Oral Daily Eather Curling, PA-C   budesonide-formoterol 2 puff Inhalation Q12H [facility] Eather Curling, PA-C   buPROPion 75 mg Oral BID Tip Sheikh MD   calcium carbonate 1,000 mg Oral Daily PRN Eather Curling, PA-C   dexamethasone 4 mg Intravenous Q8H Eather Curling, MD   Followed by       Mary Kim ON 4/17/2018] dexamethasone 2 mg Intravenous Q6H [facility] Eather Curling, MD   Followed by       Mary Kim ON 4/19/2018] dexamethasone 2 mg Intravenous Q8H Eather Curling, MD   Followed by       Mary Kim ON 4/21/2018] dexamethasone 2 mg Intravenous Q12H [facility] Eather Curling, MD   Followed by       Mary Kim ON 4/23/2018] dexamethasone 2 mg Intravenous Q24H Eather Curling, MD   dextromethorphan-guaiFENesin 10 mL Oral Q4H PRN Vicki Tellez MD   docusate sodium 100 mg Oral BID Eather Curling, MD   fentanyl citrate (PF) 25 mcg Intravenous Q1H PRN Eather Curling, MD   folic acid 1 mg Oral Daily Vicki Tellez MD   heparin (porcine) 5,000 Units Subcutaneous Q12H [facility] Eather Curling, MD   HYDROmorphone 1 mg Intravenous Q4H PRN Eather Curling, MD   insulin glargine 15 Units Subcutaneous HS Vicki Tellez MD   insulin lispro 1-5 Units Subcutaneous 4x Daily (AC & HS) Vicki Tellez MD   insulin lispro 20 Units Subcutaneous TID With Meals [place],    levETIRAcetam 500 mg Oral Q12H [facility] Shira Hills DO   lisinopril 5 mg Oral Daily Eather Curling, PA-C   LORazepam 1 mg Intravenous Q4H PRN Eather Curling, PA-C magnesium hydroxide 30 mL Oral Daily PRN Oliver Villanueva MD   multivitamin-minerals 1 tablet Oral Daily Oliver Villanueva PA-C   nicotine 1 patch Transdermal Daily Oliver Villanueva PA-C   ondansetron 4 mg Intravenous Q4H PRN Oliver Villanueva MD   oxazepam 10 mg Oral [facility] Riaz Vela MD   oxyCODONE 10 mg Oral Q4H PRN Oliver Villanueva MD   pantoprazole 40 mg Oral Early Morning Oliver Villanueva PA-C   sodium chloride 1 g Oral BID Oliver Villanueva PA-C   thiamine 100 mg Oral Daily Oliver Villanueva PA-C        Today, Patient Was Seen By: Roro Ritchie MD    ** Please Note: Dictation voice to text software may have been used in the creation of this document   **

## 2018-04-15 NOTE — PHYSICAL THERAPY NOTE
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     Lung cancer     Dependence on nicotine from cigarettes    Alcohol abuse    Hypertension    Insulin-dependent diabetes mellitus    Bipolar disorder    Leukocytosis    Deep vein thrombosis (DVT) of distal vein of left lower extremity (HCC)    PE (pulmonary thromboembolism) (HCC)    Cerebral edema (HCC)       Past Medical History  Past Medical History:   Diagnosis Date    Diabetes mellitus ([facility] )     Hypertension     Lung cancer ([facility] )     Psychiatric disorder        Past Surgical History  Past Surgical History:   Procedure Laterality Date    TONSILLECTOMY        04/15/18 0958   Note Type   Note type Eval/Treat   Pain Assessment   Pain Assessment 0-10   Pain Score 6   Pain Type Acute pain;Surgical pain   Pain Location Head   Pain Orientation Bilateral   Effect of Pain on Daily Activities PAIN DID NOT APPEAR TO LIMIT MOBILITY    Patient's Stated Pain Goal No pain   Hospital Pain Intervention(s) Ambulation/increased activity   Home Living   Type of Home Apartment   Home Layout One level;Stairs to enter with rails  (15 [address])   Bathroom Shower/Tub Tub/shower unit   Bathroom Toilet Standard   Bathroom Accessibility Accessible   Additional Comments Pt denies any use of DME    Prior Function   Level of [place] Independent with ADLs and functional mobility   Lives With Alone   Receives Help From Friend(s)   ADL Assistance Independent   IADLs Independent   Falls in the last 6 months 1 to 4   Vocational Retired   Comments Pt admits to 1 fall in the recent 6 months while drinking    Restrictions/Precautions   Weight Bearing Precautions Per Order (up with assistance )   Other Precautions Chair Alarm; Bed Alarm; Fall Risk;Pain   General   Additional Pertinent History 04/13/18 Left image guided parietal craniotomy for resection of tumor    Family/Caregiver Present No   Cognition   Overall Cognitive Status Impaired   Arousal/Participation Cooperative   Attention Attends with cues to redirect   Orientation Level Oriented X4   Memory Decreased short term memory;Decreased recall of precautions   Following Commands Follows one step commands with increased time or repetition   Comments impaired insight    RUE Assessment   RUE Assessment WFL   LUE Assessment   LUE Assessment WFL   RLE Assessment   RLE Assessment WFL   LLE Assessment   LLE Assessment WFL   Coordination   Movements are Fluid and Coordinated 0   Coordination and Movement Description ataxic LE; LE instability    Light Touch   RLE Light Touch Impaired   RLE Light Touch Comments h/o neuropathy    LLE Light Touch Impaired   LLE Light Touch Comments h/o neuropathy    Proprioception   RLE Proprioception Impaired   LLE Proprioception Impaired   Bed Mobility   Supine to Sit 5  Supervision   Additional items Increased time required   Additional Comments No dizziness/light headedness sitting EOB    Transfers   Sit to Stand 4  Minimal assistance   Additional items Assist x 1; Increased time required;Verbal cues   Stand to Sit 4  Minimal assistance   Additional items Assist x 1; Increased time required;Verbal cues   Stand pivot 4  Minimal assistance   Additional items Assist x 1; Increased time required;Verbal cues   Additional Comments OOB and short distance performed with no AD with gross instability noted; improvements noted with RW however pt remains at increased risk for falls    Ambulation/Elevation   Gait pattern Ataxia; Wide ROMINA   Gait Assistance 4  Minimal assist   Additional items Assist x 1   Assistive Device Rolling walker   Distance 50'   Balance   Static Sitting Fair +   Dynamic Sitting Fair   Static Standing Fair -   Dynamic Standing Poor +   Ambulatory Poor +   Endurance Deficit   Endurance Deficit Yes   Endurance Deficit Description ongoing deconditioning Activity Tolerance   Activity Tolerance Patient limited by fatigue;Treatment limited secondary to medical complications (Comment)   Medical Staff Made Vane Yeager OT   Nurse Made Aware appropriate to see per RNMichelle    Assessment   Prognosis Good   Problem List Decreased strength;Decreased endurance; Impaired balance;Decreased mobility; Decreased coordination;Decreased safety awareness;Decreased skin integrity;Orthopedic restrictions   Assessment Pt is a 63year old male presenting with R sided numbness initially to [facility] and was transferred to [facility] for neurosurgical evaluation  Pt underwent Left image guided parietal craniotomy for resection of tumor 4/13/18  PT consulted to assess functional mobility and assist with safe d/c planning  PT eval performed POD #2 with OOB orders  Pt with a problem list which includes L parietal brain mass with vasogenic edema, lung cancer, alcohol abuse, nicotine dependence, bipolar disorder, PE, DVT and cerebral edema  PMH includes DM, HTN, lung cancer and psychiatric disorder  PTA, pt living at home alone in a 1 floor apartment with 15 [address]  Pt denies any use of DME  Pt lives alone and is fully (I); reports limited support  On eval, pt presenting with the following deficits: impaired balance, decreased strength and ROM, pain, poor tolerance to activity, impaired coordination and decreased overall functional mobility  Pt in supine upon arrival and agreeable to participate in PT session  Pt below baseline and will benefit from continued skilled PT intervention to progress functional mobility (I) and safety  Rehab recommended at this time  Barriers to Discharge Inaccessible home environment;Decreased caregiver support   Barriers to Discharge Comments lives alone; [address]    Goals   Patient Goals to go home    STG Expiration Date 04/25/18   Short Term Goal #1 Pt will be mod(I) with bed mobility  Pt will be mod(I) with transfers   Pt will be mod(I) with ambulaiton using least restrictive AD  Pt will be S with stairs  Pt will partiicapte in HEP  Pt's balance to improve to Fair+  Pt to particiapte in HEP  Treatment Day 0   Plan   Treatment/Interventions Functional transfer training;LE strengthening/ROM; Elevations; Therapeutic exercise; Endurance training;Patient/family training;Equipment eval/education; Bed mobility;Gait training;Spoke to nursing;OT   PT Frequency 5x/wk   Recommendation   Recommendation Short-term skilled PT   Equipment Recommended Walker   Additional Comments OOB in chair with alarm activated and all needs within reach    Barthel Index   Feeding 10   Bathing 0   Grooming Score 5   Dressing Score 5   Bladder Score 10   Bowels Score 10   Toilet Use Score 5   Transfers (Bed/Chair) Score 10   Mobility (Level Surface) Score 0   Stairs Score 0   Barthel Index Score 55           Naa Lebron, PT

## 2018-04-15 NOTE — POST OP PROGRESS NOTES
Progress Note - Neurosurgery   Wilbert Razo 63 y o  male MRN: [id number]  Unit/Bed#: [facility] 917-01 Encounter: [id number]    Assessment:  1  POD#2 left image guided parietal craniotomy for resection of tumor   2  Left parietal brain mass with vasogenic edema  3  Cerebral edema  4  Lower leg DVT  5  Pulmonary thromboembolism   6  Alcohol abuse     Plan: POD# 2 image guided left parietal partial craniotomy for mass resection   · Exam reveals GCS 15  No focal findings  Continue frequent neurological checks  · Imaging reviewed personally and by attending  Final results as below  ? MRI brain with without contrast 4/7/18:  Solitary irregularly enhancing 2 4 cm left parietal mass with associated vasogenic edema  ? 4/14/18 - CT head wo: expected postoperative changes  · STAT CT head without contrast if decline in GCS >2 pts/1 hr   · Pain control per primary team  · PT/OT: pending official evaluation  Mobilize as tolerated with assistance  · DVT PPX: SCDs, HSQ  · Patient was on aspirin 81 mg which was discontinued  Last dose 4/8/18 at 0821  · Continue decadron 4mg Q 6H    · Continue Keppra 500mg BID    · Appreciate pulmonary input in regards to multiple PE s/p IVC filter  · Medical management per primary team  · No immediate neurosurgical intervention, signing off  Patient will be evaluated in 2 weeks for final path review and repeat CTH if stable, can discuss anticoagulations at that time  Call with questions or concerns    Subjective/Objective   Chief Complaint: "I'm fine"    Subjective: patient states he is doing good now  He admits to having left parietal pain located over the incision that was rated 8 out of 10 but he admits to getting medication which improved the pain to approximately 4 out of 10  He described the pain as pressure, aching and throbbing  He denies dizziness, change in vision, chest pain, SOB, abdominal pain/N/V, weakness or numbness   He admits to walking to the bathroom with a little uneasiness secondary to being sober  Patient states he walks "normal" when he is drunk and stagger when he's sober  Objective: laying in bed  NAD  I/O       04/13 0701 - 04/14 0700 04/14 0701 - 04/15 0700 04/15 0701 - 04/16 0700    P  O  960 1760     I V  (mL/kg) 5825 (66 2)      IV Piggyback 250 150     Total Intake(mL/kg) 7035 (79 9) 1910 (21 6)     Urine (mL/kg/hr) 5815 (2 8) 1800 (0 8)     Blood 50 (0)      Total Output 5865 1800      Net +1170 +110             Unmeasured Urine Occurrence  1 x           Invasive Devices     Peripheral Intravenous Line            Peripheral IV 04/13/18 Left Antecubital 1 day                Physical Exam:  Vitals: Blood pressure 122/58, pulse 85, temperature 98 2 °F (36 8 °C), temperature source Oral, resp  rate 17, height 5' 9" (1 753 m), weight 88 3 kg (194 lb 10 7 oz), SpO2 98 %  ,Body mass index is 28 75 kg/m²  General appearance: alert, appears stated age, cooperative and no distress  Head: Normocephalic, left parietal incision with dependent inferior edema without erythema or edema  Tenderness on palpation  Eyes: EOMI, PERRL  Lungs: non labored breathing  Heart: regular heart rate  Neurologic:   Mental status: Alert, oriented x4, thought content appropriate  Speech is fluent, clear  Able to repeat a sentence  3/3 immediate object recall intact  Cranial nerves: grossly intact (Cranial nerves II-XII)  Facial symmetry at rest and with expression  Tongue is slightly deviated to the right  Sensory: normal to LT in bilateral upper and lower extremities  DSS intact  Motor: moving all extremities without focal weakness  Strength 5/5 throughout  Reflexes: 2+ and symmetric  negative lino, negative clonus  Coordination: finger to nose normal bilaterally, no drift bilaterally  3/3 JPS intact  MILIND intact         Lab Results:    Results from last 7 days  Lab Units 04/14/18  0431 04/13/18  1036 04/12/18  0447 04/11/18  0459   WBC Thousand/uL 22 62*  --  16 36* 19 24*   HEMOGLOBIN g/dL 10 7*  --  11 0* 11 8*   I STAT HEMOGLOBIN g/dl  --  6 5*  --   --    HEMATOCRIT % 32 1*  --  33 1* 35 9*   PLATELETS Thousands/uL 232  230  --  237 275   NEUTROS PCT %  --   --  90* 88*   MONOS PCT %  --   --  4 4   MONO PCT MAN % 3*  --   --   --        Results from last 7 days  Lab Units 04/14/18  0431 04/13/18  1036 04/12/18  0447 04/11/18  0459   SODIUM mmol/L 134*  136  --  137 134*   POTASSIUM mmol/L 5 1  5 1  --  4 4 4 7   CHLORIDE mmol/L 102  104  --  106 103   CO2 mmol/L 25  25  --  24 25   BUN mg/dL 28*  27*  --  28* 32*   CREATININE mg/dL 1 10  1 10  --  0 99 1 04   CALCIUM mg/dL 8 8  8 5  --  8 6 8 8   TOTAL PROTEIN g/dL  --   --  6 0* 6 3*   BILIRUBIN TOTAL mg/dL  --   --  0 21 0 38   ALK PHOS U/L  --   --  51 51   ALT U/L  --   --  11* 9*   AST U/L  --   --  7 8   GLUCOSE RANDOM mg/dL 255*  253*  --  137 174*   GLUCOSE, ISTAT mg/dl  --  157*  --   --        Results from last 7 days  Lab Units 04/14/18  0431   MAGNESIUM mg/dL 1 8       Results from last 7 days  Lab Units 04/14/18  0431   PHOSPHORUS mg/dL 4 3*         No results found for: TROPONINT  ABG:No results found for: PHART, SHH8MUV, PO2ART, HYS9QVH, H5DVIQJW, BEART, SOURCE    Imaging Studies: I have personally reviewed pertinent reports  and I have personally reviewed pertinent films in PACS    CT head wo contrast   Final Result      Postsurgical changes of craniotomy and resection of left posterior parietal mass with scattered foci of air and hemorrhage at the resection site and unchanged vasogenic edema  Pneumocephalus also noted along the left frontal lobe and adjacent to    craniotomy site  Workstation performed: [id number]         IR IVC optional filter placement   Final Result   Successful placement of a convertible [employer] infrarenal IVC filter        Workstation performed: [id number]         VAS lower limb venous duplex study, complete bilateral   Final Result      CTA chest pe study   Final Result Small acute occlusive focus of left upper lobe pulmonary arterial embolism  Small foci of recanalized right lower lobe pulmonary arterial embolism are likely subacute  Compression of the right middle lobe pulmonary artery by surrounding mass  Stable right middle lobe mass with postobstructive pneumonitis  Stable 3 mm right upper lobe pulmonary nodule  Stable advanced mediastinal adenopathy  I personally discussed this study Lola Salinas on 4/10/2018 at 5:35 PM                Workstation performed: [id number]         CT chest abdomen pelvis w contrast   Final Result      Infiltrating right middle lobe lung mass with mediastinal metastatic lymph nodes as described above  There is questionable right lower lobe pulmonary artery filling defect/embolism which could also be artifactual             Workstation performed: [id number]         MRI brain w wo contrast   Final Result      Enhancing 2 4 cm mass with vasogenic edema in the left parietal vertex centered at the gray-white matter junction extending to the cortical surface  Findings may represent a solitary metastasis in this patient with a history of lung cancer  Differential diagnosis includes primary brain tumor  Neurosurgical assessment recommended  Workstation performed: [id number]             EKG, Pathology, and Other Studies: I have personally reviewed pertinent reports        VTE  Prophylaxis: Sequential compression device (Venodyne)  and Heparin

## 2018-04-16 VITALS
BODY MASS INDEX: 28.31 KG/M2 | OXYGEN SATURATION: 98 % | WEIGHT: 191.14 LBS | HEIGHT: 69 IN | RESPIRATION RATE: 18 BRPM | TEMPERATURE: 97.5 F | DIASTOLIC BLOOD PRESSURE: 84 MMHG | SYSTOLIC BLOOD PRESSURE: 164 MMHG | HEART RATE: 92 BPM

## 2018-04-16 LAB
ALBUMIN SERPL BCP-MCNC: 2.5 G/DL (ref 3.5–5)
ALP SERPL-CCNC: 50 U/L (ref 46–116)
ALT SERPL W P-5'-P-CCNC: 11 U/L (ref 12–78)
ANION GAP SERPL CALCULATED.3IONS-SCNC: 4 MMOL/L (ref 4–13)
AST SERPL W P-5'-P-CCNC: 7 U/L (ref 5–45)
BASOPHILS # BLD AUTO: 0 THOUSANDS/ΜL (ref 0–0.1)
BASOPHILS NFR BLD AUTO: 0 % (ref 0–1)
BILIRUB SERPL-MCNC: 0.46 MG/DL (ref 0.2–1)
BUN SERPL-MCNC: 29 MG/DL (ref 5–25)
CALCIUM SERPL-MCNC: 9.4 MG/DL
CHLORIDE SERPL-SCNC: 98 MMOL/L (ref 100–108)
CO2 SERPL-SCNC: 29 MMOL/L (ref 21–32)
CREAT SERPL-MCNC: 0.99 MG/DL (ref 0.6–1.3)
EOSINOPHIL # BLD AUTO: 0 THOUSAND/ΜL (ref 0–0.61)
EOSINOPHIL NFR BLD AUTO: 0 % (ref 0–6)
ERYTHROCYTE [DISTWIDTH] IN BLOOD BY AUTOMATED COUNT: 14.8 % (ref 11.6–15.1)
GFR SERPL CREATININE-BSD FRML MDRD: 82 ML/MIN/1.73SQ M
GLUCOSE SERPL-MCNC: 170 MG/DL (ref 65–140)
GLUCOSE SERPL-MCNC: 172 MG/DL (ref 65–140)
GLUCOSE SERPL-MCNC: 192 MG/DL (ref 65–140)
GLUCOSE SERPL-MCNC: 249 MG/DL (ref 65–140)
HCT VFR BLD AUTO: 35.2 % (ref 36.5–49.3)
HGB BLD-MCNC: 11.7 G/DL (ref 12–17)
LYMPHOCYTES # BLD AUTO: 1.19 THOUSANDS/ΜL (ref 0.6–4.47)
LYMPHOCYTES NFR BLD AUTO: 7 % (ref 14–44)
MCH RBC QN AUTO: 29.4 PG (ref 26.8–34.3)
MCHC RBC AUTO-ENTMCNC: 33.2 G/DL (ref 31.4–37.4)
MCV RBC AUTO: 88 FL (ref 82–98)
MONOCYTES # BLD AUTO: 1.15 THOUSAND/ΜL (ref 0.17–1.22)
MONOCYTES NFR BLD AUTO: 7 % (ref 4–12)
NEUTROPHILS # BLD AUTO: 15.16 THOUSANDS/ΜL (ref 1.85–7.62)
NEUTS SEG NFR BLD AUTO: 86 % (ref 43–75)
NRBC BLD AUTO-RTO: 0 /100 WBCS
PLATELET # BLD AUTO: 239 THOUSANDS/UL (ref 149–390)
PMV BLD AUTO: 11.9 FL (ref 8.9–12.7)
POTASSIUM SERPL-SCNC: 4.9 MMOL/L (ref 3.5–5.3)
PROT SERPL-MCNC: 6.2 G/DL (ref 6.4–8.2)
RBC # BLD AUTO: 3.98 MILLION/UL (ref 3.88–5.62)
SODIUM SERPL-SCNC: 131 MMOL/L (ref 136–145)
WBC # BLD AUTO: 17.6 THOUSAND/UL (ref 4.31–10.16)

## 2018-04-16 PROCEDURE — 99239 HOSP IP/OBS DSCHRG MGMT >30: CPT | Performed by: HOSPITALIST

## 2018-04-16 PROCEDURE — 97535 SELF CARE MNGMENT TRAINING: CPT

## 2018-04-16 PROCEDURE — 97530 THERAPEUTIC ACTIVITIES: CPT

## 2018-04-16 PROCEDURE — 85025 COMPLETE CBC W/AUTO DIFF WBC: CPT | Performed by: HOSPITALIST

## 2018-04-16 PROCEDURE — 80053 COMPREHEN METABOLIC PANEL: CPT | Performed by: HOSPITALIST

## 2018-04-16 PROCEDURE — 82948 REAGENT STRIP/BLOOD GLUCOSE: CPT

## 2018-04-16 PROCEDURE — 97116 GAIT TRAINING THERAPY: CPT

## 2018-04-16 RX ORDER — LEVETIRACETAM 500 MG/1
500 TABLET ORAL EVERY 12 HOURS SCHEDULED
Qty: 60 TABLET | Refills: 0 | Status: SHIPPED | OUTPATIENT
Start: 2018-04-16

## 2018-04-16 RX ORDER — FOLIC ACID 1 MG/1
1 TABLET ORAL DAILY
Qty: 30 TABLET | Refills: 0 | Status: SHIPPED | OUTPATIENT
Start: 2018-04-17

## 2018-04-16 RX ORDER — NICOTINE 21 MG/24HR
1 PATCH, TRANSDERMAL 24 HOURS TRANSDERMAL DAILY
Qty: 28 PATCH | Refills: 0 | Status: SHIPPED | OUTPATIENT
Start: 2018-04-17

## 2018-04-16 RX ORDER — INSULIN GLARGINE 100 [IU]/ML
15 INJECTION, SOLUTION SUBCUTANEOUS
Qty: 10 ML | Refills: 0 | Status: SHIPPED | OUTPATIENT
Start: 2018-04-16

## 2018-04-16 RX ORDER — THIAMINE MONONITRATE (VIT B1) 100 MG
100 TABLET ORAL DAILY
Qty: 30 TABLET | Refills: 0 | Status: SHIPPED | OUTPATIENT
Start: 2018-04-16

## 2018-04-16 RX ORDER — OXYCODONE HYDROCHLORIDE 10 MG/1
5 TABLET ORAL EVERY 4 HOURS PRN
Qty: 10 TABLET | Refills: 0 | Status: SHIPPED | OUTPATIENT
Start: 2018-04-16 | End: 2018-04-26

## 2018-04-16 RX ORDER — ACETAMINOPHEN 325 MG/1
650 TABLET ORAL EVERY 6 HOURS PRN
Qty: 30 TABLET | Refills: 0 | Status: SHIPPED | OUTPATIENT
Start: 2018-04-16

## 2018-04-16 RX ORDER — DEXAMETHASONE 2 MG/1
TABLET ORAL
Qty: 34 TABLET | Refills: 0 | Status: SHIPPED | OUTPATIENT
Start: 2018-04-16 | End: 2018-04-16

## 2018-04-16 RX ORDER — CALCIUM CARBONATE 200(500)MG
1000 TABLET,CHEWABLE ORAL DAILY PRN
Qty: 30 TABLET | Refills: 0 | Status: SHIPPED | OUTPATIENT
Start: 2018-04-16

## 2018-04-16 RX ORDER — OMEPRAZOLE 20 MG/1
20 CAPSULE, DELAYED RELEASE ORAL DAILY
Qty: 30 CAPSULE | Refills: 0 | Status: SHIPPED | OUTPATIENT
Start: 2018-04-16

## 2018-04-16 RX ORDER — DOCUSATE SODIUM 100 MG/1
100 CAPSULE, LIQUID FILLED ORAL 2 TIMES DAILY
Qty: 30 CAPSULE | Refills: 0 | Status: SHIPPED | OUTPATIENT
Start: 2018-04-16

## 2018-04-16 RX ORDER — DEXAMETHASONE 2 MG/1
TABLET ORAL
Qty: 38 TABLET | Refills: 0 | Status: ON HOLD | OUTPATIENT
Start: 2018-04-16 | End: 2018-05-07 | Stop reason: ALTCHOICE

## 2018-04-16 RX ADMIN — SODIUM CHLORIDE TAB 1 GM 1 G: 1 TAB at 08:47

## 2018-04-16 RX ADMIN — BUPROPION HYDROCHLORIDE 75 MG: 75 TABLET, FILM COATED ORAL at 08:47

## 2018-04-16 RX ADMIN — OXYCODONE HYDROCHLORIDE 10 MG: 10 TABLET ORAL at 12:20

## 2018-04-16 RX ADMIN — LEVETIRACETAM 500 MG: 500 TABLET ORAL at 08:47

## 2018-04-16 RX ADMIN — LISINOPRIL 5 MG: 5 TABLET ORAL at 08:48

## 2018-04-16 RX ADMIN — LORAZEPAM 1 MG: 2 INJECTION INTRAMUSCULAR; INTRAVENOUS at 04:34

## 2018-04-16 RX ADMIN — BUDESONIDE AND FORMOTEROL FUMARATE DIHYDRATE 2 PUFF: 160; 4.5 AEROSOL RESPIRATORY (INHALATION) at 08:33

## 2018-04-16 RX ADMIN — INSULIN LISPRO 1 UNITS: 100 INJECTION, SOLUTION INTRAVENOUS; SUBCUTANEOUS at 08:43

## 2018-04-16 RX ADMIN — Medication 1 TABLET: at 08:47

## 2018-04-16 RX ADMIN — DEXAMETHASONE SODIUM PHOSPHATE 4 MG: 10 INJECTION, SOLUTION INTRAMUSCULAR; INTRAVENOUS at 04:29

## 2018-04-16 RX ADMIN — LORAZEPAM 1 MG: 2 INJECTION INTRAMUSCULAR; INTRAVENOUS at 00:05

## 2018-04-16 RX ADMIN — DEXAMETHASONE SODIUM PHOSPHATE 4 MG: 10 INJECTION, SOLUTION INTRAMUSCULAR; INTRAVENOUS at 12:15

## 2018-04-16 RX ADMIN — HYDROMORPHONE HYDROCHLORIDE 1 MG: 1 INJECTION, SOLUTION INTRAMUSCULAR; INTRAVENOUS; SUBCUTANEOUS at 08:45

## 2018-04-16 RX ADMIN — FOLIC ACID 1 MG: 1 TABLET ORAL at 08:47

## 2018-04-16 RX ADMIN — INSULIN LISPRO 20 UNITS: 100 INJECTION, SOLUTION INTRAVENOUS; SUBCUTANEOUS at 08:42

## 2018-04-16 RX ADMIN — PANTOPRAZOLE SODIUM 40 MG: 40 TABLET, DELAYED RELEASE ORAL at 05:55

## 2018-04-16 RX ADMIN — OXAZEPAM 10 MG: 10 CAPSULE, GELATIN COATED ORAL at 05:55

## 2018-04-16 RX ADMIN — INSULIN LISPRO 20 UNITS: 100 INJECTION, SOLUTION INTRAVENOUS; SUBCUTANEOUS at 12:15

## 2018-04-16 RX ADMIN — OXAZEPAM 10 MG: 10 CAPSULE, GELATIN COATED ORAL at 14:46

## 2018-04-16 RX ADMIN — ARIPIPRAZOLE 10 MG: 10 TABLET ORAL at 08:47

## 2018-04-16 RX ADMIN — NICOTINE 1 PATCH: 21 PATCH, EXTENDED RELEASE TRANSDERMAL at 08:48

## 2018-04-16 RX ADMIN — Medication 100 MG: at 08:46

## 2018-04-16 RX ADMIN — HEPARIN SODIUM 5000 UNITS: 5000 INJECTION, SOLUTION INTRAVENOUS; SUBCUTANEOUS at 08:43

## 2018-04-16 RX ADMIN — INSULIN LISPRO 2 UNITS: 100 INJECTION, SOLUTION INTRAVENOUS; SUBCUTANEOUS at 12:15

## 2018-04-16 NOTE — ASSESSMENT & PLAN NOTE
Start anticoagulation in 2 weeks for lifelong anticoagulation  IVC filter  Appreciate pulmonary recs

## 2018-04-16 NOTE — ASSESSMENT & PLAN NOTE
- neurosurgery  - MRI revealed a 2 4 cm mass of the left parietal vertex  Status post resection 4/13   CT 04/14/2018:  Postsurgical changes of craniotomy and resection of left posterior parietal mass with scattered foci of air and hemorrhage at the resection site and unchanged vasogenic edema   Pneumocephalus also noted along the left frontal lobe and adjacent to craniotomy site    - PRN pain control and supportive care otherwise  -has underlying NSCLC, s/p chemo last Feb 2018  -pulmonary embolism confirmed on CTA   Follow-up Neurosurgery in 2 weeks ly  -IVC filter   -f/u pulmonary med  -lifelong AC needed in 2 weeks to be started  Vital signs monitoring  A m  labs CBC, CMP

## 2018-04-16 NOTE — CASE MANAGEMENT
Continued Stay Review    Date: 4/15/18    Vital Signs:  04/15/18 0700  98 2 °F (36 8 °C)  85  17  122/58  --  --  --  98 %  None (Room air)  Lying     Medications:   Scheduled Meds:   Current Facility-Administered Medications:  acetaminophen 650 mg Oral Q6H PRN Uzma Garcia PA-C   albuterol 2 puff Inhalation Q6H PRN Uzma Garcia PA-C   ARIPiprazole 10 mg Oral Daily Uzma Garcia PA-C   budesonide-formoterol 2 puff Inhalation Q12H [address] Uzma Garcia PA-C   buPROPion 75 mg Oral BID Ale Badillo MD   calcium carbonate 1,000 mg Oral Daily PRN Uzma Garcia PA-C   dexamethasone 4 mg Intravenous Q8H Uzma Garcia MD   Followed by       Tyler Merrill ON 4/17/2018] dexamethasone 2 mg Intravenous Q6H [address] Uzma Garcia MD   Followed by       Tyler Merrill ON 4/19/2018] dexamethasone 2 mg Intravenous Q8H Uzma Garcia MD   Followed by       Tyler Merrill ON 4/21/2018] dexamethasone 2 mg Intravenous Q12H [address] Uzma Garcia MD   Followed by       Tyler Merrill ON 4/23/2018] dexamethasone 2 mg Intravenous Q24H Uzma Garcia MD   dextromethorphan-guaiFENesin 10 mL Oral Q4H PRN Kristy Valencia MD   docusate sodium 100 mg Oral BID Uzma Garcia MD   fentanyl citrate (PF) 25 mcg Intravenous Q1H PRN Uzma Garcia MD   folic acid 1 mg Oral Daily Kristy Valencia MD   heparin (porcine) 5,000 Units Subcutaneous Q12H [address] Uzma Garcia MD   HYDROmorphone 1 mg Intravenous Q4H PRN Madeline Slimmer, DO   insulin glargine 15 Units Subcutaneous HS Kristy Valencia MD   insulin lispro 1-5 Units Subcutaneous 4x Daily (AC & HS) Kristy Valencia MD   insulin lispro 20 Units Subcutaneous TID With Meals Madeline Slimmer, DO   levETIRAcetam 500 mg Oral Q12H [address] Madeline Slimmer, DO   lisinopril 5 mg Oral Daily Uzma Garcia PA-C   LORazepam 1 mg Intravenous Q4H PRN Uzma Garcia PA-C   magnesium hydroxide 30 mL Oral Daily PRN Uzma Garcia MD   multivitamin-minerals 1 tablet Oral Daily Uzma Garcia PA-C   nicotine 1 patch Transdermal Daily Oliver Villanueva PA-C   ondansetron 4 mg Intravenous Q4H PRN Oliver Villanueva MD   oxazepam 10 mg Oral [facility] Riaz Vela MD   oxyCODONE 10 mg Oral Q4H PRN Oliver Villanueva MD   pantoprazole 40 mg Oral Early Morning Oliver Villanueva PA-C   sodium chloride 1 g Oral BID Oliver Villanueva PA-C   thiamine 100 mg Oral Daily Oliver Villanueva PA-C     PRN Meds:   acetaminophen    albuterol    calcium carbonate    dextromethorphan-guaiFENesin    fentanyl citrate (PF)    HYDROmorphone IV x 5 4/15    LORazepam IV x 2     magnesium hydroxide    ondansetron    oxyCODONE x1    Abnormal Labs/Diagnostic Results:     POC glucose 256, 306, 208    Age/Sex: 63 y o  male     Assessment/Plan:   4/15 Neurosurgery Progress Note    1  POD#2 left image guided parietal craniotomy for resection of tumor   2  Left parietal brain mass with vasogenic edema  3  Cerebral edema  4  Lower leg DVT  5  Pulmonary thromboembolism   6  Alcohol abuse      Plan: POD# 2 image guided left parietal partial craniotomy for mass resection   · Exam reveals GCS 15   No focal findings   Continue frequent neurological checks  · Imaging reviewed personally and by attending  Final results as below  ? MRI brain with without contrast 4/7/18:  Solitary irregularly enhancing 2 4 cm left parietal mass with associated vasogenic edema  ? 4/14/18 - CT head wo: expected postoperative changes  · STAT CT head without contrast if decline in GCS >2 pts/1 hr   · Pain control per primary team  · PT/OT: pending official evaluation  Mobilize as tolerated with assistance  · DVT PPX: SCDs, HSQ  · Patient was on aspirin 81 mg which was discontinued   Last dose 4/8/18 at 0821  · Continue decadron 4mg Q 6H    · Continue Keppra 500mg BID    · Appreciate pulmonary input in regards to multiple PE s/p IVC filter  · Medical management per primary team  · No immediate neurosurgical intervention, signing off   Patient will be evaluated in 2 weeks for final path review and repeat CTH if stable, can discuss anticoagulations at that time  Call with questions or concerns  ____________________________  4/15 Medicine Progress Note         Cerebral edema (HCC)   Assessment & Plan     Continue decadron  F/u NS          PE (pulmonary thromboembolism) (HCC)   Assessment & Plan        IVC filter  Appreciate pulmonary recs          Deep vein thrombosis (DVT) of distal vein of left lower extremity (HCC)   Assessment & Plan     Positive subacute DVT in LLE  IVC filter  Lifelong AC post brain biopsy          Leukocytosis   Assessment & Plan     -steroid therapy  -WBC 22 62  - remains afebrile          Insulin-dependent diabetes mellitus   Assessment & Plan     - HgA1c of 8 0- poorly controlled    - optimize SSI coverage , Lantus 15U QHS              Hypertension   Assessment & Plan     - low-sodium diet encouraged  -BP stable  - continue Zestril          Alcohol abuse   Assessment & Plan     - counseled on cessation although unlikely to comply - per patient, last drink was on 4/5  - monitor for signs/symptoms of withdrawal - PRN IV Ativan   - started on folic acid/thiamine supplementation   -no signs of Alcohol withdrawal          Dependence on nicotine from cigarettes   Assessment & Plan     - still smokes despite metastatic lung cancer diagnosis  - counseled on cessation - transdermal nicotine patch   Continue Wellbutrin          Lung cancer    Assessment & Plan     - per patient, lung and his metastatic to lymph nodes and pelvis  -stage 4  - PRN pain control/supportive care   -patient confirms he is interested in having brain mass resected, as well as chemo for further tx                * 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    Assessment & Plan     - neurosurgery  - MRI revealed a 2 4 cm mass of the left parietal vertex  Status post resection 4/13   CT 04/14/2018:  Postsurgical changes of craniotomy and resection of left posterior parietal mass with scattered foci of air and hemorrhage at the resection site and unchanged vasogenic edema   Pneumocephalus also noted along the left frontal lobe and adjacent to craniotomy site     - PRN pain control and supportive care otherwise  -has underlying NSCLC, s/p chemo last Feb 2018  -pulmonary embolism confirmed on CTA   Follow-up Neurosurgery  -IVC filter   -f/u pulmonary med  -lifelong AC needed   Vital signs monitoring  A m  labs CBC, CMP             VTE Pharmacologic Prophylaxis:   Pharmacologic: Heparin  Mechanical VTE Prophylaxis in Place: Yes  Certification Statement: The patient will continue to require additional inpatient hospital stay due to med mgt  _______________________  4/15 Pulmonary Progress Note  1   PE, status post IVC filter  2   DV T   3   Non-small cell lung CA, stage IV  On immunotherapy  4   COPD on Symbicort and albuterol  5   Solitary cerebral lesion status post craniotomy  6   Bipolar disorder  7   Right middle lobe occlusion      Plan:  1  Agree with current management  2   The patient should start on full anticoagulation for his PE and DVT for life once cleared by Neurosurgery  3   IVC filter in place  4   Can be followed as an outpatient in Pulmonary Clinic as well      Chief Complaint:   Asymptomatic from pulmonary standpoint  Discharge Plan: TBD         Thank you,  [facility] in the [employer] [facility] [employer] for 2017  Network Utilization Review Department  Phone: [phone]; Fax [phone]  ATTENTION: The Network Utilization Review Department is now centralized for our 7 Facilities  Make a note that we have a new phone and fax numbers for our Department  Please call with any questions or concerns to [phone] and carefully follow the prompts so that you are directed to the right person   All voicemails are confidential  Fax any determinations, approvals, denials, and requests for initial or continue stay review clinical to [phone]  Due to HIGH CALL volume, it would be easier if you could please send faxed requests to expedite your requests and in part, help us provide discharge notifications faster

## 2018-04-16 NOTE — DISCHARGE INSTRUCTIONS
Neurosurgical discharge instructions   Monitor incision daily  Keep incision clean and dry   May shower and wash hair 72 hours after surgery   Avoid rubbing the incision, but gently massage hair   Do not use a hair dryer, and avoid hair products such as mousse, oils, and gels  Do not brush your hair away from the incision since this will put strain on the suture line   Do not dye or perm hair until cleared by MD Anni Weiss Sutures/[place] will be removed 10 days after surgery   Please remove dressing within 1-2 days after surgery   May walk as tolerated- a few short walks daily   Avoid heavy lifting, pushing or pulling over 10lbs for several weeks   No bending  No running  No athletic activities during this period   Do not drive until cleared by MD/PA   Coumadin, ASA, Plavix may be restarted once cleared by MD Anni Weiss Recovery takes time  Please allow yourself time to heal    Follow-up as scheduled for a 2 week post-operative visit for an incision check and final pathology  ** Please notify the office if incision becomes red, swollen, tender, or has increased drainage, and temp>101  Return to the ER if you experience increased headache, drowsiness, weakness, nausea/vomiting, or seizures  **     Inferior Vena Cava Filter Placement     WHAT YOU NEED TO KNOW:   Inferior vena cava filter placement is surgery to place a filter into your inferior vena cava (IVC)  The IVC is a large blood vessel that brings blood from your lower body back to your heart  The filter is a small mesh strainer made of thin wires  It is placed in the center of the IVC to trap blood clots going to your heart or lungs  Filter types: Permanent Filters are used for patients who can't have anticoagulation medications  The filters are left in place permanently  Optional filters: Are filters that can be removed when a patient's risk for clotting is decreased      DISCHARGE INSTRUCTIONS:     Wound care: Keep your wound clean and dry Band aid may come off in 24 hours  Self-care:   · Limit activity: Do not lift, pull or push heavy objects for 24 hours  Slowly start to do more each day  Return to your daily activities as directed  · Resume your normal diet  Small sips of flat soda will help with nausea  Contact Interventional Radiology at [phone] Nirmal PATIENTS: Contact Interventional Radiology at [phone]) Sonia Moreno PATIENTS: Contact Interventional Radiology at [phone]) if:  · You have a fever  · You have chills, a cough, or feel weak and achy  · Persistent nausea or vomiting  · Your wound is red, swollen, or draining pus  · You have questions or concerns about your condition  · Optional filters can and should  be removed when you no longer need it    · Please call Interventional Radiology to make your removal appointment

## 2018-04-16 NOTE — DISCHARGE SUMMARY
Discharge- Wilbert Arandal 1955, 63 y o  male MRN: [id number]    Unit/Bed#: [facility] 917-01 Encounter: [id number]    Primary Care Provider: Matthew Ribera MD   Date and time admitted to hospital: 4/6/2018  5:10 PM        Cerebral edema ([facility] [address] )   Assessment & Plan    Continue decadron  F/u NS        PE (pulmonary thromboembolism) ([facility] [address] )   Assessment & Plan    Start anticoagulation in 2 weeks for lifelong anticoagulation  IVC filter  Appreciate pulmonary recs        Deep vein thrombosis (DVT) of distal vein of left lower extremity (HCC)   Assessment & Plan    Positive subacute DVT in LLE  IVC filter  Lifelong AC post brain biopsy        Leukocytosis   Assessment & Plan    -steroid therapy  -WBC 17 60  - remains afebrile        Bipolar disorder   Assessment & Plan    - continue Abilify/Wellbutrin regimen  -mood stable        Insulin-dependent diabetes mellitus   Assessment & Plan    - HgA1c of 8 0- poorly controlled    - optimize SSI coverage , Lantus 15U QHS           Hypertension   Assessment & Plan    - low-sodium diet encouraged  -BP stable  - continue Zestril        Alcohol abuse   Assessment & Plan    - counseled on cessation although unlikely to comply - per patient, last drink was on 4/5  - monitor for signs/symptoms of withdrawal - PRN IV Ativan   - started on folic acid/thiamine supplementation   -no signs of Alcohol withdrawal        Dependence on nicotine from cigarettes   Assessment & Plan    - still smokes despite metastatic lung cancer diagnosis  - counseled on cessation - transdermal nicotine patch   Continue Wellbutrin        Lung cancer    Assessment & Plan    - per patient, lung and his metastatic to lymph nodes and pelvis  -stage 4  - PRN pain control/supportive care   -patient confirms he is interested in having brain mass resected, as well as chemo for further tx            * 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    Assessment & Plan    - neurosurgery  - MRI revealed a 2 4 cm mass of the left parietal vertex  Status post resection 4/13   CT 04/14/2018:  Postsurgical changes of craniotomy and resection of left posterior parietal mass with scattered foci of air and hemorrhage at the resection site and unchanged vasogenic edema   Pneumocephalus also noted along the left frontal lobe and adjacent to craniotomy site    - PRN pain control and supportive care otherwise  -has underlying NSCLC, s/p chemo last Feb 2018  -pulmonary embolism confirmed on CTA   Follow-up Neurosurgery in 2 weeks ly  -IVC filter   -f/u pulmonary med  -lifelong AC needed in 2 weeks to be started  Vital signs monitoring  A m  labs CBC, CMP              Discharging Physician / Practitioner: Vitaliy Balderas MD  PCP: Kayleen Soto MD  Admission Date:   Admission Orders     Ordered        04/06/18 1903  Inpatient Admission  Once             Discharge Date: 04/16/18    Resolved Problems  Date Reviewed: 4/16/2018          Resolved    Hyperkalemia 4/10/2018     Resolved by  Vitaliy Balderas MD          Consultations During Hospital Stay:  · Pulmonology  · Oncology  · Neurosurgery    Procedures Performed:     · Craniotomy  · IVC filter    Significant Findings / Test Results:     · DVT in left lower extremity  · Status post left parietal partial craniotomy mass resection, found on MRI 04/07/2018 with solitary irregularly enhancing 2 4 cm left parietal mass with associated vasogenic edema  Repeat CT scan showed expected postoperative changes  Incidental Findings:   · None     Test Results Pending at Discharge (will require follow up): None   Outpatient Tests Requested:  · None    Complications:  None  Reason for Admission:  Transferred for neurosurgery evaluation right-sided numbness  Hospital Course:     Whit Manning is a 63 y o  male patient who originally presented to the hospital on 4/6/2018 due to right-sided numbness started on the morning of admission    Patient has a history of hypertension, diabetes, tobacco use/alcohol abuse/metastatic lung cancer  Patient originally presented to [facility] where the left parietal mass with vasogenic edema was found and patient was transferred to [facility] for neurosurgical evaluation  Patient was diagnosed with lung cancer December 2017, it was found to be stage IV and had metastasized lymph nodes and pelvis  Patient was started on immunotherapy and follows outpatient with Dr Janel Leon  Patient was seen evaluated the Neurosurgery, he had a craniotomy 04/13/2018, received ICU care, and transported to the medical floor where he was continued on Decadron and Keppra  Patient was seen by pulmonology who recommended life block and evaluation as well as IVC filter for the left lower extremity DVT that was positive  Patient was recommended to follow up with Neurosurgery in 2 weeks, at this time to start anticoagulation for PE prophylaxis/treatment  Patient was evaluated by Physical therapy, refuse inpatient physical therapy rehab  Please see above list of diagnoses and related plan for additional information  Condition at Discharge: good     Discharge Day Visit / Exam:     Subjective:  No complaints today review of systems negative otherwise  Vitals: Blood Pressure: 164/84 (04/16/18 0731)  Pulse: 92 (04/16/18 0731)  Temperature: 97 5 °F (36 4 °C) (04/16/18 0731)  Temp Source: Oral (04/16/18 0731)  Respirations: 18 (04/16/18 0731)  Height: 5' 9" (175 3 cm) (04/14/18 2324)  Weight - Scale: 86 7 kg (191 lb 2 2 oz) (04/16/18 0600)  SpO2: 98 % (04/16/18 0731)  Exam:   Physical Exam   Constitutional: He is oriented to person, place, and time  No distress  HENT:   Head: Normocephalic and atraumatic  Status post craniotomy yes   Eyes: EOM are normal  Pupils are equal, round, and reactive to light  Neck: Normal range of motion  No JVD present  Cardiovascular: Normal rate  Exam reveals no gallop and no friction rub  No murmur heard    Pulmonary/Chest: Effort normal and breath sounds normal  No respiratory distress  He has no wheezes  He has no rales  Abdominal: Soft  Bowel sounds are normal  He exhibits no distension  There is no tenderness  There is no rebound and no guarding  Musculoskeletal: Normal range of motion  He exhibits no edema  Neurological: He is alert and oriented to person, place, and time  No cranial nerve deficit  Coordination normal    Skin: Skin is warm  No rash noted  No erythema  Psychiatric: He has a normal mood and affect  His behavior is normal  Judgment and thought content normal        Discussion with Family: yes    Discharge instructions/Information to patient and family:   See after visit summary for information provided to patient and family  Provisions for Follow-Up Care:  See after visit summary for information related to follow-up care and any pertinent home health orders  Disposition:     Home with VNA Services (Reminder: Complete face to face encounter)    For Discharges to [facility] SNF:   · Not Applicable to this Patient - Not Applicable to this Patient    Planned Readmission: yes     Discharge Statement:  I spent 35 minutes discharging the patient  This time was spent on the day of discharge  I had direct contact with the patient on the day of discharge  Greater than 50% of the total time was spent examining patient, answering all patient questions, arranging and discussing plan of care with patient as well as directly providing post-discharge instructions  Additional time then spent on discharge activities  Discharge Medications:  See after visit summary for reconciled discharge medications provided to patient and family        ** Please Note: This note has been constructed using a voice recognition system **

## 2018-04-16 NOTE — PLAN OF CARE
DISCHARGE PLANNING     Discharge to home or other facility with appropriate resources Adequate for Discharge        DISCHARGE PLANNING - CARE MANAGEMENT     Discharge to post-acute care or home with appropriate resources Adequate for Discharge        INFECTION - ADULT     Absence or prevention of progression during hospitalization Adequate for Discharge        Knowledge Deficit     Patient/family/caregiver demonstrates understanding of disease process, treatment plan, medications, and discharge instructions Adequate for Discharge        Nutrition/Hydration-ADULT     Nutrient/Hydration intake appropriate for improving, restoring or maintaining nutritional needs Adequate for Discharge        PAIN - ADULT     Verbalizes/displays adequate comfort level or baseline comfort level Adequate for Discharge        Potential for Falls     Patient will remain free of falls Adequate for Discharge        Prexisting or High Potential for Compromised Skin Integrity     Skin integrity is maintained or improved Adequate for Discharge        SAFETY ADULT     Patient will remain free of falls Adequate for Discharge     Maintain or return to baseline ADL function Adequate for Discharge     Maintain or return mobility status to optimal level Adequate for Discharge

## 2018-04-16 NOTE — PHYSICAL THERAPY NOTE
Physical Therapy Progress Note     04/16/18 1210   Pain Assessment   Pain Assessment 0-10   Pain Score 8   Pain Type Acute pain   Pain Location Head   Pain Orientation Bilateral   Hospital Pain Intervention(s) Repositioned; Ambulation/increased activity; Emotional support   Response to Interventions Tolerated  Restrictions/Precautions   Other Precautions Fall Risk;Pain   Subjective   Subjective The pt  states that he does not like the walker, and that he will not use it when he returns home  He also notes that he has no support at home  Bed Mobility   Supine to Sit 5  Supervision   Additional items Increased time required   Transfers   Sit to Stand 4  Minimal assistance   Additional items Assist x 1; Increased time required   Stand to Sit 4  Minimal assistance   Additional items Increased time required;Verbal cues   Ambulation/Elevation   Gait pattern Excessively slow; Inconsistent tegan;Decreased foot clearance; Wide ROMINA   Gait Assistance 4  Minimal assist   Additional items Assist x 1;Verbal cues; Tactile cues   Assistive Device Rolling walker   Distance 160 feet with 5 standing rests  42 minutes to ambulate this distance  Stair Management Assistance 5  Supervision   Additional items Verbal cues; Tactile cues; Increased time required   Stair Management Technique Foreward;Reciprocal;One rail L;Alternating pattern   Number of Stairs 14   Balance   Static Sitting Fair +   Static Standing Fair -   Ambulatory Poor +   Activity Tolerance   Activity Tolerance Patient tolerated treatment well   Nurse Miley Waterman, RN  Equipment Use   Comments 10 meter walk test: 47 seconds  0 21 m/s  Assessment   Prognosis Good   Problem List Decreased strength;Decreased endurance; Impaired balance;Decreased mobility; Decreased coordination;Decreased safety awareness;Decreased skin integrity;Orthopedic restrictions   Assessment The pt  was able to progress his ambulatory distance today, but he continues to demonstrate decreased safety awareness  He was only agreeable to utilizing the rolling walker after education, and he continues to express that he will not use it at home as he does not like it  He also demonstrates significant limitations with his mobility as he has an excessively slow gait with 47 seconds to ambulate 10 meters which correllates to 0 21 meter / second gait speed  Typical household ambulators have been shown to have gait speed of  4 m/s, and slower ambulators have been shown to have increased risk for falls  As the pt  lives home alone this places him at a very high risk for future falls  He was able to complete the stairs, but he again requires extended time in order to complete  With the pt's continued physical deficits in balance, strength, and activity tolerance coupled with his decreased awareness into his deficits, inpatient rehab is recommended to maximize the pt's mobility, safety, and to reduce his current significant risk for falls  Barriers to Discharge Inaccessible home environment;Decreased caregiver support   Barriers to Discharge Comments Lives alone  Goals   Patient Goals To go home  STG Expiration Date 04/25/18   Treatment Day 1   Plan   Treatment/Interventions Functional transfer training;LE strengthening/ROM; Elevations;Cognitive reorientation; Endurance training; Therapeutic exercise;Patient/family training;Bed mobility;Gait training   Progress Progressing toward goals   PT Frequency 5x/wk   Recommendation   Recommendation Post acute IP rehab   Equipment Recommended Vu Rudolph PTA

## 2018-04-16 NOTE — OCCUPATIONAL THERAPY NOTE
Occupational Therapy Treatment Note:       04/16/18 1505   Pain Assessment   Pain Assessment FLACC   Pain Rating: FLACC (Rest) - Face 0   Pain Rating: FLACC (Rest) - Legs 0   Pain Rating: FLACC (Rest) - Activity 0   Pain Rating: FLACC (Rest) - Cry 0   Pain Rating: FLACC (Rest) - Consolability 0   Score: FLACC (Rest) 0   Pain Rating: FLACC (Activity) - Face 0   Pain Rating: FLACC (Activity) - Legs 0   Pain Rating: FLACC (Activity) - Activity 0   Pain Rating: FLACC (Activity) - Cry 0   Pain Rating: FLACC (Activity) - Consolability 0   Score: FLACC (Activity) 0   ADL   Toileting Comments pt reports he dressed himself in  clothing seated this session  pt was wearing clothing from home  Cognition   Overall Cognitive Status Impaired   Cognition Assessment Tools ACLS   Score 4 4   Assessment   Assessment pt participated in ongoing cogntiive assessment using lacls  pt reports he is legally blind and see's things in yellow  pt reports he was able to see the lacls project  pt was able to perform first two stitches without difficulty  pt was noted to quit task  after ot's first demonstration  pt is refusing dme and r w  at this time   Plan   Treatment Interventions ADL retraining;Cognitive reorientation   Goal Expiration Date 04/29/18   OT Frequency 3-5x/wk   Recommendation   OT Discharge Recommendation Short Term Rehab   4 4    Administered [facility] Cognitive Level Screen (ACLS)  Pt scored 4 4/6 0 indicating it is recommended that the person live with someone who does a daily check on the environment to remove any safety hazards and solve problems when minor changes in the home occur  The person may be alone for part of the day with a pre-established procedure for getting help from a neighbor  Behavior:  Knows day/date  Follows routine sequence of activities  Will learn schedule and follow daily routine  Hazards are not anticipated  Memorization is slow    Will need long term repetitive training for all new tasks   May become upset if routine is altered  May seek assist if lost   Do not expect to be aware of needs of others  May need reminders to keep appointments  Grooming:  Simms, brushes and styles hair  Applies makeup  May insist on familiar products  Finds supplies in familiar locations  May be unable to master use of new tools  Hazards are not anticipated  Dressing: Finds garments in familiar locations  Initiates dressing at usual times  Selects familiar combinations of outfits and may wear same outfit over and over  Resists new combinations  May fail to consider weather conditions, season or occasion when selecting clothing  May argue with suggested corrections of errors  Bathing:  Initiates bath/shower at customary times and follows typical routine  May collect supplies from a familiar location  May not vary rate  May want to use same products  May use excessive amounts of product  May have difficulty opening small or unusual containers  May leave towels on floor  Protect from unseen hazards (wet floors, electrical appliances near water)  Walking/exercising:  Ambulates within fitness capacity in neighborhood  Chooses to go by familiar routes  May fail to attend to signs or activities outside visual field  Needs new route identified by others and may learn after several weeks of practice  May become anxious in high stimulus environments (malls, airports, casinos)  Eating: May notice and clean highly visible dropped food items  May not be able to eat and converse at the same time  May resist changes in diet and menu  Watch handling of hot foods/liquids and heavy items  Toileting: Follows a routine of toileting in a familiar environment  Needs to have public rest rooms pointed out  May use too much paper  Does not consider needs of others  May spend excessive time in rest room  Medications:   May follow routine rigidly and resist changes in prescriptions based on erroneous beliefs of effects  Does not note adverse side effects  Does not anticipate need to renew prescriptions  May be able to open child proof containers  Can use DAILY pill box marked with day/time (filled by another person)  Use of adaptive equipment:  May be trained to use adaptive equipment that requires a sequence of familiar actions  Does not anticipate hazards  Once learned, may resist changes in equipment  May abandon use of assistive device or use in unsafe manner  Housekeeping:  Sweeps, polishes and puts away objects to match previous performance  Fails to see dirt or dust in corners  May use excessive amounts of , polish or water  May resist changes in routine or products used  May fail to differentiate between similar cleaning products  Food preparation:  May follow a fixed diet and go hungry if usual food items are not available  Does not check inventory and may run out of essentials frequently  Does not consider nutritional needs  Goes to familiar restaurants of grocery stores  Is able to prepare simple hot/cold dishes  Spending money:  Manages routine purchases for immediate needs  May count cash very slowly  May use credit cards or checks  May need assistance for making monthly budget  May not remember to account for taxes or tips  Can manage a daily allowance  Shopping:  Goes to familiar stores for routine items  Does not compare product prices or quality  May not consider cost of item in relation to overall budget  May overspend  Laundry:  Initiates and completes laundry routine at usual time  May sort items by color  May follow schedule rigidly  May forget to clean lint traps  May forget to turn iron off  Traveling:  Needs new routes and travel procedures identified by others  May express interest in driving a car but fails to attend to all environmental cues to do so safely  May not allow adequate time for travel    Telephone:  Caro Luna down messages and new numbers slowly  May attempt to use an address book  May make calls at odd hours without consideration of others schedule or cost of call  May run up large telephone bills  Driving: Should NOT operate a motor vehicle      April SREE Ortiz

## 2018-04-16 NOTE — PLAN OF CARE
Problem: PHYSICAL THERAPY ADULT  Goal: Performs mobility at highest level of function for planned discharge setting  See evaluation for individualized goals  Treatment/Interventions: Functional transfer training, LE strengthening/ROM, Elevations, Therapeutic exercise, Endurance training, Patient/family training, Equipment eval/education, Bed mobility, Gait training, Spoke to nursing, OT  Equipment Recommended: Nabor Shah       See flowsheet documentation for full assessment, interventions and recommendations  Outcome: Progressing  Prognosis: Good  Problem List: Decreased strength, Decreased endurance, Impaired balance, Decreased mobility, Decreased coordination, Decreased safety awareness, Decreased skin integrity, Orthopedic restrictions  Assessment: The pt  was able to progress his ambulatory distance today, but he continues to demonstrate decreased safety awareness  He was only agreeable to utilizing the rolling walker after education, and he continues to express that he will not use it at home as he does not like it  He also demonstrates significant limitations with his mobility as he has an excessively slow gait with 47 seconds to ambulate 10 meters which correllates to 0 21 meter / second gait speed  Typical household ambulators have been shown to have gait speed of  4 m/s, and slower ambulators have been shown to have increased risk for falls  As the pt  lives home alone this places him at a very high risk for future falls  He was able to complete the stairs, but he again requires extended time in order to complete  With the pt's continued physical deficits in balance, strength, and activity tolerance coupled with his decreased awareness into his deficits, inpatient rehab is recommended to maximize the pt's mobility, safety, and to reduce his current significant risk for falls    Barriers to Discharge: Inaccessible home environment, Decreased caregiver support  Barriers to Discharge Comments: Lives alone   Recommendation: Post acute IP rehab          See flowsheet documentation for full assessment

## 2018-04-24 LAB
ABSOL LYMPHOCYTES (HISTORICAL): 1.5 K/UL (ref 0.5–4)
ALBUMIN SERPL BCP-MCNC: 2.9 G/DL (ref 3–5.2)
ALP SERPL-CCNC: 55 U/L (ref 43–122)
ALT SERPL W P-5'-P-CCNC: 28 U/L (ref 9–52)
ANION GAP SERPL CALCULATED.3IONS-SCNC: 8 MMOL/L (ref 5–14)
AST SERPL W P-5'-P-CCNC: 10 U/L (ref 17–59)
BANDS (HISTORICAL): 1 % (ref 3–11)
BILIRUB SERPL-MCNC: <0.1 MG/DL
BUN SERPL-MCNC: 30 MG/DL (ref 5–25)
CALCIUM SERPL-MCNC: 8.3 MG/DL (ref 8.4–10.2)
CALLED AND READ BACK BY. (HISTORICAL): NORMAL
CHLORIDE SERPL-SCNC: 106 MEQ/L (ref 97–108)
CO2 SERPL-SCNC: 23 MMOL/L (ref 22–30)
COMMENT (HISTORICAL): ABNORMAL
CREATINE, SERUM (HISTORICAL): 0.76 MG/DL (ref 0.7–1.5)
DEPRECATED RDW RBC AUTO: 16.3 %
EGFR (HISTORICAL): >60 ML/MIN/1.73 M2
GLUCOSE SERPL-MCNC: 246 MG/DL (ref 70–99)
HCT VFR BLD AUTO: 31.1 % (ref 41–53)
HGB BLD-MCNC: 10.2 G/DL (ref 13.5–17.5)
LYMPHOCYTES NFR BLD AUTO: 7 % (ref 25–45)
MCH RBC QN AUTO: 28.9 PG (ref 26–34)
MCHC RBC AUTO-ENTMCNC: 32.9 % (ref 31–36)
MCV RBC AUTO: 88 FL (ref 80–100)
MONOCYTES # BLD AUTO: 0.2 K/UL (ref 0.2–0.9)
MONOCYTES NFR BLD AUTO: 1 % (ref 1–10)
NEUTROPHILS ABS COUNT (HISTORICAL): 19.6 K/UL (ref 1.8–7.8)
NEUTS SEG NFR BLD AUTO: 91 % (ref 45–65)
PLATELET # BLD AUTO: 209 K/MCL (ref 150–450)
POTASSIUM SERPL-SCNC: 3.8 MEQ/L (ref 3.6–5)
RBC # BLD AUTO: 3.54 M/MCL (ref 4.5–5.9)
RBC MORPHOLOGY (HISTORICAL): ABNORMAL
SODIUM SERPL-SCNC: 138 MEQ/L (ref 137–147)
TOTAL PROTEIN (HISTORICAL): 5.4 G/DL (ref 5.9–8.4)
WBC # BLD AUTO: 21.3 K/MCL (ref 4.5–11)

## 2018-04-26 ENCOUNTER — TELEPHONE (OUTPATIENT)
Dept: NEUROSURGERY | Facility: CLINIC | Age: 63
End: 2018-04-26

## 2018-04-28 LAB
ABSOL LYMPHOCYTES (HISTORICAL): 1.2 K/UL (ref 0.5–4)
ABSOL LYMPHOCYTES (HISTORICAL): 2.1 K/UL (ref 0.5–4)
ALBUMIN SERPL BCP-MCNC: 2.5 G/DL (ref 3–5.2)
ALBUMIN SERPL BCP-MCNC: 2.6 G/DL (ref 3–5.2)
ALP SERPL-CCNC: 50 U/L (ref 43–122)
ALP SERPL-CCNC: 52 U/L (ref 43–122)
ALT SERPL W P-5'-P-CCNC: 28 U/L (ref 9–52)
ALT SERPL W P-5'-P-CCNC: 29 U/L (ref 9–52)
AMORPHOUS MATERIAL (HISTORICAL): ABNORMAL
AMPHETAMINE URINE (HISTORICAL): NEGATIVE
ANION GAP SERPL CALCULATED.3IONS-SCNC: 4 MMOL/L (ref 5–14)
ANION GAP SERPL CALCULATED.3IONS-SCNC: 8 MMOL/L (ref 5–14)
APTT PPP: 21 SEC (ref 23–31)
APTT PPP: 25 SEC (ref 23–31)
AST SERPL W P-5'-P-CCNC: 11 U/L (ref 17–59)
AST SERPL W P-5'-P-CCNC: 14 U/L (ref 17–59)
B-NP NT PRO (HISTORICAL): 1200 PG/ML
B-NP NT PRO (HISTORICAL): 1370 PG/ML
BACTERIA UR QL AUTO: ABNORMAL
BANDS (HISTORICAL): 6 % (ref 3–11)
BARBITURATE URINE (HISTORICAL): NEGATIVE
BASOPHILS # BLD AUTO: 0 % (ref 0–1)
BASOPHILS # BLD AUTO: 0 K/UL (ref 0–0.1)
BENZODIAZEPINE URINE (HISTORICAL): NEGATIVE
BILIRUB SERPL-MCNC: 0.3 MG/DL
BILIRUB SERPL-MCNC: 0.5 MG/DL
BILIRUB UR QL STRIP: NEGATIVE MG/DL
BUN SERPL-MCNC: 21 MG/DL (ref 5–25)
BUN SERPL-MCNC: 22 MG/DL (ref 5–25)
CALCIUM SERPL-MCNC: 8.1 MG/DL (ref 8.4–10.2)
CALCIUM SERPL-MCNC: 8.2 MG/DL (ref 8.4–10.2)
CASTS/CASTS TYPE (HISTORICAL): ABNORMAL /LPF
CHLORIDE SERPL-SCNC: 100 MEQ/L (ref 97–108)
CHLORIDE SERPL-SCNC: 98 MEQ/L (ref 97–108)
CK SERPL-CCNC: 39 U/L (ref 55–170)
CK SERPL-CCNC: 78 U/L (ref 55–170)
CLARITY UR: CLEAR
CO2 SERPL-SCNC: 24 MMOL/L (ref 22–30)
CO2 SERPL-SCNC: 30 MMOL/L (ref 22–30)
COCAINE (METAB.), URINE (HISTORICAL): NEGATIVE
COLOR UR: ABNORMAL
COMMENT (HISTORICAL): ABNORMAL
CREATINE KINASE-MB FRACTION (HISTORICAL): 1.12 NG/ML (ref 0–2.37)
CREATINE KINASE-MB FRACTION (HISTORICAL): 1.39 NG/ML (ref 0–2.37)
CREATINE, SERUM (HISTORICAL): 0.73 MG/DL (ref 0.7–1.5)
CREATINE, SERUM (HISTORICAL): 0.75 MG/DL (ref 0.7–1.5)
CRYSTAL TYPE (HISTORICAL): ABNORMAL /HPF
DEPRECATED RDW RBC AUTO: 16.8 %
DEPRECATED RDW RBC AUTO: 16.9 %
DRUG COMMENT (HISTORICAL): NORMAL
EGFR (HISTORICAL): >60 ML/MIN/1.73 M2
EGFR (HISTORICAL): >60 ML/MIN/1.73 M2
EOSINOPHIL # BLD AUTO: 0.3 K/UL (ref 0–0.4)
EOSINOPHIL # BLD AUTO: 0.3 K/UL (ref 0–0.4)
EOSINOPHIL NFR BLD AUTO: 2 % (ref 0–6)
EOSINOPHIL NFR BLD AUTO: 2 % (ref 0–6)
GLUCOSE FASTING (HISTORICAL): 206 MG/DL (ref 70–99)
GLUCOSE SERPL-MCNC: 216 MG/DL (ref 70–99)
GLUCOSE SERPL-MCNC: 230 MG/DL (ref 70–99)
GLUCOSE SERPL-MCNC: 254 MG/DL (ref 70–99)
GLUCOSE SERPL-MCNC: 307 MG/DL (ref 70–99)
GLUCOSE SERPL-MCNC: 326 MG/DL (ref 70–99)
GLUCOSE UR STRIP-MCNC: NEGATIVE MG/DL
HCT VFR BLD AUTO: 29 % (ref 41–53)
HCT VFR BLD AUTO: 29.5 % (ref 41–53)
HGB BLD-MCNC: 9.5 G/DL (ref 13.5–17.5)
HGB BLD-MCNC: 9.5 G/DL (ref 13.5–17.5)
HGB UR QL STRIP.AUTO: NEGATIVE
KETONES UR STRIP-MCNC: NEGATIVE MG/DL
LEUKOCYTE ESTERASE UR QL STRIP: NEGATIVE
LIPASE SERPL-CCNC: 152 U/L (ref 23–300)
LYMPHOCYTES NFR BLD AUTO: 13 % (ref 25–45)
LYMPHOCYTES NFR BLD AUTO: 9 % (ref 25–45)
MAGNESIUM SERPL-MCNC: 1.1 MG/DL (ref 1.6–2.3)
MAGNESIUM SERPL-MCNC: 1.7 MG/DL (ref 1.6–2.3)
MCH RBC QN AUTO: 28.6 PG (ref 26–34)
MCH RBC QN AUTO: 29.1 PG (ref 26–34)
MCHC RBC AUTO-ENTMCNC: 32.3 % (ref 31–36)
MCHC RBC AUTO-ENTMCNC: 32.8 % (ref 31–36)
MCV RBC AUTO: 89 FL (ref 80–100)
MCV RBC AUTO: 89 FL (ref 80–100)
MDMA (GC/MS) (HISTORICAL): NEGATIVE
METHADONE URINE (HISTORICAL): NEGATIVE
METHAMPHETAMINE URINE (HISTORICAL): NEGATIVE
METHYL ALCOHOL (HISTORICAL): NEGATIVE MG/DL
MONOCYTES # BLD AUTO: 0.4 K/UL (ref 0.2–0.9)
MONOCYTES # BLD AUTO: 0.5 K/UL (ref 0.2–0.9)
MONOCYTES NFR BLD AUTO: 3 % (ref 1–10)
MONOCYTES NFR BLD AUTO: 3 % (ref 1–10)
MUCOUS THREADS URNS QL MICRO: ABNORMAL
NEUTROPHILS ABS COUNT (HISTORICAL): 12.5 K/UL (ref 1.8–7.8)
NEUTROPHILS ABS COUNT (HISTORICAL): 13.6 K/UL (ref 1.8–7.8)
NEUTS SEG NFR BLD AUTO: 76 % (ref 45–65)
NEUTS SEG NFR BLD AUTO: 86 % (ref 45–65)
NITRITE UR QL STRIP: NEGATIVE
NON-SQ EPI CELLS URNS QL MICRO: ABNORMAL
OPIATES (HISTORICAL): NEGATIVE
OTHER STN SPEC: ABNORMAL
OXYCODONE (HISTORICAL): NEGATIVE
PH UR STRIP.AUTO: 7 [PH] (ref 4.5–8)
PHENCYCLIDINE URINE (HISTORICAL): NEGATIVE
PLATELET # BLD AUTO: 140 K/MCL (ref 150–450)
PLATELET # BLD AUTO: 142 K/MCL (ref 150–450)
POTASSIUM SERPL-SCNC: 4.2 MEQ/L (ref 3.6–5)
POTASSIUM SERPL-SCNC: 4.3 MEQ/L (ref 3.6–5)
PROT UR STRIP-MCNC: 15 MG/DL
PT - I.N. RATIO (HISTORICAL): 1 RATIO(INR)
PT - I.N. RATIO (HISTORICAL): 1 RATIO(INR)
PT, PATIENT (HISTORICAL): 10.1 SEC (ref 9.2–11.1)
PT, PATIENT (HISTORICAL): 10.6 SEC (ref 9.2–11.1)
RBC # BLD AUTO: 3.26 M/MCL (ref 4.5–5.9)
RBC # BLD AUTO: 3.33 M/MCL (ref 4.5–5.9)
RBC #/AREA URNS AUTO: ABNORMAL /HPF
RBC MORPHOLOGY (HISTORICAL): ABNORMAL
SODIUM SERPL-SCNC: 131 MEQ/L (ref 137–147)
SODIUM SERPL-SCNC: 132 MEQ/L (ref 137–147)
SP GR UR STRIP.AUTO: 1.01 (ref 1–1.04)
THC URINE (HISTORICAL): NEGATIVE
TOTAL PROTEIN (HISTORICAL): 4.8 G/DL (ref 5.9–8.4)
TOTAL PROTEIN (HISTORICAL): 4.9 G/DL (ref 5.9–8.4)
TRICYCLICS URINE (HISTORICAL): NEGATIVE
TROPONIN I SERPL-MCNC: 0.05 NG/ML (ref 0–0.03)
TROPONIN I SERPL-MCNC: 0.07 NG/ML (ref 0–0.03)
TROPONIN I SERPL-MCNC: 0.09 NG/ML (ref 0–0.03)
TSH SERPL DL<=0.05 MIU/L-ACNC: 1.37 UIU/ML (ref 0.47–4.68)
UROBILINOGEN UR QL STRIP.AUTO: NEGATIVE MG/DL (ref 0–1)
WBC # BLD AUTO: 14.4 K/MCL (ref 4.5–11)
WBC # BLD AUTO: 16.5 K/MCL (ref 4.5–11)
WBC #/AREA URNS AUTO: ABNORMAL /HPF

## 2018-04-29 LAB
ABSOL LYMPHOCYTES (HISTORICAL): 0.2 K/UL (ref 0.5–4)
ANION GAP SERPL CALCULATED.3IONS-SCNC: 8 MMOL/L (ref 5–14)
BANDS (HISTORICAL): 6 % (ref 3–11)
BUN SERPL-MCNC: 22 MG/DL (ref 5–25)
CALCIUM SERPL-MCNC: 8.5 MG/DL (ref 8.4–10.2)
CHLORIDE SERPL-SCNC: 94 MEQ/L (ref 97–108)
CO2 SERPL-SCNC: 30 MMOL/L (ref 22–30)
COMMENT (HISTORICAL): ABNORMAL
CREATINE, SERUM (HISTORICAL): 0.73 MG/DL (ref 0.7–1.5)
DEPRECATED RDW RBC AUTO: 16.5 %
EGFR (HISTORICAL): >60 ML/MIN/1.73 M2
EOSINOPHIL # BLD AUTO: 0.1 K/UL (ref 0–0.4)
EOSINOPHIL NFR BLD AUTO: 1 % (ref 0–6)
FERRITIN SERPL-MCNC: 533 NG/ML (ref 18–464)
FOLATE SERPL-MCNC: >20 NG/ML
GLUCOSE FASTING (HISTORICAL): 217 MG/DL (ref 70–99)
GLUCOSE SERPL-MCNC: 124 MG/DL (ref 70–99)
GLUCOSE SERPL-MCNC: 244 MG/DL (ref 70–99)
GLUCOSE SERPL-MCNC: 272 MG/DL (ref 70–99)
GLUCOSE SERPL-MCNC: 358 MG/DL (ref 70–99)
GLUCOSE SERPL-MCNC: 95 MG/DL (ref 70–99)
HCT VFR BLD AUTO: 31.2 % (ref 41–53)
HGB BLD-MCNC: 10.2 G/DL (ref 13.5–17.5)
IRON SERPL-MCNC: 36 UG/DL (ref 49–181)
LYMPHOCYTES NFR BLD AUTO: 2 % (ref 25–45)
MAGNESIUM SERPL-MCNC: 1.5 MG/DL (ref 1.6–2.3)
MCH RBC QN AUTO: 29 PG (ref 26–34)
MCHC RBC AUTO-ENTMCNC: 32.9 % (ref 31–36)
MCV RBC AUTO: 88 FL (ref 80–100)
MONOCYTES # BLD AUTO: 0.1 K/UL (ref 0.2–0.9)
MONOCYTES NFR BLD AUTO: 1 % (ref 1–10)
NEUTROPHILS ABS COUNT (HISTORICAL): 12 K/UL (ref 1.8–7.8)
NEUTS SEG NFR BLD AUTO: 90 % (ref 45–65)
PERCENT SATURATION (HISTORICAL): 19 % (ref 11–46)
PLATELET # BLD AUTO: 133 K/MCL (ref 150–450)
POTASSIUM SERPL-SCNC: 4.1 MEQ/L (ref 3.6–5)
PT - I.N. RATIO (HISTORICAL): 1 RATIO(INR)
PT, PATIENT (HISTORICAL): 10.1 SEC (ref 9.2–11.1)
RBC # BLD AUTO: 3.53 M/MCL (ref 4.5–5.9)
RBC MORPHOLOGY (HISTORICAL): ABNORMAL
SODIUM SERPL-SCNC: 132 MEQ/L (ref 137–147)
TIBC SERPL-MCNC: 190 UG/DL (ref 261–462)
VIT B12 SERPL-MCNC: 348 PG/ML (ref 239–931)
WBC # BLD AUTO: 12.4 K/MCL (ref 4.5–11)

## 2018-04-30 LAB
ABSOL LYMPHOCYTES (HISTORICAL): 1.2 K/UL (ref 0.5–4)
ANION GAP SERPL CALCULATED.3IONS-SCNC: 7 MMOL/L (ref 5–14)
BASOPHILS # BLD AUTO: 0 % (ref 0–1)
BASOPHILS # BLD AUTO: 0 K/UL (ref 0–0.1)
BUN SERPL-MCNC: 26 MG/DL (ref 5–25)
CALCIUM SERPL-MCNC: 9.4 MG/DL (ref 8.4–10.2)
CALLED AND READ BACK BY. (HISTORICAL): NORMAL
CHLORIDE SERPL-SCNC: 94 MEQ/L (ref 97–108)
CO2 SERPL-SCNC: 34 MMOL/L (ref 22–30)
CREATINE, SERUM (HISTORICAL): 0.73 MG/DL (ref 0.7–1.5)
DEPRECATED RDW RBC AUTO: 16.5 %
EGFR (HISTORICAL): >60 ML/MIN/1.73 M2
EOSINOPHIL # BLD AUTO: 0 K/UL (ref 0–0.4)
EOSINOPHIL NFR BLD AUTO: 0 % (ref 0–6)
GLUCOSE FASTING (HISTORICAL): 49 MG/DL (ref 70–99)
GLUCOSE SERPL-MCNC: 181 MG/DL (ref 70–99)
GLUCOSE SERPL-MCNC: 227 MG/DL (ref 70–99)
GLUCOSE SERPL-MCNC: 247 MG/DL (ref 70–99)
GLUCOSE SERPL-MCNC: 58 MG/DL (ref 70–99)
GLUCOSE SERPL-MCNC: 75 MG/DL (ref 70–99)
HCT VFR BLD AUTO: 33.7 % (ref 41–53)
HGB BLD-MCNC: 11.1 G/DL (ref 13.5–17.5)
LYMPHOCYTES NFR BLD AUTO: 9 % (ref 25–45)
MAGNESIUM SERPL-MCNC: 2 MG/DL (ref 1.6–2.3)
MCH RBC QN AUTO: 29.2 PG (ref 26–34)
MCHC RBC AUTO-ENTMCNC: 33.1 % (ref 31–36)
MCV RBC AUTO: 88 FL (ref 80–100)
MONOCYTES # BLD AUTO: 0.5 K/UL (ref 0.2–0.9)
MONOCYTES NFR BLD AUTO: 4 % (ref 1–10)
NEUTROPHILS ABS COUNT (HISTORICAL): 12.1 K/UL (ref 1.8–7.8)
NEUTS SEG NFR BLD AUTO: 87 % (ref 45–65)
PLATELET # BLD AUTO: 173 K/MCL (ref 150–450)
POTASSIUM SERPL-SCNC: 4.2 MEQ/L (ref 3.6–5)
PT - I.N. RATIO (HISTORICAL): 1.2 RATIO(INR)
PT, PATIENT (HISTORICAL): 11.7 SEC (ref 9.2–11.1)
RBC # BLD AUTO: 3.81 M/MCL (ref 4.5–5.9)
SODIUM SERPL-SCNC: 135 MEQ/L (ref 137–147)
WBC # BLD AUTO: 13.9 K/MCL (ref 4.5–11)

## 2018-05-01 LAB
GLUCOSE SERPL-MCNC: 203 MG/DL (ref 70–99)
GLUCOSE SERPL-MCNC: 204 MG/DL (ref 70–99)
GLUCOSE SERPL-MCNC: 352 MG/DL (ref 70–99)
GLUCOSE SERPL-MCNC: 54 MG/DL (ref 70–99)
GLUCOSE SERPL-MCNC: 90 MG/DL (ref 70–99)

## 2018-05-02 PROBLEM — C79.31 BRAIN METASTASES (HCC): Status: ACTIVE | Noted: 2018-05-02

## 2018-05-02 LAB
GLUCOSE SERPL-MCNC: 109 MG/DL (ref 70–99)
GLUCOSE SERPL-MCNC: 304 MG/DL (ref 70–99)
GLUCOSE SERPL-MCNC: 335 MG/DL (ref 70–99)

## 2018-05-03 ENCOUNTER — HOSPITAL ENCOUNTER (INPATIENT)
Facility: HOSPITAL | Age: 63
LOS: 8 days | DRG: 515 | End: 2018-05-11
Attending: INTERNAL MEDICINE | Admitting: INTERNAL MEDICINE
Payer: COMMERCIAL

## 2018-05-03 ENCOUNTER — TELEPHONE (OUTPATIENT)
Dept: NEUROSURGERY | Facility: CLINIC | Age: 63
End: 2018-05-03

## 2018-05-03 DIAGNOSIS — G93.6 CEREBRAL EDEMA (HCC): ICD-10-CM

## 2018-05-03 DIAGNOSIS — C34.91 PRIMARY MALIGNANT NEOPLASM OF RIGHT LUNG METASTATIC TO OTHER SITE (HCC): Chronic | ICD-10-CM

## 2018-05-03 DIAGNOSIS — T84.7XXA: ICD-10-CM

## 2018-05-03 DIAGNOSIS — R56.9 SEIZURES (HCC): ICD-10-CM

## 2018-05-03 DIAGNOSIS — G06.0 BRAIN ABSCESS: Primary | ICD-10-CM

## 2018-05-03 DIAGNOSIS — G40.901 STATUS EPILEPTICUS (HCC): ICD-10-CM

## 2018-05-03 DIAGNOSIS — R93.0 ABNORMAL CT OF THE HEAD: ICD-10-CM

## 2018-05-03 DIAGNOSIS — G06.2 SUBDURAL EMPYEMA: ICD-10-CM

## 2018-05-03 DIAGNOSIS — C34.91 STAGE IV SQUAMOUS CELL CARCINOMA OF RIGHT LUNG (HCC): ICD-10-CM

## 2018-05-03 PROBLEM — E11.9 TYPE 2 DIABETES MELLITUS (HCC): Chronic | Status: ACTIVE | Noted: 2018-04-06

## 2018-05-03 PROBLEM — I10 ESSENTIAL HYPERTENSION: Chronic | Status: ACTIVE | Noted: 2018-04-06

## 2018-05-03 PROBLEM — C34.90 METASTATIC LUNG CANCER (METASTASIS FROM LUNG TO OTHER SITE) (HCC): Chronic | Status: ACTIVE | Noted: 2018-04-06

## 2018-05-03 PROBLEM — R79.1 SUPRATHERAPEUTIC INR: Status: ACTIVE | Noted: 2018-05-03

## 2018-05-03 LAB
ABSOL LYMPHOCYTES (HISTORICAL): 0.9 K/UL (ref 0.5–4)
ANION GAP SERPL CALCULATED.3IONS-SCNC: 6 MMOL/L (ref 5–14)
ANION GAP SERPL CALCULATED.3IONS-SCNC: 8 MMOL/L (ref 5–14)
APTT PPP: 43 SEC (ref 23–31)
BASOPHILS # BLD AUTO: 0.1 K/UL (ref 0–0.1)
BASOPHILS # BLD AUTO: 1 % (ref 0–1)
BUN SERPL-MCNC: 20 MG/DL (ref 5–25)
BUN SERPL-MCNC: 22 MG/DL (ref 5–25)
CALCIUM SERPL-MCNC: 9.3 MG/DL (ref 8.4–10.2)
CALCIUM SERPL-MCNC: 9.7 MG/DL (ref 8.4–10.2)
CALLED AND READ BACK BY. (HISTORICAL): NORMAL
CHLORIDE SERPL-SCNC: 92 MEQ/L (ref 97–108)
CHLORIDE SERPL-SCNC: 92 MEQ/L (ref 97–108)
CO2 SERPL-SCNC: 25 MMOL/L (ref 22–30)
CO2 SERPL-SCNC: 28 MMOL/L (ref 22–30)
CREATINE, SERUM (HISTORICAL): 0.84 MG/DL (ref 0.7–1.5)
CREATINE, SERUM (HISTORICAL): 0.89 MG/DL (ref 0.7–1.5)
DEPRECATED RDW RBC AUTO: 16.1 %
EGFR (HISTORICAL): >60 ML/MIN/1.73 M2
EGFR (HISTORICAL): >60 ML/MIN/1.73 M2
EOSINOPHIL # BLD AUTO: 0.1 K/UL (ref 0–0.4)
EOSINOPHIL NFR BLD AUTO: 1 % (ref 0–6)
GLUCOSE SERPL-MCNC: 161 MG/DL (ref 70–99)
GLUCOSE SERPL-MCNC: 190 MG/DL (ref 70–99)
GLUCOSE SERPL-MCNC: 260 MG/DL (ref 70–99)
GLUCOSE SERPL-MCNC: 269 MG/DL (ref 70–99)
GLUCOSE SERPL-MCNC: 289 MG/DL (ref 70–99)
GLUCOSE SERPL-MCNC: 312 MG/DL (ref 65–140)
GLUCOSE SERPL-MCNC: 381 MG/DL (ref 70–99)
HCT VFR BLD AUTO: 31.8 % (ref 41–53)
HGB BLD-MCNC: 10.4 G/DL (ref 13.5–17.5)
INR PPP: 2.59 (ref 0.86–1.16)
LYMPHOCYTES NFR BLD AUTO: 7 % (ref 25–45)
MAGNESIUM SERPL-MCNC: 1.4 MG/DL (ref 1.6–2.3)
MCH RBC QN AUTO: 28.7 PG (ref 26–34)
MCHC RBC AUTO-ENTMCNC: 32.6 % (ref 31–36)
MCV RBC AUTO: 88 FL (ref 80–100)
MONOCYTES # BLD AUTO: 1.1 K/UL (ref 0.2–0.9)
MONOCYTES NFR BLD AUTO: 8 % (ref 1–10)
NEUTROPHILS ABS COUNT (HISTORICAL): 10.6 K/UL (ref 1.8–7.8)
NEUTS SEG NFR BLD AUTO: 83 % (ref 45–65)
PHOSPHATE SERPL-MCNC: 2.3 MG/DL (ref 2.5–4.8)
PLATELET # BLD AUTO: 203 K/MCL (ref 150–450)
POTASSIUM SERPL-SCNC: 4.2 MEQ/L (ref 3.6–5)
POTASSIUM SERPL-SCNC: 4.3 MEQ/L (ref 3.6–5)
PROTHROMBIN TIME: 28.1 SECONDS (ref 12.1–14.4)
PT - I.N. RATIO (HISTORICAL): 2.6 RATIO(INR)
PT - I.N. RATIO (HISTORICAL): 7.7 RATIO(INR)
PT, PATIENT (HISTORICAL): 26.3 SEC (ref 9.2–11.1)
PT, PATIENT (HISTORICAL): 80.1 SEC (ref 9.2–11.1)
RBC # BLD AUTO: 3.62 M/MCL (ref 4.5–5.9)
SODIUM SERPL-SCNC: 126 MEQ/L (ref 137–147)
SODIUM SERPL-SCNC: 126 MEQ/L (ref 137–147)
WBC # BLD AUTO: 12.8 K/MCL (ref 4.5–11)

## 2018-05-03 PROCEDURE — 94760 N-INVAS EAR/PLS OXIMETRY 1: CPT

## 2018-05-03 PROCEDURE — 82948 REAGENT STRIP/BLOOD GLUCOSE: CPT

## 2018-05-03 PROCEDURE — 85610 PROTHROMBIN TIME: CPT | Performed by: PHYSICIAN ASSISTANT

## 2018-05-03 PROCEDURE — 93005 ELECTROCARDIOGRAM TRACING: CPT

## 2018-05-03 PROCEDURE — 99223 1ST HOSP IP/OBS HIGH 75: CPT | Performed by: HOSPITALIST

## 2018-05-03 RX ORDER — ALBUTEROL SULFATE 90 UG/1
2 AEROSOL, METERED RESPIRATORY (INHALATION) EVERY 6 HOURS PRN
Status: DISCONTINUED | OUTPATIENT
Start: 2018-05-03 | End: 2018-05-03

## 2018-05-03 RX ORDER — PANTOPRAZOLE SODIUM 20 MG/1
20 TABLET, DELAYED RELEASE ORAL
Status: DISCONTINUED | OUTPATIENT
Start: 2018-05-04 | End: 2018-05-06

## 2018-05-03 RX ORDER — LISINOPRIL 5 MG/1
5 TABLET ORAL DAILY
Status: DISCONTINUED | OUTPATIENT
Start: 2018-05-04 | End: 2018-05-05

## 2018-05-03 RX ORDER — BUDESONIDE AND FORMOTEROL FUMARATE DIHYDRATE 160; 4.5 UG/1; UG/1
2 AEROSOL RESPIRATORY (INHALATION) 2 TIMES DAILY
Status: DISCONTINUED | OUTPATIENT
Start: 2018-05-03 | End: 2018-05-03

## 2018-05-03 RX ORDER — AMOXICILLIN 250 MG
2 CAPSULE ORAL DAILY
Status: DISCONTINUED | OUTPATIENT
Start: 2018-05-04 | End: 2018-05-08

## 2018-05-03 RX ORDER — ARIPIPRAZOLE 10 MG/1
10 TABLET ORAL DAILY
Status: DISCONTINUED | OUTPATIENT
Start: 2018-05-04 | End: 2018-05-11 | Stop reason: HOSPADM

## 2018-05-03 RX ORDER — LEVETIRACETAM 500 MG/1
500 TABLET ORAL EVERY 12 HOURS SCHEDULED
Status: DISCONTINUED | OUTPATIENT
Start: 2018-05-03 | End: 2018-05-05

## 2018-05-03 RX ORDER — DEXAMETHASONE 2 MG/1
2 TABLET ORAL EVERY 8 HOURS SCHEDULED
Status: DISCONTINUED | OUTPATIENT
Start: 2018-05-03 | End: 2018-05-04

## 2018-05-03 RX ORDER — NICOTINE 21 MG/24HR
1 PATCH, TRANSDERMAL 24 HOURS TRANSDERMAL DAILY
Status: DISCONTINUED | OUTPATIENT
Start: 2018-05-04 | End: 2018-05-11 | Stop reason: HOSPADM

## 2018-05-03 RX ORDER — ONDANSETRON 2 MG/ML
4 INJECTION INTRAMUSCULAR; INTRAVENOUS EVERY 4 HOURS PRN
Status: DISCONTINUED | OUTPATIENT
Start: 2018-05-03 | End: 2018-05-11 | Stop reason: HOSPADM

## 2018-05-03 RX ORDER — FOLIC ACID 1 MG/1
1 TABLET ORAL DAILY
Status: DISCONTINUED | OUTPATIENT
Start: 2018-05-04 | End: 2018-05-08

## 2018-05-03 RX ORDER — BUDESONIDE AND FORMOTEROL FUMARATE DIHYDRATE 160; 4.5 UG/1; UG/1
2 AEROSOL RESPIRATORY (INHALATION) 2 TIMES DAILY
Status: DISCONTINUED | OUTPATIENT
Start: 2018-05-03 | End: 2018-05-05

## 2018-05-03 RX ORDER — POLYETHYLENE GLYCOL 3350 17 G/17G
17 POWDER, FOR SOLUTION ORAL DAILY
Status: DISCONTINUED | OUTPATIENT
Start: 2018-05-04 | End: 2018-05-08

## 2018-05-03 RX ORDER — NICOTINE 21 MG/24HR
1 PATCH, TRANSDERMAL 24 HOURS TRANSDERMAL DAILY
Status: DISCONTINUED | OUTPATIENT
Start: 2018-05-04 | End: 2018-05-03 | Stop reason: SDUPTHER

## 2018-05-03 RX ORDER — ALBUTEROL SULFATE 90 UG/1
2 AEROSOL, METERED RESPIRATORY (INHALATION) EVERY 4 HOURS PRN
Status: DISCONTINUED | OUTPATIENT
Start: 2018-05-03 | End: 2018-05-05

## 2018-05-03 RX ORDER — OXYCODONE HYDROCHLORIDE 5 MG/1
5 TABLET ORAL EVERY 4 HOURS PRN
Status: DISCONTINUED | OUTPATIENT
Start: 2018-05-03 | End: 2018-05-11 | Stop reason: HOSPADM

## 2018-05-03 RX ORDER — THIAMINE MONONITRATE (VIT B1) 100 MG
100 TABLET ORAL DAILY
Status: DISCONTINUED | OUTPATIENT
Start: 2018-05-04 | End: 2018-05-08

## 2018-05-03 RX ORDER — INSULIN GLARGINE 100 [IU]/ML
15 INJECTION, SOLUTION SUBCUTANEOUS
Status: DISCONTINUED | OUTPATIENT
Start: 2018-05-03 | End: 2018-05-04

## 2018-05-03 RX ORDER — SODIUM CHLORIDE 1000 MG
1 TABLET, SOLUBLE MISCELLANEOUS 2 TIMES DAILY
Status: DISCONTINUED | OUTPATIENT
Start: 2018-05-03 | End: 2018-05-05

## 2018-05-03 RX ORDER — CALCIUM CARBONATE 200(500)MG
1000 TABLET,CHEWABLE ORAL DAILY PRN
Status: DISCONTINUED | OUTPATIENT
Start: 2018-05-03 | End: 2018-05-08

## 2018-05-03 RX ORDER — ACETAMINOPHEN 325 MG/1
650 TABLET ORAL EVERY 4 HOURS PRN
Status: DISCONTINUED | OUTPATIENT
Start: 2018-05-03 | End: 2018-05-06

## 2018-05-03 RX ORDER — BUPROPION HYDROCHLORIDE 75 MG/1
75 TABLET ORAL 2 TIMES DAILY
Status: DISCONTINUED | OUTPATIENT
Start: 2018-05-03 | End: 2018-05-05

## 2018-05-03 RX ADMIN — INSULIN GLARGINE 15 UNITS: 100 INJECTION, SOLUTION SUBCUTANEOUS at 22:35

## 2018-05-03 RX ADMIN — INSULIN LISPRO 5 UNITS: 100 INJECTION, SOLUTION INTRAVENOUS; SUBCUTANEOUS at 22:43

## 2018-05-03 RX ADMIN — BUDESONIDE AND FORMOTEROL FUMARATE DIHYDRATE 2 PUFF: 160; 4.5 AEROSOL RESPIRATORY (INHALATION) at 22:43

## 2018-05-03 RX ADMIN — DEXAMETHASONE 2 MG: 2 TABLET ORAL at 22:36

## 2018-05-03 RX ADMIN — SODIUM CHLORIDE TAB 1 GM 1 G: 1 TAB at 22:41

## 2018-05-03 RX ADMIN — LEVETIRACETAM 500 MG: 500 TABLET ORAL at 22:36

## 2018-05-03 NOTE — TELEPHONE ENCOUNTER
Spoke with Aline Wiggins from [facility] radiation oncology  Appointment for patient was scheduled for tomorrow for radiation oncology  However, patient did not receive final pathology yet from Dr Hussain Pavon  Patient missed appointment on 4/26/18 to discuss pathology and for the incision to be checked  Our office has been trying to contact the patient, however patient does not return the calls  Patient must have this appointment with Dr Hussain Pavon first for pathology to be disclosed  Aline Wiggins also notified us that the patient is being admitted to a hospital  She did not know where  Will continue to follow

## 2018-05-03 NOTE — TELEPHONE ENCOUNTER
Left a message for patient to return phone call to reschedule his 2 week POV with path review appointment with Dr Joanna Duffy  Provided my direct line

## 2018-05-03 NOTE — TELEPHONE ENCOUNTER
Left a message for the emergency contact Damaris Ugalde, to return the call  Provided my direct line

## 2018-05-04 ENCOUNTER — APPOINTMENT (INPATIENT)
Dept: RADIOLOGY | Facility: HOSPITAL | Age: 63
DRG: 515 | End: 2018-05-04
Payer: COMMERCIAL

## 2018-05-04 PROBLEM — T84.7XXA INFECTION OF BONE FLAP (HCC): Status: ACTIVE | Noted: 2018-05-03

## 2018-05-04 PROBLEM — G06.2 SUBDURAL EMPYEMA: Status: ACTIVE | Noted: 2018-05-03

## 2018-05-04 PROBLEM — G06.0 BRAIN ABSCESS: Status: ACTIVE | Noted: 2018-05-03

## 2018-05-04 LAB
ANION GAP SERPL CALCULATED.3IONS-SCNC: 5 MMOL/L (ref 4–13)
BASOPHILS # BLD AUTO: 0.01 THOUSANDS/ΜL (ref 0–0.1)
BASOPHILS NFR BLD AUTO: 0 % (ref 0–1)
BUN SERPL-MCNC: 23 MG/DL (ref 5–25)
CALCIUM SERPL-MCNC: 9.2 MG/DL (ref 8.3–10.1)
CHLORIDE SERPL-SCNC: 93 MMOL/L (ref 100–108)
CO2 SERPL-SCNC: 29 MMOL/L (ref 21–32)
CREAT SERPL-MCNC: 1.12 MG/DL (ref 0.6–1.3)
CRP SERPL QL: >90 MG/L
EOSINOPHIL # BLD AUTO: 0.02 THOUSAND/ΜL (ref 0–0.61)
EOSINOPHIL NFR BLD AUTO: 0 % (ref 0–6)
ERYTHROCYTE [DISTWIDTH] IN BLOOD BY AUTOMATED COUNT: 15.1 % (ref 11.6–15.1)
ERYTHROCYTE [SEDIMENTATION RATE] IN BLOOD: 97 MM/HOUR (ref 0–10)
GFR SERPL CREATININE-BSD FRML MDRD: 71 ML/MIN/1.73SQ M
GLUCOSE SERPL-MCNC: 216 MG/DL (ref 65–140)
GLUCOSE SERPL-MCNC: 245 MG/DL (ref 65–140)
GLUCOSE SERPL-MCNC: 277 MG/DL (ref 65–140)
GLUCOSE SERPL-MCNC: 326 MG/DL (ref 65–140)
GLUCOSE SERPL-MCNC: 345 MG/DL (ref 65–140)
HCT VFR BLD AUTO: 31.5 % (ref 36.5–49.3)
HGB BLD-MCNC: 10.3 G/DL (ref 12–17)
INR PPP: 1.49 (ref 0.86–1.16)
LYMPHOCYTES # BLD AUTO: 0.92 THOUSANDS/ΜL (ref 0.6–4.47)
LYMPHOCYTES NFR BLD AUTO: 8 % (ref 14–44)
MCH RBC QN AUTO: 28.9 PG (ref 26.8–34.3)
MCHC RBC AUTO-ENTMCNC: 32.7 G/DL (ref 31.4–37.4)
MCV RBC AUTO: 89 FL (ref 82–98)
MONOCYTES # BLD AUTO: 0.58 THOUSAND/ΜL (ref 0.17–1.22)
MONOCYTES NFR BLD AUTO: 5 % (ref 4–12)
NEUTROPHILS # BLD AUTO: 9.5 THOUSANDS/ΜL (ref 1.85–7.62)
NEUTS SEG NFR BLD AUTO: 87 % (ref 43–75)
NRBC BLD AUTO-RTO: 0 /100 WBCS
PLATELET # BLD AUTO: 263 THOUSANDS/UL (ref 149–390)
PMV BLD AUTO: 10.3 FL (ref 8.9–12.7)
POTASSIUM SERPL-SCNC: 4.5 MMOL/L (ref 3.5–5.3)
PROTHROMBIN TIME: 18.1 SECONDS (ref 12.1–14.4)
RBC # BLD AUTO: 3.56 MILLION/UL (ref 3.88–5.62)
SODIUM SERPL-SCNC: 127 MMOL/L (ref 136–145)
WBC # BLD AUTO: 11.09 THOUSAND/UL (ref 4.31–10.16)

## 2018-05-04 PROCEDURE — A9585 GADOBUTROL INJECTION: HCPCS | Performed by: INTERNAL MEDICINE

## 2018-05-04 PROCEDURE — 86140 C-REACTIVE PROTEIN: CPT | Performed by: PHYSICIAN ASSISTANT

## 2018-05-04 PROCEDURE — 87075 CULTR BACTERIA EXCEPT BLOOD: CPT | Performed by: PHYSICIAN ASSISTANT

## 2018-05-04 PROCEDURE — 85652 RBC SED RATE AUTOMATED: CPT | Performed by: PHYSICIAN ASSISTANT

## 2018-05-04 PROCEDURE — 87147 CULTURE TYPE IMMUNOLOGIC: CPT | Performed by: PHYSICIAN ASSISTANT

## 2018-05-04 PROCEDURE — 87070 CULTURE OTHR SPECIMN AEROBIC: CPT | Performed by: PHYSICIAN ASSISTANT

## 2018-05-04 PROCEDURE — 85610 PROTHROMBIN TIME: CPT | Performed by: HOSPITALIST

## 2018-05-04 PROCEDURE — 92610 EVALUATE SWALLOWING FUNCTION: CPT

## 2018-05-04 PROCEDURE — 99233 SBSQ HOSP IP/OBS HIGH 50: CPT | Performed by: INTERNAL MEDICINE

## 2018-05-04 PROCEDURE — 99024 POSTOP FOLLOW-UP VISIT: CPT | Performed by: NEUROLOGICAL SURGERY

## 2018-05-04 PROCEDURE — 87081 CULTURE SCREEN ONLY: CPT | Performed by: PHYSICIAN ASSISTANT

## 2018-05-04 PROCEDURE — 87205 SMEAR GRAM STAIN: CPT | Performed by: PHYSICIAN ASSISTANT

## 2018-05-04 PROCEDURE — 99223 1ST HOSP IP/OBS HIGH 75: CPT | Performed by: INTERNAL MEDICINE

## 2018-05-04 PROCEDURE — 87040 BLOOD CULTURE FOR BACTERIA: CPT | Performed by: INTERNAL MEDICINE

## 2018-05-04 PROCEDURE — 85025 COMPLETE CBC W/AUTO DIFF WBC: CPT | Performed by: HOSPITALIST

## 2018-05-04 PROCEDURE — 80048 BASIC METABOLIC PNL TOTAL CA: CPT | Performed by: HOSPITALIST

## 2018-05-04 PROCEDURE — 82948 REAGENT STRIP/BLOOD GLUCOSE: CPT

## 2018-05-04 PROCEDURE — 87186 SC STD MICRODIL/AGAR DIL: CPT | Performed by: PHYSICIAN ASSISTANT

## 2018-05-04 PROCEDURE — 94760 N-INVAS EAR/PLS OXIMETRY 1: CPT

## 2018-05-04 PROCEDURE — 70553 MRI BRAIN STEM W/O & W/DYE: CPT

## 2018-05-04 RX ORDER — CHLORHEXIDINE GLUCONATE 0.12 MG/ML
15 RINSE ORAL EVERY 12 HOURS SCHEDULED
Status: DISCONTINUED | OUTPATIENT
Start: 2018-05-04 | End: 2018-05-05 | Stop reason: HOSPADM

## 2018-05-04 RX ORDER — DEXTROSE AND SODIUM CHLORIDE 5; .9 G/100ML; G/100ML
75 INJECTION, SOLUTION INTRAVENOUS CONTINUOUS
Status: DISCONTINUED | OUTPATIENT
Start: 2018-05-04 | End: 2018-05-05

## 2018-05-04 RX ORDER — SODIUM CHLORIDE 9 MG/ML
75 INJECTION, SOLUTION INTRAVENOUS CONTINUOUS
Status: DISCONTINUED | OUTPATIENT
Start: 2018-05-04 | End: 2018-05-04

## 2018-05-04 RX ORDER — DIAZEPAM 5 MG/ML
5 INJECTION, SOLUTION INTRAMUSCULAR; INTRAVENOUS EVERY 6 HOURS PRN
Status: DISCONTINUED | OUTPATIENT
Start: 2018-05-04 | End: 2018-05-08

## 2018-05-04 RX ADMIN — SODIUM CHLORIDE 75 ML/HR: 0.9 INJECTION, SOLUTION INTRAVENOUS at 15:34

## 2018-05-04 RX ADMIN — LEVETIRACETAM 500 MG: 500 TABLET ORAL at 09:19

## 2018-05-04 RX ADMIN — Medication 1 TABLET: at 09:19

## 2018-05-04 RX ADMIN — BUPROPION HYDROCHLORIDE 75 MG: 75 TABLET, FILM COATED ORAL at 18:07

## 2018-05-04 RX ADMIN — CEFEPIME HYDROCHLORIDE 2000 MG: 2 INJECTION, POWDER, FOR SOLUTION INTRAVENOUS at 21:46

## 2018-05-04 RX ADMIN — SODIUM CHLORIDE 5 UNITS/HR: 9 INJECTION, SOLUTION INTRAVENOUS at 22:24

## 2018-05-04 RX ADMIN — ARIPIPRAZOLE 10 MG: 10 TABLET ORAL at 09:20

## 2018-05-04 RX ADMIN — SODIUM CHLORIDE TAB 1 GM 1 G: 1 TAB at 09:24

## 2018-05-04 RX ADMIN — NICOTINE 1 PATCH: 21 PATCH, EXTENDED RELEASE TRANSDERMAL at 09:21

## 2018-05-04 RX ADMIN — VANCOMYCIN HYDROCHLORIDE 1250 MG: 10 INJECTION, POWDER, LYOPHILIZED, FOR SOLUTION INTRAVENOUS at 23:43

## 2018-05-04 RX ADMIN — DEXAMETHASONE 2 MG: 2 TABLET ORAL at 05:23

## 2018-05-04 RX ADMIN — INSULIN LISPRO 4 UNITS: 100 INJECTION, SOLUTION INTRAVENOUS; SUBCUTANEOUS at 18:05

## 2018-05-04 RX ADMIN — CEFEPIME HYDROCHLORIDE 2000 MG: 2 INJECTION, POWDER, FOR SOLUTION INTRAVENOUS at 13:57

## 2018-05-04 RX ADMIN — VANCOMYCIN HYDROCHLORIDE 1250 MG: 10 INJECTION, POWDER, LYOPHILIZED, FOR SOLUTION INTRAVENOUS at 11:24

## 2018-05-04 RX ADMIN — BUDESONIDE AND FORMOTEROL FUMARATE DIHYDRATE 2 PUFF: 160; 4.5 AEROSOL RESPIRATORY (INHALATION) at 18:05

## 2018-05-04 RX ADMIN — LEVETIRACETAM 500 MG: 500 TABLET ORAL at 21:46

## 2018-05-04 RX ADMIN — PANTOPRAZOLE SODIUM 20 MG: 20 TABLET, DELAYED RELEASE ORAL at 05:23

## 2018-05-04 RX ADMIN — SODIUM CHLORIDE TAB 1 GM 1 G: 1 TAB at 19:40

## 2018-05-04 RX ADMIN — DIAZEPAM 5 MG: 5 INJECTION, SOLUTION INTRAMUSCULAR; INTRAVENOUS at 16:28

## 2018-05-04 RX ADMIN — Medication 100 MG: at 09:20

## 2018-05-04 RX ADMIN — INSULIN LISPRO 3 UNITS: 100 INJECTION, SOLUTION INTRAVENOUS; SUBCUTANEOUS at 08:32

## 2018-05-04 RX ADMIN — FOLIC ACID 1 MG: 1 TABLET ORAL at 09:20

## 2018-05-04 RX ADMIN — INSULIN LISPRO 5 UNITS: 100 INJECTION, SOLUTION INTRAVENOUS; SUBCUTANEOUS at 11:57

## 2018-05-04 RX ADMIN — LISINOPRIL 5 MG: 5 TABLET ORAL at 09:23

## 2018-05-04 RX ADMIN — CHLORHEXIDINE GLUCONATE 15 ML: 1.2 RINSE ORAL at 21:46

## 2018-05-04 RX ADMIN — BUDESONIDE AND FORMOTEROL FUMARATE DIHYDRATE 2 PUFF: 160; 4.5 AEROSOL RESPIRATORY (INHALATION) at 09:24

## 2018-05-04 RX ADMIN — GADOBUTROL 8 ML: 604.72 INJECTION INTRAVENOUS at 17:10

## 2018-05-04 RX ADMIN — DEXTROSE AND SODIUM CHLORIDE 75 ML/HR: 5; .9 INJECTION, SOLUTION INTRAVENOUS at 22:19

## 2018-05-04 NOTE — ASSESSMENT & PLAN NOTE
Given abnormal CT scan of the head findings and supratherapeutic INR OAC on hold  Status post IVC filter placement

## 2018-05-04 NOTE — CONSULTS
Consultation - Infectious Disease   Zackery Mendiola 63 y o  male MRN: [id number]  Unit/Bed#: [facility] 904-01 Encounter: [id number]      IMPRESSION & RECOMMENDATIONS:   1  Sepsis-POA  Fever and leukocytosis  Suspect this is related to the patient's brain and scalp abscesses  Consideration for the possibility of bacteremia  No other clear sources appreciated  He remains hemodynamically stable despite his systemic illness  He tolerated the dose of vancomycin without difficulty  -continue vancomycin for now at current dose  -reordered blood cultures and asked the nurse to draw them as soon as possible  -will add cefepime 2 g IV q 8 hours  -follow up wound cultures and blood cultures  -monitor CBC with diff and creatinine    2  Brain and scalp abscesses-in the postoperative setting  Most likely staphylococcal, however g negatives also on the list of possibilities  -antibiotics as above  -follow up wound cultures and adjust antibiotics as needed  -await definitive surgical intervention including possible removal of the bone flap and evacuation of the abscesses  -discussed in detail with Neurosurgery    3  Stage IV squamous cell carcinoma of the lung-prognosis is poor  Metastatic disease to the brain  Awaiting decision about level of care  4   Pulmonary embolism/DVT-had been on anticoagulation but has an IVC filter in place   -anticoagulation as per Neurosurgery in anticipation of surgical intervention     5  Encephalopathy-likely multifactorial including 1,2,3 playing prominent role as well as metabolic issues    -monitor cognition  -antibiotics as above  -treat metabolic issues  -await surgical intervention  -no additional ID workup per treatment    HISTORY OF PRESENT ILLNESS:  Reason for Consult:  Brain and scalp abscesses  HPI: Zackery Mendiola is a 63y o  year old male with a history of metastatic lung cancer status post recent craniotomy with resection who I am asked to assist with management of high fever, confusion, and abnormal CT of the brain  Patient had been admitted to [facility] in [place] in April when he was found to have a left parietal mass with vasogenic edema  He underwent craniotomy on April 13th  During that hospital stay he apparently was found to have pulmonary embolism and DVT and IVC filter placed  He was subsequently placed on anticoagulation 2 weeks after the craniotomy  He was sent to [facility] for inpatient psychiatric treatment of depression  He developed some drainage in the incision site of his scalp and some swelling and redness and apparently some if a CAITLIN and confusion and therefore underwent CT of the head at [facility] that revealed evidence of scalp in brain abscesses  He was therefore transferred to [facility] in [place] for further management  Earlier this morning the patient spiked a fever and became more confused  He had blood cultures obtained but they were mislabeled and therefore they were thrown out  He had a wound culture obtained from the drainage of the scalp and was started on vancomycin  He currently denies any pain  He denies any cough or shortness of breath, denies any nausea vomiting or diarrhea  Of note he is unable to give much in the way of details due to his confusion and expressive aphasia  He apparently had recently been evaluated by hospice and was considering comfort care only  REVIEW OF SYSTEMS:  A complete 12 point system-based review of systems is otherwise negative      PAST MEDICAL HISTORY:  Past Medical History:   Diagnosis Date    Alcohol abuse     Anxiety     [facility] [address]     COPD [facility]chronic obstructive pulmonary disease) [facility] [address] [facility]     Depression     Diabetes mellitus ([facility] [address] [facility]     Gastric ulcer     Hearing impaired person, bilateral     Hepatitis C     Hypertension     Lung cancer ([facility] [address] [facility]     Psychiatric disorder     Tobacco abuse     Vision impairment      Past Surgical History:   Procedure Laterality Date    ADENOIDECTOMY      BRONCHOSCOPY      CRANIOTOMY Left 2018    Procedure: Left image guided parietal craniotomy for resection of tumor;  Surgeon: Adry Rojas MD;  [facility];  Service: Neurosurgery    LUNG BIOPSY      TONSILLECTOMY         FAMILY HISTORY:  Non-contributory    SOCIAL HISTORY:  Social History   History   Alcohol Use    Yes     Comment: 2L liquor weekly     History   Drug use: Unknown     History   Smoking Status    Current Every Day Smoker    Packs/day: 1 50    Years: 37 00   Smokeless Tobacco    Never Used       ALLERGIES:  Allergies   Allergen Reactions    Other      Bee stings       MEDICATIONS:  All current active medications have been reviewed  Antibiotics:  Vancomycin 1    PHYSICAL EXAM:  HR:  [] 87  Resp:  [18-20] 18  BP: (132-163)/(72-89) 132/81  SpO2:  [93 %-99 %] 93 %  Temp (24hrs), Av 9 °F (37 2 °C), Min:97 2 °F (36 2 °C), Max:100 9 °F (38 3 °C)  Current: Temperature: 98 1 °F (36 7 °C)  No intake or output data in the 24 hours ending 18 1234    General Appearance:  Awake and alert but debilitated   Head:  Left parietal erythema and swelling with turbid drainage from the incision  Eyes:  Conjunctiva pink and sclera anicteric, both eyes   Nose: Nares normal, mucosa normal, no drainage   Throat: Oropharynx moist without lesions   Neck: Supple, symmetrical, no adenopathy, no tenderness/mass/nodules   Back:   Symmetric, no curvature, ROM normal, no CVA tenderness   Lungs:   Clear to auscultation bilaterally, respirations unlabored   Chest Wall:  No tenderness or deformity   Heart:  RRR; no murmur, rub or gallop   Abdomen:   Soft, non-tender, non-distended, positive bowel sounds    Extremities: No cyanosis, clubbing or edema   Skin: No other rashes or lesions  No draining wounds noted  Lymph nodes: Cervical, supraclavicular nodes normal   Neurologic: Alert, aphasic, attempts to answer questions but not making sense    He will to say yes or no to certain questions  LABS, IMAGING, & OTHER STUDIES:  Lab Results:  I have personally reviewed pertinent labs  Results from last 7 days  Lab Units 05/04/18  1052   WBC Thousand/uL 11 09*   HEMOGLOBIN g/dL 10 3*   PLATELETS Thousands/uL 263       Results from last 7 days  Lab Units 05/04/18  1051   SODIUM mmol/L 127*   POTASSIUM mmol/L 4 5   CHLORIDE mmol/L 93*   CO2 mmol/L 29   ANION GAP mmol/L 5   BUN mg/dL 23   CREATININE mg/dL 1 12   EGFR ml/min/1 73sq m 71   GLUCOSE RANDOM mg/dL 326*   CALCIUM mg/dL 9 2        wound culture pending    Imaging Studies:   I have personally reviewed pertinent imaging study reports and images in PACS      CT head at [facility] abscesses with rim enhancing and gas bubbles, developing left extra-axial fluid hyperdense hematoma or empyema, 2 cm left parietal ring enhancing abscess with gas bubbles and surrounding vasogenic edema

## 2018-05-04 NOTE — CONSULTS
Consultation - Neurosurgery   Kat Doe 63 y o  male MRN: [id number]  Unit/Bed#: [facility] 904-01 Encounter: [id number]      Inpatient consult to Neurosurgery  Consult performed by: Que Mosquera ordered by: Tom Boggs          Assessment/Plan     Assessment:  1  Scalp infection/abscess  2  Concern for cerebral empyema  3  Sepsis present on admission  4  Stage IV squamous cell carcinoma of lung  5  Pulmonary embolism/PE  6  Encephalopathy  7  Hyponatremia - Na 127  8  Coumadin coagulopathy, reversing - 1 49  9  Hypertension  10  Alcohol abuse  11  Diabetes, A1C 8 0    Plan:  · Exam reveals GCS 14  Patient oriented to self and hospital but not present under time  Able to follow commands in all extremities without focal weakness  Right pronator drift  bilateral finger-nose past-pointing  · Cranial incision with posterior fullness and erythema  Moderate output with applied pressure  See photos below  · Aerobic and anaerobic cultures obtained after cleaning area with alcohol wipe to prevent contamination of skin kayla  · Gram stain preliminary result reveal rare gram-positive cocci in clusters   · ESR 97, CRP >90, WBC 11 09  · Images reviewed personally by attending  · CT head from outside hospital:  A subcutaneous collection of air with 1 4 x 4 4 moderately dense subdural collection along cranial edge  Pneumocephalus present  Concerning for empyema  · MRI brain with without contrast plus Bravo sequencing ordered and pending  · Ongoing medical management per primary team  · Regarding DVT/PE - given recommendation for surgical intervention, goal INR less than 1 4 by a m  INR 5/4/18 at 1051 1  49  · Patient with IVC filter  · Infectious disease consult appreciated  · Werner of care following  Need to clarify further with patient and his brother Drew Ramesh, plan of care  · Correct electrolytes  Na 127  Concern for SIADH in the setting cerebral etiology  · Neurosurgery will continue to follow  Call questions or concerns  · Tentative plan for surgical washout and likely removal of bone flap in a m  Ed Patel Discussed with primary team    History of Present Illness     HPI: Jeannette Cailx is a 63 y o   male with PMH including alcohol abuse, stage IV lung cancer, hypertension, diabetes, COPD, bipolar and anxiety/depression who presents with wound infection  Patient unable to provide history which was obtained from records  Patient had recent admission in April 2018 when he presented with right-sided numbness/weakness which improved on Decadron  He was found to have a solitary left parietal mass with associated vasogenic edema  Patient was also diagnosed with DVT/PE present on admission for which he had an IVC filter placed preoperatively  The patient underwent left image guided parietal craniotomy for tumor resection on April 13, 2018  Final pathology of left parietal mass consistent with metastatic non-small cell carcinoma consistent with squamous cell carcinoma with partial neuroendocrine differentiation/component  He was discharged home with VNA services  When evaluated by therapy there is discussion of rehab which the patient refused  Patient was a no show for his two week pathology discussion on April 26, 2018  Per report he presented to [facility] for depression  He was noted to have left cranial wound drainage for ear which a CT head was completed  There is concerns for scalp abscess with enhancement and air  For this reason patient was transferred to [facility] for neurosurgical evaluation  Of note there is reported conversation with hospice team while at [facility]  Patient is unable to clearly provide further details and discuss goals of care currently  Patient admits to intermittent severe left parietal/incisional headache currently rated 7/10 described as throbbing  Admits to chills and night sweats  He denies any change in vision or speech    Denies any chest pain or shortness of breath  Denies any new weakness  Denies gait disturbance  Patient is encephalopathic and ROS are likely unreliable  Review of Systems   Constitutional: Positive for chills and diaphoresis  Negative for activity change, fatigue and fever  HENT: Negative for trouble swallowing and voice change  Eyes: Negative for photophobia and visual disturbance  Respiratory: Positive for cough  Negative for shortness of breath  Cardiovascular: Negative for chest pain and leg swelling  Gastrointestinal: Positive for nausea  Negative for abdominal pain and vomiting  Genitourinary: Negative for decreased urine volume and difficulty urinating  Incontinent per nursing   Musculoskeletal: Negative for back pain, gait problem and neck pain  Skin: Positive for wound (Left parietal scalp)  Negative for pallor and rash  Neurological: Positive for headaches  Negative for dizziness, tremors, seizures, speech difficulty, weakness, light-headedness and numbness  Psychiatric/Behavioral: Positive for confusion  Negative for agitation         Historical Information   Past Medical History:   Diagnosis Date    Alcohol abuse     Anxiety     Bipolar disorder ([place] [address] )     COPD (chronic obstructive pulmonary disease) ([place] [address] )     Depression     Diabetes mellitus ([place] [address] )     Gastric ulcer     Hearing impaired person, bilateral     Hepatitis C     Hypertension     Lung cancer ([place] [address] )     Psychiatric disorder     Tobacco abuse     Vision impairment      Past Surgical History:   Procedure Laterality Date    ADENOIDECTOMY      BRONCHOSCOPY      CRANIOTOMY Left 4/13/2018    Procedure: Left image guided parietal craniotomy for resection of tumor;  Surgeon: Nieves Shepherd MD;  [facility];  Service: Neurosurgery    LUNG BIOPSY      TONSILLECTOMY       History   Alcohol Use    Yes     Comment: 2L liquor weekly     History   Drug use: Unknown     History   Smoking Status    Current Every Day Smoker    Packs/day: 1 50    Years: 37 00   Smokeless Tobacco    Never Used     History reviewed  No pertinent family history      Meds/Allergies   all current active meds have been reviewed, current meds:   Current Facility-Administered Medications   Medication Dose Route Frequency    acetaminophen (TYLENOL) tablet 650 mg  650 mg Oral Q4H PRN    albuterol (PROVENTIL HFA,VENTOLIN HFA) inhaler 2 puff  2 puff Inhalation Q4H PRN    ARIPiprazole (ABILIFY) tablet 10 mg  10 mg Oral Daily    budesonide-formoterol (SYMBICORT) 160-4 5 mcg/act inhaler 2 puff  2 puff Inhalation BID    buPROPion (WELLBUTRIN) tablet 75 mg  75 mg Oral BID    calcium carbonate (TUMS) chewable tablet 1,000 mg  1,000 mg Oral Daily PRN    cefepime (MAXIPIME) 2,000 mg in dextrose 5 % 50 mL IVPB  2,000 mg Intravenous Q8H    chlorhexidine (PERIDEX) 0 12 % oral rinse 15 mL  15 mL Swish & Spit Q12H [facility]    diazepam (VALIUM) injection 5 mg  5 mg Intravenous X7U PRN    folic acid (FOLVITE) tablet 1 mg  1 mg Oral Daily    HYDROmorphone (DILAUDID) injection 1 mg  1 mg Intravenous Q4H PRN    insulin glargine (LANTUS) subcutaneous injection 15 Units 0 15 mL  15 Units Subcutaneous HS    insulin lispro (HumaLOG) 100 units/mL subcutaneous injection 1-6 Units  1-6 Units Subcutaneous TID AC    insulin lispro (HumaLOG) 100 units/mL subcutaneous injection 1-6 Units  1-6 Units Subcutaneous HS    levETIRAcetam (KEPPRA) tablet 500 mg  500 mg Oral Q12H RYAN    lisinopril (ZESTRIL) tablet 5 mg  5 mg Oral Daily    multivitamin-minerals (CENTRUM) tablet 1 tablet  1 tablet Oral Daily    nicotine (NICODERM CQ) 21 mg/24 hr TD 24 hr patch 1 patch  1 patch Transdermal Daily    ondansetron (ZOFRAN) injection 4 mg  4 mg Intravenous Q4H PRN    oxyCODONE (ROXICODONE) IR tablet 5 mg  5 mg Oral Q4H PRN    pantoprazole (PROTONIX) EC tablet 20 mg  20 mg Oral Early Morning    polyethylene glycol (MIRALAX) packet 17 g  17 g Oral Daily    senna-docusate sodium (SENOKOT S) 8 6-50 mg per tablet 2 tablet  2 tablet Oral Daily    sodium chloride 0 9 % infusion  75 mL/hr Intravenous Continuous    sodium chloride tablet 1 g  1 g Oral BID    thiamine (VITAMIN B1) tablet 100 mg  100 mg Oral Daily    vancomycin (VANCOCIN) 1,250 mg in sodium chloride 0 9 % 250 mL IVPB  15 mg/kg Intravenous Q12H [address]    and PTA meds:   Prior to Admission Medications   Prescriptions Last Dose Informant Patient Reported? Taking?    ARIPiprazole (ABILIFY) 10 mg tablet   Yes No   Sig: Take 10 mg by mouth daily   Multiple Vitamin (MULTIVITAMIN) capsule   Yes No   Sig: Take 1 capsule by mouth daily   acetaminophen (TYLENOL) 325 mg tablet   No No   Sig: Take 2 tablets (650 mg total) by mouth every 6 (six) hours as needed for mild pain, headaches or fever   albuterol (PROVENTIL HFA,VENTOLIN HFA) 90 mcg/act inhaler   Yes No   Sig: Inhale 2 puffs every 6 (six) hours as needed for wheezing   buPROPion (WELLBUTRIN) 75 mg tablet   Yes No   Sig: Take 75 mg by mouth 2 (two) times a day   budesonide-formoterol (SYMBICORT) 160-4 5 mcg/act inhaler   Yes No   Sig: Inhale 2 puffs 2 (two) times a day   calcium carbonate (TUMS) 500 mg chewable tablet   No No   Sig: Chew 2 tablets (1,000 mg total) daily as needed for indigestion or heartburn   dexamethasone (DECADRON) 2 mg tablet   No No   Sig: Decadron  4mg q8h for 2 days= 12 tabs  4mg q12h for 2 days= 8 tabs  2mg q6h for 2 days= 8 tabs  2mg q8h for 2 days= 6  2mg q12h for 2 days=4   docusate sodium (COLACE) 100 mg capsule   No No   Sig: Take 1 capsule (100 mg total) by mouth 2 (two) times a day   folic acid (FOLVITE) 1 mg tablet   No No   Sig: Take 1 tablet (1 mg total) by mouth daily   insulin glargine (LANTUS) 100 units/mL subcutaneous injection   No No   Sig: Inject 15 Units under the skin daily at bedtime   insulin lispro (HumaLOG) 100 units/mL injection   No No   Sig: Inject 20 Units under the skin 3 (three) times a day with meals   levETIRAcetam (KEPPRA) 500 mg tablet   No No   Sig: Take 1 tablet (500 mg total) by mouth every 12 (twelve) hours   lisinopril (ZESTRIL) 5 mg tablet   Yes No   Sig: Take 5 mg by mouth daily   nicotine (NICODERM CQ) 21 mg/24 hr TD 24 hr patch   No No   Sig: Place 1 patch on the skin daily   omeprazole (PriLOSEC) 20 mg delayed release capsule   No No   Sig: Take 1 capsule (20 mg total) by mouth daily   sodium chloride 1 g tablet   Yes No   Sig: Take 1 g by mouth 2 (two) times a day   thiamine (VITAMIN B1) 100 mg tablet   No No   Sig: Take 1 tablet (100 mg total) by mouth daily      Facility-Administered Medications: None     Allergies   Allergen Reactions    Other      Bee stings       Objective   I/O       05/02 0701 - 05/03 0700 05/03 0701 - 05/04 0700 05/04 0701 - 05/05 0700           Unmeasured Urine Occurrence  2 x           Physical Exam   Constitutional: He appears well-developed and well-nourished  No distress  HENT:   Head: Normocephalic  Left parietal scalp incision with erythema, edema and drainage  Poor dentition   Eyes: Conjunctivae and EOM are normal  Pupils are equal, round, and reactive to light  No scleral icterus  Neck: Normal range of motion  Neck supple  Cardiovascular: Normal rate  Pulmonary/Chest: Effort normal  No respiratory distress  Cough   Abdominal: Soft  He exhibits no distension  There is no tenderness  Musculoskeletal: He exhibits no tenderness  Neurological: He has an abnormal Finger-Nose-Finger Test    Reflex Scores:       Bicep reflexes are 1+ on the right side and 1+ on the left side  Brachioradialis reflexes are 1+ on the right side and 1+ on the left side  Patellar reflexes are 2+ on the right side and 2+ on the left side  Skin: Skin is warm and dry  Left parietal scalp incision with post anterior per J Luis drainage in associated erythema and edema  Please see photos below   Psychiatric: His affect is blunt  His speech is delayed and slurred  He is slowed   Cognition and memory are impaired  Neurologic Exam     Mental Status   Oriented to person  Oriented to place  Disoriented to city  Disoriented to time  Follows 2 step commands  Attention: decreased  Concentration: decreased  Speech: slurred   Level of consciousness: drowsy  Knowledge: poor  Unable to perform simple calculations  Abnormal comprehension  Cranial Nerves     CN III, IV, VI   Pupils are equal, round, and reactive to light  Extraocular motions are normal    Nystagmus: none   Conjugate gaze: present    CN VII   Facial expression full, symmetric  CN VIII   Hearing: intact    CN XI   Right trapezius strength: normal  Left trapezius strength: normal    CN XII   Tongue: not atrophic  Fasciculations: absent  Tongue deviation: none  Patient perseverates and unreliable for visual field testing     Motor Exam   Muscle bulk: normal  Overall muscle tone: normal  Right arm pronator drift: present  Left arm pronator drift: absentMAE 5-/5     Sensory Exam   Light touch normal    Right arm pinprick: normal  Left arm pinprick: normal    Gait, Coordination, and Reflexes     Coordination   Finger to nose coordination: abnormal    Tremor   Resting tremor: present    Reflexes   Right brachioradialis: 1+  Left brachioradialis: 1+  Right biceps: 1+  Left biceps: 1+  Right patellar: 2+  Left patellar: 2+  Right ankle clonus: absent  Left ankle clonus: absent                  Vitals:Blood pressure 152/75, pulse 87, temperature (!) 97 2 °F (36 2 °C), temperature source Oral, resp  rate 18, height 5' 9" (1 753 m), SpO2 93 %  ,There is no height or weight on file to calculate BMI       Lab Results:         Invalid input(s):  EOSPCT        Invalid input(s): LABALBU            Results from last 7 days  Lab Units 05/03/18  2117   INR  2 59*     No results found for: TROPONINT  ABG:No results found for: PHART, VAH7KKJ, PO2ART, NSS6QWZ, Y1DPZNDM, BEART, SOURCE    Imaging Studies: I have personally reviewed pertinent films in PACS    EKG, Pathology, and Other Studies: I have personally reviewed pertinent reports  VTE Prophylaxis: Sequential compression device (Venodyne)  and Reason for no pharmacologic prophylaxis surgery planning    Code Status: Level 3 - DNAR and DNI  Advance Directive and Living Will:      Power of :    POLST:      Counseling / Coordination of Care  I spent 45 minutes with the patient

## 2018-05-04 NOTE — ASSESSMENT & PLAN NOTE
Follow up with outpatient Oncology  Patient is DNR DNI, prognosis is poor    ?palliative/hospice  evaluation ( according to [facility] patient wished to stay DNR DNI he is still not considering hospice care)

## 2018-05-04 NOTE — CONSULTS
ADDENDUM:  Spoke with Caitlin Corona RN- [facility] hospice liaison  Apparently patient has been evaluated for hospice services and has been approved  He was awaiting placement into a SNF for hospice services  Currently, no appropriate for IPU but will continue to monitor to see if condition deteriorates to the point that the [place] [facility] would be appropriate  His next of kin is Carmelo Sharma- contact number in chart  Consultation - [address] [place] 63 y o  male MRN: [id number]  Unit/Bed#: [facility] 904-01 Encounter: [id number]      Assessment/Plan     Assessment:  Stage IV squamous cell carcinoma with partial neuroendocrine differentiation- follows with Dr Ave Quinn at [facility]  DVT/PE  IVC filter placement 4/11  RML lung mass  COPD  DM  EtoH abuse/ dependence  Polysubstance abuse  Left parietal brain mass s/p craniotomy on 4/13 now with concern for scalp abscess  Cancer pain  Bipolar disorder    Plan:  1  Agree with PRN opioids for cancer related pain (none used since admission)  2  Patient confirms DNR/DNI status, no POLST or LW has been done  3  Patient has no documented POA and states that he has nobody that he would want to be his decision maker  Discussed the importance of this given his poor overall prognosis and he agrees to think about this further  He does have one son that is estranged but did not elaborate further on his family  4  Patient at risk for delirium given age and co-morbidities, recommend global delirium precautions as follows:   - Establishment of day/night cycle via lights during the day and blinds open   Please limit interruptions at night as medically appropriate  - Patient should be out of bed during the day as tolerated or medically indicated  - Provide glasses/hearing aids as apprioriate      - Minimize deliriogenic meds as able  - Provide reorientation including date on board and visible clock      - Avoid restraints as able, frequent verbal reorientations or patient care sitter as appropriate    - Consider use of melatonin qHS for circadian rhythm maintenance  5  Monitor for alcohol withdrawal, unsure of last alcohol consumption- will add PRN Valium, if starts to exhibit overt signs of withdraw, will need to be scheduled  6  Patient has an outpatient LSW, I will ask our LSW team to reach out to her  7  Further goals conversations will be held after recommendations from other consultants have been made  Patient will need POLST and POA named  Prognosis guarded  8  Continue home psychiatric medications    History of Present Illness   Physician Requesting Consult: Erica Rodarte MD  Reason for Consult / Principal Problem: goals  Hx and PE limited by: some word-finding difficulty   HPI: Fabiana Burr is a 63y o  year old male who presents as a transfer from [facility] for an abnormal CT scan  He has a history of squamous cell carcinoma of the lung and recent diagnosis of brain metastases  He underwent a craniotomy and mass resection on 4/13/18  He apparently went to the hospital for depressive symptoms on 5/3 and CTH demonstrated a scalp abscess at surgical site and he was transferred to [facility]  He is awake and alert, per chart review sometimes not very cooperative with care  He did agree to speak to me "for a bit"  He denies pain but states that he feels "lousy"  He elaborates by stating that he is frustrated by the inability to control his tremors and overall feeling that his body is declining  He does not have any support system  He denies family and friends initially but does finally admit that he has 1 biologic son- but they are estranged  He does not want to appoint anybody to be his POA, we discussed the importance of this and he agrees to consider naming someone his POA  Inpatient consult to Palliative Care  Consult performed by: Jeny Or  Consult ordered by: Richie Ahumada          Review of Systems   Constitutional: Positive for activity change  Respiratory: Positive for cough  Neurological: Positive for tremors, speech difficulty and weakness  All other systems reviewed and are negative  Somewhat limited ROS given word finding difficulty    Historical Information   Past Medical History:   Diagnosis Date    Alcohol abuse     Anxiety     Bipolar disorder ([place] [address] )     COPD (chronic obstructive pulmonary disease) ([place] [address] )     Depression     Diabetes mellitus (HCC)     Gastric ulcer     Hearing impaired person, bilateral     Hepatitis C     Hypertension     Lung cancer ([place] [address] )     Psychiatric disorder     Tobacco abuse     Vision impairment      Past Surgical History:   Procedure Laterality Date    ADENOIDECTOMY      BRONCHOSCOPY      CRANIOTOMY Left 4/13/2018    Procedure: Left image guided parietal craniotomy for resection of tumor;  Surgeon: Kaye Florence MD;  [facility];  Service: Neurosurgery    LUNG BIOPSY      TONSILLECTOMY       Social History     Social History    Marital status: Single     Spouse name: N/A    Number of children: N/A    Years of education: N/A     Social History Main Topics    Smoking status: Current Every Day Smoker     Packs/day: 1 50     Years: 37 00    Smokeless tobacco: Never Used    Alcohol use Yes      Comment: 2L liquor weekly    Drug use: Unknown    Sexual activity: Not Asked     Other Topics Concern    None     Social History Narrative    None     History reviewed  No pertinent family history      Meds/Allergies   all current active meds have been reviewed and current meds:   Current Facility-Administered Medications   Medication Dose Route Frequency    acetaminophen (TYLENOL) tablet 650 mg  650 mg Oral Q4H PRN    albuterol (PROVENTIL HFA,VENTOLIN HFA) inhaler 2 puff  2 puff Inhalation Q4H PRN    ARIPiprazole (ABILIFY) tablet 10 mg  10 mg Oral Daily    budesonide-formoterol (SYMBICORT) 160-4 5 mcg/act inhaler 2 puff  2 puff Inhalation BID    buPROPion (WELLBUTRIN) tablet 75 mg  75 mg Oral BID    calcium carbonate (TUMS) chewable tablet 1,000 mg  1,000 mg Oral Daily PRN    dexamethasone (DECADRON) tablet 2 mg  2 mg Oral N5X [address]    folic acid (FOLVITE) tablet 1 mg  1 mg Oral Daily    HYDROmorphone (DILAUDID) injection 1 mg  1 mg Intravenous Q4H PRN    insulin glargine (LANTUS) subcutaneous injection 15 Units 0 15 mL  15 Units Subcutaneous HS    insulin lispro (HumaLOG) 100 units/mL subcutaneous injection 1-6 Units  1-6 Units Subcutaneous TID AC    insulin lispro (HumaLOG) 100 units/mL subcutaneous injection 1-6 Units  1-6 Units Subcutaneous HS    levETIRAcetam (KEPPRA) tablet 500 mg  500 mg Oral Q12H [address]    lisinopril (ZESTRIL) tablet 5 mg  5 mg Oral Daily    multivitamin-minerals (CENTRUM) tablet 1 tablet  1 tablet Oral Daily    nicotine (NICODERM CQ) 21 mg/24 hr TD 24 hr patch 1 patch  1 patch Transdermal Daily    ondansetron (ZOFRAN) injection 4 mg  4 mg Intravenous Q4H PRN    oxyCODONE (ROXICODONE) IR tablet 5 mg  5 mg Oral Q4H PRN    pantoprazole (PROTONIX) EC tablet 20 mg  20 mg Oral Early Morning    polyethylene glycol (MIRALAX) packet 17 g  17 g Oral Daily    senna-docusate sodium (SENOKOT S) 8 6-50 mg per tablet 2 tablet  2 tablet Oral Daily    sodium chloride tablet 1 g  1 g Oral BID    thiamine (VITAMIN B1) tablet 100 mg  100 mg Oral Daily    vancomycin (VANCOCIN) 1,250 mg in sodium chloride 0 9 % 250 mL IVPB  15 mg/kg Intravenous Q12H       Palliative Care Medications: NA    Allergies   Allergen Reactions    Other      Bee stings       Objective     Physical Exam   Constitutional: He is oriented to person, place, and time  No distress  Appears older than stated age, chronically ill   HENT:   Head: Normocephalic and atraumatic  Right Ear: External ear normal    Left Ear: External ear normal    Nose: Nose normal    Mouth/Throat: Oropharynx is clear and moist  No oropharyngeal exudate  Eyes: EOM are normal  Pupils are equal, round, and reactive to light   Right eye exhibits no discharge  Left eye exhibits no discharge  No scleral icterus  Neck: Neck supple  No JVD present  No tracheal deviation present  No thyromegaly present  Cardiovascular: Normal rate and intact distal pulses  No murmur heard  tachycardic   Pulmonary/Chest: Effort normal  No respiratory distress  He has wheezes  Abdominal: Soft  Bowel sounds are normal  He exhibits no distension  There is no tenderness  Musculoskeletal: He exhibits no edema  Neurological: He is alert and oriented to person, place, and time  Resting tremor, LUE worse than RUE, word finding difficulties and mild facial droop   Skin: Skin is warm and dry  There is pallor  Psychiatric: He has a normal mood and affect  Nursing note and vitals reviewed  Lab Results: I have personally reviewed pertinent labs  , CBC: No results found for: WBC, HGB, HCT, MCV, PLT, ADJUSTEDWBC, MCH, MCHC, RDW, MPV, NRBC, CMP: No results found for: NA, K, CL, CO2, ANIONGAP, BUN, CREATININE, GLUCOSE, CALCIUM, AST, ALT, ALKPHOS, PROT, ALBUMIN, BILITOT, EGFR  Imaging Studies: I have personally reviewed pertinent reports  EKG, Pathology, and Other Studies: I have personally reviewed pertinent reports  Code Status: Level 3 - DNAR and DNI  Advance Directive and Living Will:      Power of :    POLST:      Counseling / Coordination of Care  Total floor / unit time spent today 75+ minutes  Greater than 50% of total time was spent with the patient and / or family counseling and / or coordination of care   A description of the counseling / coordination of care: symptom assessment, medication review, discussion about POA

## 2018-05-04 NOTE — CASE MANAGEMENT
Initial Clinical Review    Admission: Date/Time/Statement: 5/3/18 @ 1959     Orders Placed This Encounter   Procedures    Inpatient Admission     Standing Status:   Standing     Number of Occurrences:   1     Order Specific Question:   Admitting Physician     Answer:   Ale Contreras [[id number]]     Order Specific Question:   Level of Care     Answer:   Level 2 Stepdown / HOT [14]     Order Specific Question:   Estimated length of stay     Answer:   More than 2 Midnights     Order Specific Question:   Certification     Answer:   I certify that inpatient services are medically necessary for this patient for a duration of greater than two midnights  See H&P and MD Progress Notes for additional information about the patient's course of treatment  ED: Date/Time/Mode of Arrival:   ED Arrival Information     Patient not seen in ED                       Chief Complaint: No chief complaint on file  History of Illness: 63 y o  male  with history of metastatic lung cancer who originally was admitted to [facility] with depression and transferred to [facility] after the CT of the head reported scalp abscess with rim enhancement and gas bubble  Of notes patient has a significant history of recent admission due to right-sided numbness, imaging study of the head showed left parietal mass with vasogenic edema  Patient underwent craniotomy on April 13  He also was diagnosed with PE /DVT ,IVC filter placement  Neurosurgery recommended to start him on lifelong AC 2 weeks after craniotomy  Upon presentation to [facility] patient's INR 7 7  Repeated INR 2 6  Around 4:30 a m  on the day of transfer to [facility] patient spiked fever to 38 3C    Blood culture ordered  Admitted on an inpatient based    ED Vital Signs:   ED Triage Vitals   Temperature Pulse Respirations Blood Pressure SpO2   05/03/18 1955 05/03/18 1955 05/03/18 1955 05/03/18 1955 05/03/18 1955   99 °F (37 2 °C) 97 20 133/72 94 %      Temp Source Heart Rate Source Patient Position - Orthostatic VS BP Location FiO2 (%)   05/03/18 1955 05/03/18 2309 05/03/18 1955 05/03/18 1955 --   Oral Monitor Lying Left arm       Pain Score       --               Wt Readings from Last 1 Encounters:   05/04/18 86 6 kg (191 lb)       Vital Signs (abnormal): t 100 9    Abnormal Labs/Diagnostic Test Results: bs 345---crp >90 0  Wbc 11 09--hgb 10 3/31 5  inr 1 49--na 127--cl 93--bs 326    ED Treatment:   Medication Administration - No Administrations Displayed (No Start Event Found)     None          Past Medical/Surgical History: Active Ambulatory Problems     Diagnosis Date Noted    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  04/06/2018    Metastatic lung cancer (metastasis from lung to other site) ([facility] ) 04/06/2018    Dependence on nicotine from cigarettes 04/06/2018    Alcohol abuse 04/06/2018    Essential hypertension 04/06/2018    Insulin-dependent diabetes mellitus 04/06/2018    Bipolar disorder 04/06/2018    Leukocytosis 04/07/2018    Deep vein thrombosis (DVT) of distal vein of left lower extremity (HCC) 04/11/2018    PE (pulmonary thromboembolism) ([facility] ) 04/11/2018    Cerebral edema ([facility] ) 04/11/2018    Brain metastases ([facility] ) 05/02/2018     Resolved Ambulatory Problems     Diagnosis Date Noted    Hyperkalemia 04/07/2018     Past Medical History:   Diagnosis Date    Alcohol abuse     Anxiety     Bipolar disorder ([address] )     COPD (chronic obstructive pulmonary disease) ([facility] )     Depression     Diabetes mellitus ([address] )     Gastric ulcer     Hearing impaired person, bilateral     Hepatitis C     Hypertension     Lung cancer ([facility] )     Psychiatric disorder     Tobacco abuse     Vision impairment        Admitting Diagnosis: Hematoma [T14  8XXA]  Pulmonary embolism ([facility] ) [I26 99]    Age/Sex: 63 y o  male    Assessment/Plan:   * Abnormal CT of the head   Assessment & Plan     Patient was transferred from [facility] for neurosurgery consult after CT of the head reported " scalp abscess with rim enhancement and gas bubbles ,developing left extra-axial fluid hyperdense hematoma or empyema ,2 cm left parietal lobe rim-enhancing  abscess with gas bubble and surrounding mild vasogenic edema no midline shift or  hydrocephalus"  Continue neurochecks  Recheck INR  Fall precautions  Bedside speech and swallow  Currently at his baseline of mentality  Prognosis is poor          Brain metastases [facility])   Assessment & Plan     Management as above          Metastatic lung cancer (metastasis from lung to other site) [facility])   Assessment & Plan     Follow up with outpatient Oncology  Patient is DNR DNI, prognosis is poor  ?palliative/hospice  evaluation ( according to [employer] patient wished to stay DNR DNI he is still not considering hospice care)          Supratherapeutic INR   Assessment & Plan     INR 7 7 this am  Will repeat INR  Hold OAC          Deep vein thrombosis (DVT) of distal vein of left lower extremity ([address] )   Assessment & Plan     Given abnormal CT scan of the head findings and supratherapeutic INR OAC on hold  Status post IVC filter placement          PE (pulmonary thromboembolism) ([address] )   Assessment & Plan     Management as above          Insulin-dependent diabetes mellitus   Assessment & Plan     Continue Lantus ,Accu-Cheks, insulin sliding scale          Essential hypertension   Assessment & Plan     Monitor blood pressure ,continue lisinopril                VTE Prophylaxis: Pharmacologic VTE Prophylaxis contraindicated due to Possible intracerebral hematoma  / sequential compression device   Code Status: DNR/DNI  POLST: There is no POLST form on file for this patient (pre-hospital)        Anticipated Length of Stay:  Patient will be admitted on an Inpatient basis with an anticipated length of stay of  >2 midnights     Justification for Hospital Stay:  Neurosurgery evaluation         Admission Orders:  Scheduled Meds:   Current Facility-Administered Medications:  acetaminophen 650 mg Oral Q4H PRN Gisselle Weaver MD    albuterol 2 puff Inhalation Q4H PRN Nicole Cespedes MD    ARIPiprazole 10 mg Oral Daily Gisselle Weaver MD    budesonide-formoterol 2 puff Inhalation BID Gisselle Weaver MD    buPROPion 75 mg Oral BID Gisselle Weaver MD    calcium carbonate 1,000 mg Oral Daily PRN Gisselle Weaver MD    cefepime 2,000 mg Intravenous Q8H Constanza Lawrence MD    diazepam 5 mg Intravenous Q6H PRN Sandra Flores DO    folic acid 1 mg Oral Daily Gisselle Weaver MD    HYDROmorphone 1 mg Intravenous Q4H PRN Gisselle Weaver MD    insulin glargine 15 Units Subcutaneous HS Gisselle Weaver MD    insulin lispro 1-6 Units Subcutaneous TID AC Gisselle Weaver MD    insulin lispro 1-6 Units Subcutaneous HS Gisselle Weaver MD    levETIRAcetam 500 mg Oral Q12H [facility] Gisselle Weaver MD    lisinopril 5 mg Oral Daily Gisselle Weaver MD    multivitamin-minerals 1 tablet Oral Daily Gisselle Weaver MD    nicotine 1 patch Transdermal Daily Gisselle Weaver MD    ondansetron 4 mg Intravenous Q4H PRN Gisselle Weaver MD    oxyCODONE 5 mg Oral Q4H PRN Gisselle Weaver MD    pantoprazole 20 mg Oral Early Morning Gisselle Weaver MD    polyethylene glycol 17 g Oral Daily Gisselle Weaver MD    senna-docusate sodium 2 tablet Oral Daily Gisselle Weaver MD    sodium chloride 1 g Oral BID Gisselle Weaver MD    thiamine 100 mg Oral Daily Gisselle Weaver MD    vancomycin 15 mg/kg Intravenous Q12H Aditi Solis MD Last Rate: 1,250 mg (05/04/18 1124)     Continuous Infusions:    PRN Meds:   acetaminophen    albuterol    calcium carbonate    diazepam    HYDROmorphone    ondansetron    oxyCODONE       fs glucose 4 x daily  scd  Fall precautions  Neuro checks q4  Mri of brain for possible incisional drg  Blood cultures x2  Sed rate  Wound culture  Jerry cho diet          Id consult --Sepsis-POA  Fever and leukocytosis    Suspect this is related to the patient's brain and scalp abscesses  Consideration for the possibility of bacteremia  No other clear sources appreciated  He remains hemodynamically stable despite his systemic illness  He tolerated the dose of vancomycin without difficulty  -continue vancomycin for now at current dose  -reordered blood cultures and asked the nurse to draw them as soon as possible  -will add cefepime 2 g IV q 8 hours  -follow up wound cultures and blood cultures  -monitor CBC with diff and creatinine     2  Brain and scalp abscesses-in the postoperative setting  Most likely staphylococcal, however g negatives also on the list of possibilities  -antibiotics as above  -follow up wound cultures and adjust antibiotics as needed  -await definitive surgical intervention including possible removal of the bone flap and evacuation of the abscesses  -discussed in detail with Neurosurgery     3  Stage IV squamous cell carcinoma of the lung-prognosis is poor  Metastatic disease to the brain  Awaiting decision about level of care      4   Pulmonary embolism/DVT-had been on anticoagulation but has an IVC filter in place   -anticoagulation as per Neurosurgery in anticipation of surgical intervention      5  Encephalopathy-likely multifactorial including 1,2,3 playing prominent role as well as metabolic issues  -monitor cognition  -antibiotics as above  -treat metabolic issues  -await surgical intervention  -no additional ID workup per treatment    Consult palliative care Spoke with Stephan Hernandez, [facility] hospice liaison  Apparently patient has been evaluated for hospice services and has been approved  He was awaiting placement into a SNF for hospice services  Currently, no appropriate for IPU but will continue to monitor to see if condition deteriorates to the point that the [facility] would be appropriate

## 2018-05-04 NOTE — SOCIAL WORK
CM met with the Pt at bedside, Pt presents as confused and alert only to self  Pt requesting CM contact next of kin, brother Daniel Stewart  CM spoke with James Coto via telephone to explain the CM role and discuss any potential D/C needs  James Coto lives in [place] and does not know the Pt's home setup  Per review of chart from previous admission, Pt reported residing alone in a 2nd floor apt with no use of DME and has a flight of steps to enter  Pt reported being independent with ADLs with no hx of VNA, SNF, or drug/alcohol placements  James Coto states he is agreeable to be primary emergency contact at this time and to make medical decisions if Pt is unable or not capable  Per review of chart, tentative plan for surgical washout and removal of bone flap Saturday 05/05  Hospice referral made at [address] [place] [address] and they will continue to follow at this time

## 2018-05-04 NOTE — PROGRESS NOTES
Upon assessing pt, pt seems to be very confused, difficult finding words, and unable to tell me his full name and where he is  Pt also with difficulty feeding himself, using his hands to scoop up all his food and spilling it everywhere  Pt becomes very frustrated when I ask him questions and also when he is unable to answer  Incision on scalp draining tan/milky drainage  SLIM made aware and will be in to see pt

## 2018-05-04 NOTE — H&P
H&P- River Padilla 1955, 63 y o  male MRN: [id number]    Unit/Bed#: [facility] 904-01 Encounter: [id number]    Primary Care Provider: Sedrick Levine MD   Date and time admitted to hospital: 5/3/2018  7:59 PM        * Abnormal CT of the head   Assessment & Plan    Patient was transferred from [facility] for neurosurgery consult after CT of the head reported " scalp abscess with rim enhancement and gas bubbles ,developing left extra-axial fluid hyperdense hematoma or empyema ,2 cm left parietal lobe rim-enhancing  abscess with gas bubble and surrounding mild vasogenic edema no midline shift or  hydrocephalus"  Continue neurochecks  Recheck INR  Fall precautions  Bedside speech and swallow  Currently at his baseline of mentality  Prognosis is poor        Brain metastases [facility])   Assessment & Plan    Management as above        Metastatic lung cancer (metastasis from lung to other site) [facility])   Assessment & Plan    Follow up with outpatient Oncology  Patient is DNR DNI, prognosis is poor    ?palliative/hospice  evaluation ( according to [facility] patient wished to stay DNR DNI he is still not considering hospice care)        Supratherapeutic INR   Assessment & Plan    INR 7 7 this am  Will repeat INR  Hold OAC        Deep vein thrombosis (DVT) of distal vein of left lower extremity ([address] )   Assessment & Plan    Given abnormal CT scan of the head findings and supratherapeutic INR OAC on hold  Status post IVC filter placement        PE (pulmonary thromboembolism) ([address] )   Assessment & Plan    Management as above        Insulin-dependent diabetes mellitus   Assessment & Plan    Continue Lantus ,Accu-Cheks, insulin sliding scale        Essential hypertension   Assessment & Plan    Monitor blood pressure ,continue lisinopril            VTE Prophylaxis: Pharmacologic VTE Prophylaxis contraindicated due to Possible intracerebral hematoma  / sequential compression device   Code Status: DNR/DNI  POLST: There is no POLST form on file for this patient (pre-hospital)      Anticipated Length of Stay:  Patient will be admitted on an Inpatient basis with an anticipated length of stay of  >2 midnights  Justification for Hospital Stay:  Neurosurgery evaluation    Total Time for Visit, including Counseling / Coordination of Care: 45 minutes  Greater than 50% of this total time spent on direct patient counseling and coordination of care  Chief Complaint:   Unable to assess    History of Present Illness:    Luis Canada is a 63 y o  male  with history of metastatic lung cancer who originally was admitted to [facility] with depression and [place] [facility] [place] of the head reported scalp abscess with rim enhancement and gas bubble  Of notes patient has a significant history of recent admission due to right-sided numbness, imaging study of the head showed left parietal mass with vasogenic edema  Patient underwent craniotomy on April 13  He also was diagnosed with PE /DVT ,IVC filter placement  Neurosurgery recommended to start him on lifelong AC 2 weeks after craniotomy  Upon presentation to [facility] patient's INR 7 7  Repeated INR 2 6  Around 4:30 a m  on the day of transfer to [facility] patient spiked fever to 38 3C    Blood culture ordered  Admitted on an inpatient based        Review of Systems:    Review of Systems   Unable to perform ROS: Mental status change       Past Medical and Surgical History:     Past Medical History:   Diagnosis Date    Alcohol abuse     Anxiety     Bipolar disorder ([address] )     COPD [facility]ch[facility]) [facility]     Depression     Diabetes mellitus ([address] )     Gastric ulcer     Hearing impaired person, bilateral     Hepatitis C     Hypertension     Lung cancer [facility]     Psychiatric disorder     Tobacco abuse     Vision impairment        Past Surgical History:   Procedure Laterality Date    ADENOIDECTOMY      BRONCHOSCOPY      CRANIOTOMY Left 4/13/2018    Procedure: Left image guided parietal craniotomy for resection of tumor;  Surgeon: German Carrasquillo MD;  [facility];  Service: Neurosurgery    LUNG BIOPSY      TONSILLECTOMY         Meds/Allergies:    Prior to Admission medications    Medication Sig Start Date End Date Taking?  Authorizing Provider   acetaminophen (TYLENOL) 325 mg tablet Take 2 tablets (650 mg total) by mouth every 6 (six) hours as needed for mild pain, headaches or fever 4/16/18   Margaret Fall MD   albuterol (PROVENTIL HFA,VENTOLIN HFA) 90 mcg/act inhaler Inhale 2 puffs every 6 (six) hours as needed for wheezing    Historical Provider, MD   ARIPiprazole (ABILIFY) 10 mg tablet Take 10 mg by mouth daily    Historical Provider, MD   budesonide-formoterol (SYMBICORT) 160-4 5 mcg/act inhaler Inhale 2 puffs 2 (two) times a day    Historical Provider, MD   buPROPion (WELLBUTRIN) 75 mg tablet Take 75 mg by mouth 2 (two) times a day    Historical Provider, MD   calcium carbonate (TUMS) 500 mg chewable tablet Chew 2 tablets (1,000 mg total) daily as needed for indigestion or heartburn 4/16/18   Margaret Fall MD   dexamethasone (DECADRON) 2 mg tablet Decadron  4mg q8h for 2 days= 12 tabs  4mg q12h for 2 days= 8 tabs  2mg q6h for 2 days= 8 tabs  2mg q8h for 2 days= 6  2mg q12h for 2 days=4 4/16/18   Margaret Fall MD   docusate sodium (COLACE) 100 mg capsule Take 1 capsule (100 mg total) by mouth 2 (two) times a day 4/16/18   Margaret Fall MD   folic acid (FOLVITE) 1 mg tablet Take 1 tablet (1 mg total) by mouth daily 4/17/18   Margaret Fall MD   insulin glargine (LANTUS) 100 units/mL subcutaneous injection Inject 15 Units under the skin daily at bedtime 4/16/18   Margaret Fall MD   insulin lispro (HumaLOG) 100 units/mL injection Inject 20 Units under the skin 3 (three) times a day with meals 4/16/18   Margaret Fall MD   levETIRAcetam (KEPPRA) 500 mg tablet Take 1 tablet (500 mg total) by mouth every 12 (twelve) hours 4/16/18   Margaret Fall MD lisinopril (ZESTRIL) 5 mg tablet Take 5 mg by mouth daily    Historical Provider, MD   Multiple Vitamin (MULTIVITAMIN) capsule Take 1 capsule by mouth daily    Historical Provider, MD   nicotine (NICODERM CQ) 21 mg/24 hr TD 24 hr patch Place 1 patch on the skin daily 4/17/18   Rosalia Doss MD   omeprazole (PriLOSEC) 20 mg delayed release capsule Take 1 capsule (20 mg total) by mouth daily 4/16/18   Rosalia Doss MD   sodium chloride 1 g tablet Take 1 g by mouth 2 (two) times a day    Historical Provider, MD   thiamine (VITAMIN B1) 100 mg tablet Take 1 tablet (100 mg total) by mouth daily 4/16/18   Rosalia Doss MD     I have reviewed home medications with a medical source (PCP, Pharmacy, other)  Allergies: Allergies   Allergen Reactions    Other      Bee stings       Social History:     Marital Status: Single   Occupation: none  Patient Pre-hospital Living Situation:  Home  Patient Pre-hospital Level of Mobility:  Unknown  Patient Pre-hospital Diet Restrictions:  Unknown  Substance Use History:   History   Alcohol Use    Yes     Comment: 2L liquor weekly     History   Smoking Status    Current Every Day Smoker    Packs/day: 1 50    Years: 37 00   Smokeless Tobacco    Never Used     History   Drug use: Unknown       Family History:    non-contributory    Physical Exam:     Vitals:   Blood Pressure: 133/72 (05/03/18 1955)  Pulse: 97 (05/03/18 1955)  Temperature: 99 °F (37 2 °C) (05/03/18 1955)  Temp Source: Oral (05/03/18 1955)  Respirations: 20 (05/03/18 1955)  Height: 5' 9" (175 3 cm) (05/03/18 1955)  SpO2: 94 % (05/03/18 1955)    Physical Exam   Constitutional: No distress  HENT:   Left-sided post craniotomy incision, drainage noted   Eyes: Pupils are equal, round, and reactive to light  Neck: Normal range of motion  Cardiovascular: Regular rhythm  Pulmonary/Chest: No respiratory distress  Abdominal: He exhibits no distension  Musculoskeletal: He exhibits edema     Neurological: He is alert  Skin: Skin is warm  Psychiatric: He has a normal mood and affect  Additional Data:     Lab Results: I have personally reviewed pertinent reports  Invalid input(s): LABALBU    Results from last 7 days  Lab Units 05/03/18  2117   INR  2 59*       Results from last 7 days  Lab Units 05/03/18  2150   POC GLUCOSE mg/dl 312*           Imaging: I have personally reviewed pertinent reports  No orders to display         Allscripts / Epic Records Reviewed: Yes     ** Please Note: This note has been constructed using a voice recognition system   **

## 2018-05-04 NOTE — ASSESSMENT & PLAN NOTE
Patient was transferred from [facility] for neurosurgery consult after CT of the head reported " scalp abscess with rim enhancement and gas bubbles ,developing left extra-axial fluid hyperdense hematoma or empyema ,2 cm left parietal lobe rim-enhancing  abscess with gas bubble and surrounding mild vasogenic edema no midline shift or  hydrocephalus"  Continue neurochecks  Recheck INR  Fall precautions  Bedside speech and swallow  Currently at his baseline of mentality  Fever spike up to 38 3 C noted this a m  blood culture ordered, Tylenol ordered   Prognosis is poor

## 2018-05-04 NOTE — SPEECH THERAPY NOTE
Speech-Language Pathology Bedside Swallow Evaluation        Patient Name: Kat Doe    BJROG'I Date: 5/4/2018     Problem List  Patient Active Problem List   Diagnosis    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     Metastatic lung [facility] (metastasis [facility] lung to other site) [facility]    Dependence on nicotine from cigarettes    Alcohol abuse    Essential hypertension    Insulin-dependent diabetes mellitus    Bipolar disorder    Leukocytosis    Deep vein thrombosis (DVT) of distal vein of left lower extremity ([address] )    PE (pulmonary thromboembolism) (HCC)    Cerebral edema ([address] )    Brain metastases (HCC)    Abnormal CT of the head    Supratherapeutic INR       Past Medical History  Past Medical History:   Diagnosis Date    Alcohol abuse     Anxiety     Bipolar disorder [facility]     COPD (chronic obstructive pulmonary disease) ([facility]     Depression     Diabetes mellitus ([address] )     Gastric ulcer     Hearing impaired person, bilateral     Hepatitis C     Hypertension     Lung cancer [facility]     Psychiatric disorder     Tobacco abuse     Vision impairment        Past Surgical History  Past Surgical History:   Procedure Laterality Date    ADENOIDECTOMY      BRONCHOSCOPY      CRANIOTOMY Left 4/13/2018    Procedure: Left image guided parietal craniotomy for resection of tumor;  Surgeon: Arlene Perdue MD;  [facility];  Service: Neurosurgery    LUNG BIOPSY      TONSILLECTOMY         Summary    Oral and pharyngeal stages of swallowing appear WNL  No overt s/s aspiration  Recommendations:   Diet: regular diet and thin liquids   Meds: whole with liquid   Aspiration precautions and compensatory swallowing strategies: upright posture and only feed when fully alert- Staff to feed pt  Other Recommendations/ considerations: no further follow up needed,        Current Medical Status  Pt is a 63 y o  male who presented to [facility] 5/3/18 with Abnormal CT of the head   Pt was transferred from [facility]   Pt with history of metastatic lung cancer who originally was admitted to [facility] with depression and transferred to [facility] after the CT of the head reported scalp abscess with rim enhancement and gas bubble  Of notes patient has a significant history of recent admission due to right-sided numbness, imaging study of the head showed left parietal mass with vasogenic edema  Patient underwent craniotomy on April 13  He also was diagnosed with PE /DVT ,IVC filter placement  Neurosurgery recommended to start him on lifelong AC 2 weeks after craniotomy  Past medical history:   Please see H&P for details      Special Studies:  CT-head: 4/14 Postsurgical changes of craniotomy and resection of left posterior parietal mass with scattered foci of air and hemorrhage at the resection site and unchanged vasogenic edema  MRI ordered  Social/Education/Vocational Hx:  Pt lives alone      Swallow Information   Current Risks for Dysphagia & Aspiration: known brain mets     Current Symptoms/Concerns: mental status change    Current Diet: regular diet and thin liquids      Baseline Diet: regular diet and thin liquids      Baseline Assessment   Behavior/Cognition: alert    Speech/Language Status: able to follow some simple commands and answer simple questions  apparent expressive aphasia  Patient Positioning: upright in bed       Swallow Mechanism Exam   Facial: symmetrical  Labial: WFL  Lingual: WFL  Velum: unable to visualize  Mandible: adequate ROM  Dentition: limited dentition  Vocal quality:clear/adequate   Volitional Cough: unable to initiate volitional cough   Respiratory: RA      Consistencies Assessed and Performance   Consistencies Administered: thin liquids, puree, mechanical soft solids and soft solids    Oral Stage: Orlal processing WNL  Good oral control of liquids by straw  Pharyngeal Stage: Swallows timely and complete  No overt s/s aspiration  Esophageal Concerns: none reported    Results Reviewed with: patient and RN

## 2018-05-04 NOTE — PROGRESS NOTES
[address] Internal Medicine Progress Note  Patient: Sejal Schulte 63 y o  male   MRN: [id number]  PCP: Stanislaw Aranda MD  [facility] Encounter: [id number]  Date Of Visit: 05/04/18    Assessment/plan:  1  Scalp infection/abscess with concern for cerebral empyema - discussed with neurosurgery - for surgery in am   On Vancomycin, Cefepime  2  H/o PE/DVT - has IVC filter  Anticoagulation held for surgery  INR needs to be below 1 4 for surgery  3  Stage IV squamous cell carcinoma of the lung - on immunotherapy  Had requested hospice while at [address] [place] [address] but wants surgery and treatment for #1 at present  Pain meds per palliative care  4  Encephalopathy - toxic-metabolic  Supportive care  Treat #1  Maintain adequate hydration  Monitor electrolytes and replete  5  Hyponatremia - IVF's, monitor  Ct NaCl tabs  6  Coagulopathy - due to Coumadin - INR improved to 1 49  Coumadin held for surgery  7  Bipolar depression - ct current regimen  Meds adjusted recently at [address] [place] [address]  8  H/o alcohol abuse - ct thiamine, folic acid  9  Type 2 diabetes - with hyperglycemia  Fluctuating oral intake and refuses blood tests at times  Avoid hypoglycemia  NPO tonight for surgery in am  Ct current dose of Lantus with SSI  May need insulin infusion - possibly start in am perioperatively if agreeable to get blood sugars checked  VTE Pharmacologic Prophylaxis:   Pharmacologic: Anticoagulation held for surgery tomorrow  Mechanical: Mechanical VTE prophylaxis in place  Discussions with Specialists or Other Care Team Provider: Discussed with Dr Harman Saldana and Lynn Garcia  Education and Discussions with Family / Patient: Discussed with brother Hoa Santana on the phone    Time Spent for Care: 45 minutes  More than 50% of total time spent on counseling and coordination of care as described above      Current Length of Stay: 1 day(s)    Current Patient Status: Inpatient   Certification Statement: The patient will continue to require additional inpatient hospital stay due to empyema    Code Status: Level 3 - DNAR and DNI    Subjective:   Confused intermittently  Tmax 100 9 F early this morning  Concern for possible empyema with plan for surgery in am  No headache at present  No nausea, vomiting or diarrhea  Objective:     Vitals:   Temp (24hrs), Av 8 °F (37 1 °C), Min:97 2 °F (36 2 °C), Max:100 9 °F (38 3 °C)    HR:  [] 97  Resp:  [17-20] 17  BP: (132-163)/(71-89) 145/71  SpO2:  [93 %-99 %] 99 %     Physical Exam:     Physical Exam   HENT:   Mouth/Throat: Oropharynx is clear and moist    Eyes: EOM are normal    Neck: Neck supple  No JVD present  No tracheal deviation present  No thyromegaly present  Cardiovascular: Normal rate, regular rhythm and normal heart sounds  Pulmonary/Chest: Breath sounds normal  No respiratory distress  He has no wheezes  He has no rales  Abdominal: Soft  Bowel sounds are normal  He exhibits no distension  There is no tenderness  There is no rebound  Musculoskeletal: He exhibits no edema  Neurological:   Cognitive impairment  Slow response  Skin:   Left parietal scalp incision with erythema, edema and purulent drainage       Additional Data:     Labs:      Results from last 7 days  Lab Units 18  1052   WBC Thousand/uL 11 09*   HEMOGLOBIN g/dL 10 3*   HEMATOCRIT % 31 5*   PLATELETS Thousands/uL 263   NEUTROS PCT % 87*   LYMPHS PCT % 8*   MONOS PCT % 5   EOS PCT % 0       Results from last 7 days  Lab Units 18  1051   SODIUM mmol/L 127*   POTASSIUM mmol/L 4 5   CHLORIDE mmol/L 93*   CO2 mmol/L 29   BUN mg/dL 23   CREATININE mg/dL 1 12   CALCIUM mg/dL 9 2   GLUCOSE RANDOM mg/dL 326*       Results from last 7 days  Lab Units 18  1051   INR  1 49*       * I Have Reviewed All Lab Data Listed Above  * Additional Pertinent Lab Tests Reviewed:  All [place] Admission Reviewed      Last 24 Hours Medication List:     Current Facility-Administered Medications:  acetaminophen 650 mg Oral Q4H PRN Junior Kendal MD    albuterol 2 puff Inhalation Q4H PRN Nicole Cespedes MD    ARIPiprazole 10 mg Oral Daily Junior Kendal MD    budesonide-formoterol 2 puff Inhalation BID Junior Kendal MD    buPROPion 75 mg Oral BID Junior Kendal MD    calcium carbonate 1,000 mg Oral Daily PRN Junior Kendal MD    cefepime 2,000 mg Intravenous Q8H Naa Maher MD Last Rate: 2,000 mg (05/04/18 1357)   chlorhexidine 15 mL Swish & Spit Q12H [address] Jessee Gallagher PA-C    diazepam 5 mg Intravenous Q6H PRN Sandra Flores DO    folic acid 1 mg Oral Daily Junior Kendal MD    HYDROmorphone 1 mg Intravenous Q4H PRN Junior Kendal MD    insulin glargine 15 Units Subcutaneous HS Junior Kendal MD    insulin lispro 1-6 Units Subcutaneous TID AC Junior Kendal MD    insulin lispro 1-6 Units Subcutaneous HS Junior Kendal MD    levETIRAcetam 500 mg Oral Q12H [address] Junior Kendal MD    lisinopril 5 mg Oral Daily Junior Kendal MD    multivitamin-minerals 1 tablet Oral Daily Junior Kendal MD    nicotine 1 patch Transdermal Daily Junior Kendal MD    ondansetron 4 mg Intravenous Q4H PRN Junior Kendal MD    oxyCODONE 5 mg Oral Q4H PRN Junior Kendal MD    pantoprazole 20 mg Oral Early Morning Junior Kendal MD    polyethylene glycol 17 g Oral Daily Junior Kendal MD    senna-docusate sodium 2 tablet Oral Daily Junior Kendal MD    sodium chloride 75 mL/hr Intravenous Continuous Carmina Jose MD Last Rate: 75 mL/hr (05/04/18 1534)   sodium chloride 1 g Oral BID Junior Kendal MD    thiamine 100 mg Oral Daily Junior Kendal MD    vancomycin 15 mg/kg Intravenous Q12H Linn Etienne MD Last Rate: 1,250 mg (05/04/18 1124)        Today, Patient Was Seen By: Carmina Jose MD    ** Please Note: Dragon 360 Dictation voice to text software may have been used in the creation of this document   **

## 2018-05-04 NOTE — RESPIRATORY THERAPY NOTE
RT Protocol Note  Love Poag 63 y o  male MRN: [id number]  Unit/Bed#: [facility] 904-01 Encounter: [id number]    Assessment    Principal Problem:    Abnormal CT of the head  Active Problems:    Metastatic lung cancer (metastasis from lung to other site) [facility])    Essential hypertension    Insulin-dependent diabetes mellitus    Deep vein thrombosis (DVT) of distal vein of left lower extremity (HCC)    PE (pulmonary thromboembolism) (HCC)    Brain metastases (HCC)    Supratherapeutic INR      Home Pulmonary Medications:  Symbicort, Albuterol mdi prn       Past Medical History:   Diagnosis Date    Alcohol abuse     Anxiety     Bipolar disorder ([place] [id number] )     COPD (chronic obstructive pulmonary disease) ([place] [address] )     Depression     Diabetes mellitus ([place] [address] )     Gastric ulcer     Hearing impaired person, bilateral     Hepatitis C     Hypertension     Lung cancer ([place] [address] )     Psychiatric disorder     Tobacco abuse     Vision impairment      Social History     Social History    Marital status: Single     Spouse name: N/A    Number of children: N/A    Years of education: N/A     Social History Main Topics    Smoking status: Current Every Day Smoker     Packs/day: 1 50     Years: 37 00    Smokeless tobacco: Never Used    Alcohol use Yes      Comment: 2L liquor weekly    Drug use: Unknown    Sexual activity: Not Asked     Other Topics Concern    None     Social History Narrative    None       Subjective         Objective    Physical Exam:   Assessment Type: Assess only  General Appearance: Awake  Respiratory Pattern: Normal  Chest Assessment: Chest expansion symmetrical  Bilateral Breath Sounds: Clear  Cough: Non-productive  O2 Device: RA    Vitals:  Blood pressure 133/72, pulse 97, temperature 99 °F (37 2 °C), temperature source Oral, resp  rate 20, height 5' 9" (1 753 m), SpO2 96 %  Imaging and other studies: I have personally reviewed pertinent reports        O2 Device: RA     Plan    Respiratory Plan: Home Bronchodilator Patient pathway        Resp Comments: Pt  evaluated at bedside per Respiratory Protocol  Pt  has hx  of COPD/Lung CA and states he takes symbicort and Albuterol mdi prn at home  Pt 's Breath sounds clear bilaterally at this time  Will order Albuterol MDI prn per Respiratory Protocol

## 2018-05-04 NOTE — PROGRESS NOTES
Pt repeatedly refusing morning bloodwork and blood cultures on grounds that he might "incriminate himself"  Explained the importance of bloodwork and cultures  Still refusing  Mamadou Lopez with [facility] aware

## 2018-05-05 ENCOUNTER — APPOINTMENT (INPATIENT)
Dept: NEUROLOGY | Facility: AMBULATORY SURGERY CENTER | Age: 63
DRG: 515 | End: 2018-05-05
Payer: COMMERCIAL

## 2018-05-05 ENCOUNTER — ANESTHESIA EVENT (INPATIENT)
Dept: PERIOP | Facility: HOSPITAL | Age: 63
DRG: 515 | End: 2018-05-05
Payer: COMMERCIAL

## 2018-05-05 ENCOUNTER — ANESTHESIA (INPATIENT)
Dept: PERIOP | Facility: HOSPITAL | Age: 63
DRG: 515 | End: 2018-05-05
Payer: COMMERCIAL

## 2018-05-05 ENCOUNTER — APPOINTMENT (INPATIENT)
Dept: RADIOLOGY | Facility: HOSPITAL | Age: 63
DRG: 515 | End: 2018-05-05
Payer: COMMERCIAL

## 2018-05-05 LAB
ABO GROUP BLD: NORMAL
ALBUMIN SERPL BCP-MCNC: 2.4 G/DL (ref 3.5–5)
ALP SERPL-CCNC: 59 U/L (ref 46–116)
ALT SERPL W P-5'-P-CCNC: 17 U/L (ref 12–78)
ANION GAP SERPL CALCULATED.3IONS-SCNC: 10 MMOL/L (ref 4–13)
ANION GAP SERPL CALCULATED.3IONS-SCNC: 6 MMOL/L (ref 4–13)
ANION GAP SERPL CALCULATED.3IONS-SCNC: 9 MMOL/L (ref 4–13)
APTT PPP: 33 SECONDS (ref 23–35)
AST SERPL W P-5'-P-CCNC: 6 U/L (ref 5–45)
BASE EXCESS BLDA CALC-SCNC: -1.1 MMOL/L
BASE EXCESS BLDA CALC-SCNC: 1 MMOL/L (ref -2–3)
BASOPHILS # BLD AUTO: 0.01 THOUSANDS/ΜL (ref 0–0.1)
BASOPHILS NFR BLD AUTO: 0 % (ref 0–1)
BILIRUB SERPL-MCNC: 0.56 MG/DL (ref 0.2–1)
BLD GP AB SCN SERPL QL: NEGATIVE
BUN SERPL-MCNC: 22 MG/DL (ref 5–25)
BUN SERPL-MCNC: 23 MG/DL (ref 5–25)
BUN SERPL-MCNC: 27 MG/DL (ref 5–25)
CA-I BLD-SCNC: 1.09 MMOL/L (ref 1.12–1.32)
CALCIUM SERPL-MCNC: 7.6 MG/DL (ref 8.3–10.1)
CALCIUM SERPL-MCNC: 8 MG/DL (ref 8.3–10.1)
CALCIUM SERPL-MCNC: 8.5 MG/DL (ref 8.3–10.1)
CHLORIDE SERPL-SCNC: 100 MMOL/L (ref 100–108)
CHLORIDE SERPL-SCNC: 101 MMOL/L (ref 100–108)
CHLORIDE SERPL-SCNC: 97 MMOL/L (ref 100–108)
CO2 SERPL-SCNC: 23 MMOL/L (ref 21–32)
CO2 SERPL-SCNC: 23 MMOL/L (ref 21–32)
CO2 SERPL-SCNC: 24 MMOL/L (ref 21–32)
CREAT SERPL-MCNC: 0.79 MG/DL (ref 0.6–1.3)
CREAT SERPL-MCNC: 0.93 MG/DL (ref 0.6–1.3)
CREAT SERPL-MCNC: 1.06 MG/DL (ref 0.6–1.3)
EOSINOPHIL # BLD AUTO: 0.2 THOUSAND/ΜL (ref 0–0.61)
EOSINOPHIL NFR BLD AUTO: 2 % (ref 0–6)
ERYTHROCYTE [DISTWIDTH] IN BLOOD BY AUTOMATED COUNT: 15.2 % (ref 11.6–15.1)
ERYTHROCYTE [DISTWIDTH] IN BLOOD BY AUTOMATED COUNT: 15.2 % (ref 11.6–15.1)
EST. AVERAGE GLUCOSE BLD GHB EST-MCNC: 183 MG/DL
GFR SERPL CREATININE-BSD FRML MDRD: 76 ML/MIN/1.73SQ M
GFR SERPL CREATININE-BSD FRML MDRD: 89 ML/MIN/1.73SQ M
GFR SERPL CREATININE-BSD FRML MDRD: 98 ML/MIN/1.73SQ M
GLUCOSE SERPL-MCNC: 100 MG/DL (ref 65–140)
GLUCOSE SERPL-MCNC: 132 MG/DL (ref 65–140)
GLUCOSE SERPL-MCNC: 139 MG/DL (ref 65–140)
GLUCOSE SERPL-MCNC: 146 MG/DL (ref 65–140)
GLUCOSE SERPL-MCNC: 150 MG/DL (ref 65–140)
GLUCOSE SERPL-MCNC: 154 MG/DL (ref 65–140)
GLUCOSE SERPL-MCNC: 171 MG/DL (ref 65–140)
GLUCOSE SERPL-MCNC: 172 MG/DL (ref 65–140)
GLUCOSE SERPL-MCNC: 174 MG/DL (ref 65–140)
GLUCOSE SERPL-MCNC: 178 MG/DL (ref 65–140)
GLUCOSE SERPL-MCNC: 186 MG/DL (ref 65–140)
GLUCOSE SERPL-MCNC: 187 MG/DL (ref 65–140)
GLUCOSE SERPL-MCNC: 195 MG/DL (ref 65–140)
GLUCOSE SERPL-MCNC: 50 MG/DL (ref 65–140)
GLUCOSE SERPL-MCNC: 73 MG/DL (ref 65–140)
HBA1C MFR BLD: 8 % (ref 4.2–6.3)
HCO3 BLDA-SCNC: 24 MMOL/L (ref 22–28)
HCO3 BLDA-SCNC: 25.2 MMOL/L (ref 24–30)
HCT VFR BLD AUTO: 27.8 % (ref 36.5–49.3)
HCT VFR BLD AUTO: 29.8 % (ref 36.5–49.3)
HCT VFR BLD CALC: 24 % (ref 36.5–49.3)
HGB BLD-MCNC: 10.2 G/DL (ref 12–17)
HGB BLD-MCNC: 9.4 G/DL (ref 12–17)
HGB BLDA-MCNC: 8.2 G/DL (ref 12–17)
HOROWITZ INDEX BLDA+IHG-RTO: 100 MM[HG]
INR PPP: 1.37 (ref 0.86–1.16)
INR PPP: 1.44 (ref 0.86–1.16)
LACTATE SERPL-SCNC: 0.7 MMOL/L (ref 0.5–2)
LYMPHOCYTES # BLD AUTO: 1.47 THOUSANDS/ΜL (ref 0.6–4.47)
LYMPHOCYTES NFR BLD AUTO: 15 % (ref 14–44)
MCH RBC QN AUTO: 29.7 PG (ref 26.8–34.3)
MCH RBC QN AUTO: 29.7 PG (ref 26.8–34.3)
MCHC RBC AUTO-ENTMCNC: 33.8 G/DL (ref 31.4–37.4)
MCHC RBC AUTO-ENTMCNC: 34.2 G/DL (ref 31.4–37.4)
MCV RBC AUTO: 87 FL (ref 82–98)
MCV RBC AUTO: 88 FL (ref 82–98)
MONOCYTES # BLD AUTO: 0.74 THOUSAND/ΜL (ref 0.17–1.22)
MONOCYTES NFR BLD AUTO: 8 % (ref 4–12)
NEUTROPHILS # BLD AUTO: 7.36 THOUSANDS/ΜL (ref 1.85–7.62)
NEUTS SEG NFR BLD AUTO: 75 % (ref 43–75)
NRBC BLD AUTO-RTO: 0 /100 WBCS
O2 CT BLDA-SCNC: 15.1 ML/DL (ref 16–23)
OSMOLALITY UR/SERPL-RTO: 278 MMOL/KG (ref 282–298)
OSMOLALITY UR: 517 MMOL/KG
OXYHGB MFR BLDA: 98.9 % (ref 94–97)
PCO2 BLD: 26 MMOL/L (ref 21–32)
PCO2 BLD: 39 MM HG (ref 42–50)
PCO2 BLDA: 41.9 MM HG (ref 36–44)
PEEP RESPIRATORY: 5 CM[H2O]
PH BLD: 7.42 [PH] (ref 7.3–7.4)
PH BLDA: 7.38 [PH] (ref 7.35–7.45)
PLATELET # BLD AUTO: 245 THOUSANDS/UL (ref 149–390)
PLATELET # BLD AUTO: 276 THOUSANDS/UL (ref 149–390)
PMV BLD AUTO: 10.1 FL (ref 8.9–12.7)
PMV BLD AUTO: 10.5 FL (ref 8.9–12.7)
PO2 BLD: 257 MM HG (ref 35–45)
PO2 BLDA: 397.2 MM HG (ref 75–129)
POTASSIUM BLD-SCNC: 4.1 MMOL/L (ref 3.5–5.3)
POTASSIUM SERPL-SCNC: 3.8 MMOL/L (ref 3.5–5.3)
POTASSIUM SERPL-SCNC: 4.3 MMOL/L (ref 3.5–5.3)
POTASSIUM SERPL-SCNC: 4.5 MMOL/L (ref 3.5–5.3)
PROT SERPL-MCNC: 6.4 G/DL (ref 6.4–8.2)
PROTHROMBIN TIME: 16.9 SECONDS (ref 12.1–14.4)
PROTHROMBIN TIME: 17.6 SECONDS (ref 12.1–14.4)
RBC # BLD AUTO: 3.17 MILLION/UL (ref 3.88–5.62)
RBC # BLD AUTO: 3.44 MILLION/UL (ref 3.88–5.62)
RH BLD: NEGATIVE
SAO2 % BLD FROM PO2: 100 % (ref 95–98)
SODIUM 24H UR-SCNC: 104 MOL/L
SODIUM BLD-SCNC: 133 MMOL/L (ref 136–145)
SODIUM SERPL-SCNC: 130 MMOL/L (ref 136–145)
SODIUM SERPL-SCNC: 130 MMOL/L (ref 136–145)
SODIUM SERPL-SCNC: 133 MMOL/L (ref 136–145)
SPECIMEN EXPIRATION DATE: NORMAL
SPECIMEN SOURCE: ABNORMAL
SPECIMEN SOURCE: ABNORMAL
URATE SERPL-MCNC: 4.7 MG/DL (ref 4.2–8)
VANCOMYCIN TROUGH SERPL-MCNC: 17.9 UG/ML (ref 10–20)
VENT AC: 14
VENT- AC: AC
VT SETTING VENT: 500 ML
WBC # BLD AUTO: 11.94 THOUSAND/UL (ref 4.31–10.16)
WBC # BLD AUTO: 9.84 THOUSAND/UL (ref 4.31–10.16)

## 2018-05-05 PROCEDURE — 87186 SC STD MICRODIL/AGAR DIL: CPT | Performed by: NEUROLOGICAL SURGERY

## 2018-05-05 PROCEDURE — 87147 CULTURE TYPE IMMUNOLOGIC: CPT | Performed by: NEUROLOGICAL SURGERY

## 2018-05-05 PROCEDURE — 85025 COMPLETE CBC W/AUTO DIFF WBC: CPT | Performed by: STUDENT IN AN ORGANIZED HEALTH CARE EDUCATION/TRAINING PROGRAM

## 2018-05-05 PROCEDURE — 86901 BLOOD TYPING SEROLOGIC RH(D): CPT | Performed by: INTERNAL MEDICINE

## 2018-05-05 PROCEDURE — 83930 ASSAY OF BLOOD OSMOLALITY: CPT | Performed by: STUDENT IN AN ORGANIZED HEALTH CARE EDUCATION/TRAINING PROGRAM

## 2018-05-05 PROCEDURE — 84300 ASSAY OF URINE SODIUM: CPT | Performed by: STUDENT IN AN ORGANIZED HEALTH CARE EDUCATION/TRAINING PROGRAM

## 2018-05-05 PROCEDURE — 94640 AIRWAY INHALATION TREATMENT: CPT

## 2018-05-05 PROCEDURE — 82948 REAGENT STRIP/BLOOD GLUCOSE: CPT

## 2018-05-05 PROCEDURE — 87075 CULTR BACTERIA EXCEPT BLOOD: CPT | Performed by: NEUROLOGICAL SURGERY

## 2018-05-05 PROCEDURE — 82805 BLOOD GASES W/O2 SATURATION: CPT | Performed by: STUDENT IN AN ORGANIZED HEALTH CARE EDUCATION/TRAINING PROGRAM

## 2018-05-05 PROCEDURE — 86850 RBC ANTIBODY SCREEN: CPT | Performed by: INTERNAL MEDICINE

## 2018-05-05 PROCEDURE — 71045 X-RAY EXAM CHEST 1 VIEW: CPT

## 2018-05-05 PROCEDURE — 83605 ASSAY OF LACTIC ACID: CPT | Performed by: STUDENT IN AN ORGANIZED HEALTH CARE EDUCATION/TRAINING PROGRAM

## 2018-05-05 PROCEDURE — P9017 PLASMA 1 DONOR FRZ W/IN 8 HR: HCPCS

## 2018-05-05 PROCEDURE — 82330 ASSAY OF CALCIUM: CPT

## 2018-05-05 PROCEDURE — 84550 ASSAY OF BLOOD/URIC ACID: CPT | Performed by: STUDENT IN AN ORGANIZED HEALTH CARE EDUCATION/TRAINING PROGRAM

## 2018-05-05 PROCEDURE — 87205 SMEAR GRAM STAIN: CPT | Performed by: NEUROLOGICAL SURGERY

## 2018-05-05 PROCEDURE — 94760 N-INVAS EAR/PLS OXIMETRY 1: CPT

## 2018-05-05 PROCEDURE — 00C70ZZ EXTIRPATION OF MATTER FROM CEREBRAL HEMISPHERE, OPEN APPROACH: ICD-10-PCS | Performed by: NEUROLOGICAL SURGERY

## 2018-05-05 PROCEDURE — 94002 VENT MGMT INPAT INIT DAY: CPT

## 2018-05-05 PROCEDURE — 99233 SBSQ HOSP IP/OBS HIGH 50: CPT | Performed by: INTERNAL MEDICINE

## 2018-05-05 PROCEDURE — 80048 BASIC METABOLIC PNL TOTAL CA: CPT | Performed by: INTERNAL MEDICINE

## 2018-05-05 PROCEDURE — 36556 INSERT NON-TUNNEL CV CATH: CPT | Performed by: INTERNAL MEDICINE

## 2018-05-05 PROCEDURE — 4A133J1 MONITORING OF ARTERIAL PULSE, PERIPHERAL, PERCUTANEOUS APPROACH: ICD-10-PCS | Performed by: INTERNAL MEDICINE

## 2018-05-05 PROCEDURE — 87176 TISSUE HOMOGENIZATION CULTR: CPT | Performed by: NEUROLOGICAL SURGERY

## 2018-05-05 PROCEDURE — 5A1945Z RESPIRATORY VENTILATION, 24-96 CONSECUTIVE HOURS: ICD-10-PCS | Performed by: INTERNAL MEDICINE

## 2018-05-05 PROCEDURE — 31500 INSERT EMERGENCY AIRWAY: CPT | Performed by: INTERNAL MEDICINE

## 2018-05-05 PROCEDURE — 85610 PROTHROMBIN TIME: CPT | Performed by: INTERNAL MEDICINE

## 2018-05-05 PROCEDURE — 85730 THROMBOPLASTIN TIME PARTIAL: CPT | Performed by: INTERNAL MEDICINE

## 2018-05-05 PROCEDURE — 86900 BLOOD TYPING SEROLOGIC ABO: CPT | Performed by: INTERNAL MEDICINE

## 2018-05-05 PROCEDURE — 009630Z DRAINAGE OF CEREBRAL VENTRICLE WITH DRAINAGE DEVICE, PERCUTANEOUS APPROACH: ICD-10-PCS | Performed by: NEUROLOGICAL SURGERY

## 2018-05-05 PROCEDURE — 85027 COMPLETE CBC AUTOMATED: CPT | Performed by: INTERNAL MEDICINE

## 2018-05-05 PROCEDURE — 30233K1 TRANSFUSION OF NONAUTOLOGOUS FROZEN PLASMA INTO PERIPHERAL VEIN, PERCUTANEOUS APPROACH: ICD-10-PCS | Performed by: INTERNAL MEDICINE

## 2018-05-05 PROCEDURE — 87070 CULTURE OTHR SPECIMN AEROBIC: CPT | Performed by: NEUROLOGICAL SURGERY

## 2018-05-05 PROCEDURE — 0NB40ZZ EXCISION OF LEFT PARIETAL BONE, OPEN APPROACH: ICD-10-PCS | Performed by: NEUROLOGICAL SURGERY

## 2018-05-05 PROCEDURE — 80053 COMPREHEN METABOLIC PANEL: CPT | Performed by: STUDENT IN AN ORGANIZED HEALTH CARE EDUCATION/TRAINING PROGRAM

## 2018-05-05 PROCEDURE — 84132 ASSAY OF SERUM POTASSIUM: CPT

## 2018-05-05 PROCEDURE — 4A133B1 MONITORING OF ARTERIAL PRESSURE, PERIPHERAL, PERCUTANEOUS APPROACH: ICD-10-PCS | Performed by: INTERNAL MEDICINE

## 2018-05-05 PROCEDURE — 80202 ASSAY OF VANCOMYCIN: CPT | Performed by: INTERNAL MEDICINE

## 2018-05-05 PROCEDURE — 0BH18EZ INSERTION OF ENDOTRACHEAL AIRWAY INTO TRACHEA, VIA NATURAL OR ARTIFICIAL OPENING ENDOSCOPIC: ICD-10-PCS | Performed by: INTERNAL MEDICINE

## 2018-05-05 PROCEDURE — 82803 BLOOD GASES ANY COMBINATION: CPT

## 2018-05-05 PROCEDURE — 95951 HB EEG MONITORING/VIDEORECORD: CPT

## 2018-05-05 PROCEDURE — 95951 PR EEG MONITORING/VIDEORECORD: CPT | Performed by: PSYCHIATRY & NEUROLOGY

## 2018-05-05 PROCEDURE — 83036 HEMOGLOBIN GLYCOSYLATED A1C: CPT | Performed by: INTERNAL MEDICINE

## 2018-05-05 PROCEDURE — 83935 ASSAY OF URINE OSMOLALITY: CPT | Performed by: STUDENT IN AN ORGANIZED HEALTH CARE EDUCATION/TRAINING PROGRAM

## 2018-05-05 PROCEDURE — 85610 PROTHROMBIN TIME: CPT | Performed by: PHYSICIAN ASSISTANT

## 2018-05-05 PROCEDURE — 82947 ASSAY GLUCOSE BLOOD QUANT: CPT

## 2018-05-05 PROCEDURE — 86927 PLASMA FRESH FROZEN: CPT

## 2018-05-05 PROCEDURE — 84295 ASSAY OF SERUM SODIUM: CPT

## 2018-05-05 PROCEDURE — 80048 BASIC METABOLIC PNL TOTAL CA: CPT | Performed by: STUDENT IN AN ORGANIZED HEALTH CARE EDUCATION/TRAINING PROGRAM

## 2018-05-05 PROCEDURE — 0NC40ZZ EXTIRPATION OF MATTER FROM LEFT PARIETAL BONE, OPEN APPROACH: ICD-10-PCS | Performed by: NEUROLOGICAL SURGERY

## 2018-05-05 PROCEDURE — 70450 CT HEAD/BRAIN W/O DYE: CPT

## 2018-05-05 PROCEDURE — 99291 CRITICAL CARE FIRST HOUR: CPT | Performed by: INTERNAL MEDICINE

## 2018-05-05 PROCEDURE — 85014 HEMATOCRIT: CPT

## 2018-05-05 PROCEDURE — 03HY32Z INSERTION OF MONITORING DEVICE INTO UPPER ARTERY, PERCUTANEOUS APPROACH: ICD-10-PCS | Performed by: INTERNAL MEDICINE

## 2018-05-05 PROCEDURE — 02HV33Z INSERTION OF INFUSION DEVICE INTO SUPERIOR VENA CAVA, PERCUTANEOUS APPROACH: ICD-10-PCS | Performed by: NEUROLOGICAL SURGERY

## 2018-05-05 PROCEDURE — 61320 CRNEC/CRNOT DRG ICR ABS STTL: CPT | Performed by: NEUROLOGICAL SURGERY

## 2018-05-05 RX ORDER — DEXTROSE MONOHYDRATE 25 G/50ML
INJECTION, SOLUTION INTRAVENOUS
Status: COMPLETED
Start: 2018-05-05 | End: 2018-05-06

## 2018-05-05 RX ORDER — SODIUM CHLORIDE 1000 MG
2 TABLET, SOLUBLE MISCELLANEOUS
Status: DISCONTINUED | OUTPATIENT
Start: 2018-05-05 | End: 2018-05-08

## 2018-05-05 RX ORDER — PROPOFOL 10 MG/ML
INJECTION, EMULSION INTRAVENOUS AS NEEDED
Status: DISCONTINUED | OUTPATIENT
Start: 2018-05-05 | End: 2018-05-05 | Stop reason: SURG

## 2018-05-05 RX ORDER — NOREPINEPHRINE BITARTRATE 1 MG/ML
INJECTION, SOLUTION INTRAVENOUS
Status: DISPENSED
Start: 2018-05-05 | End: 2018-05-06

## 2018-05-05 RX ORDER — FENTANYL CITRATE-0.9 % NACL/PF 10 MCG/ML
PLASTIC BAG, INJECTION (ML) INTRAVENOUS
Status: DISCONTINUED
Start: 2018-05-05 | End: 2018-05-05 | Stop reason: WASHOUT

## 2018-05-05 RX ORDER — ONDANSETRON 2 MG/ML
INJECTION INTRAMUSCULAR; INTRAVENOUS AS NEEDED
Status: DISCONTINUED | OUTPATIENT
Start: 2018-05-05 | End: 2018-05-05 | Stop reason: SURG

## 2018-05-05 RX ORDER — LORAZEPAM 2 MG/ML
INJECTION INTRAMUSCULAR
Status: COMPLETED
Start: 2018-05-05 | End: 2018-05-05

## 2018-05-05 RX ORDER — MIDAZOLAM HYDROCHLORIDE 1 MG/ML
INJECTION INTRAMUSCULAR; INTRAVENOUS AS NEEDED
Status: DISCONTINUED | OUTPATIENT
Start: 2018-05-05 | End: 2018-05-05 | Stop reason: SURG

## 2018-05-05 RX ORDER — FENTANYL CITRATE 50 UG/ML
25 INJECTION, SOLUTION INTRAMUSCULAR; INTRAVENOUS
Status: DISCONTINUED | OUTPATIENT
Start: 2018-05-05 | End: 2018-05-08

## 2018-05-05 RX ORDER — PHENYTOIN SODIUM 50 MG/ML
100 INJECTION, SOLUTION INTRAMUSCULAR; INTRAVENOUS EVERY 8 HOURS SCHEDULED
Status: DISCONTINUED | OUTPATIENT
Start: 2018-05-06 | End: 2018-05-05

## 2018-05-05 RX ORDER — SODIUM CHLORIDE 9 MG/ML
150 INJECTION, SOLUTION INTRAVENOUS CONTINUOUS
Status: DISCONTINUED | OUTPATIENT
Start: 2018-05-05 | End: 2018-05-06

## 2018-05-05 RX ORDER — PROPOFOL 10 MG/ML
5-50 INJECTION, EMULSION INTRAVENOUS
Status: DISCONTINUED | OUTPATIENT
Start: 2018-05-05 | End: 2018-05-07

## 2018-05-05 RX ORDER — SODIUM CHLORIDE 9 MG/ML
100 INJECTION, SOLUTION INTRAVENOUS CONTINUOUS
Status: DISCONTINUED | OUTPATIENT
Start: 2018-05-05 | End: 2018-05-05

## 2018-05-05 RX ORDER — SODIUM CHLORIDE, SODIUM LACTATE, POTASSIUM CHLORIDE, CALCIUM CHLORIDE 600; 310; 30; 20 MG/100ML; MG/100ML; MG/100ML; MG/100ML
20 INJECTION, SOLUTION INTRAVENOUS CONTINUOUS
Status: DISCONTINUED | OUTPATIENT
Start: 2018-05-05 | End: 2018-05-05

## 2018-05-05 RX ORDER — LORAZEPAM 2 MG/ML
2 INJECTION INTRAMUSCULAR ONCE
Status: COMPLETED | OUTPATIENT
Start: 2018-05-05 | End: 2018-05-05

## 2018-05-05 RX ORDER — MAGNESIUM HYDROXIDE 1200 MG/15ML
LIQUID ORAL AS NEEDED
Status: DISCONTINUED | OUTPATIENT
Start: 2018-05-05 | End: 2018-05-05 | Stop reason: HOSPADM

## 2018-05-05 RX ORDER — LEVALBUTEROL 1.25 MG/.5ML
1.25 SOLUTION, CONCENTRATE RESPIRATORY (INHALATION)
Status: DISCONTINUED | OUTPATIENT
Start: 2018-05-05 | End: 2018-05-08

## 2018-05-05 RX ORDER — BISACODYL 10 MG
10 SUPPOSITORY, RECTAL RECTAL DAILY PRN
Status: DISCONTINUED | OUTPATIENT
Start: 2018-05-05 | End: 2018-05-08

## 2018-05-05 RX ORDER — SODIUM CHLORIDE, SODIUM LACTATE, POTASSIUM CHLORIDE, CALCIUM CHLORIDE 600; 310; 30; 20 MG/100ML; MG/100ML; MG/100ML; MG/100ML
INJECTION, SOLUTION INTRAVENOUS CONTINUOUS PRN
Status: DISCONTINUED | OUTPATIENT
Start: 2018-05-05 | End: 2018-05-05 | Stop reason: SURG

## 2018-05-05 RX ORDER — ROCURONIUM BROMIDE 10 MG/ML
INJECTION, SOLUTION INTRAVENOUS AS NEEDED
Status: DISCONTINUED | OUTPATIENT
Start: 2018-05-05 | End: 2018-05-05 | Stop reason: SURG

## 2018-05-05 RX ORDER — DOCUSATE SODIUM 100 MG/1
100 CAPSULE, LIQUID FILLED ORAL 2 TIMES DAILY
Status: DISCONTINUED | OUTPATIENT
Start: 2018-05-05 | End: 2018-05-06

## 2018-05-05 RX ORDER — FENTANYL CITRATE 50 UG/ML
INJECTION, SOLUTION INTRAMUSCULAR; INTRAVENOUS
Status: COMPLETED
Start: 2018-05-05 | End: 2018-05-05

## 2018-05-05 RX ORDER — PROPOFOL 10 MG/ML
INJECTION, EMULSION INTRAVENOUS
Status: COMPLETED
Start: 2018-05-05 | End: 2018-05-05

## 2018-05-05 RX ORDER — FENTANYL CITRATE 50 UG/ML
INJECTION, SOLUTION INTRAMUSCULAR; INTRAVENOUS AS NEEDED
Status: DISCONTINUED | OUTPATIENT
Start: 2018-05-05 | End: 2018-05-05 | Stop reason: SURG

## 2018-05-05 RX ORDER — ONDANSETRON 2 MG/ML
4 INJECTION INTRAMUSCULAR; INTRAVENOUS ONCE AS NEEDED
Status: DISCONTINUED | OUTPATIENT
Start: 2018-05-05 | End: 2018-05-05 | Stop reason: HOSPADM

## 2018-05-05 RX ORDER — SODIUM CHLORIDE 9 MG/ML
INJECTION, SOLUTION INTRAVENOUS CONTINUOUS PRN
Status: DISCONTINUED | OUTPATIENT
Start: 2018-05-05 | End: 2018-05-05 | Stop reason: SURG

## 2018-05-05 RX ORDER — FENTANYL CITRATE 50 UG/ML
100 INJECTION, SOLUTION INTRAMUSCULAR; INTRAVENOUS ONCE
Status: COMPLETED | OUTPATIENT
Start: 2018-05-05 | End: 2018-05-05

## 2018-05-05 RX ORDER — SENNOSIDES 8.6 MG
1 TABLET ORAL
Status: DISCONTINUED | OUTPATIENT
Start: 2018-05-05 | End: 2018-05-08

## 2018-05-05 RX ADMIN — MIDAZOLAM HYDROCHLORIDE 0.5 MG: 1 INJECTION, SOLUTION INTRAMUSCULAR; INTRAVENOUS at 10:02

## 2018-05-05 RX ADMIN — ONDANSETRON 4 MG: 2 INJECTION INTRAMUSCULAR; INTRAVENOUS at 09:57

## 2018-05-05 RX ADMIN — SUGAMMADEX 346 MG: 100 INJECTION, SOLUTION INTRAVENOUS at 09:46

## 2018-05-05 RX ADMIN — FENTANYL CITRATE 200 MCG: 50 INJECTION, SOLUTION INTRAMUSCULAR; INTRAVENOUS at 08:41

## 2018-05-05 RX ADMIN — LORAZEPAM 2 MG: 2 INJECTION INTRAMUSCULAR at 13:01

## 2018-05-05 RX ADMIN — IPRATROPIUM BROMIDE 0.5 MG: 0.5 SOLUTION RESPIRATORY (INHALATION) at 19:30

## 2018-05-05 RX ADMIN — LORAZEPAM 2 MG: 2 INJECTION INTRAMUSCULAR; INTRAVENOUS at 13:01

## 2018-05-05 RX ADMIN — PROPOFOL 150 MG: 10 INJECTION, EMULSION INTRAVENOUS at 08:41

## 2018-05-05 RX ADMIN — ACETAMINOPHEN 650 MG: 325 TABLET, FILM COATED ORAL at 18:08

## 2018-05-05 RX ADMIN — ACETAMINOPHEN 650 MG: 325 TABLET, FILM COATED ORAL at 22:03

## 2018-05-05 RX ADMIN — SODIUM CHLORIDE: 0.9 INJECTION, SOLUTION INTRAVENOUS at 08:32

## 2018-05-05 RX ADMIN — SODIUM CHLORIDE 75 ML/HR: 0.9 INJECTION, SOLUTION INTRAVENOUS at 01:38

## 2018-05-05 RX ADMIN — CEFEPIME HYDROCHLORIDE 2000 MG: 2 INJECTION, POWDER, FOR SOLUTION INTRAVENOUS at 22:01

## 2018-05-05 RX ADMIN — PROPOFOL 50 MCG/KG/MIN: 10 INJECTION, EMULSION INTRAVENOUS at 12:40

## 2018-05-05 RX ADMIN — SODIUM CHLORIDE 75 ML/HR: 0.9 INJECTION, SOLUTION INTRAVENOUS at 10:44

## 2018-05-05 RX ADMIN — FENTANYL CITRATE 100 MCG: 50 INJECTION, SOLUTION INTRAMUSCULAR; INTRAVENOUS at 13:00

## 2018-05-05 RX ADMIN — SODIUM CHLORIDE 1000 ML: 0.9 INJECTION, SOLUTION INTRAVENOUS at 13:18

## 2018-05-05 RX ADMIN — NOREPINEPHRINE BITARTRATE 2 MCG/MIN: 1 INJECTION INTRAVENOUS at 15:00

## 2018-05-05 RX ADMIN — IPRATROPIUM BROMIDE 0.5 MG: 0.5 SOLUTION RESPIRATORY (INHALATION) at 13:20

## 2018-05-05 RX ADMIN — LORAZEPAM 2 MG: 2 INJECTION INTRAMUSCULAR at 13:00

## 2018-05-05 RX ADMIN — LEVETIRACETAM 1000 MG: 100 INJECTION, SOLUTION INTRAVENOUS at 12:30

## 2018-05-05 RX ADMIN — LEVALBUTEROL HYDROCHLORIDE 1.25 MG: 1.25 SOLUTION, CONCENTRATE RESPIRATORY (INHALATION) at 13:20

## 2018-05-05 RX ADMIN — PROPOFOL 40 MCG/KG/MIN: 10 INJECTION, EMULSION INTRAVENOUS at 22:35

## 2018-05-05 RX ADMIN — SODIUM CHLORIDE TAB 1 GM 2 G: 1 TAB at 16:17

## 2018-05-05 RX ADMIN — DEXTROSE MONOHYDRATE 25 ML: 25 INJECTION, SOLUTION INTRAVENOUS at 23:51

## 2018-05-05 RX ADMIN — CEFEPIME HYDROCHLORIDE 2000 MG: 2 INJECTION, POWDER, FOR SOLUTION INTRAVENOUS at 14:58

## 2018-05-05 RX ADMIN — ROCURONIUM BROMIDE 100 MG: 10 INJECTION INTRAVENOUS at 08:41

## 2018-05-05 RX ADMIN — Medication 1000 MG: at 21:24

## 2018-05-05 RX ADMIN — LORAZEPAM 2 MG: 2 INJECTION INTRAMUSCULAR; INTRAVENOUS at 13:00

## 2018-05-05 RX ADMIN — CEFEPIME HYDROCHLORIDE 2000 MG: 2 INJECTION, POWDER, FOR SOLUTION INTRAVENOUS at 09:30

## 2018-05-05 RX ADMIN — LEVALBUTEROL HYDROCHLORIDE 1.25 MG: 1.25 SOLUTION, CONCENTRATE RESPIRATORY (INHALATION) at 19:30

## 2018-05-05 RX ADMIN — FENTANYL CITRATE 50 MCG/HR: 50 INJECTION, SOLUTION INTRAMUSCULAR; INTRAVENOUS at 14:43

## 2018-05-05 RX ADMIN — ONDANSETRON 2 MG: 2 INJECTION INTRAMUSCULAR; INTRAVENOUS at 08:49

## 2018-05-05 RX ADMIN — INSULIN LISPRO 1 UNITS: 100 INJECTION, SOLUTION INTRAVENOUS; SUBCUTANEOUS at 05:55

## 2018-05-05 RX ADMIN — ACETAMINOPHEN 650 MG: 325 TABLET, FILM COATED ORAL at 04:24

## 2018-05-05 RX ADMIN — VANCOMYCIN HYDROCHLORIDE 1 G: 1 INJECTION, POWDER, LYOPHILIZED, FOR SOLUTION INTRAVENOUS at 09:44

## 2018-05-05 RX ADMIN — SODIUM CHLORIDE 0.5 UNITS/HR: 9 INJECTION, SOLUTION INTRAVENOUS at 14:25

## 2018-05-05 RX ADMIN — SENNOSIDES 8.6 MG: 8.6 TABLET ORAL at 22:03

## 2018-05-05 RX ADMIN — SODIUM CHLORIDE, SODIUM LACTATE, POTASSIUM CHLORIDE, AND CALCIUM CHLORIDE: .6; .31; .03; .02 INJECTION, SOLUTION INTRAVENOUS at 08:32

## 2018-05-05 RX ADMIN — NOREPINEPHRINE BITARTRATE 7 MCG/MIN: 1 INJECTION INTRAVENOUS at 12:30

## 2018-05-05 RX ADMIN — PROPOFOL 40 MCG/KG/MIN: 10 INJECTION, EMULSION INTRAVENOUS at 15:00

## 2018-05-05 NOTE — RESPIRATORY THERAPY NOTE
RT Ventilator Management Note  Yany Gutierrez 63 y o  male MRN: [id number]  Unit/Bed#: ICU 06 Encounter: [id number]      Daily Screen       5/5/2018 1200             Patient safety screen outcome[de-identified] Failed    Not Ready for Weaning due to[de-identified] Underline problem not resolved            Physical Exam:   Assessment Type: Assess only  General Appearance: Sedated  Respiratory Pattern: Assisted  Chest Assessment: Chest expansion symmetrical  Bilateral Breath Sounds: Clear, Diminished      Resp Comments: (P) no other vent changes, pt appears comfortable on current settings, will continue to monitor

## 2018-05-05 NOTE — PROGRESS NOTES
Spoke with Dr Ramiro Ba with [facility] plan to start insulin drip tonight for tight control for OR tomorrow  Will await orders  Also Dr Ramiro Ba asked to get morning labs earlier to check pts INR goal to be below 1 4  Will continue to monitor pt

## 2018-05-05 NOTE — PROCEDURES
Central Line Insertion  Date/Time: 5/5/2018 4:07 PM  Performed by: Richard Segal  Authorized by: Richard Segal     Patient location:  Bedside  Other Assisting Provider: Yes (comment) (Dr Zaki Desir)    Consent:     Consent obtained:  Written    Consent given by:  Healthcare agent (Brother)    Risks discussed:  Arterial puncture, incorrect placement, nerve damage, pneumothorax, infection and bleeding    Alternatives discussed:  No treatment and delayed treatment  Universal protocol:     Procedure explained and questions answered to patient or proxy's satisfaction: yes      Relevant documents present and verified: yes      Test results available and properly labeled: yes      Imaging studies available: yes      Required blood products, implants, devices, and special equipment available: yes      Site/side marked: yes      Immediately prior to procedure, a time out was called: yes      Patient identity confirmed:  Arm band, provided demographic data and hospital-assigned identification number  Pre-procedure details:     Hand hygiene: Hand hygiene performed prior to insertion      Sterile barrier technique: All elements of maximal sterile technique followed      Skin preparation:  ChloraPrep    Skin preparation agent: Skin preparation agent completely dried prior to procedure    Indications:     Central line indications: medications requiring central line and hemodynamic monitoring    Sedation:     Sedation type: Moderate (conscious) sedation (Propofol gtt)  Anesthesia (see MAR for exact dosages):      Anesthesia method:  Local infiltration    Local anesthetic:  Lidocaine 1% WITH epi (5ml)  Procedure details:     Location:  Right internal jugular    Vessel type: vein      Laterality:  Right    Approach: percutaneous technique used      Catheter type:  Triple lumen 16cm    Catheter size:  7 Fr    Landmarks identified: yes      Ultrasound guidance: yes      Sterile ultrasound techniques: Sterile gel and sterile probe covers were used      Number of attempts:  1    Successful placement: yes    Post-procedure details:     Post-procedure:  Dressing applied and line sutured    Assessment:  Blood return through all ports and free fluid flow    Patient tolerance of procedure:   Tolerated well, no immediate complications

## 2018-05-05 NOTE — PROGRESS NOTES
Pt up to ICU from PACU at 1210  Dr Garret Jenkins bedside  Pt still displaying R sided seizure symptoms  4mg ativan given and pt still displaying seizure like symptoms  10 of Propofol, 100mcg of fentanyl, and 100mg of succinylcholine given for intubation  Propofol gtt started  Levophed started  Pt sent for stat head CT  Will be placed on EMU this afternoon  Dr Garret Jenkins attempted to call family, but no contact could be obtained  Pt otherwise stable  Will continue to monitor condition

## 2018-05-05 NOTE — PROCEDURES
Intubation  Date/Time: 5/5/2018 12:15 PM  Performed by: Melinda Rajan by: Iliana Forrester     Patient location:  Bedside  Consent:     Consent obtained:  Emergent situation  Pre-procedure details:     Patient status:  Altered mental status    Pretreatment medications:  Fentanyl and propofol    Paralytics:  Succinylcholine  Indications:     Indications for intubation: airway protection and other (comment)      Indications for intubation comment:  STATUS EPILEPTICUS  Procedure details:     Preoxygenation:  Nonrebreather mask    CPR in progress: no      Intubation method:  Oral    Oral intubation technique:  Glidescope    Tube size (mm):  8 0    Tube type:  Cuffed    Number of attempts:  2    Ventilation between attempts: yes      Tube visualized through cords: yes    Placement assessment:     ETT to lip:  24cm    Tube secured with:  ETT avitia    Breath sounds:  Equal and absent over the epigastrium    Placement verification: chest rise, condensation, CXR verification and ETCO2 detector      Ventilator settings:  AC//14/1 0/5

## 2018-05-05 NOTE — OP NOTE
OPERATIVE REPORT  PATIENT NAME: Lori Sotomayor    :  1955  MRN: [id number]  Pt Location: BE OR ROOM 17    SURGERY DATE: 2018    Surgeon(s) and Role:     * Dilan Bowie MD - Primary    Preop Diagnosis:  Cerebral edema ([address] ) [G93 6]  Brain abscess [G06 0]  Abnormal CT of the head [R93 0]  Infection of bone flap, initial encounter ([facility] [address] ) [M41  7XXA]  Subdural empyema [G06 2]    Post-Op Diagnosis Codes:     * Cerebral edema (HCC) [G93 6]     * Brain abscess [G06 0]     * Abnormal CT of the head [R93 0]     * Infection of bone flap, initial encounter ([facility] [address] ) [P03  7XXA]     * Subdural empyema [G06 2]    Procedure(s) (LRB):  CRANIECTOMY FOR EXTRAXIAL EMPYEMA  with washout and debridement of subgaleal collection with removal of bone flap (Left)    Specimen(s):  ID Type Source Tests Collected by Time Destination   A : left parietal collection Tissue Brain ANAEROBIC CULTURE AND GRAM STAIN, CULTURE, TISSUE AND GRAM STAIN Dilan Bowie MD 2018  9:20 AM        Estimated Blood Loss:   Minimal    Drains:  Closed/Suction Drain Left Head Bulb 10 Fr  (Active)   Dressing Status Clean;Dry; Intact 2018  9:47 AM   Number of days: 0       Urethral Catheter Latex 16 Fr  (Active)   Site Assessment Clean;Skin intact 2018  9:03 AM   Collection Container Standard drainage bag 2018  9:03 AM   Securement Method Securing device (Describe) 2018  9:03 AM   Number of days: 0       Anesthesia Type:   General    Operative Indications:  Cerebral edema ([address] ) [G93 6]  Brain abscess [G06 0]  Abnormal CT of the head [R93 0]  Infection of bone flap, initial encounter ([facility] [address] ) [C30  7XXA]  Subdural empyema [G06 2]      Operative Findings:  No infection noted beneath the dura  Abscess in the subgaleal space surrounding the bone    Complications:   None    Procedure and Technique:  After adequate general endotracheal anesthesia the patient was placed supine on the operating table  The head was turned to the right    The scalp was clipped free of hair and prepped with Betadine soap then DuraPrep  Double layer drapes were placed in normal fashion and Betadine impregnated sticky drape was placed over these  A time-out was called and all parameters a time-out were followed  Antibiotics were held until after cultures  The previous incision was opened with a 15  Blade a cerebellar style we later retractor was used to maintain operative exposure  Immediately abscess was identified and was sent to the laboratory as specimen for a aerobic and anaerobic cultures  The Biomet bone fixation screws were removed and the entire bone flap was removed  A Pulsavac  was utilized to irrigate out the region and produce soft tissue debridement  The dura was opened over 1 cm region and there was no subdural abscess identified  Accordingly the dura was closed with a running 4 0 Nurolon suture  A 10 mm flat Leonardo-Menendez drain was placed  The galea was approximated with interrupted inverted to 0 Vicryl suture  The epidermis was closed with staples  Clean sterile dressing was placed  The patient did have a left brain right body seizure with tonic-clonic activity which did not generalize  Eye gaze was diverted to the right side during the event  There was 3 such events  These were treated with Versed and the patient was loaded with a 1000 mg of Keppra  No further events occurred following this         I was present for the entire procedure    Patient Disposition:  PACU     SIGNATURE: Mara Escamilla MD  DATE: May 5, 2018  TIME: 10:36 AM

## 2018-05-05 NOTE — ANESTHESIA POSTPROCEDURE EVALUATION
Post-Op Assessment Note      CV Status:  Stable    Mental Status:  Awake, confused and anxious    Hydration Status:  Euvolemic    PONV Controlled:  Controlled    Airway Patency:  Patent    Post Op Vitals Reviewed: Yes          Staff: Anesthesiologist, CRNA       Comments: Back to baseline          BP   146/68   Temp  97 6   Pulse  103   Resp   14   SpO2 99

## 2018-05-05 NOTE — ANESTHESIA PREPROCEDURE EVALUATION
Review of Systems/Medical History  Patient summary reviewed  Chart reviewed      Cardiovascular  EKG reviewed, Hypertension , No past MI , No cardiac stents  No PND,    Pulmonary  COPD ,        GI/Hepatic    Liver disease , Hepatitis C,             Endo/Other  Diabetes ,      GYN       Hematology   Musculoskeletal       Neurology   Psychology   Anxiety, Depression ,            Protime 12 1 - 14 4 seconds 16 9   H    INR 0 86 - 1 16 1 37   H       Specimen Collected: 05/05/18 07:11   Last Resulted: 05/05/18 07:33        POC Glucose 65 - 140 mg/dl 172   H       Specimen Collected: 05/05/18 05:54        ABO Grouping A    Rh Factor Negative    Antibody Screen Negative    Specimen Expiration Date [id number]       Specimen Collected: 05/05/18 03:36   Last Resulted: 05/05/18 04:40        Ref Range & Units 5/5/18 0329 Flag    WBC 4 31 - 10 16 Thousand/uL 11 94   H    RBC 3 88 - 5 62 Million/uL 3 44   L    Hemoglobin 12 0 - 17 0 g/dL 10 2   L    Hematocrit 36 5 - 49 3 % 29 8   L    MCV 82 - 98 fL 87     MCH 26 8 - 34 3 pg 29 7     MCHC 31 4 - 37 4 g/dL 34 2     RDW 11 6 - 15 1 % 15 2   H    Platelets 792 - 856 Thousands/uL 276     MPV 8 9 - 12 7 fL 10 5       Ref Range & Units 5/5/18 0326 Flag    Sodium 136 - 145 mmol/L 130   L    Potassium 3 5 - 5 3 mmol/L 4 3     Chloride 100 - 108 mmol/L 97   L    CO2 21 - 32 mmol/L 23     Anion Gap 4 - 13 mmol/L 10     BUN 5 - 25 mg/dL 27   H    Creatinine 0 60 - 1 30 mg/dL 1 06     SUMMARY     LEFT VENTRICLE:  Systolic function was normal  Ejection fraction was estimated to be 60 %  There were no regional wall motion abnormalities      LEFT ATRIUM:  The atrium was mildly to moderately dilated      MITRAL VALVE:  There was trace regurgitation      TRICUSPID VALVE:  There was trace regurgitation      PERICARDIUM:  A trace pericardial effusion was identified   There was no evidence of hemodynamic compromise      HISTORY: PRIOR HISTORY: Smoker, DM, HTN, Lung CA     PROCEDURE: The procedure was performed at the bedside  This was a routine study  The transthoracic approach was used  The study included complete 2D imaging, M-mode, complete spectral Doppler, and color Doppler  The heart rate was 85 bpm,  at the start of the study  Images were obtained from the parasternal, apical, subcostal, and suprasternal notch acoustic windows  Image quality was adequate      LEFT VENTRICLE: Size was normal  Systolic function was normal  Ejection fraction was estimated to be 60 %  There were no regional wall motion abnormalities  Wall thickness was normal  No evidence of apical thrombus  DOPPLER: Left  ventricular diastolic function parameters were normal      RIGHT VENTRICLE: The size was normal  Systolic function was normal  Wall thickness was normal      LEFT ATRIUM: The atrium was mildly to moderately dilated      RIGHT ATRIUM: Size was normal      MITRAL VALVE: Valve structure was normal  There was mild-moderate diffuse thickening  There was normal leaflet separation  DOPPLER: The transmitral velocity was within the normal range  There was no evidence for stenosis  There was trace  regurgitation      AORTIC VALVE: The valve was trileaflet  Leaflets exhibited normal thickness and normal cuspal separation  DOPPLER: Transaortic velocity was within the normal range  There was no evidence for stenosis  There was no significant  regurgitation      TRICUSPID VALVE: The valve structure was normal  There was normal leaflet separation  DOPPLER: The transtricuspid velocity was within the normal range  There was no evidence for stenosis  There was trace regurgitation      PULMONIC VALVE: Leaflets exhibited normal thickness, no calcification, and normal cuspal separation  DOPPLER: The transpulmonic velocity was within the normal range  There was no significant regurgitation      PERICARDIUM: A trace pericardial effusion was identified  There was no evidence of hemodynamic compromise   The pericardium was normal in appearance      AORTA: The root exhibited upper limit of normal size  3 5 cm    Normal sinus rhythm  Nonspecific T wave abnormality  Abnormal ECG  When compared with ECG of 03-OCT-2008 20:52,  Vent  rate has decreased BY  35 BPM  Nonspecific T wave abnormality now evident in Inferior leads  Nonspecific T wave abnormality, worse in Lateral leads  Confirmed by [facility] STIVEN MANCUSO ([id number]) on 4/7/2018 7:14:45 AM      Specimen Collected: 04/06/18 21:37   Last Resulted: 04/07/18 07:14              Physical Exam    Airway    Mallampati score: II  TM Distance: >3 FB       Dental       Cardiovascular      Pulmonary      Other Findings  Poor dentition      Anesthesia Plan  ASA Score- 4     Anesthesia Type- general with ASA Monitors  Additional Monitors: arterial line  Airway Plan: ETT  Comment:  MOO Walker , have personally seen and evaluated the patient prior to anesthetic care  I have reviewed the pre-anesthetic record, and other medical records if appropriate to the anesthetic care  If a CRNA is involved in the case, I have reviewed the CRNA assessment, if present, and agree  Risks/benefits and alternatives discussed with patient including possible PONV, sore throat, and possibility of rare anesthetic and surgical emergencies        Plan Factors-Patient not instructed to abstain from smoking on day of procedure  Patient smoked on day of surgery  Induction- intravenous  Postoperative Plan-     Informed Consent- Anesthetic plan and risks discussed with patient  I personally reviewed this patient with the CRNA  Discussed and agreed on the Anesthesia Plan with the CRNA  Jennifer Diop

## 2018-05-05 NOTE — PROGRESS NOTES
Pt was started on insulin gtt (algorithm 1) earlier this evening d/t uncontrolled blood sugars  Pt's last FS 74 with D5NS @ 75 mL/hr  Will d/c insulin gtt and restart SSI coverage for now without Lantus d/t hypoglycemia  Pt is NPO for surgery tomorrow  Continue q2 FS for now  Discussed case with attending Dr Elda Young

## 2018-05-05 NOTE — RESPIRATORY THERAPY NOTE
RT Ventilator Management Note  Betzaida Muir 63 y o  male MRN: [id number]  Unit/Bed#: ICU 06 Encounter: [id number]      Daily Screen       5/5/2018 1200             Patient safety screen outcome[de-identified] Failed    Not Ready for Weaning due to[de-identified] Underline problem not resolved            Physical Exam:   Assessment Type: (P) Assess only  General Appearance: (P) Sedated  Respiratory Pattern: (P) Assisted  Chest Assessment: (P) Chest expansion symmetrical  Bilateral Breath Sounds: (P) Clear, Diminished      Resp Comments: (P) pt intubated by Dr Neto Hector, with a size 8 endotube/24 at the lip, cxr done endotube confirmed, pt appears comfortable on current settings will continue to monitor

## 2018-05-05 NOTE — CONSULTS
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 that was previously resected did and subsequently has developed a brain abscess which is what was intervened upon today  He also has a history of PE and DVT status post IVC filter placement, hypertension, and insulin-dependent diabetes  Postoperatively he had a left brain right body seizure with tonic-clonic activity on the right that did not generalize and a I gaze deviation to the right  A multiple events was given Versed and fluid with 1000 mg of Keppra  Upon arrival in the ICU he continued to have such events despite 2 doses of 2 mg IV Ativan and was subsequently intubated started on a propofol drip  History obtained from chart review       Past Medical History:  Past Medical History:   Diagnosis Date    Alcohol abuse     Anxiety     [facility]     COPD [facility]chronic obstructive pulmonary disease) [facility]     Depression     Diabetes mellitus ([facility]     Gastric ulcer     Hearing impaired person, bilateral     Hepatitis C     Hypertension     Lung cancer ([facility]     Psychiatric disorder     Tobacco abuse     Vision impairment         Past Surgical History:  Past Surgical History:   Procedure Laterality Date    ADENOIDECTOMY      BRONCHOSCOPY      CRANIOTOMY Left 4/13/2018    Procedure: Left image guided parietal craniotomy for resection of tumor;  Surgeon: Arin Leal MD;  Location: [facility];  Service: Neurosurgery    LUNG BIOPSY      TONSILLECTOMY          Past Family History:  History reviewed  No pertinent family history  Social History:  History   Smoking Status    Current Every Day Smoker    Packs/day: 1 50    Years: 37 00   Smokeless Tobacco    Never Used     History   Alcohol Use    Yes     Comment: 2L liquor weekly     History   Drug use: Unknown     Marital Status: Single       Home Medications:   Prior to Admission medications    Medication Sig Start Date End Date Taking?  Authorizing Provider   acetaminophen (TYLENOL) 325 mg tablet Take 2 tablets (650 mg total) by mouth every 6 (six) hours as needed for mild pain, headaches or fever 4/16/18   Mic Kahn MD   albuterol (PROVENTIL HFA,VENTOLIN HFA) 90 mcg/act inhaler Inhale 2 puffs every 6 (six) hours as needed for wheezing    Historical Provider, MD   ARIPiprazole (ABILIFY) 10 mg tablet Take 10 mg by mouth daily    Historical Provider, MD   budesonide-formoterol (SYMBICORT) 160-4 5 mcg/act inhaler Inhale 2 puffs 2 (two) times a day    Historical Provider, MD   buPROPion (WELLBUTRIN) 75 mg tablet Take 75 mg by mouth 2 (two) times a day    Historical Provider, MD   calcium carbonate (TUMS) 500 mg chewable tablet Chew 2 tablets (1,000 mg total) daily as needed for indigestion or heartburn 4/16/18   Mic Kahn MD   dexamethasone (DECADRON) 2 mg tablet Decadron  4mg q8h for 2 days= 12 tabs  4mg q12h for 2 days= 8 tabs  2mg q6h for 2 days= 8 tabs  2mg q8h for 2 days= 6  2mg q12h for 2 days=4 4/16/18   Mic Kahn MD   docusate sodium (COLACE) 100 mg capsule Take 1 capsule (100 mg total) by mouth 2 (two) times a day 4/16/18   Mic Kahn MD   folic acid (FOLVITE) 1 mg tablet Take 1 tablet (1 mg total) by mouth daily 4/17/18   Mic Kahn MD   insulin glargine (LANTUS) 100 units/mL subcutaneous injection Inject 15 Units under the skin daily at bedtime 4/16/18   Tip Sheikh MD   insulin lispro (HumaLOG) 100 units/mL injection Inject 20 Units under the skin 3 (three) times a day with meals 4/16/18   Tip Sheikh MD   levETIRAcetam (KEPPRA) 500 mg tablet Take 1 tablet (500 mg total) by mouth every 12 (twelve) hours 4/16/18   Tip Sheikh MD   lisinopril (ZESTRIL) 5 mg tablet Take 5 mg by mouth daily    Historical Provider, MD   Multiple Vitamin (MULTIVITAMIN) capsule Take 1 capsule by mouth daily    Historical Provider, MD   nicotine (NICODERM CQ) 21 mg/24 hr TD 24 hr patch Place 1 patch on the skin daily 4/17/18   Tip Sheikh MD   omeprazole (PriLOSEC) 20 mg delayed release capsule Take 1 capsule (20 mg total) by mouth daily 4/16/18   Tip Sheikh MD   sodium chloride 1 g tablet Take 1 g by mouth 2 (two) times a day    Historical Provider, MD   thiamine (VITAMIN B1) 100 mg tablet Take 1 tablet (100 mg total) by mouth daily 4/16/18   Tip Sheikh MD        Inpatient Medications:  Scheduled Meds:    Current Facility-Administered Medications:  acetaminophen 650 mg Oral Q4H PRN Josey Covington MD    ARIPiprazole 10 mg Oral Daily Josey Covington MD    bisacodyl 10 mg Rectal Daily PRN Eather Curling, MD    calcium carbonate 1,000 mg Oral Daily PRN Josey Covington MD    cefepime 2,000 mg Intravenous Q8H Phyllis Hanson MD Last Rate: Stopped (05/05/18 1528)   diazepam 5 mg Intravenous Q6H PRN Sandra Flores DO    docusate sodium 100 mg Oral BID Rik Bernheim, MD    fentaNYL 50 mcg/hr Intravenous Continuous Rik Bernheim, MD Last Rate: 50 mcg/hr (05/05/18 1710)   fentanyl citrate (PF) 25 mcg Intravenous Q1H PRN Eather Curling, MD    folic acid 1 mg Oral Daily Josey Covington MD    HYDROmorphone 1 mg Intravenous Q4H PRN Josey Covington MD    insulin regular (HumuLIN R,NovoLIN R) infusion 0 3-21 Units/hr Intravenous Titrated Manish Bernheim, MD Last Rate: 3 Units/hr (05/05/18 0182)   ipratropium 0 5 mg Nebulization TID Scott Whitfield MD    levalbuterol 1 25 mg Nebulization TID Scott Whitfield MD    multivitamin-minerals 1 tablet Oral Daily Josey Covington MD    nicotine 1 patch Transdermal Daily Josey Covington MD    norepinephrine        norepinephrine 1-30 mcg/min Intravenous Titrated Rik Bernheim, MD Last Rate: Stopped (05/05/18 1631)   ondansetron 4 mg Intravenous Q4H PRN Josey Covington MD    oxyCODONE 5 mg Oral Q4H PRN Josey Covington MD    pantoprazole 20 mg Oral Early Morning Josey Covington MD    polyethylene glycol 17 g Oral Daily Josey Covington MD    propofol 5-50 mcg/kg/min Intravenous Titrated Rik Bernheim, MD Last Rate: 20 mcg/kg/min (05/05/18 1738)   senna 1 tablet Oral HS Rik Bernheim, MD    senna-docusate sodium 2 tablet Oral Daily Josey Covington MD    sodium chloride 150 mL/hr Intravenous Continuous Rik Bernheim, MD Last Rate: 150 mL/hr (05/05/18 1307)   sodium chloride 2 g Per NG Tube TID With Meals Scott Whitfield MD    thiamine 100 mg Oral Daily Josey Covington MD    vancomycin 15 mg/kg Intravenous Q12H [facility] Shadi Hurley MD Last Rate: 1,250 mg (05/04/18 2343)     Continuous Infusions:    fentaNYL 50 mcg/hr Last Rate: 50 mcg/hr (05/05/18 1710)   insulin regular (HumuLIN R,NovoLIN R) infusion 0 3-21 Units/hr Last Rate: 3 Units/hr (05/05/18 1731)   norepinephrine 1-30 mcg/min Last Rate: Stopped (05/05/18 1631)   propofol 5-50 mcg/kg/min Last Rate: 20 mcg/kg/min (05/05/18 1738)   sodium chloride 150 mL/hr Last Rate: 150 mL/hr (05/05/18 1307)     PRN Meds:    acetaminophen 650 mg Q4H PRN   bisacodyl 10 mg Daily PRN   calcium carbonate 1,000 mg Daily PRN   diazepam 5 mg Q6H PRN   fentanyl citrate (PF) 25 mcg Q1H PRN   HYDROmorphone 1 mg Q4H PRN   ondansetron 4 mg Q4H PRN   oxyCODONE 5 mg Q4H PRN        Allergies: Allergies   Allergen Reactions    Other      Bee stings        ROS:   Review of Systems   Reason unable to perform ROS: Intubated          Vitals:  Vitals: 18 1615 18 1630 18 1652 18 1700   BP:  119/71     BP Location:       Pulse: 78 78  90   Resp: 14 13  16   Temp:   99 7 °F (37 6 °C)    TempSrc:   Oral    SpO2: 100% 100%  100%   Weight:       Height:         Temperature:   Temp (24hrs), Av 9 °F (37 2 °C), Min:96 8 °F (36 °C), Max:100 5 °F (38 1 °C)    Current Temperature: 99 7 °F (37 6 °C)     Weights:   IBW: 70 7 kg  Body mass index is 27 97 kg/m²  Hemodynamic Monitoring:  N/A     Non-Invasive/Invasive Ventilation Settings:  Respiratory    Lab Data (Last 4 hours)    None         O2/Vent Data (Last 4 hours)       1541           Vent Mode AC/VC       Resp Rate (BPM) (BPM) 14       Vt (mL) (mL) 500       FIO2 (%) (%) 40       PEEP (cmH2O) (cmH2O) 5       MV 7 4                 Lab Results   Component Value Date    PHART 7 376 2018    BEH5UZR 41 9 2018    PO2ART 397 2 (H) 2018    BSE8ESW 24 0 2018    BEART -1 1 2018    SOURCE Line, Arterial 2018     SpO2: SpO2: 100 %     Physical Exam:  Physical Exam   HENT:   Head: Normocephalic and atraumatic  Cardiovascular: Normal rate and regular rhythm  No murmur heard  Pulmonary/Chest: Breath sounds normal    Abdominal: Soft  Bowel sounds are normal  There is no tenderness  Neurological:   Tonic-clonic activity of right-sided extremities with right gaze preference prior to intubation and sedation with propofol  Skin: Skin is warm and dry          Labs:    Results from last 7 days  Lab Units 18  1212 18  0923 18  0329 18  1052   WBC Thousand/uL 9 84  --  11 94* 11 09*   HEMOGLOBIN g/dL 9 4*  --  10 2* 10 3*   I STAT HEMOGLOBIN g/dl  --  8 2*  --   --    HEMATOCRIT % 27 8*  --  29 8* 31 5*   PLATELETS Thousands/uL 245  --  276 263   NEUTROS PCT % 75  --   --  87*   MONOS PCT % 8  --   --  5        Results from last 7 days  Lab Units 18  1451 18  1212 18  0923 18  0326   SODIUM mmol/L 133* 130*  -- 130*   POTASSIUM mmol/L 3 8 4 5  --  4 3   CHLORIDE mmol/L 101 100  --  97*   CO2 mmol/L 23 24  --  23   BUN mg/dL 22 23  --  27*   CREATININE mg/dL 0 79 0 93  --  1 06   CALCIUM mg/dL 7 6* 8 0*  --  8 5   TOTAL PROTEIN g/dL  --  6 4  --   --    BILIRUBIN TOTAL mg/dL  --  0 56  --   --    ALK PHOS U/L  --  59  --   --    ALT U/L  --  17  --   --    AST U/L  --  6  --   --    GLUCOSE RANDOM mg/dL 171* 174*  --  132   GLUCOSE, ISTAT mg/dl  --   --  150*  --                 Results from last 7 days  Lab Units 05/05/18  0711 05/05/18  0355 05/04/18  1051   INR  1 37* 1 44* 1 49*   PTT seconds  --  33  --        Results from last 7 days  Lab Units 05/05/18  1212   LACTIC ACID mmol/L 0 7       0  Lab Value Date/Time   TROPONINI <0 02 04/06/2018 2120        Imaging:  I have personally reviewed pertinent reports      EKG:  Reviewed  Micro:  Lab Results   Component Value Date    BLOODCX No Growth at 24 hrs  05/04/2018    BLOODCX No Growth at 24 hrs  05/04/2018    WOUNDCULT 2+ Growth of Staphylococcus aureus (A) 05/04/2018       Assessment:  Patient Active Problem List   Diagnosis    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     Metastatic lung cancer (metastasis from lung to other site) [facility] [address] [facility]    Dependence on nicotine from cigarettes    Alcohol abuse    Essential hypertension    Insulin-dependent diabetes mellitus    Bipolar disorder    Leukocytosis    Deep vein thrombosis (DVT) of distal vein of left lower extremity (HCC)    PE (pulmonary thromboembolism) (HCC)    Cerebral edema (HCC)    Brain metastases (HCC)    Abnormal CT of the head    Supratherapeutic INR    Brain abscess    Infection of bone flap (HCC)    Subdural empyema           Plan:                  Neuro:    - hx of squamous cell lung carcinoma with neuroendocrine component with mets to brain s/p left parietal metastasis resection on 4/13/2018    - now POD#0 s/p carniectomy for extraxial empyeme with washout and debridement of subgaleal collection with removal of bone flap (left)   - postoperatively today the patient had left brain right body seizure with tonic-clonic activity which did not generalized, with right deviated eye gaze  Multiple events of this current the patient was initially given Versed loaded with 1000 mg Keppra and transferred to the ICU  Upon arrival to the ICU he continued to have right-sided tonic-clonic activity and right gaze deviation which did not respond to 2 doses of 2 mg IV Ativan, and therefore the patient was given fentanyl and intubated  He was given additional 1000 mg Keppra  - patient is on 500 mg of Keppra b i d  which unfortunately did not receive his a m  dose of today  - Will start keppra 1000 mg BID  - a stat CT head was done due to the patient seizure activity and it demonstrated a revision of left parietal craniotomy, interval drainage of extra-axial and superficial scalp collections    No significant mass effect    - Start 24 hour video EEG monitoring, epileptology notified   - propofol 40 mcg/kg/min for sedation, will start fentanyl drip and attempt to wean propofol   - Pain: acetaminophen 650 mg Q4H PRN for mild pain, oxycodone IR 5 mg Q4H PRN for moderate pain, Dilaudid 1 mg Q4H PRN for severe pain and fentanyl 25 mcg Q1H PRN for breakthrough pain   - valium 5 mg IV Q6H PRN for anxiety   - hold home wellbutrin due to seizures, continue home abilify   - seizure precautions, craniectomy precaution                 CV:    - MAP goal >65, currently on levophed 4 mcg/min   - hold home antihypertensives                 Lung:     - Intubated on AC Rate 14, , PEEP +5, FiO2 40   -CXR demonstrating ETT appropriately in place   - respiratory protocol, xopenex and atrovent                 GI:    - NPO   - continue protonix 20 mg   - bowel regimen: colace, senna, dulcolax, miralax   - zofran PRN for nausea                 FEN:    - IV  cc/hr   - Electrolytes    Na 130    K 4 5    Cl 100   - NPO   - Possible SIADH, will start salt tabs, continue  cc/hr, recheck BMP and check urine and serum osmolality and sodium, and serum uric acid   - history of alcohol abuse, continue thiamine and folate                 :    - scott, monitor I/O's   - BUN/Cr: 23/0 93                 ID:    - s/p drainage of brain abscess   - afebrile, no leukocytosis currently   - continue cefepime and vancomycin per ID, check vanc trough with dose tonight                 Heme:    - IVC filter in place   - anticoagulation held initiallydue to supratherapeutic INR and also fore requirement of INR to be <1 4 prior to surgery today  Do not resume until OK by neurosurgery                 Endo:    - history of insulin dependent diabetes, will start insulin drip   - concern for SIADH, labs as above                 Msk/Skin:    - reposition q2h   - PT, OT when appropriate                 Disposition: Continued ICU care    I personally spoke with the patient's brother Madeleine Hector over the phone and updated him on the patient's current medical condition     VTE Pharmacologic Prophylaxis: Reason for no pharmacologic prophylaxis Oral anticoagulation was held in setting  of a supratherapeutic INR and procedure  VTE Mechanical Prophylaxis: sequential compression device     Invasive lines and devices: Invasive Devices     Central Venous Catheter Line            CVC Central Lines 05/05/18 Triple 16cm less than 1 day          Peripheral Intravenous Line            Peripheral IV 05/04/18 Right Antecubital 1 day    Peripheral IV 05/05/18 Left Arm less than 1 day          Arterial Line            Arterial Line 05/05/18 Right Radial less than 1 day          Drain            Closed/Suction Drain Left Head Bulb 10 Fr  less than 1 day    NG/OG/Enteral Tube Orogastric Right mouth less than 1 day    Urethral Catheter Latex 16 Fr  less than 1 day          Airway            ETT  Hi-Lo; Cuffed 8 mm less than 1 day                 Code Status: Level 3 - DNAR and DNI  POA:    POLST:       Given critical illness, patient length of stay will require greater than two midnights  Portions of the record may have been created with voice recognition software  Occasional wrong word or "sound a like" substitutions may have occurred due to the inherent limitations of voice recognition software  Read the chart carefully and recognize, using context, where substitutions have occurred          Dana Kirkland MD

## 2018-05-05 NOTE — RESPIRATORY THERAPY NOTE
RT Ventilator Management Note  Malik Guerrero 63 y o  male MRN: [id number]  Unit/Bed#: ICU 06 Encounter: [id number]      Daily Screen       5/5/2018 1200             Patient safety screen outcome[de-identified] Failed    Not Ready for Weaning due to[de-identified] Underline problem not resolved            Physical Exam:   Assessment Type: Assess only  General Appearance: Sedated  Respiratory Pattern: Assisted  Chest Assessment: Chest expansion symmetrical  Bilateral Breath Sounds: Clear, Diminished      Resp Comments: (P) pt transported to ct scan via portable [place], and an ambubag,  returned from ct scan, placed back on vent, no problems or issues

## 2018-05-05 NOTE — PROGRESS NOTES
Progress Note - Infectious Disease   Deborah Burris 63 y o  male MRN: [id number]  Unit/Bed#: [facility] 904-01 Encounter: [id number]      Impression/Plan:  1  Sepsis-POA  Fever and leukocytosis  Suspect this is related to the patient's brain and scalp abscesses  Consideration for the possibility of bacteremia  No other clear sources appreciated  He remains hemodynamically stable despite his systemic illness  He tolerated the dose of vancomycin without difficulty  -continue vancomycin for now at current dose  -check vancomycin trough with the dose tonight  -continue cefepime for now at current dose  -follow-up blood cultures  -follow up wound cultures and blood cultures and adjust antibiotics as needed  -recheck  CBC with diff and creatinine tomorrow a m      2  Scalp abscesses/left parietal bone flap infection/possible infection beneath the dura-in the postoperative setting  Most likely staphylococcal, however gram negatives also on the list of possibilities  The Gram stain suggest the possibility of a Gram-positive infection   -antibiotics as above  -follow up wound cultures and adjust antibiotics as needed  -patient for removal of bone flap today and washout     3  Stage IV squamous cell carcinoma of the lung-prognosis is poor  Metastatic disease to the brain  Awaiting decision about level of care      4   Pulmonary embolism/DVT-had been on anticoagulation but has an IVC filter in place   -anticoagulation as per Neurosurgery in anticipation of surgical intervention      5  Encephalopathy-likely multifactorial including 1,2,3 playing prominent role as well as metabolic issues    His cognition remains quite abnormal   -monitor cognition  -antibiotics as above  -treat metabolic issues  -await surgical intervention for later today  -no additional ID workup per treatment    Discussed in detail with Neurosurgery    Antibiotics:  Vancomycin 2  Cefepime 2    Subjective:  Patient still having intermittent low-grade fever; no reported nausea, vomiting, diarrhea; no reported cough, shortness of breath; no reported pain  He remains hemodynamically stable despite his systemic illness  Objective:  Vitals:  HR:  [] 84  Resp:  [17-18] 18  BP: (127-149)/(69-95) 136/95  SpO2:  [93 %-99 %] 94 %  Temp (24hrs), Av °F (37 2 °C), Min:98 1 °F (36 7 °C), Max:100 5 °F (38 1 °C)  Current: Temperature: 98 4 °F (36 9 °C)    Physical Exam:   General Appearance:  Somnolent, attempts to the answer questions but is aphasic  Eyes: Conjuctiva pink, sclera anicteric   Throat: Oropharynx moist without lesions  Neck: Supple without DEION   Lungs:   Clear to auscultation bilaterally; no wheezes, rhonchi or rales; respirations unlabored   Chest wall : No deformity, nontender   Heart:  RRR; no murmur, rub or gallop   Abdomen:   Soft, non-tender, non-distended, positive bowel sounds  Extremities: No clubbing, cyanosis or edema   Neuro: Alert, answers questions appropriately   Skin: No new rashes or lesions  No new draining wounds noted    Expressed purulent fluid from the left parietal scalp       Labs, Imaging, & Other studies:   All pertinent labs and imaging studies were personally reviewed    Results from last 7 days  Lab Units 18  0329 18  1052   WBC Thousand/uL 11 94* 11 09*   HEMOGLOBIN g/dL 10 2* 10 3*   PLATELETS Thousands/uL 276 263       Results from last 7 days  Lab Units 18  0326 18  1051   SODIUM mmol/L 130* 127*   POTASSIUM mmol/L 4 3 4 5   CHLORIDE mmol/L 97* 93*   CO2 mmol/L 23 29   ANION GAP mmol/L 10 5   BUN mg/dL 27* 23   CREATININE mg/dL 1 06 1 12   EGFR ml/min/1 73sq m 76 71   GLUCOSE RANDOM mg/dL 132 326*   CALCIUM mg/dL 8 5 9 2       Results from last 7 days  Lab Units 18  1028   GRAM STAIN RESULT  No polys seen  Rare Gram positive cocci in clusters     MRI brain-  Postoperative changes of a left parietal mass resection as described above   Mild smooth leptomeningeal enhancement at the resection cavity is likely related to patient's history of infection/abscess  Sherl Lux, there is no encapsulated intra-axial fluid   collection   Mild smooth dural enhancement at the left supratentorial brain is likely postoperative in nature   A short-term follow-up MRI brain in 2-3 months is recommended for further evaluation  No mass effect or midline shift   Significant interval improvement in left parietal vasogenic edema when compared to the prior MRI brain  No restricted diffusion to suggest acute ischemia  Tiny left frontoparietal subdural hematoma, likely postoperative in nature      Images reviewed by me in PACS

## 2018-05-06 ENCOUNTER — APPOINTMENT (INPATIENT)
Dept: NEUROLOGY | Facility: AMBULATORY SURGERY CENTER | Age: 63
DRG: 515 | End: 2018-05-06
Payer: COMMERCIAL

## 2018-05-06 LAB
ABO GROUP BLD BPU: NORMAL
ABO GROUP BLD BPU: NORMAL
ANION GAP SERPL CALCULATED.3IONS-SCNC: 7 MMOL/L (ref 4–13)
ANION GAP SERPL CALCULATED.3IONS-SCNC: 7 MMOL/L (ref 4–13)
ANION GAP SERPL CALCULATED.3IONS-SCNC: 8 MMOL/L (ref 4–13)
APTT PPP: 36 SECONDS (ref 23–35)
BACTERIA WND AEROBE CULT: ABNORMAL
BASE EXCESS BLDA CALC-SCNC: -5.2 MMOL/L
BASOPHILS # BLD AUTO: 0.02 THOUSANDS/ΜL (ref 0–0.1)
BASOPHILS NFR BLD AUTO: 0 % (ref 0–1)
BODY TEMPERATURE: 99.6 DEGREES FEHRENHEIT
BPU ID: NORMAL
BPU ID: NORMAL
BUN SERPL-MCNC: 12 MG/DL (ref 5–25)
BUN SERPL-MCNC: 16 MG/DL (ref 5–25)
BUN SERPL-MCNC: 19 MG/DL (ref 5–25)
CALCIUM SERPL-MCNC: 6.8 MG/DL (ref 8.3–10.1)
CALCIUM SERPL-MCNC: 7.1 MG/DL (ref 8.3–10.1)
CALCIUM SERPL-MCNC: 7.2 MG/DL (ref 8.3–10.1)
CHLORIDE SERPL-SCNC: 104 MMOL/L (ref 100–108)
CHLORIDE SERPL-SCNC: 106 MMOL/L (ref 100–108)
CHLORIDE SERPL-SCNC: 109 MMOL/L (ref 100–108)
CO2 SERPL-SCNC: 20 MMOL/L (ref 21–32)
CO2 SERPL-SCNC: 22 MMOL/L (ref 21–32)
CO2 SERPL-SCNC: 24 MMOL/L (ref 21–32)
CREAT SERPL-MCNC: 0.72 MG/DL (ref 0.6–1.3)
CREAT SERPL-MCNC: 0.77 MG/DL (ref 0.6–1.3)
CREAT SERPL-MCNC: 0.83 MG/DL (ref 0.6–1.3)
EOSINOPHIL # BLD AUTO: 0.29 THOUSAND/ΜL (ref 0–0.61)
EOSINOPHIL NFR BLD AUTO: 3 % (ref 0–6)
ERYTHROCYTE [DISTWIDTH] IN BLOOD BY AUTOMATED COUNT: 15.3 % (ref 11.6–15.1)
GFR SERPL CREATININE-BSD FRML MDRD: 101 ML/MIN/1.73SQ M
GFR SERPL CREATININE-BSD FRML MDRD: 96 ML/MIN/1.73SQ M
GFR SERPL CREATININE-BSD FRML MDRD: 99 ML/MIN/1.73SQ M
GLUCOSE SERPL-MCNC: 103 MG/DL (ref 65–140)
GLUCOSE SERPL-MCNC: 106 MG/DL (ref 65–140)
GLUCOSE SERPL-MCNC: 118 MG/DL (ref 65–140)
GLUCOSE SERPL-MCNC: 134 MG/DL (ref 65–140)
GLUCOSE SERPL-MCNC: 150 MG/DL (ref 65–140)
GLUCOSE SERPL-MCNC: 157 MG/DL (ref 65–140)
GLUCOSE SERPL-MCNC: 163 MG/DL (ref 65–140)
GLUCOSE SERPL-MCNC: 175 MG/DL (ref 65–140)
GLUCOSE SERPL-MCNC: 194 MG/DL (ref 65–140)
GLUCOSE SERPL-MCNC: 246 MG/DL (ref 65–140)
GLUCOSE SERPL-MCNC: 65 MG/DL (ref 65–140)
GLUCOSE SERPL-MCNC: 73 MG/DL (ref 65–140)
GLUCOSE SERPL-MCNC: 81 MG/DL (ref 65–140)
GLUCOSE SERPL-MCNC: 82 MG/DL (ref 65–140)
GLUCOSE SERPL-MCNC: 95 MG/DL (ref 65–140)
GRAM STN SPEC: ABNORMAL
GRAM STN SPEC: ABNORMAL
HCO3 BLDA-SCNC: 19.5 MMOL/L (ref 22–28)
HCT VFR BLD AUTO: 26.1 % (ref 36.5–49.3)
HGB BLD-MCNC: 8.6 G/DL (ref 12–17)
HOROWITZ INDEX BLDA+IHG-RTO: 40 MM[HG]
INR PPP: 1.76 (ref 0.86–1.16)
LYMPHOCYTES # BLD AUTO: 1.35 THOUSANDS/ΜL (ref 0.6–4.47)
LYMPHOCYTES NFR BLD AUTO: 12 % (ref 14–44)
MAGNESIUM SERPL-MCNC: 1.8 MG/DL (ref 1.6–2.6)
MCH RBC QN AUTO: 29.3 PG (ref 26.8–34.3)
MCHC RBC AUTO-ENTMCNC: 33 G/DL (ref 31.4–37.4)
MCV RBC AUTO: 89 FL (ref 82–98)
MONOCYTES # BLD AUTO: 0.75 THOUSAND/ΜL (ref 0.17–1.22)
MONOCYTES NFR BLD AUTO: 6 % (ref 4–12)
MRSA NOSE QL CULT: NORMAL
NEUTROPHILS # BLD AUTO: 9.17 THOUSANDS/ΜL (ref 1.85–7.62)
NEUTS SEG NFR BLD AUTO: 79 % (ref 43–75)
NRBC BLD AUTO-RTO: 0 /100 WBCS
O2 CT BLDA-SCNC: 12.8 ML/DL (ref 16–23)
OXYHGB MFR BLDA: 96.1 % (ref 94–97)
PCO2 BLDA: 34.5 MM HG (ref 36–44)
PCO2 TEMP ADJ BLDA: 35.4 MM HG (ref 36–44)
PEEP RESPIRATORY: 5 CM[H2O]
PH BLD: 7.36 [PH] (ref 7.35–7.45)
PH BLDA: 7.37 [PH] (ref 7.35–7.45)
PHOSPHATE SERPL-MCNC: 2 MG/DL (ref 2.3–4.1)
PLATELET # BLD AUTO: 267 THOUSANDS/UL (ref 149–390)
PMV BLD AUTO: 10.1 FL (ref 8.9–12.7)
PO2 BLD: 96.6 MM HG (ref 75–129)
PO2 BLDA: 93.1 MM HG (ref 75–129)
POTASSIUM SERPL-SCNC: 3.6 MMOL/L (ref 3.5–5.3)
POTASSIUM SERPL-SCNC: 3.6 MMOL/L (ref 3.5–5.3)
POTASSIUM SERPL-SCNC: 4 MMOL/L (ref 3.5–5.3)
PROTHROMBIN TIME: 20.7 SECONDS (ref 12.1–14.4)
RBC # BLD AUTO: 2.94 MILLION/UL (ref 3.88–5.62)
SODIUM SERPL-SCNC: 135 MMOL/L (ref 136–145)
SODIUM SERPL-SCNC: 135 MMOL/L (ref 136–145)
SODIUM SERPL-SCNC: 137 MMOL/L (ref 136–145)
SPECIMEN SOURCE: ABNORMAL
UNIT DISPENSE STATUS: NORMAL
UNIT DISPENSE STATUS: NORMAL
UNIT PRODUCT CODE: NORMAL
UNIT PRODUCT CODE: NORMAL
UNIT RH: NORMAL
UNIT RH: NORMAL
VENT AC: 14
VENT- AC: AC
VT SETTING VENT: 500 ML
WBC # BLD AUTO: 11.64 THOUSAND/UL (ref 4.31–10.16)

## 2018-05-06 PROCEDURE — 82948 REAGENT STRIP/BLOOD GLUCOSE: CPT

## 2018-05-06 PROCEDURE — 85025 COMPLETE CBC W/AUTO DIFF WBC: CPT | Performed by: INTERNAL MEDICINE

## 2018-05-06 PROCEDURE — 85610 PROTHROMBIN TIME: CPT | Performed by: NEUROLOGICAL SURGERY

## 2018-05-06 PROCEDURE — 99233 SBSQ HOSP IP/OBS HIGH 50: CPT | Performed by: INTERNAL MEDICINE

## 2018-05-06 PROCEDURE — 80048 BASIC METABOLIC PNL TOTAL CA: CPT | Performed by: INTERNAL MEDICINE

## 2018-05-06 PROCEDURE — 83735 ASSAY OF MAGNESIUM: CPT | Performed by: INTERNAL MEDICINE

## 2018-05-06 PROCEDURE — 80048 BASIC METABOLIC PNL TOTAL CA: CPT | Performed by: NEUROLOGICAL SURGERY

## 2018-05-06 PROCEDURE — 94760 N-INVAS EAR/PLS OXIMETRY 1: CPT

## 2018-05-06 PROCEDURE — 85730 THROMBOPLASTIN TIME PARTIAL: CPT | Performed by: NEUROLOGICAL SURGERY

## 2018-05-06 PROCEDURE — 94640 AIRWAY INHALATION TREATMENT: CPT

## 2018-05-06 PROCEDURE — 99291 CRITICAL CARE FIRST HOUR: CPT | Performed by: PSYCHIATRY & NEUROLOGY

## 2018-05-06 PROCEDURE — 95951 HB EEG MONITORING/VIDEORECORD: CPT

## 2018-05-06 PROCEDURE — 84100 ASSAY OF PHOSPHORUS: CPT | Performed by: INTERNAL MEDICINE

## 2018-05-06 PROCEDURE — 95951 PR EEG MONITORING/VIDEORECORD: CPT | Performed by: PSYCHIATRY & NEUROLOGY

## 2018-05-06 PROCEDURE — 82805 BLOOD GASES W/O2 SATURATION: CPT | Performed by: INTERNAL MEDICINE

## 2018-05-06 PROCEDURE — 94003 VENT MGMT INPAT SUBQ DAY: CPT

## 2018-05-06 RX ORDER — ACETAMINOPHEN 325 MG/1
975 TABLET ORAL EVERY 8 HOURS SCHEDULED
Status: DISCONTINUED | OUTPATIENT
Start: 2018-05-06 | End: 2018-05-06

## 2018-05-06 RX ORDER — POTASSIUM CHLORIDE 29.8 MG/ML
40 INJECTION INTRAVENOUS ONCE
Status: COMPLETED | OUTPATIENT
Start: 2018-05-06 | End: 2018-05-06

## 2018-05-06 RX ORDER — DEXTROSE AND SODIUM CHLORIDE 5; .9 G/100ML; G/100ML
100 INJECTION, SOLUTION INTRAVENOUS CONTINUOUS
Status: DISCONTINUED | OUTPATIENT
Start: 2018-05-06 | End: 2018-05-06

## 2018-05-06 RX ORDER — ACETAMINOPHEN 160 MG/5ML
975 SUSPENSION, ORAL (FINAL DOSE FORM) ORAL EVERY 8 HOURS
Status: DISCONTINUED | OUTPATIENT
Start: 2018-05-06 | End: 2018-05-08

## 2018-05-06 RX ORDER — ALBUMIN, HUMAN INJ 5% 5 %
12.5 SOLUTION INTRAVENOUS ONCE
Status: COMPLETED | OUTPATIENT
Start: 2018-05-06 | End: 2018-05-06

## 2018-05-06 RX ORDER — CHLORHEXIDINE GLUCONATE 0.12 MG/ML
15 RINSE ORAL EVERY 12 HOURS SCHEDULED
Status: DISCONTINUED | OUTPATIENT
Start: 2018-05-06 | End: 2018-05-08

## 2018-05-06 RX ORDER — PHENYTOIN SODIUM 50 MG/ML
100 INJECTION, SOLUTION INTRAMUSCULAR; INTRAVENOUS EVERY 8 HOURS SCHEDULED
Status: DISCONTINUED | OUTPATIENT
Start: 2018-05-06 | End: 2018-05-07

## 2018-05-06 RX ORDER — ACETAMINOPHEN 160 MG/5ML
975 SUSPENSION, ORAL (FINAL DOSE FORM) ORAL EVERY 6 HOURS PRN
Status: DISCONTINUED | OUTPATIENT
Start: 2018-05-06 | End: 2018-05-06

## 2018-05-06 RX ORDER — SODIUM CHLORIDE, SODIUM GLUCONATE, SODIUM ACETATE, POTASSIUM CHLORIDE, MAGNESIUM CHLORIDE, SODIUM PHOSPHATE, DIBASIC, AND POTASSIUM PHOSPHATE .53; .5; .37; .037; .03; .012; .00082 G/100ML; G/100ML; G/100ML; G/100ML; G/100ML; G/100ML; G/100ML
100 INJECTION, SOLUTION INTRAVENOUS CONTINUOUS
Status: DISCONTINUED | OUTPATIENT
Start: 2018-05-06 | End: 2018-05-06

## 2018-05-06 RX ORDER — SODIUM CHLORIDE, SODIUM GLUCONATE, SODIUM ACETATE, POTASSIUM CHLORIDE, MAGNESIUM CHLORIDE, SODIUM PHOSPHATE, DIBASIC, AND POTASSIUM PHOSPHATE .53; .5; .37; .037; .03; .012; .00082 G/100ML; G/100ML; G/100ML; G/100ML; G/100ML; G/100ML; G/100ML
20 INJECTION, SOLUTION INTRAVENOUS CONTINUOUS
Status: DISCONTINUED | OUTPATIENT
Start: 2018-05-06 | End: 2018-05-07

## 2018-05-06 RX ORDER — MAGNESIUM SULFATE HEPTAHYDRATE 40 MG/ML
2 INJECTION, SOLUTION INTRAVENOUS ONCE
Status: COMPLETED | OUTPATIENT
Start: 2018-05-06 | End: 2018-05-06

## 2018-05-06 RX ORDER — HEPARIN SODIUM 5000 [USP'U]/ML
5000 INJECTION, SOLUTION INTRAVENOUS; SUBCUTANEOUS EVERY 8 HOURS SCHEDULED
Status: DISCONTINUED | OUTPATIENT
Start: 2018-05-06 | End: 2018-05-08

## 2018-05-06 RX ORDER — PANTOPRAZOLE SODIUM 40 MG/1
20 INJECTION, POWDER, FOR SOLUTION INTRAVENOUS ONCE
Status: DISCONTINUED | OUTPATIENT
Start: 2018-05-06 | End: 2018-05-06

## 2018-05-06 RX ADMIN — SODIUM CHLORIDE 1500 MG PE: 9 INJECTION, SOLUTION INTRAVENOUS at 07:59

## 2018-05-06 RX ADMIN — POLYETHYLENE GLYCOL 3350 17 G: 17 POWDER, FOR SOLUTION ORAL at 10:15

## 2018-05-06 RX ADMIN — DEXTROSE AND SODIUM CHLORIDE 100 ML/HR: 5; .9 INJECTION, SOLUTION INTRAVENOUS at 08:09

## 2018-05-06 RX ADMIN — Medication 1000 MG: at 06:04

## 2018-05-06 RX ADMIN — NOREPINEPHRINE BITARTRATE 10.13 MCG/MIN: 1 INJECTION INTRAVENOUS at 12:40

## 2018-05-06 RX ADMIN — NICOTINE 1 PATCH: 21 PATCH, EXTENDED RELEASE TRANSDERMAL at 08:31

## 2018-05-06 RX ADMIN — SODIUM CHLORIDE TAB 1 GM 2 G: 1 TAB at 16:46

## 2018-05-06 RX ADMIN — NAFCILLIN SODIUM 2000 MG: 2 INJECTION, POWDER, LYOPHILIZED, FOR SOLUTION INTRAMUSCULAR; INTRAVENOUS at 14:47

## 2018-05-06 RX ADMIN — SODIUM CHLORIDE TAB 1 GM 2 G: 1 TAB at 08:18

## 2018-05-06 RX ADMIN — CEFEPIME HYDROCHLORIDE 2000 MG: 2 INJECTION, POWDER, FOR SOLUTION INTRAVENOUS at 06:25

## 2018-05-06 RX ADMIN — MAGNESIUM SULFATE HEPTAHYDRATE 2 G: 40 INJECTION, SOLUTION INTRAVENOUS at 17:35

## 2018-05-06 RX ADMIN — NOREPINEPHRINE BITARTRATE 8 MCG/MIN: 1 INJECTION INTRAVENOUS at 03:57

## 2018-05-06 RX ADMIN — ACETAMINOPHEN 650 MG: 325 TABLET, FILM COATED ORAL at 02:39

## 2018-05-06 RX ADMIN — NAFCILLIN SODIUM 2000 MG: 2 INJECTION, POWDER, LYOPHILIZED, FOR SOLUTION INTRAMUSCULAR; INTRAVENOUS at 22:34

## 2018-05-06 RX ADMIN — PROPOFOL 39.65 MCG/KG/MIN: 10 INJECTION, EMULSION INTRAVENOUS at 08:01

## 2018-05-06 RX ADMIN — FENTANYL CITRATE 50 MCG/HR: 50 INJECTION, SOLUTION INTRAMUSCULAR; INTRAVENOUS at 14:36

## 2018-05-06 RX ADMIN — ACETAMINOPHEN 975 MG: 160 SUSPENSION ORAL at 08:42

## 2018-05-06 RX ADMIN — ALBUMIN HUMAN 12.5 G: 0.05 INJECTION, SOLUTION INTRAVENOUS at 13:41

## 2018-05-06 RX ADMIN — POTASSIUM CHLORIDE 40 MEQ: 400 INJECTION, SOLUTION INTRAVENOUS at 02:06

## 2018-05-06 RX ADMIN — ACETAMINOPHEN 975 MG: 160 SUSPENSION ORAL at 16:45

## 2018-05-06 RX ADMIN — SODIUM CHLORIDE TAB 1 GM 2 G: 1 TAB at 12:29

## 2018-05-06 RX ADMIN — PANTOPRAZOLE SODIUM 20 MG: 20 TABLET, DELAYED RELEASE ORAL at 06:14

## 2018-05-06 RX ADMIN — FOLIC ACID 1 MG: 1 TABLET ORAL at 08:18

## 2018-05-06 RX ADMIN — SODIUM CHLORIDE, SODIUM GLUCONATE, SODIUM ACETATE, POTASSIUM CHLORIDE, MAGNESIUM CHLORIDE, SODIUM PHOSPHATE, DIBASIC, AND POTASSIUM PHOSPHATE 100 ML/HR: .53; .5; .37; .037; .03; .012; .00082 INJECTION, SOLUTION INTRAVENOUS at 07:43

## 2018-05-06 RX ADMIN — NAFCILLIN SODIUM 2000 MG: 2 INJECTION, POWDER, LYOPHILIZED, FOR SOLUTION INTRAMUSCULAR; INTRAVENOUS at 10:40

## 2018-05-06 RX ADMIN — Medication 100 MG: at 08:18

## 2018-05-06 RX ADMIN — LEVALBUTEROL HYDROCHLORIDE 1.25 MG: 1.25 SOLUTION, CONCENTRATE RESPIRATORY (INHALATION) at 19:08

## 2018-05-06 RX ADMIN — POTASSIUM PHOSPHATE, MONOBASIC AND POTASSIUM PHOSPHATE, DIBASIC 30 MMOL: 224; 236 INJECTION, SOLUTION INTRAVENOUS at 20:05

## 2018-05-06 RX ADMIN — HEPARIN SODIUM 5000 UNITS: 5000 INJECTION, SOLUTION INTRAVENOUS; SUBCUTANEOUS at 22:07

## 2018-05-06 RX ADMIN — Medication 1 TABLET: at 08:18

## 2018-05-06 RX ADMIN — ALBUMIN HUMAN 12.5 G: 0.05 INJECTION, SOLUTION INTRAVENOUS at 11:30

## 2018-05-06 RX ADMIN — VANCOMYCIN HYDROCHLORIDE 1250 MG: 10 INJECTION, POWDER, LYOPHILIZED, FOR SOLUTION INTRAVENOUS at 00:15

## 2018-05-06 RX ADMIN — SODIUM CHLORIDE 150 ML/HR: 0.9 INJECTION, SOLUTION INTRAVENOUS at 02:46

## 2018-05-06 RX ADMIN — SODIUM CHLORIDE, SODIUM GLUCONATE, SODIUM ACETATE, POTASSIUM CHLORIDE, MAGNESIUM CHLORIDE, SODIUM PHOSPHATE, DIBASIC, AND POTASSIUM PHOSPHATE 20 ML/HR: .53; .5; .37; .037; .03; .012; .00082 INJECTION, SOLUTION INTRAVENOUS at 18:00

## 2018-05-06 RX ADMIN — PROPOFOL 40 MCG/KG/MIN: 10 INJECTION, EMULSION INTRAVENOUS at 04:13

## 2018-05-06 RX ADMIN — ARIPIPRAZOLE 10 MG: 10 TABLET ORAL at 08:19

## 2018-05-06 RX ADMIN — DEXTROSE MONOHYDRATE 25 ML: 25 INJECTION, SOLUTION INTRAVENOUS at 02:05

## 2018-05-06 RX ADMIN — SENNOSIDES 8.6 MG: 8.6 TABLET ORAL at 22:07

## 2018-05-06 RX ADMIN — SODIUM CHLORIDE 1 UNITS/HR: 9 INJECTION, SOLUTION INTRAVENOUS at 17:04

## 2018-05-06 RX ADMIN — IPRATROPIUM BROMIDE 0.5 MG: 0.5 SOLUTION RESPIRATORY (INHALATION) at 13:21

## 2018-05-06 RX ADMIN — NOREPINEPHRINE BITARTRATE 12 MCG/MIN: 1 INJECTION INTRAVENOUS at 19:59

## 2018-05-06 RX ADMIN — Medication 1000 MG: at 14:14

## 2018-05-06 RX ADMIN — NAFCILLIN SODIUM 2000 MG: 2 INJECTION, POWDER, LYOPHILIZED, FOR SOLUTION INTRAMUSCULAR; INTRAVENOUS at 18:41

## 2018-05-06 RX ADMIN — PHENYTOIN SODIUM 100 MG: 50 INJECTION INTRAMUSCULAR; INTRAVENOUS at 14:40

## 2018-05-06 RX ADMIN — PHENYTOIN SODIUM 100 MG: 50 INJECTION INTRAMUSCULAR; INTRAVENOUS at 22:05

## 2018-05-06 RX ADMIN — Medication 1000 MG: at 22:05

## 2018-05-06 RX ADMIN — IPRATROPIUM BROMIDE 0.5 MG: 0.5 SOLUTION RESPIRATORY (INHALATION) at 07:22

## 2018-05-06 RX ADMIN — LEVALBUTEROL HYDROCHLORIDE 1.25 MG: 1.25 SOLUTION, CONCENTRATE RESPIRATORY (INHALATION) at 07:22

## 2018-05-06 RX ADMIN — LEVALBUTEROL HYDROCHLORIDE 1.25 MG: 1.25 SOLUTION, CONCENTRATE RESPIRATORY (INHALATION) at 13:21

## 2018-05-06 RX ADMIN — CHLORHEXIDINE GLUCONATE 15 ML: 1.2 RINSE ORAL at 22:04

## 2018-05-06 RX ADMIN — Medication 2 TABLET: at 10:15

## 2018-05-06 RX ADMIN — PROPOFOL 29.64 MCG/KG/MIN: 10 INJECTION, EMULSION INTRAVENOUS at 13:39

## 2018-05-06 RX ADMIN — IPRATROPIUM BROMIDE 0.5 MG: 0.5 SOLUTION RESPIRATORY (INHALATION) at 19:08

## 2018-05-06 NOTE — RESPIRATORY THERAPY NOTE
RT Ventilator Management Note  Raquel Mo 63 y o  male MRN: [id number]  Unit/Bed#: ICU 06 Encounter: [id number]      Daily Screen       5/6/2018 0722 5/6/2018 1604          Patient safety screen outcome[de-identified] Failed Passed      Not Ready for Weaning due to[de-identified] Underline problem not resolved -      RSBI: - 55              Physical Exam:   Assessment Type: Assess only  General Appearance: Eyes open/responds to stimulus  Respiratory Pattern: Assisted  Chest Assessment: Chest expansion symmetrical  Bilateral Breath Sounds: Diminished  Cough: Productive, Strong  O2 Device: vent      Resp Comments: pt remains on PSV settings 5/5 30%, no changes made at this time, all vitals are stable at this time, will continue to monitor pt

## 2018-05-06 NOTE — PROGRESS NOTES
Progress Note - Critical Care   Love Poag 63 y o  male MRN: [id number]  Unit/Bed#: ICU 06 Encounter: [id number]    Assessment:     60-year-old male with metastatic lung cancer to the brain status post resection with subsequent scalp abscess with rim enhancement gas with left parietal rim enhancing central abscess  Patient Active Problem List   Diagnosis    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     Metastatic lung cancer (metastasis from lung to other site) (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 status post left parietal metastasis resection on 04/13/2018, postop day 1  From craniectomy to remove central abscess  Currently on video EEG for seizure in PACU, seizure activity overnight  Keppra increased from 1 g b i d  to 1 g t i d , propofol currently at 40 mics  Patient currently on Tylenol p r n  for mild pain, oxycodone q 4 hours 5 mg for moderate pain, Dilaudid 1 mg q 4 hours p r n  for severe pain, Valium for anxiety 5 mg Q 6 hours, holding home Wellbutrin due to seizures, continue home Abilify  Continue seizure precautions and craniotomy precautions  Continue q 1 hour neuro checks, continue CHANELL drain under the direction of Neurosurgery  Appreciate Neurosurgery recommendations  CHANELL drain output 30 cc of serosanguineous output     CV:  Currently on Levophed of 8, map goals of 65, map overnight   Continue titration of Levophed for blood pressure support holding home anti hypertensives  Lung:  Continue assist-control settings, rate of 14 tidal volume of 500 peep of 5 FiO2 of 40% titrating to a goal of 92%  Chest x-ray post intubation ET tube in proper position  Respiratory protocol  Smoking history, nicotine replacement provided     GI:  NPO, will start tube feeds today, Protonix 20 mg, bowel regimen Colace senna MiraLax Dulcolax, Zofran p r n  for nausea      FEN:  Electrolytes within normal limits, patient currently on normal saline at 150 mL per her will change over to isolate 75 an hour  Total in's 6241 7, total out's 1540, positive 4 7 L     :  Continue Figueroa for critical illness management, creatinine within normal limits  ID:  Status post drainage of brain abscess, currently afebrile overnight, leukocytosis mild, continue cefepime and vanc as per ID, vancomycin trough 17 9 appreciate infectious disease input  Cefepime and vancomycin  day 2  Heme:  Status post IVC filter in place for DVT to PE, currently not on anticoagulation secondary to intracranial abscess with Neurosurgery  Currently holding anticoagulation                 Endo:  History of insulin dependent diabetes currently on insulin drip, concern for SIADH, continue to monitor electrolytes, sodium remains within normal limits  Urine osm 517 serum osm 270, urine sodium 104, continue monitor for SIADH  Blood glucose overnight was 50 to 118, insulin drip was titrated, algorithm 1                Msk/Skin:  Continue local wound care, PT OT once, reposition q 2 hours                 Disposition:  ICU for management of critical illness    Chief Complaint:  NA patient is sedated    HPI/24hr events:  Patient continued to seize overnight, had 1 episode of seizing seen on video EEG at that time seizure activity remitted without intervention  Keppra was increased to t i d  dosing  Patient sees this morning as well, at this time Dilantin was loaded  Patient continued to meet map goals of over 65      Physical Exam:     Physical Exam   Constitutional: He appears well-developed and well-nourished  HENT:   Head: Normocephalic  Right Ear: External ear normal    Left Ear: External ear normal    Eyes:   Pupils 2 mm reactive bilaterally, gaze is midline no nystagmus   Neck: Neck supple  No JVD present  Cardiovascular: Normal rate, regular rhythm, normal heart sounds and intact distal pulses  Exam reveals no gallop and no friction rub  No murmur heard  Pulmonary/Chest: Effort normal and breath sounds normal  No respiratory distress  He has no wheezes  He has no rales  He exhibits no tenderness  Abdominal: Soft  Bowel sounds are normal  He exhibits no distension  There is no tenderness  There is no rebound  Genitourinary:         Neurological: GCS eye subscore is 3  GCS verbal subscore is 1  GCS motor subscore is 6  Reflex Scores:       Bicep reflexes are 2+ on the right side and 2+ on the left side  Patellar reflexes are 2+ on the right side and 2+ on the left side  Patient will wiggle his fingers and toes on the left, will not move his right side, intermittently follows commands, opens his eyes to pain    No clonus in all 4 extremities, withdraws in all 4 extremities   Skin: Skin is warm and dry  He is not diaphoretic  Nursing note and vitals reviewed  Vitals:    18 0200 18 0300 18 0400 18 0500   BP:    152/78   BP Location:       Pulse: 74 74 74 100   Resp: 13 14 14 (!) 24   Temp:   (!) 101 8 °F (38 8 °C)    TempSrc:   Oral    SpO2: 100% 100% 100% 100%   Weight:       Height:         Arterial Line BP: 96/78  Arterial Line MAP (mmHg): 88 mmHg    Temperature:   Temp (24hrs), Av 5 °F (37 5 °C), Min:96 8 °F (36 °C), Max:101 8 °F (38 8 °C)    Current: Temperature: (!) 101 8 °F (38 8 °C)    Weights:   IBW: 70 7 kg    Body mass index is 27 97 kg/m²    Weight (last 2 days)     Date/Time   Weight    18 1200  85 9 (189 38)              Hemodynamic Monitoring: Arterial line     Non-Invasive/Invasive Ventilation Settings:  Respiratory    Lab Data (Last 4 hours)    None         O2/Vent Data (Last 4 hours)    None              Lab Results   Component Value Date    PHART 7 376 05/05/2018    KVY0GHQ 41 9 05/05/2018    PO2ART 397 2 (H) 05/05/2018    LOI7OSH 24 0 05/05/2018    BEART -1 1 05/05/2018    SOURCE Line, Arterial 05/05/2018     SpO2: SpO2: 100 %    Intake and Outputs:  I/O       05/04 0701 - 05/05 0700 05/05 0701 - 05/06 0700    P  O  850     I V  (mL/kg)  4658 4 (54 2)    Blood 285 4 333 3    NG/GT  50    IV Piggyback  1200    Total Intake(mL/kg) 1135 4 (13 1) 6241 7 (72 7)    Urine (mL/kg/hr) 2480 (1 2) 1510 (0 7)    Emesis/NG output  0 (0)    Drains  30 (0)    Stool 0 (0)     Total Output 2480 1540    Net -1344 6 +4701 7          Unmeasured Stool Occurrence 1 x         UOP: 62/hour   Nutrition:        Diet Orders            Start     Ordered    05/05/18 1426  Diet NPO  Diet effective now     Question Answer Comment   Diet Type NPO    RD to adjust diet per protocol?  Yes        05/05/18 1425        NPO    Labs:     Results from last 7 days  Lab Units 05/06/18  0606 05/05/18  1212 05/05/18  0923 05/05/18  0329 05/04/18  1052   WBC Thousand/uL 11 64* 9 84  --  11 94* 11 09*   HEMOGLOBIN g/dL 8 6* 9 4*  --  10 2* 10 3*   I STAT HEMOGLOBIN g/dl  --   --  8 2*  --   --    HEMATOCRIT % 26 1* 27 8*  --  29 8* 31 5*   PLATELETS Thousands/uL 267 245  --  276 263   NEUTROS PCT % 79* 75  --   --  87*   MONOS PCT % 6 8  --   --  5        Results from last 7 days  Lab Units 05/06/18  0606 05/06/18  0004 05/05/18  1451 05/05/18  1212   SODIUM mmol/L 135* 135* 133* 130*   POTASSIUM mmol/L 4 0 3 6 3 8 4 5   CHLORIDE mmol/L 106 104 101 100   CO2 mmol/L 22 24 23 24   BUN mg/dL 16 19 22 23   CREATININE mg/dL 0 72 0 77 0 79 0 93   CALCIUM mg/dL 7 1* 7 2* 7 6* 8 0*   TOTAL PROTEIN g/dL  --   --   --  6 4   BILIRUBIN TOTAL mg/dL  --   --   --  0 56   ALK PHOS U/L  --   --   --  59   ALT U/L --   --   --  17   AST U/L  --   --   --  6   GLUCOSE RANDOM mg/dL 82 103 171* 174*                Results from last 7 days  Lab Units 05/06/18  0606 05/05/18  0711 05/05/18  0355   INR  1 76* 1 37* 1 44*   PTT seconds 36*  --  33       Results from last 7 days  Lab Units 05/05/18  1212   LACTIC ACID mmol/L 0 7       0  Lab Value Date/Time   TROPONINI <0 02 04/06/2018 2120       Imaging:   Xr Chest Portable    Result Date: 5/5/2018  Impression: 1  Lines and tubes as above  Nasogastric tube should be advanced by at least 5 cm  Workstation performed: [id number]     Ct Head Wo Contrast    Result Date: 5/5/2018  Impression: Revision of left parietal craniotomy, interval drainage of extra-axial and superficial scalp collections  No significant mass effect  Workstation performed: [id number]     Mri Brain W Wo Contrast    Result Date: 5/4/2018  Impression: Postoperative changes of a left parietal mass resection as described above  Mild smooth leptomeningeal enhancement at the resection cavity is likely related to patient's history of infection/abscess  However, there is no encapsulated intra-axial fluid collection  Mild smooth dural enhancement at the left supratentorial brain is likely postoperative in nature  A short-term follow-up MRI brain in 2-3 months is recommended for further evaluation  No mass effect or midline shift  Significant interval improvement in left parietal vasogenic edema when compared to the prior MRI brain  No restricted diffusion to suggest acute ischemia  Tiny left frontoparietal subdural hematoma, likely postoperative in nature  Workstation performed: [id number]    I have personally reviewed pertinent reports  EKG:  Normal sinus rhythm as per telemetry    Micro:  Lab Results   Component Value Date    BLOODCX No Growth at 24 hrs  05/04/2018    BLOODCX No Growth at 24 hrs  05/04/2018    WOUNDCULT 2+ Growth of Staphylococcus aureus (A) 05/04/2018       Allergies:    Allergies Allergen Reactions    Bee Venom Other (See Comments)     Unknown severity       Medications:   Scheduled Meds:    Current Facility-Administered Medications:  acetaminophen 650 mg Oral Q4H PRN Will Sheridan MD    ARIPiprazole 10 mg Oral Daily Will Sheridan MD    bisacodyl 10 mg Rectal Daily PRN Sherrell Espinal MD    calcium carbonate 1,000 mg Oral Daily PRN Will Sheridan MD    cefepime 2,000 mg Intravenous Q8H Amber Gonsalez MD Last Rate: 2,000 mg (05/06/18 0625)   diazepam 5 mg Intravenous Q6H PRN Sandra Flores DO    docusate sodium 100 mg Oral BID Merry Wolfe MD    fentaNYL 50 mcg/hr Intravenous Continuous Merry Wolfe MD Last Rate: 50 mcg/hr (05/05/18 2001)   fentanyl citrate (PF) 25 mcg Intravenous Q1H PRN Sherrell Espinal MD    folic acid 1 mg Oral Daily Will Sheridan MD    fosphenytoin (CEREBYX) IVPB bolus 1,500 mg PE Intravenous Once Luana Pimentel DO    HYDROmorphone 1 mg Intravenous Q4H PRN Will Sheridan MD    insulin regular (HumuLIN R,NovoLIN R) infusion 0 3-21 Units/hr Intravenous Titrated Merry Wolfe MD Last Rate: Stopped (05/05/18 2350)   ipratropium 0 5 mg Nebulization TID Alison Otto MD    levalbuterol 1 25 mg Nebulization TID Alison Otto MD    levETIRAcetam 1,000 mg Intravenous Q8H Travis Gates DO Last Rate: 1,000 mg (05/06/18 0604)   multi-electrolyte 100 mL/hr Intravenous Continuous Luana Pimentel DO    multivitamin-minerals 1 tablet Oral Daily Will Sheridan MD    nicotine 1 patch Transdermal Daily Will Sheridan MD    norepinephrine 1-30 mcg/min Intravenous Titrated Merry Wolfe MD Last Rate: 8 mcg/min (05/06/18 0357)   ondansetron 4 mg Intravenous Q4H PRCHON Sheridan MD    oxyCODONE 5 mg Oral Q4H PRN Will Sheridan MD    pantoprazole 20 mg Oral Early Morning Will Sheridan MD    phenytoin 100 mg Intravenous Q8H [facility] Luana Pimentel DO    polyethylene glycol 17 g Oral Daily Will Sheridan MD    propofol 5-50 mcg/kg/min Intravenous Titrated Yudith Jung MD Last Rate: 40 mcg/kg/min (05/06/18 0413)   senna 1 tablet Oral HS Yudith Jung MD    senna-docusate sodium 2 tablet Oral Daily Wesly Cisneros MD    sodium chloride 2 g Per NG Tube TID With Meals Oni Bobo MD    thiamine 100 mg Oral Daily Wesly Cisneros MD    vancomycin 15 mg/kg Intravenous Q12H [address] Leyda Paredes MD Last Rate: 1,250 mg (05/06/18 0015)     Continuous Infusions:    fentaNYL 50 mcg/hr Last Rate: 50 mcg/hr (05/05/18 2001)   insulin regular (HumuLIN R,NovoLIN R) infusion 0 3-21 Units/hr Last Rate: Stopped (05/05/18 2350)   multi-electrolyte 100 mL/hr    norepinephrine 1-30 mcg/min Last Rate: 8 mcg/min (05/06/18 0357)   propofol 5-50 mcg/kg/min Last Rate: 40 mcg/kg/min (05/06/18 0413)     PRN Meds:    acetaminophen 650 mg Q4H PRN   bisacodyl 10 mg Daily PRN   calcium carbonate 1,000 mg Daily PRN   diazepam 5 mg Q6H PRN   fentanyl citrate (PF) 25 mcg Q1H PRN   HYDROmorphone 1 mg Q4H PRN   ondansetron 4 mg Q4H PRN   oxyCODONE 5 mg Q4H PRN       VTE Pharmacologic Prophylaxis: RX contraindicated due to: Neurosurgery  VTE Mechanical Prophylaxis: sequential compression device    Invasive lines and devices: Invasive Devices     Central Venous Catheter Line            CVC Central Lines 05/05/18 Triple 16cm less than 1 day          Peripheral Intravenous Line            Peripheral IV 05/04/18 Right Antecubital 1 day    Peripheral IV 05/05/18 Left Arm less than 1 day          Arterial Line            Arterial Line 05/05/18 Right Radial less than 1 day          Drain            Closed/Suction Drain Left Head Bulb 10 Fr  less than 1 day    NG/OG/Enteral Tube Orogastric Right mouth less than 1 day    Urethral Catheter Latex 16 Fr  less than 1 day          Airway            ETT  Hi-Lo; Cuffed 8 mm less than 1 day                   Counseling / Coordination of Care  Total Critical Care time spent 35 minutes excluding procedures, teaching and family updates  Code Status: Level 3 - DNAR and DNI     Portions of the record may have been created with voice recognition software  Occasional wrong word or "sound a like" substitutions may have occurred due to the inherent limitations of voice recognition software  Read the chart carefully and recognize, using context, where substitutions have occurred       Francis Hernandez DO

## 2018-05-06 NOTE — CONSULTS
Consultation - Neurology   Kelly Pastor 63 y o  male MRN: [id number]  Unit/Bed#: ICU 06 Encounter: [id number]      Assessment/Plan   Assessment:  1  Focal motor seizures, unclear if secondary generalized  Related to left frontal lobe empyema, status post evacuation  Seizure threshold likely reduced by recent surgery, infection, potential epileptogenicity of penetrant antibiotics  2  Stage IV lung cancer with prior posterior left frontal metastasis resection, complicated by a scalp wound, evolving to subdural empyema  3  History of alcohol abuse  4  Charted history referencing tremor disorder  5  Type 2 diabetes  Plan:  1  We will check a Dilantin level in the a m  His albumin is depleted, so 300 mg a day may be excessive, but will wait and adjust according to his levels and clinical response  2   Retry gradual wean off propofol, with continuous EEG monitoring  If his seizures reappear, then may require alternate agent  3   If successfully weaned off propofol walk without seizure recurrence, then may need to consider low-dose benzodiazepine alcohol withdrawal prophylaxis  4  Antibiotics as per Infectious Disease  5  We will follow and advise    Physician Requesting Consult: Betito Greene MD  Reason for Consult / Principal Problem:  Seizures  Hx and PE limited by:  Patient is intubated and sedated  History obtained from chart review    HPI: Kelly Pastor is a 63y o  year old male with history of alcohol abuse, diabetes, COPD, bipolar disorder, and stage 4 lung cancer with recent left frontal metastasis resection (April 2018)  That hospital stay also complicated by DVT/PE  He had begun anticoagulation 2 weeks postop  He failed to show up for his neuro surgical postop appointment  Evidently he is now transferred from [facility] where he presented with depressive symptoms    Investigation there included a head CT that identified scalp abscess, subdural empyema, prompting transfer to [facility]  Although his initial code status was DNI/DNR, after discussion with Neurosurgery, he was agreeable to undergo evacuation and debridement  Surgery was performed yesterday  In the immediate postop period he developed multiple reported focal right-sided seizures, prompting an increase in Keppra as well as initiation of propofol  Continuous EEG was also begun  Around 8:00 p m  he had an additional seizure recurrence as propofol was being tapered  Keppra was further increased to 1 g t i d   This morning he again developed seizure recurrence, prompting the addition of fosphenytoin (given 1500 PE bolus, and placed on 100 t i d )  Per nursing, and per discussion with epileptologist, no further seizures at this point  Previously prescribed Wellbutrin has been discontinued  Infectious disease was consulted, and after initial treatment with vancomycin and cefepime, his antibiotics have now been tailored to nafcillin to cover MSSA  Additional medical issues have included coagulopathy present on admission, reversed  He has an IVC filter    History of alcohol abuse with unknown recent intake, on thiamine daily  There had been prior discussions regarding hospice    Inpatient consult to Neurology  Consult performed by: Shalom Castano ordered by: JEANA MAGDALENO          Review of Systems   Unable to perform ROS: Acuity of condition       Historical Information   Past Medical History:   Diagnosis Date    Alcohol abuse     Anxiety     Bipolar disorder ([address]     COPD (chronic obstructive pulmonary disease) ([address]     Depression     Diabetes mellitus ([address] )     Gastric ulcer     Hearing impaired person, bilateral     Hepatitis C     Hypertension     Lung cancer ([address] )     Psychiatric disorder     Tobacco abuse     Vision impairment      Past Surgical History:   Procedure Laterality Date    ADENOIDECTOMY      BRONCHOSCOPY      CRANIOTOMY Left 4/13/2018    Procedure: Left image guided parietal craniotomy for resection of tumor;  Surgeon: Ana Green MD;  Location: [facility];  Service: Neurosurgery    LUNG BIOPSY      TONSILLECTOMY       Social History   History   Alcohol Use    Yes     Comment: 2L liquor weekly     History   Drug use: Unknown     History   Smoking Status    Current Every Day Smoker    Packs/day: 1 50    Years: 37 00   Smokeless Tobacco    Never Used     Family History: non-contributory    Review of previous medical records was  completed       Meds/Allergies   all current active meds have been reviewed and current meds:   Current Facility-Administered Medications   Medication Dose Route Frequency    acetaminophen (TYLENOL) oral suspension 975 mg  975 mg Oral Q8H    ARIPiprazole (ABILIFY) tablet 10 mg  10 mg Oral Daily    bisacodyl (DULCOLAX) rectal suppository 10 mg  10 mg Rectal Daily PRN    calcium carbonate (TUMS) chewable tablet 1,000 mg  1,000 mg Oral Daily PRN    dextrose 5 % and sodium chloride 0 9 % infusion  100 mL/hr Intravenous Continuous    diazepam (VALIUM) injection 5 mg  5 mg Intravenous Q6H PRN    fentaNYL 1250 mcg in sodium chloride 0 9% 125mL drip  50 mcg/hr Intravenous Continuous    fentanyl citrate (PF) 100 MCG/2ML 25 mcg  25 mcg Intravenous P4C PRN    folic acid (FOLVITE) tablet 1 mg  1 mg Oral Daily    HYDROmorphone (DILAUDID) injection 1 mg  1 mg Intravenous Q4H PRN    insulin regular (HumuLIN R,NovoLIN R) 1 Units/mL in sodium chloride 0 9 % 100 mL infusion  0 3-21 Units/hr Intravenous Titrated    ipratropium (ATROVENT) 0 02 % inhalation solution 0 5 mg  0 5 mg Nebulization TID    levalbuterol (XOPENEX) inhalation solution 1 25 mg  1 25 mg Nebulization TID    levETIRAcetam (KEPPRA) 1,000 mg in sodium chloride 0 9 % 100 mL IVPB  1,000 mg Intravenous Q8H    multivitamin-minerals (CENTRUM) tablet 1 tablet  1 tablet Oral Daily    nafcillin (NALLPEN) 2,000 mg in sodium chloride 0 9 % 100 mL IVPB  2,000 mg Intravenous Q4H    nicotine (NICODERM CQ) 21 mg/24 hr TD 24 hr patch 1 patch  1 patch Transdermal Daily    norepinephrine (LEVOPHED) 4 mg (STANDARD CONCENTRATION) IV in sodium chloride 0 9% 250 mL  1-30 mcg/min Intravenous Titrated    ondansetron (ZOFRAN) injection 4 mg  4 mg Intravenous Q4H PRN    oxyCODONE (ROXICODONE) IR tablet 5 mg  5 mg Oral Q4H PRN    pantoprazole (PROTONIX) injection 20 mg  20 mg Intravenous Once    phenytoin (DILANTIN) injection 100 mg  100 mg Intravenous Q8H [address]    polyethylene glycol (MIRALAX) packet 17 g  17 g Oral Daily    propofol (DIPRIVAN) 1000 mg in 100 mL infusion (premix)  5-50 mcg/kg/min Intravenous Titrated    senna (SENOKOT) tablet 8 6 mg  1 tablet Oral HS    senna-docusate sodium (SENOKOT S) 8 6-50 mg per tablet 2 tablet  2 tablet Oral Daily    sodium chloride tablet 2 g  2 g Per NG Tube TID With Meals    thiamine (VITAMIN B1) tablet 100 mg  100 mg Oral Daily       Allergies   Allergen Reactions    Bee Venom Other (See Comments)     Unknown severity       Objective   Vitals:Blood pressure 108/62, pulse 82, temperature (!) 101 1 °F (38 4 °C), temperature source Oral, resp  rate 20, height 5' 9" (1 753 m), weight 85 9 kg (189 lb 6 oz), SpO2 99 %  ,Body mass index is 27 97 kg/m²  Intake/Output Summary (Last 24 hours) at 05/06/18 1139  Last data filed at 05/06/18 1030   Gross per 24 hour   Intake          6911 58 ml   Output             2080 ml   Net          4831 58 ml       Invasive Devices:    Invasive Devices     Central Venous Catheter Line            CVC Central Lines 05/05/18 Triple 16cm less than 1 day          Peripheral Intravenous Line            Peripheral IV 05/04/18 Right Antecubital 1 day    Peripheral IV 05/05/18 Left Arm 1 day          Arterial Line            Arterial Line 05/05/18 Right Radial 1 day          Drain            Closed/Suction Drain Left Head Bulb 10 Fr  1 day    Urethral Catheter Latex 16 Fr  1 day    NG/OG/Enteral Tube Orogastric Right mouth less than 1 day          Airway            ETT  Hi-Lo; Cuffed 8 mm less than 1 day                Physical Exam   Constitutional:   Appears somewhat ill kempt   HENT:   EEG wires in place  Left scalp wound dressed, drain in place  Endotracheal tube, no obvious lip bite, but difficult to inspect his oral cavity   Eyes: Conjunctivae are normal  No scleral icterus  Neck: Normal range of motion  Neck supple  Cardiovascular: Normal rate and regular rhythm  Pulmonary/Chest:   Intubated  Chest sounds clear   Abdominal: Soft  Bowel sounds are normal    Genitourinary:   Genitourinary Comments: Figueroa in place   Musculoskeletal:   Bilateral wrist restraints  Generalized edema   Skin: Skin is warm and dry  He is not diaphoretic  Vitals reviewed  Neurologic Exam     Mental Status   Propofol  was not held for exam   Patient is roused oval to name  Opens eyes and follows majority of simple commands as able  Unable to assess higher cognitive function  Cranial Nerves   Unable to reliably assess visual fields  His pupils were equal and reactive  Gaze is midline and conjugate  He did not deviate gaze left or right on request   No gross facial droop, but no resistance to eye opening  No gag with ET tube manipulation     Motor Exam He was able to  on the right, did not do so on the left  Was able to actively resist gravity with each limb after passive elevation, but not sustained  Required extra trials to demonstrate that finding on his right leg  Tone appears relatively normal throughout  No involuntary movements or shaking noted  Sensory Exam   Unable to reliably assess     Gait, Coordination, and Reflexes Cannot assess coordination  Reflexes were 1-2 in the upper limbs, diminished in the lowers  Plantar response appeared extensor on the left and flexor on the right       Lab Results:   I have personally reviewed pertinent reports    , CBC:   Results from last 7 days  Lab Units 05/06/18  0606 05/05/18  1212 05/05/18  0923 05/05/18  0329   WBC Thousand/uL 11 64* 9 84  --  11 94*   RBC Million/uL 2 94* 3 17*  --  3 44*   HEMOGLOBIN g/dL 8 6* 9 4*  --  10 2*   I STAT HEMOGLOBIN g/dl  --   --  8 2*  --    HEMATOCRIT % 26 1* 27 8*  --  29 8*   MCV fL 89 88  --  87   PLATELETS Thousands/uL 267 245  --  276   , BMP/CMP:   Results from last 7 days  Lab Units 05/06/18  0606 05/06/18  0004 05/05/18  1451 05/05/18  1212   SODIUM mmol/L 135* 135* 133* 130*   POTASSIUM mmol/L 4 0 3 6 3 8 4 5   CHLORIDE mmol/L 106 104 101 100   CO2 mmol/L 22 24 23 24   ANION GAP mmol/L 7 7 9 6   BUN mg/dL 16 19 22 23   CREATININE mg/dL 0 72 0 77 0 79 0 93   GLUCOSE RANDOM mg/dL 82 103 171* 174*   CALCIUM mg/dL 7 1* 7 2* 7 6* 8 0*   AST U/L  --   --   --  6   ALT U/L  --   --   --  17   ALK PHOS U/L  --   --   --  59   TOTAL PROTEIN g/dL  --   --   --  6 4   BILIRUBIN TOTAL mg/dL  --   --   --  0 56   EGFR ml/min/1 73sq m 101 99 98 89   , HgBA1C:   Results from last 7 days  Lab Units 05/05/18  0334   HEMOGLOBIN A1C % 8 0*   , Coagulation:   Results from last 7 days  Lab Units 05/06/18  0606   INR  1 76*   , Urinalysis:       Invalid input(s): URIBILINOGEN  Imaging Studies: I have personally reviewed pertinent films in PACS  EKG, Pathology, and Other Studies: I have personally reviewed pertinent reports  Code Status: Level 3 - DNAR and DNI  Advance Directive and Living Will:      Power of :    POLST:      Counseling / Coordination of Care  Total Critical Care time spent 45 minutes excluding procedures, teaching and family updates

## 2018-05-06 NOTE — PROGRESS NOTES
Progress Note - Neurosurgery   Dorothy Roth 63 y o  male MRN: [id number]  Unit/Bed#: ICU 06 Encounter: [id number]    Assessment:  Postop day 1 status post evacuation of subgaleal abscess and removal of bone flap  Focal seizures    A total of 30 min was spent with the patient, of which more than 50% was spent in direct counseling coordination of care  Plan:  Continue present management  Epilepsy the service involvement greatly appreciated  Now on dual anticonvulsant medications    VTE Prophylaxis: Sequential compression device (Venodyne)  and Heparin    Subjective/Objective   Chief Complaint:  Patient is intubated the EMG use service reported seizures last evening and the patient was given additional fosphenytoin    Vitals: Blood pressure 112/63, pulse 68, temperature 99 6 °F (37 6 °C), temperature source Oral, resp  rate 13, height 5' 9" (1 753 m), weight 85 9 kg (189 lb 6 oz), SpO2 (P) 99 %  ,Body mass index is 27 97 kg/m²      Hemodynamic Monitoring: MAP: Arterial Line MAP (mmHg): 80 mmHg    Physical Exam:   Patient is intubated  Patient's eyes are open  He does move all 4 extremities spontaneously  He pupils are 3 mm and reactive bilaterally  Exam was done on propofol    Intake/Output       05/06/18 0701 - 05/07/18 0700      [id number] 3319-4519 Total       Intake    I V   1000 2  -- 1000 2    NG/GT  325  -- 325    IV Piggyback  460  -- 460    Total Intake 1785 2 -- 1785 2       Output    Urine  950  -- 950    Output (mL) (Urethral Catheter Latex 16 Fr ) 950 -- 950    Total Output 950 -- 950       Net I/O     835 2 -- 835 2          Invasive Devices     Central Venous Catheter Line            CVC Central Lines 05/05/18 Triple 16cm 1 day          Peripheral Intravenous Line            Peripheral IV 05/04/18 Right Antecubital 2 days          Arterial Line            Arterial Line 05/05/18 Right Radial 1 day          Drain            Closed/Suction Drain Left Head Bulb 10 Fr  1 day    NG/OG/Enteral Tube Orogastric Right mouth 1 day    Urethral Catheter Latex 16 Fr  1 day          Airway            ETT  Hi-Lo; Cuffed 8 mm 1 day                          Lab Results:   Lab Results   Component Value Date    WBC 11 64 (H) 05/06/2018    HGB 8 6 (L) 05/06/2018    HCT 26 1 (L) 05/06/2018    MCV 89 05/06/2018     05/06/2018    MCH 29 3 05/06/2018    MCHC 33 0 05/06/2018    RDW 15 3 (H) 05/06/2018    MPV 10 1 05/06/2018    NRBC 0 05/06/2018     (L) 05/06/2018     05/06/2018    CO2 22 05/06/2018    ANIONGAP 7 05/06/2018    BUN 16 05/06/2018    CREATININE 0 72 05/06/2018    GLUCOSE 82 05/06/2018    CALCIUM 7 1 (L) 05/06/2018    EGFR 101 05/06/2018    INR 1 76 (H) 05/06/2018       Imaging Studies: I have personally reviewed pertinent films in PACS CT scan of brain carefully reviewed  There is no abnormal involvement of the parenchyma of the brain on the study  There is no real the residual extra-axial mass      Other Studies:  None

## 2018-05-06 NOTE — RESPIRATORY THERAPY NOTE
RT Ventilator Management Note  Amber Lunsford 63 y o  male MRN: [id number]  Unit/Bed#: ICU 06 Encounter: [id number]      Daily Screen       5/5/2018 1200             Patient safety screen outcome[de-identified] Failed    Not Ready for Weaning due to[de-identified] Underline problem not resolved            Physical Exam:   Assessment Type: (P) Assess only  General Appearance: (P) Sedated  Respiratory Pattern: (P) Assisted  Chest Assessment: (P) Chest expansion symmetrical  Bilateral Breath Sounds: (P) Clear, Diminished  O2 Device: (P) vent  Subjective Data: (P) n/a      Resp Comments: (P) No changes made to the vent at this time pt is tollerating AC settings, will cont to monitor pt overnight

## 2018-05-06 NOTE — PROGRESS NOTES
Progress Note - Infectious Disease   Caryn Kim 63 y o  male MRN: [id number]  Unit/Bed#: ICU 06 Encounter: [id number]      Impression/Plan:  1  Jasmin Marisela   Fever and leukocytosis   Suspect this is related to the patient's brain  abscesses/subdural empyema  Consideration for the possibility of bacteremia   No other clear sources appreciated  Yan Escudero remains hemodynamically stable despite his systemic illness   He is tolerating the antibiotics without difficulty  -discontinue vancomycin cefepime  -nafcillin 2 g IV q 4 hours to cover the MSSA and maximize CNS penetration  -follow-up blood cultures  -follow up final wound cultures and blood cultures and adjust antibiotics as needed  -recheck  CBC with diff and CMP tomorrow a m      2  Brain abscesses/subdural empyema/ left parietal bone flap infection-in the postoperative setting  Appears secondary to MSSA  No gram-negative rods isolated  Patient is now status post I and D with evacuation of the abscess and removal of the flap  -antibiotics as above to improve penetration into the CNS  -follow up final wound cultures and adjust antibiotics as needed  -close neurosurgical follow-up     3   Stage IV squamous cell carcinoma of the lung-prognosis is poor   Metastatic disease to the brain        4   Pulmonary embolism/DVT-had been on anticoagulation but has an IVC filter in place   -anticoagulation as per Neurosurgery in anticipation of surgical intervention      5   Encephalopathy-likely multifactorial including 1,2,3 playing prominent role as well as metabolic issues  seizure activity also likely playing a role  His cognition remains quite abnormal   -monitor cognition  -EEG monitoring  -antibiotics as above  -treat metabolic issues  -no additional ID workup per treatment    6    Acute hypoxic respiratory failure-in the postoperative setting   -supported the ventilator  -wean needs as able     Discussed in detail with Neurosurgery    Antibiotics:  Vancomycin 3  Cefepime 3    Subjective:  Patient sedated on the ventilator  Patient underwent craniotomy with evacuation of the subdural empyema and a brain abscess  He had some seizure activity postprocedure  He remains hemodynamically stable without vasopressor support  He is spiking fevers  Objective:  Vitals:  HR:  [] 78  Resp:  [6-30] 14  BP: ()/(49-85) 91/52  SpO2:  [95 %-100 %] 99 %  Temp (24hrs), Av °F (37 8 °C), Min:97 6 °F (36 4 °C), Max:101 8 °F (38 8 °C)  Current: Temperature: (!) 101 1 °F (38 4 °C)    Physical Exam:   General Appearance:  Resting on the ventilator  Drains in place  Eyes: Conjuctiva pale, sclera anicteric   Throat: Oropharynx moist without lesions  Endotracheal tube in place   Neck: Supple without DEION   Lungs:   Decreased breath sounds bilaterally; no wheezes, rhonchi or rales; respirations unlabored   Chest wall : No deformity, nontender   Heart:  RRR; no murmur, rub or gallop   Abdomen:   Soft, non-tender, non-distended, positive bowel sounds  Extremities: No clubbing, cyanosis or edema   Neuro: Sedated, nonverbal   Skin: No new rashes or lesions  No draining wounds noted         Labs, Imaging, & Other studies:   All pertinent labs and imaging studies were personally reviewed    Results from last 7 days  Lab Units 18  0606 18  1212 18  0923 18  0329   WBC Thousand/uL 11 64* 9 84  --  11 94*   HEMOGLOBIN g/dL 8 6* 9 4*  --  10 2*   I STAT HEMOGLOBIN g/dl  --   --  8 2*  --    PLATELETS Thousands/uL 267 245  --  276       Results from last 7 days  Lab Units 18  0606 18  0004 18  1451 18  1212   SODIUM mmol/L 135* 135* 133* 130*   POTASSIUM mmol/L 4 0 3 6 3 8 4 5   CHLORIDE mmol/L 106 104 101 100   CO2 mmol/L 22 24 23 24   ANION GAP mmol/L 7 7 9 6   BUN mg/dL 16 19 22 23   CREATININE mg/dL 0 72 0 77 0 79 0 93   EGFR ml/min/1 73sq m 101 99 98 89   GLUCOSE RANDOM mg/dL 82 103 171* 174*   CALCIUM mg/dL 7 1* 7 2* 7 6* 8 0* AST U/L  --   --   --  6   ALT U/L  --   --   --  17   ALK PHOS U/L  --   --   --  59   TOTAL PROTEIN g/dL  --   --   --  6 4   BILIRUBIN TOTAL mg/dL  --   --   --  0 56       Results from last 7 days  Lab Units 05/05/18  0920 05/04/18  1324 05/04/18  1028   BLOOD CULTURE   --  No Growth at 24 hrs    No Growth at 24 hrs   --    GRAM STAIN RESULT  No polys seen  Rare Gram positive cocci in pairs  --  No polys seen  Rare Gram positive cocci in clusters   WOUND CULTURE   --   --  2+ Growth of Staphylococcus aureus*     Chest x-ray-atelectasis    Images reviewed by me in PACS

## 2018-05-06 NOTE — RESPIRATORY THERAPY NOTE
RT Ventilator Management Note  Kat Doe 63 y o  male MRN: [id number]  Unit/Bed#: ICU 06 Encounter: [id number]      Daily Screen       5/6/2018 0722 5/6/2018 1604          Patient safety screen outcome[de-identified] Failed Passed      Not Ready for Weaning due to[de-identified] Underline problem not resolved -      RSBI: - 55              Physical Exam:   Assessment Type: (P) Assess only  General Appearance: (P) Eyes open/responds to stimulus  Respiratory Pattern: (P) Assisted  Chest Assessment: (P) Chest expansion symmetrical  Bilateral Breath Sounds: (P) Diminished  Cough: (P) Productive, Strong  O2 Device: (P) vent      Resp Comments: (P) pt placed on PS 5/5 30% per MD, pt tolerating wean well, all vitals are stable at this time, will continue to monitor pt

## 2018-05-06 NOTE — PROGRESS NOTES
7p-7a  At 2045 twitching of right arm and leg with right sided gaze  Order obtained to increase frequency of Keppra to q8hrs  Propofol increased to 40mcg/kg  Tylenol given for temp 101 3  Blood sugar 50 at 2400  Given 1/2 amp D50, repeat   Insulin drip placed on hold  40KCl PB given for K 3 6  0200 blood sugar 65, another 1/2 amp D50 given  Repeat   0400 and 0600 blood sugars 81 and 73 respectively  More awake between 0400 and 0500  Moving all extremities, but not following commands or attempting to communicate

## 2018-05-06 NOTE — PHYSICAL THERAPY NOTE
Physical Therapy Cancellation Note      PT CONSULT RECEIVED  PATIENT INTUBATED AT THIS TIME AND UNABLE TO PARTICIPATE IN SKILLED INPT PT  PT WILL FOLLOW ON CASELOAD AND PERFORM INITIAL EVALUATION AS MEDICALLY APPROPRIATE      Natalie Alvarado PT

## 2018-05-07 ENCOUNTER — APPOINTMENT (INPATIENT)
Dept: RADIOLOGY | Facility: HOSPITAL | Age: 63
DRG: 515 | End: 2018-05-07
Payer: COMMERCIAL

## 2018-05-07 ENCOUNTER — APPOINTMENT (INPATIENT)
Dept: NEUROLOGY | Facility: AMBULATORY SURGERY CENTER | Age: 63
DRG: 515 | End: 2018-05-07
Payer: COMMERCIAL

## 2018-05-07 PROBLEM — I95.9 HYPOTENSION: Status: ACTIVE | Noted: 2018-05-03

## 2018-05-07 PROBLEM — C34.91: Status: ACTIVE | Noted: 2018-04-06

## 2018-05-07 PROBLEM — J96.00 ACUTE RESPIRATORY FAILURE (HCC): Status: ACTIVE | Noted: 2018-05-07

## 2018-05-07 PROBLEM — Z71.89 GOALS OF CARE, COUNSELING/DISCUSSION: Status: ACTIVE | Noted: 2018-05-07

## 2018-05-07 PROBLEM — I95.9 HYPOTENSION: Status: RESOLVED | Noted: 2018-05-03 | Resolved: 2018-05-07

## 2018-05-07 PROBLEM — A49.01 MSSA (METHICILLIN SUSCEPTIBLE STAPHYLOCOCCUS AUREUS) INFECTION: Status: ACTIVE | Noted: 2018-05-03

## 2018-05-07 LAB
ALBUMIN SERPL BCP-MCNC: 2.2 G/DL (ref 3.5–5)
ANION GAP SERPL CALCULATED.3IONS-SCNC: 6 MMOL/L (ref 4–13)
BACTERIA TISS AEROBE CULT: ABNORMAL
BASOPHILS # BLD AUTO: 0.02 THOUSANDS/ΜL (ref 0–0.1)
BASOPHILS NFR BLD AUTO: 0 % (ref 0–1)
BUN SERPL-MCNC: 11 MG/DL (ref 5–25)
CALCIUM SERPL-MCNC: 7 MG/DL (ref 8.3–10.1)
CHLORIDE SERPL-SCNC: 109 MMOL/L (ref 100–108)
CO2 SERPL-SCNC: 24 MMOL/L (ref 21–32)
CREAT SERPL-MCNC: 0.89 MG/DL (ref 0.6–1.3)
EOSINOPHIL # BLD AUTO: 0.22 THOUSAND/ΜL (ref 0–0.61)
EOSINOPHIL NFR BLD AUTO: 1 % (ref 0–6)
ERYTHROCYTE [DISTWIDTH] IN BLOOD BY AUTOMATED COUNT: 15.7 % (ref 11.6–15.1)
GFR SERPL CREATININE-BSD FRML MDRD: 93 ML/MIN/1.73SQ M
GLUCOSE SERPL-MCNC: 123 MG/DL (ref 65–140)
GLUCOSE SERPL-MCNC: 149 MG/DL (ref 65–140)
GLUCOSE SERPL-MCNC: 175 MG/DL (ref 65–140)
GLUCOSE SERPL-MCNC: 179 MG/DL (ref 65–140)
GLUCOSE SERPL-MCNC: 181 MG/DL (ref 65–140)
GLUCOSE SERPL-MCNC: 197 MG/DL (ref 65–140)
GLUCOSE SERPL-MCNC: 198 MG/DL (ref 65–140)
GLUCOSE SERPL-MCNC: 208 MG/DL (ref 65–140)
GLUCOSE SERPL-MCNC: 269 MG/DL (ref 65–140)
GLUCOSE SERPL-MCNC: 302 MG/DL (ref 65–140)
GLUCOSE SERPL-MCNC: 315 MG/DL (ref 65–140)
GLUCOSE SERPL-MCNC: 60 MG/DL (ref 65–140)
GLUCOSE SERPL-MCNC: 82 MG/DL (ref 65–140)
GLUCOSE SERPL-MCNC: 89 MG/DL (ref 65–140)
GLUCOSE SERPL-MCNC: 96 MG/DL (ref 65–140)
GRAM STN SPEC: ABNORMAL
GRAM STN SPEC: ABNORMAL
HCT VFR BLD AUTO: 27.4 % (ref 36.5–49.3)
HGB BLD-MCNC: 9 G/DL (ref 12–17)
LYMPHOCYTES # BLD AUTO: 1.58 THOUSANDS/ΜL (ref 0.6–4.47)
LYMPHOCYTES NFR BLD AUTO: 10 % (ref 14–44)
MAGNESIUM SERPL-MCNC: 2.4 MG/DL (ref 1.6–2.6)
MCH RBC QN AUTO: 29.4 PG (ref 26.8–34.3)
MCHC RBC AUTO-ENTMCNC: 32.8 G/DL (ref 31.4–37.4)
MCV RBC AUTO: 90 FL (ref 82–98)
MONOCYTES # BLD AUTO: 0.65 THOUSAND/ΜL (ref 0.17–1.22)
MONOCYTES NFR BLD AUTO: 4 % (ref 4–12)
NEUTROPHILS # BLD AUTO: 13.68 THOUSANDS/ΜL (ref 1.85–7.62)
NEUTS SEG NFR BLD AUTO: 85 % (ref 43–75)
NRBC BLD AUTO-RTO: 0 /100 WBCS
PHENYTOIN SERPL-MCNC: 13.7 UG/ML (ref 10–20)
PHOSPHATE SERPL-MCNC: 2.9 MG/DL (ref 2.3–4.1)
PLATELET # BLD AUTO: 267 THOUSANDS/UL (ref 149–390)
PMV BLD AUTO: 10.2 FL (ref 8.9–12.7)
POTASSIUM SERPL-SCNC: 4.3 MMOL/L (ref 3.5–5.3)
RBC # BLD AUTO: 3.06 MILLION/UL (ref 3.88–5.62)
SODIUM SERPL-SCNC: 139 MMOL/L (ref 136–145)
WBC # BLD AUTO: 16.22 THOUSAND/UL (ref 4.31–10.16)

## 2018-05-07 PROCEDURE — 95951 HB EEG MONITORING/VIDEORECORD: CPT

## 2018-05-07 PROCEDURE — 83735 ASSAY OF MAGNESIUM: CPT | Performed by: FAMILY MEDICINE

## 2018-05-07 PROCEDURE — 94760 N-INVAS EAR/PLS OXIMETRY 1: CPT

## 2018-05-07 PROCEDURE — 99291 CRITICAL CARE FIRST HOUR: CPT | Performed by: EMERGENCY MEDICINE

## 2018-05-07 PROCEDURE — 82948 REAGENT STRIP/BLOOD GLUCOSE: CPT

## 2018-05-07 PROCEDURE — 82040 ASSAY OF SERUM ALBUMIN: CPT | Performed by: NURSE PRACTITIONER

## 2018-05-07 PROCEDURE — 80185 ASSAY OF PHENYTOIN TOTAL: CPT | Performed by: PSYCHIATRY & NEUROLOGY

## 2018-05-07 PROCEDURE — 99233 SBSQ HOSP IP/OBS HIGH 50: CPT | Performed by: INTERNAL MEDICINE

## 2018-05-07 PROCEDURE — 80048 BASIC METABOLIC PNL TOTAL CA: CPT | Performed by: FAMILY MEDICINE

## 2018-05-07 PROCEDURE — 80186 ASSAY OF PHENYTOIN FREE: CPT | Performed by: PSYCHIATRY & NEUROLOGY

## 2018-05-07 PROCEDURE — 99291 CRITICAL CARE FIRST HOUR: CPT | Performed by: PSYCHIATRY & NEUROLOGY

## 2018-05-07 PROCEDURE — 84100 ASSAY OF PHOSPHORUS: CPT | Performed by: FAMILY MEDICINE

## 2018-05-07 PROCEDURE — 85025 COMPLETE CBC W/AUTO DIFF WBC: CPT | Performed by: FAMILY MEDICINE

## 2018-05-07 PROCEDURE — 94640 AIRWAY INHALATION TREATMENT: CPT

## 2018-05-07 PROCEDURE — 95951 PR EEG MONITORING/VIDEORECORD: CPT | Performed by: PSYCHIATRY & NEUROLOGY

## 2018-05-07 RX ORDER — LORAZEPAM 2 MG/ML
2 INJECTION INTRAMUSCULAR ONCE
Status: DISCONTINUED | OUTPATIENT
Start: 2018-05-07 | End: 2018-05-07

## 2018-05-07 RX ORDER — HALOPERIDOL 5 MG/ML
2 INJECTION INTRAMUSCULAR ONCE
Status: COMPLETED | OUTPATIENT
Start: 2018-05-07 | End: 2018-05-07

## 2018-05-07 RX ORDER — PHENYTOIN SODIUM 50 MG/ML
50 INJECTION, SOLUTION INTRAMUSCULAR; INTRAVENOUS EVERY 8 HOURS SCHEDULED
Status: DISCONTINUED | OUTPATIENT
Start: 2018-05-07 | End: 2018-05-07

## 2018-05-07 RX ORDER — HYDROXYZINE PAMOATE 50 MG/1
50 CAPSULE ORAL DAILY
COMMUNITY

## 2018-05-07 RX ORDER — PHENYTOIN SODIUM 50 MG/ML
100 INJECTION, SOLUTION INTRAMUSCULAR; INTRAVENOUS EVERY 8 HOURS SCHEDULED
Status: DISCONTINUED | OUTPATIENT
Start: 2018-05-07 | End: 2018-05-11 | Stop reason: HOSPADM

## 2018-05-07 RX ORDER — LORAZEPAM 2 MG/ML
1 INJECTION INTRAMUSCULAR ONCE
Status: COMPLETED | OUTPATIENT
Start: 2018-05-07 | End: 2018-05-07

## 2018-05-07 RX ORDER — SIMVASTATIN 20 MG
20 TABLET ORAL
COMMUNITY

## 2018-05-07 RX ADMIN — ARIPIPRAZOLE 10 MG: 10 TABLET ORAL at 09:53

## 2018-05-07 RX ADMIN — CHLORHEXIDINE GLUCONATE 15 ML: 1.2 RINSE ORAL at 21:46

## 2018-05-07 RX ADMIN — Medication 2 TABLET: at 09:54

## 2018-05-07 RX ADMIN — HEPARIN SODIUM 5000 UNITS: 5000 INJECTION, SOLUTION INTRAVENOUS; SUBCUTANEOUS at 05:16

## 2018-05-07 RX ADMIN — NAFCILLIN SODIUM 2000 MG: 2 INJECTION, POWDER, LYOPHILIZED, FOR SOLUTION INTRAMUSCULAR; INTRAVENOUS at 15:03

## 2018-05-07 RX ADMIN — PROPOFOL 19.82 MCG/KG/MIN: 10 INJECTION, EMULSION INTRAVENOUS at 10:21

## 2018-05-07 RX ADMIN — Medication 1 TABLET: at 09:54

## 2018-05-07 RX ADMIN — LEVALBUTEROL HYDROCHLORIDE 1.25 MG: 1.25 SOLUTION, CONCENTRATE RESPIRATORY (INHALATION) at 13:49

## 2018-05-07 RX ADMIN — SODIUM CHLORIDE TAB 1 GM 2 G: 1 TAB at 11:18

## 2018-05-07 RX ADMIN — IPRATROPIUM BROMIDE 0.5 MG: 0.5 SOLUTION RESPIRATORY (INHALATION) at 19:10

## 2018-05-07 RX ADMIN — NAFCILLIN SODIUM 2000 MG: 2 INJECTION, POWDER, LYOPHILIZED, FOR SOLUTION INTRAMUSCULAR; INTRAVENOUS at 18:19

## 2018-05-07 RX ADMIN — FOLIC ACID 1 MG: 1 TABLET ORAL at 09:54

## 2018-05-07 RX ADMIN — ACETAMINOPHEN 975 MG: 160 SUSPENSION ORAL at 07:36

## 2018-05-07 RX ADMIN — NAFCILLIN SODIUM 2000 MG: 2 INJECTION, POWDER, LYOPHILIZED, FOR SOLUTION INTRAMUSCULAR; INTRAVENOUS at 23:20

## 2018-05-07 RX ADMIN — NAFCILLIN SODIUM 2000 MG: 2 INJECTION, POWDER, LYOPHILIZED, FOR SOLUTION INTRAMUSCULAR; INTRAVENOUS at 05:55

## 2018-05-07 RX ADMIN — HALOPERIDOL LACTATE 2 MG: 5 INJECTION, SOLUTION INTRAMUSCULAR at 20:26

## 2018-05-07 RX ADMIN — NAFCILLIN SODIUM 2000 MG: 2 INJECTION, POWDER, LYOPHILIZED, FOR SOLUTION INTRAMUSCULAR; INTRAVENOUS at 02:05

## 2018-05-07 RX ADMIN — PHENYTOIN SODIUM 100 MG: 50 INJECTION INTRAMUSCULAR; INTRAVENOUS at 21:46

## 2018-05-07 RX ADMIN — Medication 1000 MG: at 05:06

## 2018-05-07 RX ADMIN — LORAZEPAM 1 MG: 2 INJECTION INTRAMUSCULAR; INTRAVENOUS at 20:56

## 2018-05-07 RX ADMIN — HEPARIN SODIUM 5000 UNITS: 5000 INJECTION, SOLUTION INTRAVENOUS; SUBCUTANEOUS at 21:46

## 2018-05-07 RX ADMIN — Medication 1000 MG: at 21:47

## 2018-05-07 RX ADMIN — HEPARIN SODIUM 5000 UNITS: 5000 INJECTION, SOLUTION INTRAVENOUS; SUBCUTANEOUS at 13:25

## 2018-05-07 RX ADMIN — ACETAMINOPHEN 975 MG: 160 SUSPENSION ORAL at 00:42

## 2018-05-07 RX ADMIN — LEVALBUTEROL HYDROCHLORIDE 1.25 MG: 1.25 SOLUTION, CONCENTRATE RESPIRATORY (INHALATION) at 07:20

## 2018-05-07 RX ADMIN — PROPOFOL 15 MCG/KG/MIN: 10 INJECTION, EMULSION INTRAVENOUS at 01:00

## 2018-05-07 RX ADMIN — SODIUM CHLORIDE TAB 1 GM 2 G: 1 TAB at 07:36

## 2018-05-07 RX ADMIN — POLYETHYLENE GLYCOL 3350 17 G: 17 POWDER, FOR SOLUTION ORAL at 09:54

## 2018-05-07 RX ADMIN — IPRATROPIUM BROMIDE 0.5 MG: 0.5 SOLUTION RESPIRATORY (INHALATION) at 07:20

## 2018-05-07 RX ADMIN — NICOTINE 1 PATCH: 21 PATCH, EXTENDED RELEASE TRANSDERMAL at 09:53

## 2018-05-07 RX ADMIN — ACETAMINOPHEN 975 MG: 160 SUSPENSION ORAL at 16:22

## 2018-05-07 RX ADMIN — Medication 20 MG: at 05:16

## 2018-05-07 RX ADMIN — Medication 1000 MG: at 13:43

## 2018-05-07 RX ADMIN — Medication 100 MG: at 09:54

## 2018-05-07 RX ADMIN — PHENYTOIN SODIUM 100 MG: 50 INJECTION INTRAMUSCULAR; INTRAVENOUS at 13:19

## 2018-05-07 RX ADMIN — PHENYTOIN SODIUM 100 MG: 50 INJECTION INTRAMUSCULAR; INTRAVENOUS at 05:16

## 2018-05-07 RX ADMIN — IPRATROPIUM BROMIDE 0.5 MG: 0.5 SOLUTION RESPIRATORY (INHALATION) at 13:49

## 2018-05-07 RX ADMIN — LEVALBUTEROL HYDROCHLORIDE 1.25 MG: 1.25 SOLUTION, CONCENTRATE RESPIRATORY (INHALATION) at 19:10

## 2018-05-07 RX ADMIN — CHLORHEXIDINE GLUCONATE 15 ML: 1.2 RINSE ORAL at 09:54

## 2018-05-07 RX ADMIN — NAFCILLIN SODIUM 2000 MG: 2 INJECTION, POWDER, LYOPHILIZED, FOR SOLUTION INTRAMUSCULAR; INTRAVENOUS at 10:48

## 2018-05-07 RX ADMIN — SODIUM CHLORIDE TAB 1 GM 2 G: 1 TAB at 16:25

## 2018-05-07 NOTE — PROGRESS NOTES
Progress Note - Infectious Disease   Krys Yeager 63 y o  male MRN: [id number]  Unit/Bed#: ICU 06 Encounter: [id number]      Impression/Plan:  1  Wendy Dorado   Fever and leukocytosis   Suspect this is related to the patient's brain  abscesses/subdural empyema  Consideration for the possibility of bacteremia   No other clear sources appreciated  Michelle Chirinos remains hemodynamically stable despite his systemic illness   He is tolerating the antibiotics without difficulty  He spiked another fever last night   -continue nafcillin  -planned 6 week course of intravenous antibiotics  -follow-up blood cultures  -if the patient spikes another fever would recheck blood cultures x2 sets  -recheck [facility] with diff and CMP tomorrow a m      2  Brain abscesses/subdural empyema/ left parietal bone flap infection-in the postoperative setting  Appears secondary to MSSA  No gram-negative rods isolated  Patient is now status post I and D with evacuation of the abscess and removal of the flap  -antibiotics as above   -follow up final wound cultures and adjust antibiotics as needed  -close neurosurgical follow-up     3   Stage IV squamous cell carcinoma of the lung-prognosis is poor   Metastatic disease to the brain        4   Pulmonary embolism/DVT-had been on anticoagulation but has an IVC filter in place   -anticoagulation as per Neurosurgery in anticipation of surgical intervention      5   Encephalopathy-likely multifactorial including 1,2,3 playing prominent role as well as metabolic issues   seizure activity also likely playing a role  He is more alert today and does not seem to be seizing  -monitor cognition  -antibiotics as above  -treat metabolic issues  -no additional ID workup per treatment     6  Acute hypoxic respiratory failure-in the postoperative setting   -supported the ventilator  -wean needs as able  -likely extubated today    7    Disposition-await decision about level of care moving forward     Discussed in detail with the critical care service    Antibiotics:  Nafcillin 2  Antibiotics 4  Subjective:  Patient still having intermittent fever; no reported nausea, vomiting, diarrhea; he is still on the ventilator but only requiring low level ventilatory support:  He is not requiring any vasopressor support    Objective:  Vitals:  HR:  [] 82  Resp:  [12-27] 18  BP: ()/(43-82) 117/66  SpO2:  [93 %-100 %] 98 %  Temp (24hrs), Av 1 °F (37 8 °C), Min:99 2 °F (37 3 °C), Max:102 1 °F (38 9 °C)  Current: Temperature: 99 6 °F (37 6 °C)    Physical Exam:   General Appearance:  Awake, on the ventilator, in no acute distress   Head:   Drains in place status post I and D and removal of flap   Eyes: Conjuctiva pale, sclera anicteric   Throat: Oropharynx moist without lesions  Endotracheal tube in place   Neck: Supple without DEION   Lungs:   Decreased breath sounds bilaterally; no wheezes, rhonchi or rales; respirations unlabored   Chest wall : No deformity, nontender   Heart:  RRR; no murmur, rub or gallop   Abdomen:   Soft, non-tender, non-distended, positive bowel sounds  Extremities: No clubbing, cyanosis or edema   Neuro: Awake, nonverbal   Skin: No new rashes or lesions  No draining wounds noted         Labs, Imaging, & Other studies:   All pertinent labs and imaging studies were personally reviewed    Results from last 7 days  Lab Units 18  0508 18  0606 18  1212   WBC Thousand/uL 16 22* 11 64* 9 84   HEMOGLOBIN g/dL 9 0* 8 6* 9 4*   PLATELETS Thousands/uL 267 267 245       Results from last 7 days  Lab Units 18  0508 18  1550 18  0606  18  1212   SODIUM mmol/L 139 137 135*  < > 130*   POTASSIUM mmol/L 4 3 3 6 4 0  < > 4 5   CHLORIDE mmol/L 109* 109* 106  < > 100   CO2 mmol/L 24 20* 22  < > 24   ANION GAP mmol/L 6 8 7  < > 6   BUN mg/dL 11 12 16  < > 23   CREATININE mg/dL 0 89 0 83 0 72  < > 0 93   EGFR ml/min/1 73sq m 93 96 101  < > 89   GLUCOSE RANDOM mg/dL 181* 150* 82  < > 174*   CALCIUM mg/dL 7 0* 6 8* 7 1*  < > 8 0*   AST U/L  --   --   --   --  6   ALT U/L  --   --   --   --  17   ALK PHOS U/L  --   --   --   --  59   TOTAL PROTEIN g/dL  --   --   --   --  6 4   BILIRUBIN TOTAL mg/dL  --   --   --   --  0 56   < > = values in this interval not displayed  Results from last 7 days  Lab Units 05/05/18  0920 05/04/18  1836 05/04/18  1324 05/04/18  1028   BLOOD CULTURE   --   --  No Growth at 48 hrs    No Growth at 48 hrs   --    GRAM STAIN RESULT  No polys seen  Rare Gram positive cocci in pairs  --   --  No polys seen  Rare Gram positive cocci in clusters   WOUND CULTURE   --   --   --  2+ Growth of Staphylococcus aureus*   MRSA CULTURE ONLY   --  No Methicillin Resistant Staphlyococcus aureus (MRSA) isolated  --   --      Chest x-ray-atelectasis    Images reviewed by me in PACS

## 2018-05-07 NOTE — PROGRESS NOTES
Progress Note - Critical Care   Robles Delcid 63 y o  male MRN: [id number]  Unit/Bed#: ICU 06 Encounter: [id number]    ------------------------------------------------------------------------------------------------  Chief Complaint: Denies complaints this AM  Motions that he wants ETT removed  HPI/24hr events: Weaned off levophed at 0530  No seizures reported  Remained on PSV overnight    ----------------------------------------------------------------------------------------------  Assessment and Plan  Principal Problem:    Subdural empyema  Active Problems:    Infection of bone flap (HCC)    MSSA (methicillin susceptible Staphylococcus aureus) infection    Status epilepticus ([address] )    Acute respiratory failure (HCC)    Leukocytosis    Metastatic lung cancer (metastasis from lung to other site) (HCC)    Dependence on nicotine from cigarettes    Alcohol abuse    Essential hypertension    Insulin-dependent diabetes mellitus    Bipolar disorder    Deep vein thrombosis (DVT) of distal vein of left lower extremity (HCC)    PE (pulmonary thromboembolism) (HCC)    Cerebral edema (HCC)    Brain metastases (HCC)    Supratherapeutic INR    Hypotension    COPD (chronic obstructive pulmonary disease) (HCC)  Resolved Problems:    * No resolved hospital problems  *      Neuro:    · Acute metabolic encephalopathy due to subural empyema - POD #2 s/p washout and debriedment of subgaleal collection with removal  Drain management per neurosurgical team  Continue antibiotics as detailed below  Monitor incision site  Neuro checks q4h  · Status epilepticus- No seizures noted overnight  Keppra increased yetserday to 1g q8h with addition of fosphenytoid load followed by 100mg q8h  Corrected total level is 25 4 this AM  Hold next dose  Will follow up with neurology regarding antiepileptic regimen going forward  Continue EEG monitoring for today  CAM-ICU daily  · History bioplar disorder- continue home Abilify daily   Holding home Wellbutrin due to seizures  · Hx ETOH abuse- no sign of withdrawal  Continue thiamine/folate/MVI daily      CV:   · Hypotension- patient requiring levophed, likely related to propofol  Titrated off this AM  Maintain MAP >65  Continue close telemetry monitoring  Pulm:  · Acute respiratory failure- left intubated for airway protection  Tolerated PSV without issues over last 24 hours  Possible extubation today  Will confirm code status with patient's family  Maintain SpO2 >88%  Continue pulmonary hygiene  Incentive spirometer q1h while awake, encourage coughing and deep breathing  · COPD without acute exacerbation- continue ATC xopenex/ipratropium  · Nicotine dependence- nicotine patch daily  GI:   · PPx- omeprazole q24h, bowel regimen       :   · No acute issues  Creatine at baseline  Strict q2h I/O monitoring  Continue to follow renal function tests  Can likely discontinue scott today  F/E/N:   · Discontinue maintenance IVF  · Electrolytes WNL  · Tube feeds at 20ml/hr  Increase up to goal now that vasopressor requirements are down     Heme/Onc:   · Stage IV squamous cell 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- prognosis remains poor  Consider palliative care consultation   · Anemia- remains stable without signs of blood loss  Continue to trend hemoglobin and platelets  · Recent DVT/PE- patient wa previously on TTC, which has been held to due durgical intervention  IVC fultere in place  SCD's to BLE, SQH TID    Endo:   · Type 2 diabetes with hyperglycemia- continue insulin gtt to maintain goal -180  ID:   · MSSA infection of bone flap with subdural empyema- ID following, appreciate recommendations  Started on nafcillin yesterday  Nafcillin day #2, antibiotic day #4  Continue to monitor fever and WBC curve       MSK/Skin:   · Reposition q2h, eliminate pressure points while in bed  · Close skin surveillance   · PT/OT when able to participate       Dispo: Continue critical care today     Code Status: Level 3 - DNAR and DNI  ---------------------------------------------------------------------------------------  Physical Exam   Constitutional: He appears well-developed and well-nourished  He is cooperative  No distress  He is sedated, intubated and restrained  Eyes: Pupils are equal, round, and reactive to light  Neck: Neck supple  No JVD present  Cardiovascular: Normal rate, regular rhythm and normal heart sounds  Pulses:       Radial pulses are 2+ on the right side, and 2+ on the left side  Dorsalis pedis pulses are 2+ on the right side, and 2+ on the left side  Pulmonary/Chest: He is intubated  He has no decreased breath sounds  He has no wheezes  He has rhonchi in the right upper field  Abdominal: Soft  He exhibits no distension  Bowel sounds are decreased  Genitourinary:   Genitourinary Comments: Figueroa:clear, mel urine    Neurological: He is alert  He has normal strength  GCS eye subscore is 4  GCS verbal subscore is 1  GCS motor subscore is 6  MAEx4   Skin: Skin is warm and dry  Capillary refill takes less than 2 seconds   He is not diaphoretic      ------------------------------------------------------------------------------------------  Review of Systems   Unable to perform ROS: Intubated     ------------------------------------------------------------------------------------------    Vitals   Vitals:    18 0500 18 0530 18 0600 18 0650   BP: 114/60  95/50    Pulse: 74  78 72   Resp: 12  12 15   Temp: 99 6 °F (37 6 °C)      TempSrc: Oral      SpO2: 97%  93% 97%   Weight:  86 4 kg (190 lb 7 6 oz)     Height:         Temp (24hrs), Av 3 °F (37 9 °C), Min:99 2 °F (37 3 °C), Max:102 1 °F (38 9 °C)  Current: Temperature: 99 6 °F (37 6 °C)  HR: 62 - 90  BP: 64/50 - 152/72  RR: 10 - 31  SpO2: 93 - 100%    Invasive/non-invasive ventilation settings   Respiratory    Lab Data (Last 4 hours)    None         O2/Vent Data (Last 4 hours) 05/07 0350           Vent Mode CPAP/PS Spont       FIO2 (%) (%) 30       PEEP (cmH2O) (cmH2O) 5       Pressure Support (cmH2O) (cmH20) 5       MV (Obs) 7       RSBI 33                   Height and Weights   Height: 5' 9" (175 3 cm)  IBW: 70 7 kg  Body mass index is 28 13 kg/m²  Weight (last 2 days)     Date/Time   Weight    05/07/18 0530  86 4 (190 48)    05/05/18 1200  85 9 (189 38)              Intake and Output  I/O       05/05 0701 - 05/06 0700 05/06 0701 - 05/07 0700 05/07 0701 - 05/08 0700    P  O        I V  (mL/kg) 5069 6 (59) 2690 9 (31 1)     Blood 333 3      NG/GT 50 495 95    IV Piggyback 1600 1625     Feedings  250     Total Intake(mL/kg) 7052 9 (82 1) 5060 9 (58 6) 95 (1 1)    Urine (mL/kg/hr) 1635 (0 8) 2195 (1 1) 150 (1 4)    Emesis/NG output 100 (0)      Drains 30 (0) 15 (0)     Stool       Total Output 1765 2210 150    Net +5287 9 +2850 9 -55                 UOP: 91 ml/hour     Nutrition       Diet Orders            Start     Ordered    05/06/18 0826  Diet Enteral/Parenteral; Tube Feeding No Oral Diet; Jevity 1 2 Jerry; 20  Diet effective now     Comments:  START AT 20, INCREASE 10CC Q4, GOAL AT 60   Question Answer Comment   Diet Type Enteral/Parenteral    Enteral/Parenteral Tube Feeding No Oral Diet    Tube Feeding Formula: Jevity 1 2 Jerry    Tube Feeding Continuous rate (mL/hr): 20    RD to adjust diet per protocol?  Yes        05/06/18 0828        TF currently running at 30 ml/hr    Laboratory and Diagnostics:    Results from last 7 days  Lab Units 05/07/18  0508 05/06/18  0606 05/05/18  1212 05/05/18  0923 05/05/18  0329 05/04/18  1052   WBC Thousand/uL 16 22* 11 64* 9 84  --  11 94* 11 09*   HEMOGLOBIN g/dL 9 0* 8 6* 9 4*  --  10 2* 10 3*   I STAT HEMOGLOBIN g/dl  --   --   --  8 2*  --   --    HEMATOCRIT % 27 4* 26 1* 27 8*  --  29 8* 31 5*   PLATELETS Thousands/uL 267 267 245  --  276 263   NEUTROS PCT % 85* 79* 75  --   --  87*   MONOS PCT % 4 6 8  --   --  5       Results from last 7 days  Lab Units 05/07/18  0508 05/06/18  1550 05/06/18  0606 05/06/18  0004 05/05/18  1451 05/05/18  1212 05/05/18  0923 05/05/18  0326   SODIUM mmol/L 139 137 135* 135* 133* 130*  --  130*   POTASSIUM mmol/L 4 3 3 6 4 0 3 6 3 8 4 5  --  4 3   CHLORIDE mmol/L 109* 109* 106 104 101 100  --  97*   CO2 mmol/L 24 20* 22 24 23 24  --  23   BUN mg/dL 11 12 16 19 22 23  --  27*   CREATININE mg/dL 0 89 0 83 0 72 0 77 0 79 0 93  --  1 06   CALCIUM mg/dL 7 0* 6 8* 7 1* 7 2* 7 6* 8 0*  --  8 5   ANION GAP mmol/L 6 8 7 7 9 6  --  10   ALBUMIN g/dL 2 2*  --   --   --   --  2 4*  --   --    BILIRUBIN TOTAL mg/dL  --   --   --   --   --  0 56  --   --    ALK PHOS U/L  --   --   --   --   --  59  --   --    ALT U/L  --   --   --   --   --  17  --   --    AST U/L  --   --   --   --   --  6  --   --    GLUCOSE RANDOM mg/dL 181* 150* 82 103 171* 174*  --  132   GLUCOSE, ISTAT mg/dl  --   --   --   --   --   --  150*  --        Results from last 7 days  Lab Units 05/07/18  0508 05/06/18  1550   MAGNESIUM mg/dL 2 4 1 8   PHOSPHORUS mg/dL 2 9 2 0*        Results from last 7 days  Lab Units 05/06/18  0606 05/05/18  0711 05/05/18  0355 05/04/18  1051 05/03/18  2117   INR  1 76* 1 37* 1 44* 1 49* 2 59*   PTT seconds 36*  --  33  --   --            Results from last 7 days  Lab Units 05/05/18  1212   LACTIC ACID mmol/L 0 7     ABG:  Lab Results   Component Value Date    PHART 7 370 05/06/2018    BBO2QCK 34 5 (L) 05/06/2018    PO2ART 93 1 05/06/2018    YVP0KDP 19 5 (L) 05/06/2018    BEART -5 2 05/06/2018    SOURCE Line, Arterial 05/06/2018       EKG: NSR  Imaging: I have personally reviewed pertinent reports  and I have personally reviewed pertinent films in PACS    Micro    Results from last 7 days  Lab Units 05/05/18  0920 05/04/18  1836 05/04/18  1324 05/04/18  1028   BLOOD CULTURE   --   --  No Growth at 48 hrs    No Growth at 48 hrs   --    GRAM STAIN RESULT  No polys seen  Rare Gram positive cocci in pairs  --   --  No polys seen Rare Gram positive cocci in clusters   WOUND CULTURE   --   --   --  2+ Growth of Staphylococcus aureus*   MRSA CULTURE ONLY   --  No Methicillin Resistant Staphlyococcus aureus (MRSA) isolated  --   --        Allergies   Allergies   Allergen Reactions    Bee Venom Other (See Comments)     Unknown severity       Active Medications  Scheduled Meds:  Current Facility-Administered Medications:  acetaminophen 975 mg Oral Q8H Reddyrylee Shelbi Gamez MD    ARIPiprazole 10 mg Oral Daily Wild Villagomez MD    bisacodyl 10 mg Rectal Daily PRN Amaya Goodman MD    calcium carbonate 1,000 mg Oral Daily PRN Wild Villagomez MD    chlorhexidine 15 mL Swish & Spit Q12H [facility] Heather Boyce DO    diazepam 5 mg Intravenous Q6H PRN Sandra Flores DO    fentaNYL 50 mcg/hr Intravenous Continuous Ny Bearden MD Last Rate: 50 mcg/hr (05/06/18 1436)   fentanyl citrate (PF) 25 mcg Intravenous Q1H PRN Amaya Goodman MD    folic acid 1 mg Oral Daily Wild Villagomez MD    heparin (porcine) 5,000 Units Subcutaneous Q8H [facility] Alta Luna MD    HYDROmorphone 1 mg Intravenous Q4H PRN Wild Villagomez MD    insulin regular (HumuLIN R,NovoLIN R) infusion 0 3-21 Units/hr Intravenous Titrated Ny Bearden MD Last Rate: 4 Units/hr (05/07/18 0601)   ipratropium 0 5 mg Nebulization TID Alta Luna MD    levalbuterol 1 25 mg Nebulization TID Alta Luna MD    levETIRAcetam 1,000 mg Intravenous Q8H Travis Gates DO Last Rate: Stopped (05/07/18 0601)   multi-electrolyte 20 mL/hr Intravenous Continuous Alta Luna MD Last Rate: 20 mL/hr (05/06/18 1800)   multivitamin-minerals 1 tablet Oral Daily Wild Villagomez MD    nafcillin 2,000 mg Intravenous Q4H Markus Sanders MD Last Rate: 2,000 mg (05/07/18 0555)   nicotine 1 patch Transdermal Daily Wild Villagomez MD    norepinephrine 1-30 mcg/min Intravenous Titrated Ny Bearden MD Last Rate: Stopped (05/07/18 0530)   omeprazole (PRILOSEC) suspension 2 mg/mL 20 mg Oral Early Morning Bakari Lopez MD    ondansetron 4 mg Intravenous Q4H PRN Cesia Rivero MD    oxyCODONE 5 mg Oral Q4H PRN Cesia Rivero MD    phenytoin 100 mg Intravenous Q8H [address] Francis Hernandez,     polyethylene glycol 17 g Oral Daily Cesia Rivero MD    propofol 5-50 mcg/kg/min Intravenous Titrated Gloria Moore MD Last Rate: 15 mcg/kg/min (05/07/18 0100)   senna 1 tablet Oral HS Gloria Moore MD    senna-docusate sodium 2 tablet Oral Daily Cesia Rivero MD    sodium chloride 2 g Per NG Tube TID With Meals Bakari Lopez MD    thiamine 100 mg Oral Daily Cesia Rivero MD      Continuous Infusions:    fentaNYL 50 mcg/hr Last Rate: 50 mcg/hr (05/06/18 1436)   insulin regular (HumuLIN R,NovoLIN R) infusion 0 3-21 Units/hr Last Rate: 4 Units/hr (05/07/18 0601)   multi-electrolyte 20 mL/hr Last Rate: 20 mL/hr (05/06/18 1800)   norepinephrine 1-30 mcg/min Last Rate: Stopped (05/07/18 0530)   propofol 5-50 mcg/kg/min Last Rate: 15 mcg/kg/min (05/07/18 0100)     PRN Meds:     bisacodyl 10 mg Daily PRN   calcium carbonate 1,000 mg Daily PRN   diazepam 5 mg Q6H PRN   fentanyl citrate (PF) 25 mcg Q1H PRN   HYDROmorphone 1 mg Q4H PRN   ondansetron 4 mg Q4H PRN   oxyCODONE 5 mg Q4H PRN       VTE Pharmacologic Prophylaxis: Heparin  VTE Mechanical Prophylaxis: sequential compression device    Invasive  Devices  Invasive Devices     Central Venous Catheter Line            CVC Central Lines 05/05/18 Triple 16cm 1 day          Peripheral Intravenous Line            Peripheral IV 05/04/18 Right Antecubital 2 days          Arterial Line            Arterial Line 05/05/18 Right Radial 1 day          Drain            Closed/Suction Drain Left Head Bulb 10 Fr  1 day    NG/OG/Enteral Tube Orogastric Right mouth 1 day    Urethral Catheter Latex 16 Fr  1 day          Airway            ETT  Hi-Lo; Cuffed 8 mm 1 day                Counseling / Coordination of Care  Total Critical Care time spent 35 minutes excluding procedures, teaching and family updates  Ambrosio Cabot Peterman, CRNP      Portions of the record may have been created with voice recognition software  Occasional wrong word or "sound a like" substitutions may have occurred due to the inherent limitations of voice recognition software    Read the chart carefully and recognize, using context, where substitutions have occurred

## 2018-05-07 NOTE — PROGRESS NOTES
Critical Care Interval Progress Note     Krys Yeager 63 y o  male MRN: [id number]    Unit/Bed#: ICU 06 Encounter: [id number]    Impression:  Principal Problem:    Subdural empyema  Active Problems:    Infection of bone flap (HCC)    MSSA (methicillin susceptible Staphylococcus aureus) infection    Status epilepticus ([address] )    Acute respiratory failure (HCC)    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)    Leukocytosis    Dependence on nicotine from cigarettes    Alcohol abuse    Essential hypertension    Insulin-dependent diabetes mellitus    Bipolar disorder    Deep vein thrombosis (DVT) of distal vein of left lower extremity (HCC)    PE (pulmonary thromboembolism) (HCC)    Cerebral edema (HCC)    Brain metastases (HCC)    Supratherapeutic INR    COPD (chronic obstructive pulmonary disease) (HCC)  Resolved Problems:    Hypotension    Acute hypoxic respiratory failure  Status epilepticus with seizure disrder  Subdural empyema POD # 2 evacuation and craniectomy right- MSSA  Scalp cellulitis  Encephalopathy  Hypotension- medication now resolved  Fever  COPD without excerbation  DVT/ PE - ivc filter  Lung cancer with brain mets  Bipolar disorder  H/O etoh    Plan:  Continue Keppra and Dilantin  Check dilantin level in am and nh3  Continue eeg monitoring  SBT today and eval for extubation  Continue drains as per NS  Maintain abx as per ID #4  Follow fever and wbc, no change in abx per id  Insulin infusion for glycemic control  Continue with aggressive pulmonary hygiene and nebs  Plan for PICC for long term abx  Palliative care following and plans to discuss with brother plan of care, per palliative patient was going to be transitioned to hospice prior to hospital admission  Counseling / Coordination of Care: Total Critical Care time spent 35 minutes excluding procedures, teaching and family updates      ______________________________________________________________________    Chief Complaint: Admitted for scalp infection  Recent Events / Nursing Concern: Patient doing well on ps and cpap  SCC lung stage 4 with brain mets with tumor resection brain 1 month ago  Patient returned to ED with scalp infection with pus drainage on imaging subdural collection  Tmax 102 1  IO +2 8L    Vitals:   Vitals:    18 0800 18 0900 18 1000 18 1025   BP:    106/63   BP Location:    Left arm   Pulse: 92 100 86 86   Resp: 22 (!) 27 16 17   Temp:       TempSrc:       SpO2: 97% 99% 95% 96%   Weight:       Height:         Arterial Line BP: 94/78  Arterial Line MAP (mmHg): 292 mmHg    Temperature: Temp (24hrs), Av 1 °F (37 8 °C), Min:99 2 °F (37 3 °C), Max:102 1 °F (38 9 °C)  Current: Temperature: 99 5 °F (37 5 °C)    Hemodynamic Monitoring:  N/A       Respiratory:  SpO2: SpO2: 96 %, SpO2 Activity: SpO2 Activity: At Rest, SpO2 Device: O2 Device: Other (comment) (vent)  O2 Flow Rate (L/min): 6 L/min    Physical Exam:  Physical Exam   Constitutional: He is oriented to person, place, and time  He appears well-developed and well-nourished  No distress  HENT:   Head: Normocephalic and atraumatic  Mouth/Throat: Oropharynx is clear and moist    Eyes: EOM are normal  Pupils are equal, round, and reactive to light  Neck: Neck supple  No JVD present  Cardiovascular: Regular rhythm, normal heart sounds and intact distal pulses  Pulmonary/Chest: Effort normal and breath sounds normal  No respiratory distress  Rhonchi bilateral   Abdominal: Soft  Bowel sounds are normal  He exhibits no distension  Genitourinary:   Genitourinary Comments: Urinary catheter     Musculoskeletal: Normal range of motion  He exhibits no edema or deformity  Neurological: He is alert and oriented to person, place, and time  resting tremor, able to lift arms off bed bilateral, mild asterixis left and right drift, 5/5 strength bilateral le, delayed slow speech, fragmented   Skin: Skin is warm and dry  No rash noted     Psychiatric: He has a normal mood and affect  Allergies:    Allergies   Allergen Reactions    Bee Venom Other (See Comments)     Unknown severity       Medications:   Scheduled Meds:  Current Facility-Administered Medications:  acetaminophen 975 mg Oral Q8H Yimi Hartmann MD    ARIPiprazole 10 mg Oral Daily Betsy Parham MD    bisacodyl 10 mg Rectal Daily PRN Uzma Garcia MD    calcium carbonate 1,000 mg Oral Daily PRN Betsy Parham MD    chlorhexidine 15 mL Swish & Spit Q12H [facility] Gabbie Bone DO    diazepam 5 mg Intravenous Q6H PRN Sandra Flores DO    fentaNYL 50 mcg/hr Intravenous Continuous Aide Hernández MD Last Rate: 50 mcg/hr (05/06/18 1436)   fentanyl citrate (PF) 25 mcg Intravenous Q1H PRN Uzma Garcia MD    folic acid 1 mg Oral Daily Betsy Parham MD    heparin (porcine) 5,000 Units Subcutaneous [facility] Hipolito Minor MD    HYDROmorphone 1 mg Intravenous Q4H PRN Betsy Parham MD    influenza vaccine 0 5 mL Intramuscular Prior to discharge Hipolito Minor MD    insulin regular (HumuLIN R,NovoLIN R) infusion 0 3-21 Units/hr Intravenous Titrated Aide Hernández MD Last Rate: 8 Units/hr (05/07/18 1023)   ipratropium 0 5 mg Nebulization TID Hipolito Minor MD    levalbuterol 1 25 mg Nebulization TID Hipolito Minor MD    levETIRAcetam 1,000 mg Intravenous Q8H Travis Gates DO Last Rate: Stopped (05/07/18 0601)   multivitamin-minerals 1 tablet Oral Daily Betsy Parham MD    nafcillin 2,000 mg Intravenous Q4H Bianca Stoddard MD Last Rate: 2,000 mg (05/07/18 1048)   nicotine 1 patch Transdermal Daily Betsy Parham MD    norepinephrine 1-30 mcg/min Intravenous Titrated Aide Hernández MD Last Rate: Stopped (05/07/18 0530)   omeprazole (PRILOSEC) suspension 2 mg/mL 20 mg Oral Early Morning Hipolito Minor MD    ondansetron 4 mg Intravenous Q4H PRN Betsy Parham MD    oxyCODONE 5 mg Oral Q4H PRN Betsy Parham MD    phenytoin 100 mg Intravenous [facility] BRUCE Butler    polyethylene glycol 17 g Oral Daily Casey Martin MD    propofol 5-50 mcg/kg/min Intravenous Titrated Fidelia White MD Last Rate: 19 823 mcg/kg/min (05/07/18 1021)   senna 1 tablet Oral HS Fidelia White MD    senna-docusate sodium 2 tablet Oral Daily Casey Martin MD    sodium chloride 2 g Per NG Tube TID With Meals Regina Ugatre MD    thiamine 100 mg Oral Daily Casey Martin MD      Continuous Infusions:  fentaNYL 50 mcg/hr Last Rate: 50 mcg/hr (05/06/18 1436)   insulin regular (HumuLIN R,NovoLIN R) infusion 0 3-21 Units/hr Last Rate: 8 Units/hr (05/07/18 1023)   norepinephrine 1-30 mcg/min Last Rate: Stopped (05/07/18 0530)   propofol 5-50 mcg/kg/min Last Rate: 19 823 mcg/kg/min (05/07/18 1021)     PRN Meds:    bisacodyl 10 mg Daily PRN   calcium carbonate 1,000 mg Daily PRN   diazepam 5 mg Q6H PRN   fentanyl citrate (PF) 25 mcg Q1H PRN   HYDROmorphone 1 mg Q4H PRN   influenza vaccine 0 5 mL Prior to discharge   ondansetron 4 mg Q4H PRN   oxyCODONE 5 mg Q4H PRN       Labs:     Results from last 7 days  Lab Units 05/07/18  0508 05/06/18  0606 05/05/18  1212   WBC Thousand/uL 16 22* 11 64* 9 84   HEMOGLOBIN g/dL 9 0* 8 6* 9 4*   HEMATOCRIT % 27 4* 26 1* 27 8*   PLATELETS Thousands/uL 267 267 245   NEUTROS PCT % 85* 79* 75   MONOS PCT % 4 6 8       Results from last 7 days  Lab Units 05/07/18  0508 05/06/18  1550 05/06/18  0606  05/05/18  1212   SODIUM mmol/L 139 137 135*  < > 130*   POTASSIUM mmol/L 4 3 3 6 4 0  < > 4 5   CHLORIDE mmol/L 109* 109* 106  < > 100   CO2 mmol/L 24 20* 22  < > 24   BUN mg/dL 11 12 16  < > 23   CREATININE mg/dL 0 89 0 83 0 72  < > 0 93   CALCIUM mg/dL 7 0* 6 8* 7 1*  < > 8 0*   TOTAL PROTEIN g/dL  --   --   --   --  6 4   BILIRUBIN TOTAL mg/dL  --   --   --   --  0 56   ALK PHOS U/L  --   --   --   --  59   ALT U/L  --   --   --   --  17   AST U/L  --   --   --   --  6   GLUCOSE RANDOM mg/dL 181* 150* 82  < > 174*   < > = values in this interval not displayed  Results from last 7 days  Lab Units 05/07/18  0508 05/06/18  1550   MAGNESIUM mg/dL 2 4 1 8       Results from last 7 days  Lab Units 05/07/18  0508 05/06/18  1550   PHOSPHORUS mg/dL 2 9 2 0*        Results from last 7 days  Lab Units 05/06/18  0606 05/05/18  0711 05/05/18  0355   INR  1 76* 1 37* 1 44*   PTT seconds 36*  --  33       Results from last 7 days  Lab Units 05/05/18  1212   LACTIC ACID mmol/L 0 7       0  Lab Value Date/Time   TROPONINI <0 02 04/06/2018 2120       Results from last 7 days  Lab Units 05/06/18  1214 05/05/18  1212   PH ART  7 370 7 376   PCO2 ART mm Hg 34 5* 41 9   PO2 ART mm Hg 93 1 397 2*   HCO3 ART mmol/L 19 5* 24 0   BASE EXC ART mmol/L -5 2 -1 1   ABG SOURCE  Line, Arterial Line, Arterial       Diagnostic Imaging / Data: I have personally reviewed pertinent reports  and I have personally reviewed pertinent films in PACS    EKG: tele nsr  Code Status: Level 3 - DNAR and DNI    Portions of the record may have been created with voice recognition software  Occasional wrong word or "sound a like" substitutions may have occurred due to the inherent limitations of voice recognition software  Read the chart carefully and recognize, using context, where substitutions have occurred      SIGNATURE: Mary Alice Suarez DO  DATE: May 7, 2018  TIME: 11:05 AM

## 2018-05-07 NOTE — PROGRESS NOTES
Progress Note - Neurology   Uzma Kunz 63 y o  male MRN: [id number]  Unit/Bed#: ICU 06 Encounter: [id number]    Assessment/Plan:   3 63year old male with subdural empyema now s/p craniectomy and wound washout     - Post-op management as per neurosurgery team     - Antibiotics as per ID     - Monitor exam and notify with changes  2  Status epilepticus    - No seizures reported overnight per discussion with epileptology  Patient appears awake and alert today, following commands briskly  - Continue with Keppra 1000 mg Q8H     - Corrected Dilantin level this AM 25 4, consider decrease Dilantin to 100 mg Q12H  Recommend continue current dosing while patient is being taken off of Propofol and recommend check repeat trough level in AM and will adjust dose based on that level  - Seizure precautions     - No seizures noted for just over 24 hours, recommend continue to monitor on vEEG today and will re-evaluate in AM     - Monitor exam and notify with changes  3  MSSA infection of craniotomy wound    - Antibiotics as per ID  4  Metastatic lung CA   - Management as per primary team, per chart review, hospice had previously been discussed  5  Anxiety/Depression   - Management as per primary team      6  DVT/PE    - s/p IVC filter   - Anticoagulation on hold secondary to recent surgery  7  Hyponatremia   - Resolved  8  Leukocytosis    - WBCs trending up, continue to montor CBC     - Antibiotics as per ID      9  History of ETOH abuse    - Monitor for signs/symptoms of withdrawal     - Continue on thiamine and folic acid  10  Acute respiratory failure    - Ventilator management as per critical care team      Subjective:   Uzma Kunz is a 63year old male with PMH of ETOH abuse, HTN, DM, COPD, bipolar, recent DVT/PE, anxiety/depression metastatic lung CA with known mets to the brain who underwent craniotomy on April 13, 2018 for resection of L parietal mass   He presented to [facility] for [facility] and was found to have craniotomy wound drainage  CTH was completed which showed concern for scalp abscess with enhancement and air noted  He was transferred to [facility] for further care  He underwent craniectomy for external empyema with washout and debridement of subgaleal collection with removal of bone flap on 5/5/18  He was noted post-op to be having seizure with tonic clonic activity and R gaze deviation  He was given Versed and 1000 mg Keppra  He returned to ICU and was noted to be having seizure activity with right gaze preference and right sided tonic clonic activity  He was intubated and given Propofol with temporary cessation of seizure activity  He was given an additional 1 gram of Keppra and increased the maintenance dose to 1 gram Q12H  He was placed on vEEG monitoring  Per chart review, he had multiple seizures overnight on 5/6/18 while Propofol as being weaned and Keppra was increased to 1 gram every 8 hours and also loaded with DIlantin  ROS:  Unable to reliably assess secondary to mental status       Medications  Scheduled Meds:  Current Facility-Administered Medications:  acetaminophen 975 mg Oral Q8H Yimi Chilel MD    ARIPiprazole 10 mg Oral Daily Yesenia Castillo MD    bisacodyl 10 mg Rectal Daily PRN Adry Rojas MD    calcium carbonate 1,000 mg Oral Daily PRN Yesenia Castillo MD    chlorhexidine 15 mL Swish & Spit Q12H [facility] Margaret Asher DO    diazepam 5 mg Intravenous Q6H PRN Sandra Flores DO    fentaNYL 50 mcg/hr Intravenous Continuous Caron Moore MD Last Rate: 50 mcg/hr (05/06/18 1436)   fentanyl citrate (PF) 25 mcg Intravenous Q1H PRN Adry Rojas MD    folic acid 1 mg Oral Daily Yesenia Castillo MD    heparin (porcine) 5,000 Units Subcutaneous [facility] Ernie Choudhury MD    HYDROmorphone 1 mg Intravenous Q4H PRN Yesenia Castillo MD    insulin regular (HumuLIN R,NovoLIN R) infusion 0 3-21 Units/hr Intravenous Titrated Caron Moore MD Last Rate: 3 Units/hr (05/07/18 3312)   ipratropium 0 5 mg Nebulization TID Phoebe Allen MD    levalbuterol 1 25 mg Nebulization TID hPoebe Allen MD    levETIRAcetam 1,000 mg Intravenous Q8H Travis Gates DO Last Rate: Stopped (05/07/18 0601)   multivitamin-minerals 1 tablet Oral Daily Álvaro Mendenhall MD    nafcillin 2,000 mg Intravenous Q4H Melissa Keller MD Last Rate: 2,000 mg (05/07/18 0555)   nicotine 1 patch Transdermal Daily Álvaro Mendenhall MD    norepinephrine 1-30 mcg/min Intravenous Titrated Reilly Dawson MD Last Rate: Stopped (05/07/18 0530)   omeprazole (PRILOSEC) suspension 2 mg/mL 20 mg Oral Early Morning Phoebe Allen MD    ondansetron 4 mg Intravenous Q4H PRN Álvaro Mendenhall MD    oxyCODONE 5 mg Oral Q4H PRN Álvaro Mendenhall MD    phenytoin 100 [facility]    polyethylene glycol 17 g Oral Daily Álvaro Mendenhall MD    propofol 5-50 mcg/kg/min Intravenous Titrated Reilly Dawson MD Last Rate: 15 mcg/kg/min (05/07/18 0100)   senna 1 tablet Oral HS Reilly Dawson MD    senna-docusate sodium 2 tablet Oral Daily Álvaro Mendenhall MD    sodium chloride 2 g Per NG Tube TID With Meals Phoebe Allen MD    thiamine 100 mg Oral Daily Álvaro Mendenhall MD      Continuous Infusions:  fentaNYL 50 mcg/hr Last Rate: 50 mcg/hr (05/06/18 1436)   insulin regular (HumuLIN R,NovoLIN R) infusion 0 3-21 Units/hr Last Rate: 3 Units/hr (05/07/18 0816)   norepinephrine 1-30 mcg/min Last Rate: Stopped (05/07/18 0530)   propofol 5-50 mcg/kg/min Last Rate: 15 mcg/kg/min (05/07/18 0100)     PRN Meds: bisacodyl    calcium carbonate    diazepam    fentanyl citrate (PF)    HYDROmorphone    ondansetron    oxyCODONE    Vitals: Blood pressure 95/50, pulse 92, temperature 99 5 °F (37 5 °C), temperature source Oral, resp  rate 22, height 5' 9" (1 753 m), weight 86 4 kg (190 lb 7 6 oz), SpO2 97 %  ,Body mass index is 28 13 kg/m²      Physical Exam: BP 95/50   Pulse 92   Temp 99 5 °F (37 5 °C) (Oral)   Resp 22   Ht 5' 9" (1 753 m)   Wt 86 4 kg (190 lb 7 6 oz)   SpO2 97%   BMI 28 13 kg/m²   General appearance: Awake and readily follows commands, remains intubated so unable to reliably assess orientation  Head: Drain noted, dressing intact, EEG leads in place  Eyes: negative findings: lids and lashes normal, conjunctivae and sclerae normal and pupils equal, round, reactive to light and accomodation  Lungs: clear to auscultation bilaterally  Heart: Irregular rhythm  Abdomen: Soft, not distended  Extremities: extremities normal, warm and well-perfused; no cyanosis, clubbing, or edema  Skin: Skin color, texture, turgor normal  No rashes or lesions  Neurologic: Mental status: Awake and alert, briskly follows commands  Cranial nerves: II: pupils equal, round, reactive to light and accommodation, III,VII: ptosis no ptosis noted  Sensory: Unable to reliably assess   Motor: Moves all extremities well and follows commands briskly  Reflexes:  Toes downgoing B/L    Labs  Recent Results (from the past 24 hour(s))   Fingerstick Glucose (POCT)    Collection Time: 05/06/18  9:58 AM   Result Value Ref Range    POC Glucose 134 65 - 140 mg/dl   Fingerstick Glucose (POCT)    Collection Time: 05/06/18 11:59 AM   Result Value Ref Range    POC Glucose 194 (H) 65 - 140 mg/dl   Blood gas, arterial    Collection Time: 05/06/18 12:14 PM   Result Value Ref Range    pH, Arterial 7 370 7 350 - 7 450    PH ART TC 7 361 7 350 - 7 450    pCO2, Arterial 34 5 (L) 36 0 - 44 0 mm Hg    PCO2 (TC) Arterial 35 4 (L) 36 0 - 44 0 mm Hg    pO2, Arterial 93 1 75 0 - 129 0 mm Hg    PO2 (TC) Arterial 96 6 75 0 - 129 0 mm Hg    HCO3, Arterial 19 5 (L) 22 0 - 28 0 mmol/L    Base Excess, Arterial -5 2 mmol/L    O2 Content, Arterial 12 8 (L) 16 0 - 23 0 mL/dL    O2 HGB,Arterial  96 1 94 0 - 97 0 %    SOURCE Line, Arterial     Temperature 99 6 Degrees Fehrenheit    Vent Type- AC AC     AC Rate 14     Tidal Volume 500 ml    Inspired Air (FIO2) 40     PEEP 5    Fingerstick Glucose (POCT)    Collection Time: 05/06/18  2:25 PM   Result Value Ref Range    POC Glucose 246 (H) 65 - 140 mg/dl   Fingerstick Glucose (POCT)    Collection Time: 05/06/18  3:49 PM   Result Value Ref Range    POC Glucose 157 (H) 65 - 140 mg/dl   Basic metabolic panel    Collection Time: 05/06/18  3:50 PM   Result Value Ref Range    Sodium 137 136 - 145 mmol/L    Potassium 3 6 3 5 - 5 3 mmol/L    Chloride 109 (H) 100 - 108 mmol/L    CO2 20 (L) 21 - 32 mmol/L    Anion Gap 8 4 - 13 mmol/L    BUN 12 5 - 25 mg/dL    Creatinine 0 83 0 60 - 1 30 mg/dL    Glucose 150 (H) 65 - 140 mg/dL    Calcium 6 8 (L) 8 3 - 10 1 mg/dL    eGFR 96 ml/min/1 73sq m   Magnesium    Collection Time: 05/06/18  3:50 PM   Result Value Ref Range    Magnesium 1 8 1 6 - 2 6 mg/dL   Phosphorus    Collection Time: 05/06/18  3:50 PM   Result Value Ref Range    Phosphorus 2 0 (L) 2 3 - 4 1 mg/dL   Fingerstick Glucose (POCT)    Collection Time: 05/06/18  5:36 PM   Result Value Ref Range    POC Glucose 175 (H) 65 - 140 mg/dl   Fingerstick Glucose (POCT)    Collection Time: 05/06/18  7:45 PM   Result Value Ref Range    POC Glucose 149 (H) 65 - 140 mg/dl   Fingerstick Glucose (POCT)    Collection Time: 05/06/18 10:33 PM   Result Value Ref Range    POC Glucose 163 (H) 65 - 140 mg/dl   Fingerstick Glucose (POCT)    Collection Time: 05/07/18 12:09 AM   Result Value Ref Range    POC Glucose 60 (L) 65 - 140 mg/dl   Fingerstick Glucose (POCT)    Collection Time: 05/07/18 12:13 AM   Result Value Ref Range    POC Glucose 96 65 - 140 mg/dl   Fingerstick Glucose (POCT)    Collection Time: 05/07/18  1:58 AM   Result Value Ref Range    POC Glucose 123 65 - 140 mg/dl   Fingerstick Glucose (POCT)    Collection Time: 05/07/18  4:10 AM   Result Value Ref Range    POC Glucose 197 (H) 65 - 140 mg/dl   Phenytoin level, total    Collection Time: 05/07/18  5:08 AM   Result Value Ref Range    Phenytoin Lvl 13 7 10 0 - 20 0 ug/mL   Basic metabolic panel    Collection Time: 05/07/18  5:08 AM   Result Value Ref Range    Sodium 139 136 - 145 mmol/L    Potassium 4 3 3 5 - 5 3 mmol/L    Chloride 109 (H) 100 - 108 mmol/L    CO2 24 21 - 32 mmol/L    Anion Gap 6 4 - 13 mmol/L    BUN 11 5 - 25 mg/dL    Creatinine 0 89 0 60 - 1 30 mg/dL    Glucose 181 (H) 65 - 140 mg/dL    Calcium 7 0 (L) 8 3 - 10 1 mg/dL    eGFR 93 ml/min/1 73sq m   Phosphorus    Collection Time: 05/07/18  5:08 AM   Result Value Ref Range    Phosphorus 2 9 2 3 - 4 1 mg/dL   Magnesium    Collection Time: 05/07/18  5:08 AM   Result Value Ref Range    Magnesium 2 4 1 6 - 2 6 mg/dL   CBC and differential    Collection Time: 05/07/18  5:08 AM   Result Value Ref Range    WBC 16 22 (H) 4 31 - 10 16 Thousand/uL    RBC 3 06 (L) 3 88 - 5 62 Million/uL    Hemoglobin 9 0 (L) 12 0 - 17 0 g/dL    Hematocrit 27 4 (L) 36 5 - 49 3 %    MCV 90 82 - 98 fL    MCH 29 4 26 8 - 34 3 pg    MCHC 32 8 31 4 - 37 4 g/dL    RDW 15 7 (H) 11 6 - 15 1 %    MPV 10 2 8 9 - 12 7 fL    Platelets 385 499 - 754 Thousands/uL    nRBC 0 /100 WBCs    Neutrophils Relative 85 (H) 43 - 75 %    Lymphocytes Relative 10 (L) 14 - 44 %    Monocytes Relative 4 4 - 12 %    Eosinophils Relative 1 0 - 6 %    Basophils Relative 0 0 - 1 %    Neutrophils Absolute 13 68 (H) 1 85 - 7 62 Thousands/µL    Lymphocytes Absolute 1 58 0 60 - 4 47 Thousands/µL    Monocytes Absolute 0 65 0 17 - 1 22 Thousand/µL    Eosinophils Absolute 0 22 0 00 - 0 61 Thousand/µL    Basophils Absolute 0 02 0 00 - 0 10 Thousands/µL   Albumin    Collection Time: 05/07/18  5:08 AM   Result Value Ref Range    Albumin 2 2 (L) 3 5 - 5 0 g/dL   Fingerstick Glucose (POCT)    Collection Time: 05/07/18  6:01 AM   Result Value Ref Range    POC Glucose 198 (H) 65 - 140 mg/dl   Fingerstick Glucose (POCT)    Collection Time: 05/07/18  7:50 AM   Result Value Ref Range    POC Glucose 179 (H) 65 - 140 mg/dl     Imaging   No new neuro imaging available for review       VTE Prophylaxis: Heparin    Counseling / Coordination of Care  Total time spent today 25 minutes  Greater than 50% of total time was spent with the patient and / or family counseling and / or coordination of care  A description of the counseling / coordination of care: reviewed chart, imaging, discussed case with epileptology

## 2018-05-07 NOTE — POST OP PROGRESS NOTES
Progress Note - Neurosurgery   Betzaida Muir 63 y o  male MRN: [id number]  Unit/Bed#: ICU 06 Encounter: [id number]    Assessment:  1  POD2 evacuation of subgaleal abscess and removal of bone flap  2  Focal seizures  3  Brain abscess  4  Infection of bone flap   5  Stage IV squamous call carcinoma of lung  6  Sepsis, POA  7  DVT/PE POA s/p IVC filter placement  8  HTN  9  Alcohol abuse  10  Diabetes     Plan:  · Exam GCS 11T, E4, V1T, M6  Intubated and attempting to mouth words  MCKEON without focal weakness  · Imaging reviewed personally and by attending  Final results as below  · CT head wo 5/5/18: expected postoperative changes following washout and bone flap removal  Decreased extra-axial collection  · CHANELL with limited output  Some purulence in tubing  Maintain  If no further output, d/c in afternoon vs AM    · Neurology input appreciated  No seizures overnight  Keppra 1 g Q 8H and Dilantin 100mg Q 12H  Check dilantin trough in am  Remains on vEEG  · ID following - Nafcillin 2g Q 4H  Follow-up blood culture  Check CBC/BMP in AM    · Pain control as needed  · Mobilize with PT/OT when able  · DVT PPX: HSQ and SCDs  · Will continue to follow     Subjective/Objective   Chief Complaint: Postoperative follow-up     Subjective: Neuro stable  No significant events overnight  Objective: Agitated and reaching for tube  I/O       05/05 0701 - 05/06 0700 05/06 0701 - 05/07 0700 05/07 0701 - 05/08 0700    P  O        I V  (mL/kg) 5069 6 (59) 2690 9 (31 1) 73 (0 8)    Blood 333 3      NG/GT 50 495 355    IV Piggyback 1600 1625     Feedings  250 60    Total Intake(mL/kg) 7052 9 (82 1) 5060 9 (58 6) 488 (5 6)    Urine (mL/kg/hr) 1635 (0 8) 2195 (1 1) 250 (0 8)    Emesis/NG output 100 (0)      Drains 30 (0) 15 (0)     Stool       Total Output 1765 2210 250    Net +5287 9 +2850 9 +238                 Invasive Devices     Central Venous Catheter Line            CVC Central Lines 05/05/18 Triple 16cm 1 day Peripheral Intravenous Line            Peripheral IV 05/04/18 Right Antecubital 2 days          Arterial Line            Arterial Line 05/05/18 Right Radial 1 day          Drain            Closed/Suction Drain Left Head Bulb 10 Fr  2 days    Urethral Catheter Latex 16 Fr  2 days    NG/OG/Enteral Tube Orogastric Right mouth 1 day          Airway            ETT  Hi-Lo; Cuffed 8 mm 1 day                Physical Exam:  Vitals: Blood pressure 106/63, pulse 86, temperature 99 5 °F (37 5 °C), temperature source Oral, resp  rate 17, height 5' 9" (1 753 m), weight 86 4 kg (190 lb 7 6 oz), SpO2 96 %  ,Body mass index is 28 13 kg/m²  Hemodynamic Monitoring: MAP: Arterial Line MAP (mmHg): 292 mmHg    General appearance: alert, older than stated age, and no distress  Head: Normocephalic, Dressing intact  EEG leads in place  Eyes: EOMI, PERRL  Neck: supple, symmetrical, trachea midline   Lungs: intubated/ventilated  Heart: regular heart rate  Neurologic:   Mental status: GCS 11T, E4, V1T, M6, attempts to mouth words, FC briskly  Cranial nerves: grossly intact (Cranial nerves II-XII) - limited 2/2 intubation/as documented     Sensory: normal to crude touch x 4  Motor: moving all extremities without focal weakness  Reflexes: 2+ and symmetric, no clonus b/l     Lab Results:    Results from last 7 days  Lab Units 05/07/18  0508 05/06/18  0606 05/05/18  1212   WBC Thousand/uL 16 22* 11 64* 9 84   HEMOGLOBIN g/dL 9 0* 8 6* 9 4*   HEMATOCRIT % 27 4* 26 1* 27 8*   PLATELETS Thousands/uL 267 267 245   NEUTROS PCT % 85* 79* 75   MONOS PCT % 4 6 8       Results from last 7 days  Lab Units 05/07/18  0508 05/06/18  1550 05/06/18  0606  05/05/18  1212   SODIUM mmol/L 139 137 135*  < > 130*   POTASSIUM mmol/L 4 3 3 6 4 0  < > 4 5   CHLORIDE mmol/L 109* 109* 106  < > 100   CO2 mmol/L 24 20* 22  < > 24   BUN mg/dL 11 12 16  < > 23   CREATININE mg/dL 0 89 0 83 0 72  < > 0 93   CALCIUM mg/dL 7 0* 6 8* 7 1*  < > 8 0*   TOTAL PROTEIN g/dL  --   -- --   --  6 4   BILIRUBIN TOTAL mg/dL  --   --   --   --  0 56   ALK PHOS U/L  --   --   --   --  59   ALT U/L  --   --   --   --  17   AST U/L  --   --   --   --  6   GLUCOSE RANDOM mg/dL 181* 150* 82  < > 174*   < > = values in this interval not displayed  Results from last 7 days  Lab Units 05/07/18  0508 05/06/18  1550   MAGNESIUM mg/dL 2 4 1 8       Results from last 7 days  Lab Units 05/07/18  0508 05/06/18  1550   PHOSPHORUS mg/dL 2 9 2 0*       Results from last 7 days  Lab Units 05/06/18  0606 05/05/18  0711 05/05/18  0355   INR  1 76* 1 37* 1 44*   PTT seconds 36*  --  33     No results found for: TROPONINT  ABG:  Lab Results   Component Value Date    PHART 7 370 05/06/2018    CDV4BTR 34 5 (L) 05/06/2018    PO2ART 93 1 05/06/2018    SUP8QCI 19 5 (L) 05/06/2018    BEART -5 2 05/06/2018    SOURCE Line, Arterial 05/06/2018       Results from last 7 days  Lab Units 05/05/18  0920 05/04/18  1836 05/04/18  1324 05/04/18  1028   BLOOD CULTURE   --   --  No Growth at 48 hrs  No Growth at 48 hrs   --    GRAM STAIN RESULT  No polys seen  Rare Gram positive cocci in pairs  --   --  No polys seen  Rare Gram positive cocci in clusters   WOUND CULTURE   --   --   --  2+ Growth of Staphylococcus aureus*   MRSA CULTURE ONLY   --  No Methicillin Resistant Staphlyococcus aureus (MRSA) isolated  --   --          Imaging Studies: I have personally reviewed pertinent reports  and I have personally reviewed pertinent films in PACS    Xr Chest Portable    Result Date: 5/6/2018  Narrative: CHEST INDICATION:   R IJ TLC placement  COMPARISON:  Prior study same date earlier time EXAM PERFORMED/VIEWS:  XR CHEST PORTABLE FINDINGS: The heart mediastinum are unchanged with continued widening of the superior mediastinum  The ET tube is in satisfactory position  A right IJ line has been placed with tip terminating the SVC  Left subclavian line terminates in SVC  NG tube terminates  below the diaphragm    There is no pneumothorax  There is increasing linear density within the lung bases bilaterally suggesting atelectasis  Osseous structures appear within normal limits for patient age  Impression: Right IJ terminates in SVC  No pneumothorax  Increasing bibasal regions of atelectasis  Workstation performed: [id number]     Xr Chest Portable    Result Date: 5/5/2018  Narrative: CHEST INDICATION:   iNTUBATION  COMPARISON:  Chest CT 4/10/2018  Chest radiograph 4/28/2018 EXAM PERFORMED/VIEWS:  XR CHEST PORTABLE FINDINGS:  Endotracheal tube tip is in the midthoracic trachea  Nasogastric tube tip projects over the stomach  Distal sidehole is at the gastroesophageal junction and should be advanced by approximately 5 cm  Left subclavian line tip is in the proximal SVC  Normal heart size  Abnormal right paratracheal stripe representing adenopathy is unchanged The lungs are clear  Prior atelectasis involving the right middle lobe appears improved though this is difficult to confirm without a lateral radiograph  No pneumothorax or pleural effusion  Osseous structures appear within normal limits for patient age  Impression: 1  Lines and tubes as above  Nasogastric tube should be advanced by at least 5 cm  Workstation performed: [id number]     Ct Head Wo Contrast    Result Date: 5/5/2018  Narrative: CT BRAIN - WITHOUT CONTRAST INDICATION:   Epilepsy  COMPARISON:  CT brain 5/3/2018, MR brain 5/4/2018 TECHNIQUE:  CT examination of the brain was performed  In addition to axial images, coronal 2D reformatted images were created and submitted for interpretation  Radiation dose length product (DLP) for this visit:  1045 mGy-cm   This examination, like all CT scans performed in the [facility], was performed utilizing techniques to minimize radiation dose exposure, including the use of iterative reconstruction and automated exposure control  IMAGE QUALITY:  Diagnostic   FINDINGS: PARENCHYMA:  Right parietal craniotomy flap is been removed, prosthesis is been inserted  The large left scalp abscess is been drained  It appears that the extra-axial collection subjacent to and extending anteriorly from the original craniotomy flap has been decompressed  No significant mass effect  Stable distortion of soft tissues at the original operative site  VENTRICLES AND EXTRA-AXIAL SPACES:  Prominent for a VISUALIZED ORBITS AND PARANASAL SINUSES:  Unremarkable  CALVARIUM AND EXTRACRANIAL SOFT TISSUES:  Removal of left parietal bone flap with cranioplasty  Diminished soft tissue swelling at the operative site  Vascular clips  Impression: Revision of left parietal craniotomy, interval drainage of extra-axial and superficial scalp collections  No significant mass effect  Workstation performed: [id number]     Ct Head Wo Contrast    Result Date: 4/14/2018  Narrative: CT BRAIN - WITHOUT CONTRAST INDICATION:   Status post craniotomy for resection of tumor    COMPARISON:  Brain MRI 4/7/2018  TECHNIQUE:  CT examination of the brain was performed  In addition to axial images, coronal 2D reformatted images were created and submitted for interpretation  Radiation dose length product (DLP) for this visit:  1046 16 mGy-cm   This examination, like all CT scans performed in the [facility], was performed utilizing techniques to minimize radiation dose exposure, including the use of iterative reconstruction and automated exposure control  IMAGE QUALITY:  Diagnostic  FINDINGS: PARENCHYMA: Postsurgical changes of resection of left posterior parietal mass is noted  There is scattered foci of air within the resection cavity as well as pneumocephalus along the left frontal lobe and adjacent to craniotomy site  Trace  hemorrhage seen at the resection site  There is unchanged vasogenic edema in the region of prior tumor  No CT signs of acute infarction  There is no mass effect or midline shift   VENTRICLES AND EXTRA-AXIAL SPACES:  Normal for the patient's age  VISUALIZED ORBITS AND PARANASAL SINUSES:  Unremarkable  CALVARIUM AND EXTRACRANIAL SOFT TISSUES:  Postsurgical changes of left posterior parietal craniotomy is noted  Impression: Postsurgical changes of craniotomy and resection of left posterior parietal mass with scattered foci of air and hemorrhage at the resection site and unchanged vasogenic edema  Pneumocephalus also noted along the left frontal lobe and adjacent to craniotomy site  Workstation performed: [id number]     Mri Brain W Wo Contrast    Result Date: 5/4/2018  Narrative: MRI BRAIN WITH AND WITHOUT CONTRAST INDICATION: incisional drainage,eval extent of infection COMPARISON:  MRI brain dated April 7, 2018  CT brain dated May 3, 2018  TECHNIQUE: Sagittal T1, axial T2, axial FLAIR, axial T1, axial [place], axial diffusion  Sagittal, axial and coronal T1 postcontrast   Axial BRAVO post contrast   IV Contrast:  8 mL of gadobutrol injection (MULTI-DOSE)  IMAGE QUALITY:   Diagnostic  FINDINGS: BRAIN PARENCHYMA: Interval postoperative changes of surgical resection of a left parietal vertex mass are identified when compared to the prior MRI brain  There is leptomeningeal enhancement at the resection site, likely related to patient's history of an abscess  There is a tiny subdural collection involving the left frontoparietal region measuring up to 5 mm also likely postoperative in nature  There is mild vasogenic edema surrounding the resection cavity which has significantly improved when compared to the prior MRI brain  There is no significant mass effect on the adjacent ventricles  No midline shift  There is no restricted diffusion to suggest acute ischemia  There is mild smooth dural enhancement involving the left supratentorial brain, likely postoperative in nature  VENTRICLES:  No hydrocephalus   SELLA AND PITUITARY GLAND:  Normal  ORBITS:  Normal  PARANASAL SINUSES:  Normal  VASCULATURE:  Evaluation of the major intracranial vasculature demonstrates appropriate flow voids  CALVARIUM AND SKULL BASE:  Left parietal craniotomy changes are noted  EXTRACRANIAL SOFT TISSUES:  Again noted is a fluid collection within the scalp adjacent to the left parietal craniotomy site measuring 2 6 x 1 7 cm compatible with the patient's history of scalp abscess  Impression: Postoperative changes of a left parietal mass resection as described above  Mild smooth leptomeningeal enhancement at the resection cavity is likely related to patient's history of infection/abscess  However, there is no encapsulated intra-axial fluid collection  Mild smooth dural enhancement at the left supratentorial brain is likely postoperative in nature  A short-term follow-up MRI brain in 2-3 months is recommended for further evaluation  No mass effect or midline shift  Significant interval improvement in left parietal vasogenic edema when compared to the prior MRI brain  No restricted diffusion to suggest acute ischemia  Tiny left frontoparietal subdural hematoma, likely postoperative in nature  Workstation performed: [id number]     Ct Chest Abdomen Pelvis W Contrast    Result Date: 4/9/2018  Narrative: CT CHEST, ABDOMEN AND PELVIS WITH IV CONTRAST INDICATION:   Brain cancer/neoplasm, staging  COMPARISON: None  TECHNIQUE: CT examination of the chest, abdomen and pelvis was performed  Axial, sagittal, and coronal 2D reformatted images were created from the source data and submitted for interpretation  Radiation dose length product (DLP) for this visit:  931 98 mGy-cm   This examination, like all CT scans performed in the [facility], was performed utilizing techniques to minimize radiation dose exposure, including the use of iterative  reconstruction and automated exposure control  IV Contrast:  100 mL of iohexol (OMNIPAQUE) Enteric Contrast: Enteric contrast was administered   FINDINGS: CHEST LUNGS:  Right middle lobe 4 4 x 3 4 cm mass lesion/neoplasm obstructing the right middle lobe bronchus with distal atelectasis  Tiny 3 mm nodule right upper lobe series 3 image 25 possibly a metastatic nodule  PLEURA:  Unremarkable  HEART/GREAT VESSELS:  There is infiltration of the right middle lobe lung mass into the right middle lobe pulmonary artery  There is questionable right lower lobe pulmonary embolus  MEDIASTINUM AND MERVIN:  Metastatic lymph nodes identified in the mediastinum including the anterior retrosternal space, right paratracheal space, subcarinal space  There is also a left-sided subcarinal 2 5 cm metastatic lymph node  Questionable partially visualized right supraclavicular 1 5 cm lymph node  CHEST WALL AND LOWER NECK:   There is questionable right lower lobe pulmonary embolus  ABDOMEN LIVER/BILIARY TREE:  Unremarkable  GALLBLADDER:  No calcified gallstones  No pericholecystic inflammatory change  SPLEEN:  Unremarkable  PANCREAS:  Unremarkable  ADRENAL GLANDS:  Unremarkable  KIDNEYS/URETERS:  Unremarkable  No hydronephrosis  STOMACH AND BOWEL:  There is colonic diverticulosis without evidence of acute diverticulitis  APPENDIX:  No findings to suggest appendicitis  ABDOMINOPELVIC CAVITY:  No ascites or free intraperitoneal air  No lymphadenopathy  VESSELS:  Unremarkable for patient's age  PELVIS REPRODUCTIVE ORGANS:  Unremarkable for patient's age  URINARY BLADDER:  Unremarkable  ABDOMINAL WALL/INGUINAL REGIONS:  Unremarkable  OSSEOUS STRUCTURES:  No acute fracture or destructive osseous lesion  Impression: Infiltrating right middle lobe lung mass with mediastinal metastatic lymph nodes as described above  There is questionable right lower lobe pulmonary artery filling defect/embolism which could also be artifactual  Workstation performed: [id number]     Cta Chest Pe Study    Result Date: 4/10/2018  Narrative: CTA - CHEST WITH IV CONTRAST - PULMONARY ANGIOGRAM INDICATION:   Chest pain, acute, PE suspected, high pretest prob    Excerpt from oncology progress note from earlier the same day: " past medical history of stage IV lung cancer who came in with a newly diagnosed brain metastases  He had a CT scan of the  chest abdomen pelvis done yesterday which showed a questionable filling defect" COMPARISON: CT chest and pelvis 4/9/2018 describing a potential right lower lobe pulmonary embolus  TECHNIQUE: CTA examination of the chest was performed using angiographic technique according to a protocol specifically tailored to evaluate for pulmonary embolism  Axial, sagittal, and coronal 2D reformatted images were created from the source data and  submitted for interpretation  In addition, coronal 3D MIP postprocessing was performed on the acquisition scanner  Radiation dose length product (DLP) for this visit:  600 87 mGy-cm   This examination, like all CT scans performed in the [facility], was performed utilizing techniques to minimize radiation dose exposure, including the use of iterative  reconstruction and automated exposure control  IV Contrast:  85 mL of iohexol (OMNIPAQUE)  350 Multi-dose  FINDINGS: PULMONARY ARTERIAL TREE: Obstructive, small focus of left upper lobe pulmonary arterial embolism in keeping with acute embolus  2 small foci in the right lower lobe are recanalized and more likely subacute than acute  The below described right middle lobe mass compresses but does not appear to extend into the right middle lobe artery  No definite intraluminal enhancing tumor thrombus  LUNGS:  Stable right middle lobe mass with postobstructive pneumonitis unchanged from yesterday's study  Previously described 3 mm right upper lobe nodule is stable  PLEURA:  Unremarkable  HEART/AORTA:  Unremarkable for patient's age  MEDIASTINUM AND MERVIN:  Advanced mediastinal adenopathy unchanged from yesterday's study  CHEST WALL AND LOWER NECK:   Partially imaged left chest wall port  VISUALIZED STRUCTURES IN THE UPPER ABDOMEN:  Unremarkable  OSSEOUS STRUCTURES:  No acute fracture or destructive osseous lesion  Impression: Small acute occlusive focus of left upper lobe pulmonary arterial embolism  Small foci of recanalized right lower lobe pulmonary arterial embolism are likely subacute  Compression of the right middle lobe pulmonary artery by surrounding mass  Stable right middle lobe mass with postobstructive pneumonitis  Stable 3 mm right upper lobe pulmonary nodule  Stable advanced mediastinal adenopathy  I personally discussed this study Connie Braun on 4/10/2018 at 5:35 PM  Workstation performed: [id number]     Vas Lower Limb Venous Duplex Study, Complete Bilateral    Result Date: 4/11/2018  Narrative:  [facility] REPORT CLINICAL: Indications: Patient presents with bilateral lower extremity edema  Risk Factors: The patient has history of DVT and malignancy  FINDINGS:  Segment    Right            Left                  Impression       Impression                                  CFV        Normal (Patent)                                              FV Mid                      Subacute vs Chronic Non-occlusive Thrombus  FV Dist                     Subacute vs Chronic Non-occlusive Thrombus  Popliteal                   Subacute vs Chronic Non-occlusive Thrombus     CONCLUSION: Impression: RIGHT LOWER LIMB: No evidence of acute or chronic deep vein thrombosis  No evidence of superficial thrombophlebitis noted  Doppler evaluation shows a normal response to augmentation maneuvers  Popliteal, posterior tibial and anterior tibial arterial Doppler waveforms are triphasic  There is a well defined hypoechoic non-vascularized structure noted in the popliteal fossa consistent with a Bakers cyst   LEFT LOWER LIMB: Evidence of subacute vs chronic non-occlusive deep vein thrombosis in the mid femoral, distal femoral and popliteal veins  Evidence of superficial thrombophlebitis noted   Doppler evaluation shows a normal response to augmentation maneuvers  Popliteal, posterior tibial and anterior tibial arterial Doppler waveforms are triphasic  Technical findings [facility] at the time of the exam   SIGNATURE: Electronically Signed by: Jones Perez on 2018-04-11 12:20:37 PM    Ir Ivc Optional Filter Placement    Result Date: 4/12/2018  Narrative: INDICATION: DVT, pulmonary embolism, cancer, plan for craniotomy, unable to receive anticoagulation  COMPARISON: CT abdomen dated 4/9/2018 PROCEDURE: 1  Left common iliac vein and IVC venogram 2  Infrarenal temporary IVC filter placement 3  Post-filter placement IVC venogram PROCEDURE DETAILS: Operators: Dr Eliu Rodriguez, [facility] attending, performed the procedure  Anesthesia: Conscious sedation was provided throughout a total intra-service time of during which the patient's hemodynamic parameters were continuously monitored by an independent trained radiology nurse  1% lidocaine was injected in the skin and subcutaneous tissues overlying the access site  Medications: 1% lidocaine, fentanyl, Versed Contrast: 25 mL of Omnipaque 300 Fluoroscopy time: 4 1 minutes Images: 5 COMMENTS: Following the discussion of the risks, benefits and alternatives to the procedure, written informed consent was obtained from the patient  The patient was placed supine on the imaging table  A preprocedure timeout was performed per [facility] protocol  The right was prepped and draped in the usual sterile fashion  Under ultrasound guidance, the patent and compressible right internal jugular vein was punctured using a 21-gauge micropuncture needle  A static ultrasound image was saved to PACS  A microwire was advanced through the needle into the right atrium  The needle was exchanged for a microsheath allowing exchange of the microwire for a heavy wire which was advanced into the IVC  The IVC filter 9 [place] sheath was advanced over the wire into the left common iliac vein   The wire was removed and a small hand contrast injection confirmed placement  A left common iliac and IVC venogram was performed  The wire was removed, the convertible Vena Tech filter was advanced using the pusher through the sheath into appropriate position  The sheath was then withdrawn until the filter was deployed  The pusher was removed  A post filter deployment IVC venogram was then performed confirming appropriate positioning  The patient tolerated the procedure well and there are no immediate postprocedure complications  FINDINGS: 1  Patent nonduplicated IVC with no evidence of clot  No evidence of clot in the left common iliac vein  2   Successful placement of an infrarenal convertible Vena Tech IVC filter  Impression: Successful placement of a convertible Vena Tech infrarenal IVC filter  Workstation performed: [id number]     EKG, Pathology, and Other Studies: I have personally reviewed pertinent reports        VTE Pharmacologic Prophylaxis: Heparin    VTE Mechanical Prophylaxis: sequential compression device

## 2018-05-07 NOTE — OCCUPATIONAL THERAPY NOTE
Occupational Therapy Cancellation    Orders received  Chart reviewed  Pt alert however remains intubated and on EEG monitoring  Will hold on therapy evaluation this date  Will continue to follow pt on caseload in order to initiate OT evaluation at a later date      Ignacio Brito MS, OTR/L

## 2018-05-07 NOTE — PHYSICAL THERAPY NOTE
Physical Therapy Cancellation Note - attempted to see pt x2, receiving nursing care, on EMU, lethargic, not appropriate for PT today, will continue to monitor status    Reta Dee, PT

## 2018-05-07 NOTE — ASSESSMENT & PLAN NOTE
- spoke with next of kin- Shanice Strongg on the phone (he resides in [place]) and updated him on clinical status and addressed his questions/concerns    - He confirmed that he is DNR/DNI  - He states that he will do "whatever is best for him, if that's hospice, then we will do hospice "  - Plan for follow up conversations tomorrow

## 2018-05-07 NOTE — PROGRESS NOTES
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- follows with Dr Ave Quinn at [facility]  DVT/PE  IVC filter placement 4/11  RML lung mass  COPD  DM  EtoH abuse/ dependence  Polysubstance abuse  Left parietal brain mass s/p craniotomy on 4/13 now s/p washout on 5/5/18  Cancer pain  Bipolar disorder    Goals of care, counseling/discussion   Assessment & Plan    - spoke with next of kin- Carmelo Sharma on the phone (he resides in [address] [place] [address]) and updated him on clinical status and addressed his questions/concerns  - He confirmed that he is DNR/DNI  - He states that he will do "whatever is best for him, if that's hospice, then we will do hospice "  - Plan for follow up conversations tomorrow             Interval history:       Patient just extubated  He answers yes/no questions but remains encephalopathic  Spoke with his next of kin over the phone       MEDICATIONS / ALLERGIES:    all current active meds have been reviewed and current meds:   Current Facility-Administered Medications   Medication Dose Route Frequency    acetaminophen (TYLENOL) oral suspension 975 mg  975 mg Oral Q8H    ARIPiprazole (ABILIFY) tablet 10 mg  10 mg Oral Daily    bisacodyl (DULCOLAX) rectal suppository 10 mg  10 mg Rectal Daily PRN    calcium carbonate (TUMS) chewable tablet 1,000 mg  1,000 mg Oral Daily PRN    chlorhexidine (PERIDEX) 0 12 % oral rinse 15 mL  15 mL Swish & Spit Q12H [facility]    diazepam (VALIUM) injection 5 mg  5 mg Intravenous Q6H PRN    fentanyl citrate (PF) 100 MCG/2ML 25 mcg  25 mcg Intravenous L3O PRN    folic acid (FOLVITE) tablet 1 mg  1 mg Oral Daily    heparin (porcine) subcutaneous injection 5,000 Units  5,000 Units Subcutaneous Q8H [facility]    influenza inactivated quadrivalent vaccine (FLULAVAL) IM injection 0 5 mL  0 5 mL Intramuscular Prior to discharge    insulin regular (HumuLIN R,NovoLIN R) 1 Units/mL in sodium chloride 0 9 % 100 mL infusion  0 3-21 Units/hr Intravenous Titrated    ipratropium (ATROVENT) 0 02 % inhalation solution 0 5 mg  0 5 mg Nebulization TID    levalbuterol (XOPENEX) inhalation solution 1 25 mg  1 25 mg Nebulization TID    levETIRAcetam (KEPPRA) 1,000 mg in sodium chloride 0 9 % 100 mL IVPB  1,000 mg Intravenous Q8H    multivitamin-minerals (CENTRUM) tablet 1 tablet  1 tablet Oral Daily    nafcillin (NALLPEN) 2,000 mg in sodium chloride 0 9 % 100 mL IVPB  2,000 mg Intravenous Q4H    nicotine (NICODERM CQ) 21 mg/24 hr TD 24 hr patch 1 patch  1 patch Transdermal Daily    omeprazole (PRILOSEC) suspension 2 mg/mL  20 mg Oral Early Morning    ondansetron (ZOFRAN) injection 4 mg  4 mg Intravenous Q4H PRN    oxyCODONE (ROXICODONE) IR tablet 5 mg  5 mg Oral Q4H PRN    phenytoin (DILANTIN) injection 100 mg  100 mg Intravenous Q8H [facility]    polyethylene glycol (MIRALAX) packet 17 g  17 g Oral Daily    senna (SENOKOT) tablet 8 6 mg  1 tablet Oral HS    senna-docusate sodium (SENOKOT S) 8 6-50 mg per tablet 2 tablet  2 tablet Oral Daily    sodium chloride tablet 2 g  2 g Per NG Tube TID With Meals    thiamine (VITAMIN B1) tablet 100 mg  100 mg Oral Daily       Allergies   Allergen Reactions    Bee Venom Other (See Comments)     Unknown severity       OBJECTIVE:    Physical Exam  Physical Exam   Constitutional: No distress  HENT:   Head: Normocephalic and atraumatic  Drain in place   Eyes: EOM are normal  Right eye exhibits no discharge  Left eye exhibits no discharge  No scleral icterus  Cardiovascular: Normal rate, regular rhythm and intact distal pulses  Pulmonary/Chest: Effort normal and breath sounds normal  No respiratory distress  Abdominal: Soft   Bowel sounds are normal  He exhibits no distension  Musculoskeletal: He exhibits no edema  Neurological: He is alert  Skin: Skin is warm and dry  There is pallor  Nursing note and vitals reviewed  Lab Results:   I have personally reviewed pertinent labs  , CBC:   Lab Results   Component Value Date    WBC 16 22 (H) 05/07/2018    HGB 9 0 (L) 05/07/2018    HCT 27 4 (L) 05/07/2018    MCV 90 05/07/2018     05/07/2018    MCH 29 4 05/07/2018    MCHC 32 8 05/07/2018    RDW 15 7 (H) 05/07/2018    MPV 10 2 05/07/2018    NRBC 0 05/07/2018   , CMP:   Lab Results   Component Value Date     05/07/2018    K 4 3 05/07/2018     (H) 05/07/2018    CO2 24 05/07/2018    ANIONGAP 6 05/07/2018    BUN 11 05/07/2018    CREATININE 0 89 05/07/2018    GLUCOSE 181 (H) 05/07/2018    CALCIUM 7 0 (L) 05/07/2018    EGFR 93 05/07/2018     Imaging Studies: reviewed  EKG, Pathology, and Other Studies: reviewed    Counseling / Coordination of Care  Total floor / unit time spent today 35+ minutes  Greater than 50% of total time was spent with the patient and / or family counseling and / or coordination of care   A description of the counseling / coordination of care: goals of care discussions

## 2018-05-07 NOTE — RESPIRATORY THERAPY NOTE
RT Ventilator Management Note  Yany Gutierrez 63 y o  male MRN: [id number]  Unit/Bed#: ICU 06 Encounter: [id number]      Daily Screen       5/6/2018 0722 5/6/2018 1604          Patient safety screen outcome[de-identified] Failed Passed      Not Ready for Weaning due to[de-identified] Underline problem not resolved -      RSBI: - 55              Physical Exam:   Assessment Type: (P) Assess only  General Appearance: (P) Sleeping  Respiratory Pattern: (P) Assisted  Chest Assessment: (P) Chest expansion symmetrical  Bilateral Breath Sounds: (P) Clear, Diminished  Cough: Productive, Strong  O2 Device: (P) vent  Subjective Data: (P) n/a      Resp Comments: (P) No changes made to the pts PS settings overnight, pt is tollerating settings will cont to monitor pt overnight

## 2018-05-08 ENCOUNTER — APPOINTMENT (INPATIENT)
Dept: NEUROLOGY | Facility: AMBULATORY SURGERY CENTER | Age: 63
DRG: 515 | End: 2018-05-08
Payer: COMMERCIAL

## 2018-05-08 PROBLEM — J96.00 ACUTE RESPIRATORY FAILURE (HCC): Status: RESOLVED | Noted: 2018-05-07 | Resolved: 2018-05-08

## 2018-05-08 LAB
ALBUMIN SERPL BCP-MCNC: 2 G/DL (ref 3.5–5)
ALP SERPL-CCNC: 73 U/L (ref 46–116)
ALT SERPL W P-5'-P-CCNC: 13 U/L (ref 12–78)
AMMONIA PLAS-SCNC: 29 UMOL/L (ref 11–35)
ANION GAP SERPL CALCULATED.3IONS-SCNC: 6 MMOL/L (ref 4–13)
AST SERPL W P-5'-P-CCNC: 13 U/L (ref 5–45)
BACTERIA SPEC ANAEROBE CULT: NORMAL
BACTERIA SPEC ANAEROBE CULT: NORMAL
BASOPHILS # BLD AUTO: 0.04 THOUSANDS/ΜL (ref 0–0.1)
BASOPHILS NFR BLD AUTO: 0 % (ref 0–1)
BILIRUB SERPL-MCNC: 0.3 MG/DL (ref 0.2–1)
BUN SERPL-MCNC: 11 MG/DL (ref 5–25)
CALCIUM SERPL-MCNC: 6.9 MG/DL (ref 8.3–10.1)
CHLORIDE SERPL-SCNC: 113 MMOL/L (ref 100–108)
CO2 SERPL-SCNC: 23 MMOL/L (ref 21–32)
CREAT SERPL-MCNC: 0.66 MG/DL (ref 0.6–1.3)
EOSINOPHIL # BLD AUTO: 0.19 THOUSAND/ΜL (ref 0–0.61)
EOSINOPHIL NFR BLD AUTO: 1 % (ref 0–6)
ERYTHROCYTE [DISTWIDTH] IN BLOOD BY AUTOMATED COUNT: 16 % (ref 11.6–15.1)
GFR SERPL CREATININE-BSD FRML MDRD: 105 ML/MIN/1.73SQ M
GLUCOSE SERPL-MCNC: 108 MG/DL (ref 65–140)
GLUCOSE SERPL-MCNC: 122 MG/DL (ref 65–140)
GLUCOSE SERPL-MCNC: 136 MG/DL (ref 65–140)
GLUCOSE SERPL-MCNC: 147 MG/DL (ref 65–140)
GLUCOSE SERPL-MCNC: 222 MG/DL (ref 65–140)
GLUCOSE SERPL-MCNC: 235 MG/DL (ref 65–140)
GLUCOSE SERPL-MCNC: 239 MG/DL (ref 65–140)
GLUCOSE SERPL-MCNC: 311 MG/DL (ref 65–140)
GLUCOSE SERPL-MCNC: 70 MG/DL (ref 65–140)
HCT VFR BLD AUTO: 26.9 % (ref 36.5–49.3)
HGB BLD-MCNC: 8.9 G/DL (ref 12–17)
LYMPHOCYTES # BLD AUTO: 1.02 THOUSANDS/ΜL (ref 0.6–4.47)
LYMPHOCYTES NFR BLD AUTO: 7 % (ref 14–44)
MCH RBC QN AUTO: 30.3 PG (ref 26.8–34.3)
MCHC RBC AUTO-ENTMCNC: 33.1 G/DL (ref 31.4–37.4)
MCV RBC AUTO: 92 FL (ref 82–98)
MONOCYTES # BLD AUTO: 0.37 THOUSAND/ΜL (ref 0.17–1.22)
MONOCYTES NFR BLD AUTO: 3 % (ref 4–12)
NEUTROPHILS # BLD AUTO: 12.86 THOUSANDS/ΜL (ref 1.85–7.62)
NEUTS SEG NFR BLD AUTO: 89 % (ref 43–75)
NRBC BLD AUTO-RTO: 0 /100 WBCS
PHENYTOIN SERPL-MCNC: 14.2 UG/ML (ref 10–20)
PLATELET # BLD AUTO: 246 THOUSANDS/UL (ref 149–390)
PMV BLD AUTO: 9.7 FL (ref 8.9–12.7)
POTASSIUM SERPL-SCNC: 3.6 MMOL/L (ref 3.5–5.3)
PROT SERPL-MCNC: 5.7 G/DL (ref 6.4–8.2)
RBC # BLD AUTO: 2.94 MILLION/UL (ref 3.88–5.62)
SODIUM SERPL-SCNC: 142 MMOL/L (ref 136–145)
WBC # BLD AUTO: 14.54 THOUSAND/UL (ref 4.31–10.16)

## 2018-05-08 PROCEDURE — 82140 ASSAY OF AMMONIA: CPT | Performed by: NURSE PRACTITIONER

## 2018-05-08 PROCEDURE — 85025 COMPLETE CBC W/AUTO DIFF WBC: CPT | Performed by: NURSE PRACTITIONER

## 2018-05-08 PROCEDURE — 80053 COMPREHEN METABOLIC PANEL: CPT | Performed by: NURSE PRACTITIONER

## 2018-05-08 PROCEDURE — 99233 SBSQ HOSP IP/OBS HIGH 50: CPT | Performed by: NURSE PRACTITIONER

## 2018-05-08 PROCEDURE — 99233 SBSQ HOSP IP/OBS HIGH 50: CPT | Performed by: INTERNAL MEDICINE

## 2018-05-08 PROCEDURE — 92610 EVALUATE SWALLOWING FUNCTION: CPT

## 2018-05-08 PROCEDURE — 95951 PR EEG MONITORING/VIDEORECORD: CPT | Performed by: PSYCHIATRY & NEUROLOGY

## 2018-05-08 PROCEDURE — 95951 HB EEG MONITORING/VIDEORECORD: CPT

## 2018-05-08 PROCEDURE — 80185 ASSAY OF PHENYTOIN TOTAL: CPT | Performed by: NURSE PRACTITIONER

## 2018-05-08 PROCEDURE — 94760 N-INVAS EAR/PLS OXIMETRY 1: CPT

## 2018-05-08 PROCEDURE — 94640 AIRWAY INHALATION TREATMENT: CPT

## 2018-05-08 PROCEDURE — 99291 CRITICAL CARE FIRST HOUR: CPT | Performed by: PSYCHIATRY & NEUROLOGY

## 2018-05-08 PROCEDURE — 82948 REAGENT STRIP/BLOOD GLUCOSE: CPT

## 2018-05-08 RX ORDER — POTASSIUM CHLORIDE 20 MEQ/1
40 TABLET, EXTENDED RELEASE ORAL ONCE
Status: COMPLETED | OUTPATIENT
Start: 2018-05-08 | End: 2018-05-08

## 2018-05-08 RX ORDER — MORPHINE SULFATE 10 MG/ML
6 INJECTION, SOLUTION INTRAMUSCULAR; INTRAVENOUS EVERY 4 HOURS PRN
Status: DISCONTINUED | OUTPATIENT
Start: 2018-05-08 | End: 2018-05-11 | Stop reason: HOSPADM

## 2018-05-08 RX ORDER — BUDESONIDE AND FORMOTEROL FUMARATE DIHYDRATE 160; 4.5 UG/1; UG/1
2 AEROSOL RESPIRATORY (INHALATION) 2 TIMES DAILY
Status: DISCONTINUED | OUTPATIENT
Start: 2018-05-08 | End: 2018-05-08

## 2018-05-08 RX ORDER — LEVALBUTEROL 1.25 MG/.5ML
1.25 SOLUTION, CONCENTRATE RESPIRATORY (INHALATION) EVERY 6 HOURS PRN
Status: DISCONTINUED | OUTPATIENT
Start: 2018-05-08 | End: 2018-05-08

## 2018-05-08 RX ORDER — INSULIN GLARGINE 100 [IU]/ML
15 INJECTION, SOLUTION SUBCUTANEOUS EVERY MORNING
Status: DISCONTINUED | OUTPATIENT
Start: 2018-05-08 | End: 2018-05-08

## 2018-05-08 RX ORDER — LORAZEPAM 2 MG/ML
1 INJECTION INTRAMUSCULAR EVERY 4 HOURS PRN
Status: DISCONTINUED | OUTPATIENT
Start: 2018-05-08 | End: 2018-05-08

## 2018-05-08 RX ORDER — ACETAMINOPHEN 160 MG/5ML
975 SUSPENSION, ORAL (FINAL DOSE FORM) ORAL EVERY 8 HOURS PRN
Status: DISCONTINUED | OUTPATIENT
Start: 2018-05-08 | End: 2018-05-11 | Stop reason: HOSPADM

## 2018-05-08 RX ORDER — LORAZEPAM 2 MG/ML
1 INJECTION INTRAMUSCULAR
Status: DISCONTINUED | OUTPATIENT
Start: 2018-05-08 | End: 2018-05-11 | Stop reason: HOSPADM

## 2018-05-08 RX ADMIN — FOLIC ACID 1 MG: 1 TABLET ORAL at 09:15

## 2018-05-08 RX ADMIN — NAFCILLIN SODIUM 2000 MG: 2 INJECTION, POWDER, LYOPHILIZED, FOR SOLUTION INTRAMUSCULAR; INTRAVENOUS at 01:46

## 2018-05-08 RX ADMIN — SODIUM CHLORIDE 1 UNITS/HR: 9 INJECTION, SOLUTION INTRAVENOUS at 04:20

## 2018-05-08 RX ADMIN — LEVALBUTEROL HYDROCHLORIDE 1.25 MG: 1.25 SOLUTION, CONCENTRATE RESPIRATORY (INHALATION) at 07:17

## 2018-05-08 RX ADMIN — Medication 20 MG: at 05:22

## 2018-05-08 RX ADMIN — Medication 1000 MG: at 13:23

## 2018-05-08 RX ADMIN — SODIUM CHLORIDE TAB 1 GM 2 G: 1 TAB at 13:36

## 2018-05-08 RX ADMIN — ACETAMINOPHEN 975 MG: 160 SUSPENSION ORAL at 09:12

## 2018-05-08 RX ADMIN — IPRATROPIUM BROMIDE 0.5 MG: 0.5 SOLUTION RESPIRATORY (INHALATION) at 07:17

## 2018-05-08 RX ADMIN — SODIUM CHLORIDE TAB 1 GM 2 G: 1 TAB at 09:11

## 2018-05-08 RX ADMIN — NAFCILLIN SODIUM 2000 MG: 2 INJECTION, POWDER, LYOPHILIZED, FOR SOLUTION INTRAMUSCULAR; INTRAVENOUS at 14:35

## 2018-05-08 RX ADMIN — PHENYTOIN SODIUM 100 MG: 50 INJECTION INTRAMUSCULAR; INTRAVENOUS at 21:58

## 2018-05-08 RX ADMIN — NICOTINE 1 PATCH: 21 PATCH, EXTENDED RELEASE TRANSDERMAL at 09:43

## 2018-05-08 RX ADMIN — Medication 100 MG: at 09:15

## 2018-05-08 RX ADMIN — NAFCILLIN SODIUM 2000 MG: 2 INJECTION, POWDER, LYOPHILIZED, FOR SOLUTION INTRAMUSCULAR; INTRAVENOUS at 06:18

## 2018-05-08 RX ADMIN — PHENYTOIN SODIUM 100 MG: 50 INJECTION INTRAMUSCULAR; INTRAVENOUS at 05:22

## 2018-05-08 RX ADMIN — INSULIN LISPRO 5 UNITS: 100 INJECTION, SOLUTION INTRAVENOUS; SUBCUTANEOUS at 13:20

## 2018-05-08 RX ADMIN — PHENYTOIN SODIUM 100 MG: 50 INJECTION INTRAMUSCULAR; INTRAVENOUS at 13:41

## 2018-05-08 RX ADMIN — Medication 1000 MG: at 21:39

## 2018-05-08 RX ADMIN — LORAZEPAM 1 MG: 2 INJECTION INTRAMUSCULAR; INTRAVENOUS at 19:56

## 2018-05-08 RX ADMIN — HEPARIN SODIUM 5000 UNITS: 5000 INJECTION, SOLUTION INTRAVENOUS; SUBCUTANEOUS at 13:34

## 2018-05-08 RX ADMIN — BUDESONIDE AND FORMOTEROL FUMARATE DIHYDRATE 2 PUFF: 160; 4.5 AEROSOL RESPIRATORY (INHALATION) at 11:02

## 2018-05-08 RX ADMIN — NAFCILLIN SODIUM 2000 MG: 2 INJECTION, POWDER, LYOPHILIZED, FOR SOLUTION INTRAMUSCULAR; INTRAVENOUS at 11:00

## 2018-05-08 RX ADMIN — ARIPIPRAZOLE 10 MG: 10 TABLET ORAL at 09:12

## 2018-05-08 RX ADMIN — POTASSIUM CHLORIDE 40 MEQ: 1500 TABLET, EXTENDED RELEASE ORAL at 09:20

## 2018-05-08 RX ADMIN — ACETAMINOPHEN 975 MG: 160 SUSPENSION ORAL at 01:44

## 2018-05-08 RX ADMIN — INSULIN GLARGINE 15 UNITS: 100 INJECTION, SOLUTION SUBCUTANEOUS at 09:15

## 2018-05-08 RX ADMIN — Medication 1000 MG: at 05:23

## 2018-05-08 RX ADMIN — Medication 1 TABLET: at 09:15

## 2018-05-08 RX ADMIN — HEPARIN SODIUM 5000 UNITS: 5000 INJECTION, SOLUTION INTRAVENOUS; SUBCUTANEOUS at 05:22

## 2018-05-08 NOTE — PROGRESS NOTES
The patient was attempting to climb OOB  He had his feet touching the floor, as he was sitting on the edge of the bed by the siderail near the foot of the bed  He had pulled off some of his EEG wires, his oxygen was pulled off, he pulled off his O2 Saturation sensor, and he was tachypneic and diaphoretic  The patient was helped back in to bed, reapplied his oxygen & his O2 Sat sensor  A blood sugar was checked also, and he was NOT hypoglycemic  He is confused and agitated  Wrist restraints were applied  BRUCE Hoyos (Critical Care Medicine Team) was notified of the patient events

## 2018-05-08 NOTE — PROGRESS NOTES
Progress Note - Critical Care   Kylie Rico 63 y o  male MRN: [id number]  Unit/Bed#: ICU 06 Encounter: [id number]    ------------------------------------------------------------------------------------------------  Chief Complaint: No complaints this AM    HPI/24hr events: Patient agitated and confused  Given haldol at approximately 2030 and subsequently had clonic tonic seizure which stopped spontaneously  Later was noted to have spikes on EEG and was given 1mg IV ativan at 2100  No further events overnight   ----------------------------------------------------------------------------------------------  Assessment and Plan  Principal Problem:    Subdural empyema  Active Problems:    Infection of bone flap (HCC)    MSSA (methicillin susceptible Staphylococcus aureus) infection    Status epilepticus (HCC)    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)    Leukocytosis    Dependence on nicotine from cigarettes    Alcohol abuse    Essential hypertension    Insulin-dependent diabetes mellitus    Bipolar disorder    Deep vein thrombosis (DVT) of distal vein of left lower extremity (HCC)    PE (pulmonary thromboembolism) (HCC)    Cerebral edema (HCC)    Brain metastases (HCC)    Supratherapeutic INR    COPD (chronic obstructive pulmonary disease) (HCC)    Goals of care, counseling/discussion    Brain abscess  Resolved Problems:    Acute respiratory failure (HCC)    Hypotension      Neuro:    · Acute metabolic encephalopathy due to subural empyema - POD #3 s/p washout and debriedment of subgaleal collection with removal  Drain management per neurosurgical team  Continue antibiotics as detailed below  Monitor incision site  Neuro checks q4h  · Status epilepticus- Two seizures overnight  Ativan 1mg given after second episode  Keppra 1g q8h with and dilantin 100mg q8h  Corrected total level is 28 4 this AM  Neurology managing anti-epileptic agents  Continue EEG monitoring for today  CAM-ICU daily      · History bioplar disorder- continue home Abilify daily  Holding home Wellbutrin due to seizures  · Hx ETOH abuse- no sign of withdrawal  Continue thiamine/folate/MVI daily      CV:   · No acute issues  Maintain MAP >65  Continue close telemetry monitoring  Pulm:  · Acute respiratory failure- Resolved  Patient extubated yesterday without complication  Maintain SpO2 >88%  Continue pulmonary hygiene  Incentive spirometer q1h while awake, encourage coughing and deep breathing  · COPD without acute exacerbation- continue ATC xopenex/ipratropium  · Nicotine dependence- nicotine patch daily  GI:   · PPx- home omeprazole q24h, bowel regimen       :   · No acute issues  Creatine at baseline  Strict q4h I/O monitoring  Continue to follow renal function tests  No scott  F/E/N:   · No maintenance IVF  · Replete potassium   · Carb control diet per nutrition recommendations      Heme/Onc:   · Stage IV squamous cell 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- prognosis remains poor  Palliative care following  Possible dispo to hospice on Thursday  · Anemia- remains stable without signs of blood loss  Continue to trend hemoglobin and platelets  · Recent DVT/PE- patient was previously on systemic ATC, which has been held to due surgical intervention  IVC filter in place  SCD's to BLE, SQH TID     Endo:   · Type 2 diabetes with hyperglycemia- Stop insulin gtt  Give 15u lantus now with SSI  Increase as needed to maintain goal -180      ID:   · MSSA infection of bone flap with subdural empyema- ID following, appreciate recommendations  Started on nafcillin yesterday  Nafcillin day #3, antibiotic day #5   Continue to monitor fever and WBC curve       MSK/Skin:   · Reposition q2h, eliminate pressure points while in bed  · Close skin surveillance   · PT/OT when able to participate       Dispo: Critical care vs transfer to  today    Code Status: Level 3 - DNAR and DNI  ---------------------------------------------------------------------------------------  Physical Exam   Constitutional: He appears well-developed  He appears lethargic  He is cooperative  No distress  HENT:   Head:       Eyes: Pupils are equal, round, and reactive to light  Neck: Neck supple  No JVD present  Cardiovascular: Normal rate and regular rhythm  Pulses:       Radial pulses are 2+ on the right side, and 2+ on the left side  Dorsalis pedis pulses are 2+ on the right side, and 2+ on the left side  Pulmonary/Chest: He has decreased breath sounds in the right lower field and the left lower field  He has no wheezes  He has rhonchi in the right upper field and the left upper field  Abdominal: Soft  Bowel sounds are normal  He exhibits no distension  There is no tenderness  Neurological: He has normal strength  He appears lethargic  GCS eye subscore is 4  GCS verbal subscore is 4  GCS motor subscore is 6  Oriented only to person  Follows simple commands, cannot remember complex tasks  Mild LUE tremor/asterisks    Skin: Skin is warm and dry  Capillary refill takes less than 2 seconds  He is not diaphoretic      ------------------------------------------------------------------------------------------  Review of Systems   HENT: Negative  Respiratory: Negative for chest tightness and shortness of breath  Cardiovascular: Negative for chest pain, palpitations and leg swelling  Gastrointestinal: Negative  Negative for diarrhea, nausea and vomiting  Genitourinary: Negative  Neurological: Positive for tremors and weakness  Negative for light-headedness, numbness and headaches     Psychiatric/Behavioral: Positive for confusion      ------------------------------------------------------------------------------------------    Vitals   Vitals:    05/08/18 0559 05/08/18 0600 05/08/18 0700 05/08/18 0718   BP:  123/65 139/78    Pulse:  78 100    Resp:  19 (!) 32    Temp:       TempSrc: SpO2:  98% 99% 99%   Weight: 86 6 kg (190 lb 14 7 oz)      Height:         Temp (24hrs), Av 2 °F (37 3 °C), Min:98 6 °F (37 °C), Max:99 8 °F (37 7 °C)  Current: Temperature: 98 9 °F (37 2 °C)  HR: 74 - 100  BP: 10/64 - 160/82  RR: 16 - 32  SpO2: 94 - 100%    Height and Weights   Height: 5' 9" (175 3 cm)  IBW: 70 7 kg  Body mass index is 28 19 kg/m²  Weight (last 2 days)     Date/Time   Weight    18 0559  86 6 (190 92)    18 0530  86 4 (190 48)            Intake and Output  I/O        07 -  0700  07 -  07 07 -  0700    P  O   580     I V  (mL/kg) 2690 9 (31 1) 224 (2 6) 12 2 (0 1)    Blood       NG/ 355     IV Piggyback 1625 520 200    Feedings 250 150     Total Intake(mL/kg) 5060 9 (58 6) 1829 (21 1) 212 2 (2 5)    Urine (mL/kg/hr) 2195 (1 1) 560 (0 3)     Emesis/NG output       Drains 15 (0) 10 (0)     Stool  700 (0 3)     Total Output 2210 1270      Net +2850 9 +559 +212 2           Unmeasured Urine Occurrence  1 x     Unmeasured Stool Occurrence  1 x         UOP: 23 ml/hour     Nutrition       Diet Orders            Start     Ordered    18  Diet Jerry/CHO Controlled; Consistent Carbohydrate Diet Level 3 (6 carb servings/90 grams CHO/meal)  Diet effective now     Question Answer Comment   Diet Type Jerry/CHO Controlled    Jerry/CHO Controlled Consistent Carbohydrate Diet Level 3 (6 carb servings/90 grams CHO/meal)    RD to adjust diet per protocol?  Yes        18          Laboratory and Diagnostics:    Results from last 7 days  Lab Units 18  0441 18  0508 18  0606 18  1212 18  0923 18  0329 18  1052   WBC Thousand/uL 14 54* 16 22* 11 64* 9 84  --  11 94* 11 09*   HEMOGLOBIN g/dL 8 9* 9 0* 8 6* 9 4*  --  10 2* 10 3*   I STAT HEMOGLOBIN g/dl  --   --   --   --  8 2*  --   --    HEMATOCRIT % 26 9* 27 4* 26 1* 27 8*  --  29 8* 31 5*   PLATELETS Thousands/uL 246 267 267 245  --  276 263   NEUTROS PCT % 89* 85* 79* 75  --   --  87*   MONOS PCT % 3* 4 6 8  --   --  5       Results from last 7 days  Lab Units 05/08/18  0441 05/07/18  0508 05/06/18  1550 05/06/18  0606 05/06/18  0004 05/05/18  1451 05/05/18  1212   SODIUM mmol/L 142 139 137 135* 135* 133* 130*   POTASSIUM mmol/L 3 6 4 3 3 6 4 0 3 6 3 8 4 5   CHLORIDE mmol/L 113* 109* 109* 106 104 101 100   CO2 mmol/L 23 24 20* 22 24 23 24   BUN mg/dL 11 11 12 16 19 22 23   CREATININE mg/dL 0 66 0 89 0 83 0 72 0 77 0 79 0 93   CALCIUM mg/dL 6 9* 7 0* 6 8* 7 1* 7 2* 7 6* 8 0*   ANION GAP mmol/L 6 6 8 7 7 9 6   ALBUMIN g/dL 2 0* 2 2*  --   --   --   --  2 4*   BILIRUBIN TOTAL mg/dL 0 30  --   --   --   --   --  0 56   ALK PHOS U/L 73  --   --   --   --   --  59   ALT U/L 13  --   --   --   --   --  17   AST U/L 13  --   --   --   --   --  6   GLUCOSE RANDOM mg/dL 108 181* 150* 82 103 171* 174*       Results from last 7 days  Lab Units 05/07/18  0508 05/06/18  1550   MAGNESIUM mg/dL 2 4 1 8   PHOSPHORUS mg/dL 2 9 2 0*        Results from last 7 days  Lab Units 05/06/18  0606 05/05/18  0711 05/05/18  0355 05/04/18  1051 05/03/18  2117   INR  1 76* 1 37* 1 44* 1 49* 2 59*   PTT seconds 36*  --  33  --   --            Results from last 7 days  Lab Units 05/05/18  1212   LACTIC ACID mmol/L 0 7     ABG:  Lab Results   Component Value Date    PHART 7 370 05/06/2018    GNR4ASE 34 5 (L) 05/06/2018    PO2ART 93 1 05/06/2018    MTA0UAZ 19 5 (L) 05/06/2018    BEART -5 2 05/06/2018    SOURCE Line, Arterial 05/06/2018       EKG: NSR  Imaging: I have personally reviewed pertinent reports  and I have personally reviewed pertinent films in PACS    Micro    Results from last 7 days  Lab Units 05/05/18  0920 05/04/18  1836 05/04/18  1324 05/04/18  1028   BLOOD CULTURE   --   --  No Growth at 72 hrs    No Growth at 72 hrs   --    GRAM STAIN RESULT  No polys seen  Rare Gram positive cocci in pairs  --   --  No polys seen  Rare Gram positive cocci in clusters   WOUND CULTURE   -- --   --  2+ Growth of Staphylococcus aureus*   MRSA CULTURE ONLY   --  No Methicillin Resistant Staphlyococcus aureus (MRSA) isolated  --   --        Allergies   Allergies   Allergen Reactions    Bee Venom Other (See Comments)     Unknown severity       Active Medications  Scheduled Meds:  Current Facility-Administered Medications:  acetaminophen 975 mg Oral Q8H Yimi Dangelo MD    ARIPiprazole 10 mg Oral Daily Rox Sacks, MD    bisacodyl 10 mg Rectal Daily PRN Osman Gutierrez MD    calcium carbonate 1,000 mg Oral Daily PRN Rox Sacks, MD    chlorhexidine 15 mL Swish & Spit Q12H [address] Mona Sethi DO    diazepam 5 mg Intravenous Q6H PRN Sandra Flores DO    fentanyl citrate (PF) 25 mcg Intravenous Q1H PRN Osman Gutierrez MD    folic acid 1 mg Oral Daily Rox Sacks, MD    heparin (porcine) 5,000 Units Subcutaneous [facility] Cailin Hurt MD    influenza vaccine 0 5 mL Intramuscular Prior to discharge Cailin Hurt MD    insulin regular (HumuLIN R,NovoLIN R) infusion 0 3-21 Units/hr Intravenous Titrated Zach Cesar MD Last Rate: 0 5 Units/hr (05/08/18 0617)   ipratropium 0 5 mg Nebulization TID Cailin Hurt MD    levalbuterol 1 25 mg Nebulization TID Cailin Hurt MD    levETIRAcetam 1,000 mg Intravenous Q8H Travis Gates DO Last Rate: Stopped (05/08/18 2598)   multivitamin-minerals 1 tablet Oral Daily Rox Sacks, MD    nafcillin 2,000 mg Intravenous Q4H Micaela Anand MD Last Rate: Stopped (05/08/18 0194)   nicotine 1 patch Transdermal Daily Rox Sacks, MD    omeprazole (PRILOSEC) suspension 2 mg/mL 20 mg Oral Early Morning Cailin Hurt MD    ondansetron 4 mg Intravenous Q4H PRN Rox Sacks, MD    oxyCODONE 5 mg Oral Q4H PRN Rox Sacks, MD    phenytoin 100 mg Intravenous Q8H [address] BRUCE Grewal    polyethylene glycol 17 g Oral Daily Rox Sacks, MD    senna 1 tablet Oral HS Zach Cesar MD    senna-docusate sodium 2 tablet Oral Daily Joshua Mclaughlin MD    sodium chloride 2 g Per NG Tube TID With Meals Ángel George MD    thiamine 100 mg Oral Daily Joshua Mclaughlin MD      Continuous Infusions:    insulin regular (HumuLIN R,NovoLIN R) infusion 0 3-21 Units/hr Last Rate: 0 5 Units/hr (05/08/18 0617)     PRN Meds:     bisacodyl 10 mg Daily PRN   calcium carbonate 1,000 mg Daily PRN   diazepam 5 mg Q6H PRN   fentanyl citrate (PF) 25 mcg Q1H PRN   influenza vaccine 0 5 mL Prior to discharge   ondansetron 4 mg Q4H PRN   oxyCODONE 5 mg Q4H PRN       VTE Pharmacologic Prophylaxis: Heparin  VTE Mechanical Prophylaxis: sequential compression device    Invasive  Devices  Invasive Devices     Central Venous Catheter Line            CVC Central Lines 05/05/18 Triple 16cm 2 days          Drain            Closed/Suction Drain Left Head Bulb 10 Fr  2 days                Counseling / Coordination of Care  Total time spent today 40 minutes  Greater than 50% of total time was spent with the patient and / or family counseling and / or coordination of care  A description of the counseling / coordination of care: BRUCE Feliciano      Portions of the record may have been created with voice recognition software  Occasional wrong word or "sound a like" substitutions may have occurred due to the inherent limitations of voice recognition software    Read the chart carefully and recognize, using context, where substitutions have occurred

## 2018-05-08 NOTE — PROGRESS NOTES
Progress Note - ICU Transfer to Med Surg   Alondra Zuñiga 63 y o  male MRN: [id number]  [facility]   Unit/Bed#: ICU 06 Encounter: [id number]    Code Status: Level 3 - DNAR and DNI  POA:   No formal POA, next of kin Yesenia Wheat    Reason for ICU admission: Mechanical ventilation and status epilepticus     Active problems:   Principal Problem:    Subdural empyema  Active Problems:    Infection of bone flap (HCC)    MSSA (methicillin susceptible Staphylococcus aureus) infection    Status epilepticus ([address] )    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)    Leukocytosis    Dependence on nicotine from cigarettes    Alcohol abuse    Essential hypertension    Insulin-dependent diabetes mellitus    Bipolar disorder    Deep vein thrombosis (DVT) of distal vein of left lower extremity (HCC)    PE (pulmonary thromboembolism) (HCC)    Cerebral edema (HCC)    Brain metastases (HCC)    Supratherapeutic INR    COPD (chronic obstructive pulmonary disease) (HCC)    Goals of care, counseling/discussion    Brain abscess  Resolved Problems:    Acute respiratory failure ([address] )    Hypotension      Consultants:   Palliative Care  Neurosurgery   Infectious Disease  Neurology     History of Present Illness:   Alondra Zuñiga is a 63 y o  male who presents to the ICU postoperatively from a craniectomy for extra-axial empyema with washout and debridement of sub ileal collection with removal of left bone flap  Patient has a known history of squamous cell lung carcinoma with met static lesion in the brain that was previously resected did and subsequently has developed a brain abscess which is what was intervened upon today  He also has a history of PE and DVT status post IVC filter placement, hypertension, and insulin-dependent diabetes  Postoperatively he had a left brain right body seizure with tonic-clonic activity on the right that did not generalize and a I gaze deviation to the right    A multiple events was given Versed and fluid with 1000 mg of Keppra  Upon arrival in the ICU he continued to have such events despite 2 doses of 2 mg IV Ativan and was subsequently intubated started on a propofol drip  Summary of clinical course:   Patient was admitted to the ICU post-operatively from a craniectomy and washout of subdural empyema  He was left intubated for airway protection and later found to have right-sided clonic tonic seizures  He was started on Keppra and placed on EEG  Over the course of 48 hours, his seizures improved, and he was able to be extubated  Although his infection and mental status have improved somewhat, his overall prognosis from his stage IV lung CA remains poor  Palliative care had been following as consult, and underwent discussions with the patient's brother, who ultimately agreed to transition the patient to hospice  The patient stated his understanding, and is awaiting placement in a hospice facility  He is currently still awake and alert, tolerating and oral diet, and has been stable from respiratory and hemodynamic standpoints  Recent or scheduled procedures:   5/5 Endotracheal Intubation   5/5 Right IJ TLC     Cultures:   5/4 Wound Cx: MSSA  5/4 BCXx2: No growth   5/4 MRSA Cx: Negative   5/5 Tissue Cx: MSSA        Mobilization Plan:   OOB as tolerated with assist for comfort     Nutrition Plan:   Regular diet  Dental Soft, nectar thick liquids  Comfort feedings     Discharge Plan:   Patient should be ready for discharge to hospice after clearance from        Plan:  · Patient transferred to [place] [address] as level 4 comfort care    · Palliative care has added medications for pain or anxiety  · Ativan 1mg q3h PRN   · Morphine 6mg q4h PRN  · Oxycodone 5mg q4h PRN  · Tylenol 975mg q8h PRN  · Zofran 4mg q4h PRN  · Antibiotics, labs, insulin therapy and accuchekcs, neuro checks, and frequent VS have been stopped   · Continue anti-epileptic medications at this time   · Keppra 1g q8h  · Dilantin 100mg q8h  · Ativan can still be given for witnessed seizure activity, although neurology/rapid response team/CC do not need to be notified       Spoke with Dr Saida Chaidez regarding transfer  Please call [phone] with any questions or concerns  Portions of the record may have been created with voice recognition software  Occasional wrong word or "sound a like" substitutions may have occurred due to the inherent limitations of voice recognition software  Read the chart carefully and recognize, using context, where substitutions have occurred  Casimiro Hunter

## 2018-05-08 NOTE — PLAN OF CARE
Problem: SLP ADULT - SWALLOWING, IMPAIRED  Goal: Initial SLP swallow eval performed  Outcome: Completed Date Met: 05/08/18  Level 3 dysphagia diet c NTL suggested and precautions posted

## 2018-05-08 NOTE — PROGRESS NOTES
Progress Note - Neurology   Kylie Rico 63 y o  male MRN: [id number]  Unit/Bed#: ICU 06 Encounter: [id number]    Assessment/ Plan:  1)  Focal seizures with secondary generalization and status epilepticus, likely 2/2 to #2 and #3   -VEEG demonstrates highly epileptogenic structural lesion in the left frontal parasaggital region superimposed on mild-moderate diffuse cerebral dysfunction    - Per discussion with epileptology, pt with seizure overnight at 7:45pm and 8pm  Patient appears awake and alert today, following commands briskly  - Dilantin level 14 2 this am, corrected based on albumin 27 3 and estimated free level of approx  2 2 - continue current dose for now    -Agree with benzodiazepines for break through seizure at this point, recommend Ativan 2mg IV for seizure lasting >60 seconds    -Continue with Keppra 1000 mg Q8H   -Continue VEEG     -Will continue to follow  Please monitor neuro exam and notify with changes     2  Subdural MSSA empyema now s/p craniectomy and wound washout POD 3      - Post-op management as per neurosurgery team      - Antibiotics as per ID       3  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    - Management as per primary team   - Palliateive care discussed goals of care with family, follow discussion to occur today      4  DVT/PE     - s/p IVC filter    - Anticoagulation on hold secondary to recent surgery       5  History of ETOH abuse     - Pt is on hospital course day 6, likely peak of ETOH withdrawal     - Continue on thiamine and folic acid  6)  Hx of bipolar DO   -per pt and , pt on Wellbutrin, Lithium, Abilify and Vistaril as home medications   -DC wellbutrin 2/2 lowers seizure threshold   -Abilify has low ability to lower seizure threshold, rec continue at this point    -Lithium level pending        Subjective:   Pt with at least 2 clinical seizures overnight, one consisting of RLE shaking  Agitated overnight and attempted to climb out of bed   Soft wrist restraints applied  Haldol administered at approx 2030  Pt also given Ativan 1mf IV at 2100 for EEG spikes  No additional changes to AEDs  On exam today, the patient was alert and resting in bed  He was mildly agitated but otherwise behavior within normal limits  The patient was restrained in a Posey with bilateral mitts after repeatedly touching his CHANELL and his triple lumen  Twelve point review of systems was completed and is negative  Patient demonstrates a nonfocal neurological exam with the exception of being easily confused by multistep commands  Patient reported that he was in the hospital, but did not know that he was at [facility] or in [place]      ROS:  See Subjective     Medications:  Scheduled Meds:  Current Facility-Administered Medications:  acetaminophen 975 mg Oral Q8H Tue Thi Lona Ledesma MD    ARIPiprazole 10 mg Oral Daily Alycia Ganser, MD    bisacodyl 10 mg Rectal Daily PRN Cj Wesley MD    budesonide-formoterol 2 puff Inhalation BID BRUCE Kearns    calcium carbonate 1,000 mg Oral Daily PRN Alycia Ganser, MD    chlorhexidine 15 mL Swish & Spit Q12H [address] Shaka Spence DO    folic acid 1 mg Oral Daily Alycia Ganser, MD    heparin (porcine) 5,000 Units Subcutaneous [facility] Karina Brown MD    influenza vaccine 0 5 mL Intramuscular Prior to discharge Karina Brown MD    insulin glargine 15 Units Subcutaneous QAM BRUCE Kearns    insulin lispro 1-5 Units Subcutaneous HS BRUCE Lan    insulin lispro 1-6 Units Subcutaneous TID AC BRUCE Grewal    ipratropium 0 5 mg Nebulization Q6H PRN BRUCE Kearns    levalbuterol 1 25 mg Nebulization Q6H PRN BRUCE Kearns    levETIRAcetam 1,000 mg Intravenous Q8H Travis Gates DO Last Rate: Stopped (05/08/18 1003)   multivitamin-minerals 1 tablet Oral Daily Alycia Ganser, MD    nafcillin 2,000 mg Intravenous Q4H Samreen Mendoza MD Last Rate: Stopped (05/08/18 0365)   nicotine 1 patch Transdermal Daily Jing Burroughs MD    omeprazole (PRILOSEC) suspension 2 mg/mL 20 mg Oral Early Morning Alfreda Cleaning MD    ondansetron 4 mg Intravenous Q4H PRN Jing Burroughs MD    oxyCODONE 5 mg Oral Q4H PRN Jing Burroughs MD    phenytoin 100 mg Intravenous Q8H [address] BRUCE Grewal    polyethylene glycol 17 g Oral Daily Jing Burroughs MD    senna 1 tablet Oral HS Angela Aguilar MD    senna-docusate sodium 2 tablet Oral Daily Jing Burroughs MD    sodium chloride 2 g Per NG Tube TID With Meals Alfreda Cleaning MD    thiamine 100 mg Oral Daily Jing Burroughs MD      Continuous Infusions:   PRN Meds: bisacodyl    calcium carbonate    influenza vaccine    ipratropium    levalbuterol    ondansetron    oxyCODONE      Vitals: Blood pressure 158/71, pulse 98, temperature 98 9 °F (37 2 °C), temperature source Oral, resp  rate 22, height 5' 9" (1 753 m), weight 86 6 kg (190 lb 14 7 oz), SpO2 98 %  ,Body mass index is 28 19 kg/m²  Physical Exam:   Physical Exam   Constitutional: He appears well-developed and well-nourished  No distress  HENT:   Head: Normocephalic  Right Ear: External ear normal    Left Ear: External ear normal    CHANELL and VEEG leads in place     Poor dentition   Eyes: Conjunctivae are normal  Right eye exhibits no discharge  Left eye exhibits no discharge  No scleral icterus  Neck: Normal range of motion  Neck supple  Pulmonary/Chest: Effort normal  No respiratory distress  Musculoskeletal: Normal range of motion  He exhibits no edema, tenderness or deformity  Neurological: He has normal strength  Finger-nose-finger test: high amplitude, high frequence tremor noted LUE    Reflex Scores:       Tricep reflexes are 2+ on the right side and 2+ on the left side  Bicep reflexes are 2+ on the right side and 2+ on the left side  Brachioradialis reflexes are 2+ on the right side and 2+ on the left side  Patellar reflexes are 2+ on the right side and 2+ on the left side  Achilles reflexes are 2+ on the right side and 2+ on the left side  Skin: Skin is warm and dry  No rash noted  He is not diaphoretic  No erythema  No pallor  Psychiatric:   Minor agitation but mood/ affect largely WNL    Nursing note and vitals reviewed  Neurologic Exam     Mental Status   Oriented to person  Disoriented to place  (Able to state he is in a hospital )  Disoriented to date  Oriented to year and month  Follows commands: Intermittently able to follow one step commands    Attention: normal  Concentration: normal    Level of consciousness: alert  Knowledge: poor  Unable to perform simple calculations  Abnormal comprehension  Pt became confused easily with multistep commands      Cranial Nerves   Cranial nerves II through XII intact  Motor Exam   Muscle bulk: normal  Overall muscle tone: normal    Strength   Strength 5/5 throughout  Sensory Exam   Light touch normal      Gait, Coordination, and Reflexes     Gait  Gait: (deferred for safety)    Coordination   Finger-nose-finger test: high amplitude, high frequence tremor noted LUE     Tremor   Action tremor: left arm    Reflexes   Right brachioradialis: 2+  Left brachioradialis: 2+  Right biceps: 2+  Left biceps: 2+  Right triceps: 2+  Left triceps: 2+  Right patellar: 2+  Left patellar: 2+  Right achilles: 2+  Left achilles: 2+  Right : 2+  Left : 2+  Right plantar: normal  Left plantar: normal  Unable to assess for clonus 2/2 pt comprehension        Lab, Imaging and other studies: I have personally reviewed pertinent reports       Recent Results (from the past 24 hour(s))   Fingerstick Glucose (POCT)    Collection Time: 05/07/18 10:17 AM   Result Value Ref Range    POC Glucose 302 (H) 65 - 140 mg/dl   Fingerstick Glucose (POCT)    Collection Time: 05/07/18 11:59 AM   Result Value Ref Range    POC Glucose 269 (H) 65 - 140 mg/dl   Fingerstick Glucose (POCT)    Collection Time: 05/07/18  1:11 PM   Result Value Ref Range    POC Glucose 208 (H) 65 - 140 mg/dl   Fingerstick Glucose (POCT)    Collection Time: 05/07/18  3:15 PM   Result Value Ref Range    POC Glucose 89 65 - 140 mg/dl   Fingerstick Glucose (POCT)    Collection Time: 05/07/18  5:54 PM   Result Value Ref Range    POC Glucose 82 65 - 140 mg/dl   Fingerstick Glucose (POCT)    Collection Time: 05/07/18  7:58 PM   Result Value Ref Range    POC Glucose 175 (H) 65 - 140 mg/dl   Fingerstick Glucose (POCT)    Collection Time: 05/07/18  9:52 PM   Result Value Ref Range    POC Glucose 315 (H) 65 - 140 mg/dl   Fingerstick Glucose (POCT)    Collection Time: 05/08/18 12:01 AM   Result Value Ref Range    POC Glucose 235 (H) 65 - 140 mg/dl   Fingerstick Glucose (POCT)    Collection Time: 05/08/18  2:13 AM   Result Value Ref Range    POC Glucose 70 65 - 140 mg/dl   Fingerstick Glucose (POCT)    Collection Time: 05/08/18  4:15 AM   Result Value Ref Range    POC Glucose 136 65 - 140 mg/dl   CBC and differential    Collection Time: 05/08/18  4:41 AM   Result Value Ref Range    WBC 14 54 (H) 4 31 - 10 16 Thousand/uL    RBC 2 94 (L) 3 88 - 5 62 Million/uL    Hemoglobin 8 9 (L) 12 0 - 17 0 g/dL    Hematocrit 26 9 (L) 36 5 - 49 3 %    MCV 92 82 - 98 fL    MCH 30 3 26 8 - 34 3 pg    MCHC 33 1 31 4 - 37 4 g/dL    RDW 16 0 (H) 11 6 - 15 1 %    MPV 9 7 8 9 - 12 7 fL    Platelets 026 916 - 794 Thousands/uL    nRBC 0 /100 WBCs    Neutrophils Relative 89 (H) 43 - 75 %    Lymphocytes Relative 7 (L) 14 - 44 %    Monocytes Relative 3 (L) 4 - 12 %    Eosinophils Relative 1 0 - 6 %    Basophils Relative 0 0 - 1 %    Neutrophils Absolute 12 86 (H) 1 85 - 7 62 Thousands/µL    Lymphocytes Absolute 1 02 0 60 - 4 47 Thousands/µL    Monocytes Absolute 0 37 0 17 - 1 22 Thousand/µL    Eosinophils Absolute 0 19 0 00 - 0 61 Thousand/µL    Basophils Absolute 0 04 0 00 - 0 10 Thousands/µL   Comprehensive metabolic panel    Collection Time: 05/08/18 4:41 AM   Result Value Ref Range    Sodium 142 136 - 145 mmol/L    Potassium 3 6 3 5 - 5 3 mmol/L    Chloride 113 (H) 100 - 108 mmol/L    CO2 23 21 - 32 mmol/L    Anion Gap 6 4 - 13 mmol/L    BUN 11 5 - 25 mg/dL    Creatinine 0 66 0 60 - 1 30 mg/dL    Glucose 108 65 - 140 mg/dL    Calcium 6 9 (L) 8 3 - 10 1 mg/dL    AST 13 5 - 45 U/L    ALT 13 12 - 78 U/L    Alkaline Phosphatase 73 46 - 116 U/L    Total Protein 5 7 (L) 6 4 - 8 2 g/dL    Albumin 2 0 (L) 3 5 - 5 0 g/dL    Total Bilirubin 0 30 0 20 - 1 00 mg/dL    eGFR 105 ml/min/1 73sq m   Phenytoin level, total    Collection Time: 05/08/18  4:41 AM   Result Value Ref Range    Phenytoin Lvl 14 2 10 0 - 20 0 ug/mL   Ammonia    Collection Time: 05/08/18  5:54 AM   Result Value Ref Range    Ammonia 29 11 - 35 umol/L   Fingerstick Glucose (POCT)    Collection Time: 05/08/18  6:14 AM   Result Value Ref Range    POC Glucose 122 65 - 140 mg/dl   Fingerstick Glucose (POCT)    Collection Time: 05/08/18  7:54 AM   Result Value Ref Range    POC Glucose 147 (H) 65 - 140 mg/dl   ]      VTE Prophylaxis: Sequential compression device (Venodyne)  and Heparin    Counseling / Coordination of Care  Total Critical Care time spent 30 minutes excluding procedures, teaching and family updates

## 2018-05-08 NOTE — PROGRESS NOTES
Progress Note - Infectious Disease   Robles Delcid 63 y o  male MRN: [id number]  Unit/Bed#: ICU 06 Encounter: [id number]      Impression/Plan:  1  Roc Rivera   Fever and leukocytosis   Suspect this is related to the patient's brain  abscesses/subdural empyema  Consideration for the possibility of bacteremia   No other clear sources appreciated  Kandy Navarro remains hemodynamically stable despite his systemic illness   He is tolerating the antibiotics without difficulty  He spiked another fever last night  He does not appear to be bacteremic   -continue nafcillin  -planned 6 week course of intravenous antibiotics  -follow-up blood cultures  -if the patient spikes another fever would recheck blood cultures x2 sets  -recheck [facility] with diff and CMP tomorrow a m      2   MSSA brain abscesses/subdural empyema/ left parietal bone flap infection-in the postoperative setting   Patient is now status post I and D with evacuation of the abscess and removal of the flap  -antibiotics as above   -drain management  -close neurosurgical follow-up     3   Stage IV squamous cell carcinoma of the lung-prognosis is poor   Metastatic disease to the brain        4   Pulmonary embolism/DVT-had been on anticoagulation but has an IVC filter in place   -anticoagulation as per Neurosurgery in the critical care service     5   Encephalopathy-likely multifactorial including 1,2,3 playing prominent role as well as metabolic issues   seizure activity also likely playing a role  He is more alert today and does not seem to be seizing  -monitor cognition  -EEG monitor  -antibiotics as above  -treat metabolic issues  -no additional ID workup for now     6   Acute hypoxic respiratory failure-in the postoperative setting  He is now extubated and is not requiring any oxygen support   -monitor respiratory status     7    Disposition-await decision about level of care moving forward     Discussed in detail with the critical care service    Antibiotics:  Nafcillin 3  Antibiotics 5    Subjective:  Patient has no fever, chills, sweats; no nausea, vomiting, diarrhea; no cough, shortness of breath; no pain  No new symptoms  He remains hemodynamically stable without vasopressor support  He has been extubated and is no longer requiring any oxygen support    Objective:  Vitals:  HR:  [] 98  Resp:  [16-32] 22  BP: (100-160)/(57-82) 158/71  SpO2:  [94 %-100 %] 98 %  Temp (24hrs), Av 2 °F (37 3 °C), Min:98 6 °F (37 °C), Max:99 8 °F (37 7 °C)  Current: Temperature: 98 9 °F (37 2 °C)    Physical Exam:   General Appearance:  Alert, interactive, nontoxic, no acute distress  Eyes: Conjuctiva pink, sclera anicteric   Head: Drains in place, EEG leads in place   Throat: Oropharynx moist without lesions  Neck: Supple without DEION   Lungs:   Decreased breath sounds bilaterally; no wheezes, rhonchi or rales; respirations unlabored   Chest wall : No deformity, nontender   Heart:  Tachycardic; no murmur, rub or gallop   Abdomen:   Soft, non-tender, non-distended, positive bowel sounds  Extremities: No clubbing, cyanosis or edema   Neuro: Alert, able to answer very simple questions, but remains a bit confused   Skin: No new rashes or lesions  No draining wounds noted         Labs, Imaging, & Other studies:   All pertinent labs and imaging studies were personally reviewed    Results from last 7 days  Lab Units 18  0441 18  0508 18  0606   WBC Thousand/uL 14 54* 16 22* 11 64*   HEMOGLOBIN g/dL 8 9* 9 0* 8 6*   PLATELETS Thousands/uL 246 267 267       Results from last 7 days  Lab Units 18  0441 18  0508 18  1550  18  1212   SODIUM mmol/L 142 139 137  < > 130*   POTASSIUM mmol/L 3 6 4 3 3 6  < > 4 5   CHLORIDE mmol/L 113* 109* 109*  < > 100   CO2 mmol/L 23 24 20*  < > 24   ANION GAP mmol/L 6 6 8  < > 6   BUN mg/dL 11 11 12  < > 23   CREATININE mg/dL 0 66 0 89 0 83  < > 0 93   EGFR ml/min/1 73sq m 105 93 96 < > 89   GLUCOSE RANDOM mg/dL 108 181* 150*  < > 174*   CALCIUM mg/dL 6 9* 7 0* 6 8*  < > 8 0*   AST U/L 13  --   --   --  6   ALT U/L 13  --   --   --  17   ALK PHOS U/L 73  --   --   --  59   TOTAL PROTEIN g/dL 5 7*  --   --   --  6 4   BILIRUBIN TOTAL mg/dL 0 30  --   --   --  0 56   < > = values in this interval not displayed  Results from last 7 days  Lab Units 05/05/18  0920 05/04/18  1836 05/04/18  1324 05/04/18  1028   BLOOD CULTURE   --   --  No Growth at 72 hrs    No Growth at 72 hrs   --    GRAM STAIN RESULT  No polys seen  Rare Gram positive cocci in pairs  --   --  No polys seen  Rare Gram positive cocci in clusters   WOUND CULTURE   --   --   --  2+ Growth of Staphylococcus aureus*   MRSA CULTURE ONLY   --  No Methicillin Resistant Staphlyococcus aureus (MRSA) isolated  --   --

## 2018-05-08 NOTE — OCCUPATIONAL THERAPY NOTE
Occupational Therapy Cancellation    Orders received  Chart reviewed  Pt remains on EEG monitoring and currently requiring posey belt  Will hold on evaluation this date and will continue to follow pt on caseload in order to initiate OT evaluation pending further medical stability       Allyn Rick MS, OTR/L

## 2018-05-08 NOTE — PHYSICAL THERAPY NOTE
Physical Therapy Cancellation Note      PT CONSULT RECEIVED  PATIENT NOW EXTUBATED HOWEVER UNDERGOING CONTINUOUS EEG MONITORING AT BEDSIDE AT THIS TIME  PT WILL CONTINUE TO FOLLOW ON CASELOAD AND PERFORM INITIAL EVALUATION AS MEDICALLY APPROPRIATE      Nida Comp, PT

## 2018-05-08 NOTE — PROGRESS NOTES
Progress Note - Palliative & Supportive Care     Spoke with Julia Mccain over the phone and provided clinical update  He understands how critically ill his brother is and states "I get that he's near the end"  Discussed transition to comfort measures/hospice services and he would like to pursue that at this time  CM consult and Deacon James, RN [facility] liaison notified)  Change to Level 4  Comfort medications added  Please maintain AEDs at this time       Blane Fang DO  Palliative and Supportive Care  [phone]

## 2018-05-08 NOTE — PROGRESS NOTES
The patient was eating his breakfast, and had no difficulty with swallowing until he was eating his cereal   The patient initially was swallowing the cereal with no difficulty, but then began to cough and choke, which lasted for ~ 3 to 4 minutes  (I was present in the room with him when this happened)  The patient became tachycardic during the coughing/choking episode (heart rate ~ 144) but his oxygen saturation (on Room Air) maintained a minimum of 97% saturation reading  The patient was able to speak  The Critical Care Medicine Team was called to notify them of this  An order for a formal speech and swallowing evaluation has now been received

## 2018-05-08 NOTE — POST OP PROGRESS NOTES
Progress Note - Neurosurgery   Kelly Pastor 63 y o  male MRN: [id number]  Unit/Bed#: ICU 06 Encounter: [id number]    Assessment:  1  POD3 evacuation of subgaleal abscess and removal of left bone flap  2  Focal seizures  3  Brain abscess  4  Infection of bone flap   5  Acute metabolic encephaolpathy  6  Stage IV squamous call carcinoma of lung  7  Sepsis, POA  8  DVT/PE POA s/p IVC filter placement  9  HTN  10  Alcohol abuse  11  Diabetes     Plan:  · Exam GCS 14, E4, V4, M6  Speech clear and appropriate  Confused  O x self  MCKEON with good strength  · Imaging reviewed personally and by attending  Final results as below  · CT head wo 5/5/18: expected postoperative changes following washout and bone flap removal  Decreased extra-axial collection  · CHANELL with limited output  Some purulence in tubing  D/c today  · Neurology input appreciated  Continue Keppra 1 g Q 8H and Dilantin 100mg Q 12H  Ativan prn for breakthrough seizures  Continue on vEEG  · ID following - Nafcillin 2g Q 4H for 6 week  Follow-up blood culture  Follow blood cultures  Repeat BC if temp spikes  Follow labs  WBC 16 2->14 5  · Pain control as needed  · Mobilize with PT/OT  · DVT PPX: HSQ and SCDs  · Ongoing medical management per primary team    · Continue home medications  · Continue Thiamine/folate and MVI for h/o EtOH abuse  · Replete electrolytes as needed  · SSI   · Will continue to follow     Subjective/Objective   Chief Complaint: "I'm not good"/post operative follow-up    Subjective: Patient is without complaints  Questionable reliability of ROS  Denies headache, vision or speech changes  Denies CP/SOB/N/V  Denies weakness  Agitated and given Haldol  Patient with at least 2 clinical seizures overnight which improved with Ativan  Objective: sitting up in bed with posey and bilateral wrist restraints    I/O       05/06 0701 - 05/07 0700 05/07 0701 - 05/08 0700 05/08 0701 - 05/09 0700    P  O   580 210    I V  (mL/kg) 2690 9 (31 1) 224 (2 6) 12 2 (0 1)    Blood       NG/ 355     IV Piggyback 1625 520 200    Feedings 250 150     Total Intake(mL/kg) 5060 9 (58 6) 1829 (21 1) 422 2 (4 9)    Urine (mL/kg/hr) 2195 (1 1) 560 (0 3) 657 (1 3)    Emesis/NG output       Drains 15 (0) 10 (0)     Stool  700 (0 3)     Total Output 2210 1270 657    Net +2850 9 +559 -234 8           Unmeasured Urine Occurrence  1 x     Unmeasured Stool Occurrence  1 x           Invasive Devices     Central Venous Catheter Line            CVC Central Lines 05/05/18 Triple 16cm 2 days          Drain            Closed/Suction Drain Left Head Bulb 10 Fr  3 days                Physical Exam:  Vitals: Blood pressure 135/71, pulse 88, temperature 99 3 °F (37 4 °C), temperature source Oral, resp  rate 19, height 5' 9" (1 753 m), weight 86 6 kg (190 lb 14 7 oz), SpO2 94 %  ,Body mass index is 28 19 kg/m²  Hemodynamic Monitoring: MAP: Arterial Line MAP (mmHg): 292 mmHg    General appearance: alert, appears older than stated age, cooperative and no distress  Head: EEG lead in place  Dressing intact  CHANELL drain with purulent drainage in tubing   Eyes: EOMI, PERRL  Neck: supple, symmetrical, trachea midline   Lungs: non labored breathing  Heart: regular heart rate  Neurologic:   Mental status: Alert, oriented x self, GCS 14, good calculations  Speech clear  Confused  Recall 2-3 words at 5 minutes  Able to Id objects  Cranial nerves: grossly intact (Cranial nerves II-XII)  Sensory: normal to LT  Motor: moving all extremities without focal weakness   Reflexes: 1+ and symmetric, no clonus b/l     Coordination: finger to nose abnormal right    Lab Results:    Results from last 7 days  Lab Units 05/08/18  0441 05/07/18  0508 05/06/18  0606   WBC Thousand/uL 14 54* 16 22* 11 64*   HEMOGLOBIN g/dL 8 9* 9 0* 8 6*   HEMATOCRIT % 26 9* 27 4* 26 1*   PLATELETS Thousands/uL 246 267 267   NEUTROS PCT % 89* 85* 79*   MONOS PCT % 3* 4 6       Results from last 7 days  Lab Units 05/08/18  0441 05/07/18  0508 05/06/18  1550  05/05/18  1212   SODIUM mmol/L 142 139 137  < > 130*   POTASSIUM mmol/L 3 6 4 3 3 6  < > 4 5   CHLORIDE mmol/L 113* 109* 109*  < > 100   CO2 mmol/L 23 24 20*  < > 24   BUN mg/dL 11 11 12  < > 23   CREATININE mg/dL 0 66 0 89 0 83  < > 0 93   CALCIUM mg/dL 6 9* 7 0* 6 8*  < > 8 0*   TOTAL PROTEIN g/dL 5 7*  --   --   --  6 4   BILIRUBIN TOTAL mg/dL 0 30  --   --   --  0 56   ALK PHOS U/L 73  --   --   --  59   ALT U/L 13  --   --   --  17   AST U/L 13  --   --   --  6   GLUCOSE RANDOM mg/dL 108 181* 150*  < > 174*   < > = values in this interval not displayed  Results from last 7 days  Lab Units 05/07/18  0508 05/06/18  1550   MAGNESIUM mg/dL 2 4 1 8       Results from last 7 days  Lab Units 05/07/18  0508 05/06/18  1550   PHOSPHORUS mg/dL 2 9 2 0*       Results from last 7 days  Lab Units 05/06/18  0606 05/05/18  0711 05/05/18  0355   INR  1 76* 1 37* 1 44*   PTT seconds 36*  --  33     No results found for: TROPONINT  ABG:  Lab Results   Component Value Date    PHART 7 370 05/06/2018    HPA1AYO 34 5 (L) 05/06/2018    PO2ART 93 1 05/06/2018    IDA2RCB 19 5 (L) 05/06/2018    BEART -5 2 05/06/2018    SOURCE Line, Arterial 05/06/2018       Imaging Studies: I have personally reviewed pertinent reports  and I have personally reviewed pertinent films in PACS    Xr Chest Portable    Result Date: 5/6/2018  Narrative: CHEST INDICATION:   R IJ TLC placement  COMPARISON:  Prior study same date earlier time EXAM PERFORMED/VIEWS:  XR CHEST PORTABLE FINDINGS: The heart mediastinum are unchanged with continued widening of the superior mediastinum  The ET tube is in satisfactory position  A right IJ line has been placed with tip terminating the SVC  Left subclavian line terminates in SVC  NG tube terminates  below the diaphragm  There is no pneumothorax  There is increasing linear density within the lung bases bilaterally suggesting atelectasis   Osseous structures appear within normal limits for patient age  Impression: Right IJ terminates in SVC  No pneumothorax  Increasing bibasal regions of atelectasis  Workstation performed: [id number]     Xr Chest Portable    Result Date: 5/5/2018  Narrative: CHEST INDICATION:   iNTUBATION  COMPARISON:  Chest CT 4/10/2018  Chest radiograph 4/28/2018 EXAM PERFORMED/VIEWS:  XR CHEST PORTABLE FINDINGS:  Endotracheal tube tip is in the midthoracic trachea  Nasogastric tube tip projects over the stomach  Distal sidehole is at the gastroesophageal junction and should be advanced by approximately 5 cm  Left subclavian line tip is in the proximal SVC  Normal heart size  Abnormal right paratracheal stripe representing adenopathy is unchanged The lungs are clear  Prior atelectasis involving the right middle lobe appears improved though this is difficult to confirm without a lateral radiograph  No pneumothorax or pleural effusion  Osseous structures appear within normal limits for patient age  Impression: 1  Lines and tubes as above  Nasogastric tube should be advanced by at least 5 cm  Workstation performed: [id number]     Ct Head Wo Contrast    Result Date: 5/5/2018  Narrative: CT BRAIN - WITHOUT CONTRAST INDICATION:   Epilepsy  COMPARISON:  CT brain 5/3/2018, MR brain 5/4/2018 TECHNIQUE:  CT examination of the brain was performed  In addition to axial images, coronal 2D reformatted images were created and submitted for interpretation  Radiation dose length product (DLP) for this visit:  1045 mGy-cm   This examination, like all CT scans performed in the [facility], was performed utilizing techniques to minimize radiation dose exposure, including the use of iterative reconstruction and automated exposure control  IMAGE QUALITY:  Diagnostic  FINDINGS: PARENCHYMA:  Right parietal craniotomy flap is been removed, prosthesis is been inserted  The large left scalp abscess is been drained    It appears that the extra-axial collection subjacent to and extending anteriorly from the original craniotomy flap has been decompressed  No significant mass effect  Stable distortion of soft tissues at the original operative site  VENTRICLES AND EXTRA-AXIAL SPACES:  Prominent for a VISUALIZED ORBITS AND PARANASAL SINUSES:  Unremarkable  CALVARIUM AND EXTRACRANIAL SOFT TISSUES:  Removal of left parietal bone flap with cranioplasty  Diminished soft tissue swelling at the operative site  Vascular clips  Impression: Revision of left parietal craniotomy, interval drainage of extra-axial and superficial scalp collections  No significant mass effect  Workstation performed: [id number]     Ct Head Wo Contrast    Result Date: 4/14/2018  Narrative: CT BRAIN - WITHOUT CONTRAST INDICATION:   Status post craniotomy for resection of tumor    COMPARISON:  Brain MRI 4/7/2018  TECHNIQUE:  CT examination of the brain was performed  In addition to axial images, coronal 2D reformatted images were created and submitted for interpretation  Radiation dose length product (DLP) for this visit:  1046 16 mGy-cm   This examination, like all CT scans performed in the [facility], was performed utilizing techniques to minimize radiation dose exposure, including the use of iterative reconstruction and automated exposure control  IMAGE QUALITY:  Diagnostic  FINDINGS: PARENCHYMA: Postsurgical changes of resection of left posterior parietal mass is noted  There is scattered foci of air within the resection cavity as well as pneumocephalus along the left frontal lobe and adjacent to craniotomy site  Trace  hemorrhage seen at the resection site  There is unchanged vasogenic edema in the region of prior tumor  No CT signs of acute infarction  There is no mass effect or midline shift  VENTRICLES AND EXTRA-AXIAL SPACES:  Normal for the patient's age  VISUALIZED ORBITS AND PARANASAL SINUSES:  Unremarkable   CALVARIUM AND EXTRACRANIAL SOFT TISSUES:  Postsurgical changes of left posterior parietal craniotomy is noted  Impression: Postsurgical changes of craniotomy and resection of left posterior parietal mass with scattered foci of air and hemorrhage at the resection site and unchanged vasogenic edema  Pneumocephalus also noted along the left frontal lobe and adjacent to craniotomy site  Workstation performed: [id number]     Mri Brain W Wo Contrast    Result Date: 5/4/2018  Narrative: MRI BRAIN WITH AND WITHOUT CONTRAST INDICATION: incisional drainage,eval extent of infection COMPARISON:  MRI brain dated April 7, 2018  CT brain dated May 3, 2018  TECHNIQUE: Sagittal T1, axial T2, axial FLAIR, axial T1, axial [place], axial diffusion  Sagittal, axial and coronal T1 postcontrast   Axial BRAVO post contrast   IV Contrast:  8 mL of gadobutrol injection (MULTI-DOSE)  IMAGE QUALITY:   Diagnostic  FINDINGS: BRAIN PARENCHYMA: Interval postoperative changes of surgical resection of a left parietal vertex mass are identified when compared to the prior MRI brain  There is leptomeningeal enhancement at the resection site, likely related to patient's history of an abscess  There is a tiny subdural collection involving the left frontoparietal region measuring up to 5 mm also likely postoperative in nature  There is mild vasogenic edema surrounding the resection cavity which has significantly improved when compared to the prior MRI brain  There is no significant mass effect on the adjacent ventricles  No midline shift  There is no restricted diffusion to suggest acute ischemia  There is mild smooth dural enhancement involving the left supratentorial brain, likely postoperative in nature  VENTRICLES:  No hydrocephalus  SELLA AND PITUITARY GLAND:  Normal  ORBITS:  Normal  PARANASAL SINUSES:  Normal  VASCULATURE:  Evaluation of the major intracranial vasculature demonstrates appropriate flow voids  CALVARIUM AND SKULL BASE:  Left parietal craniotomy changes are noted   EXTRACRANIAL SOFT TISSUES:  Again noted is a fluid collection within the scalp adjacent to the left parietal craniotomy site measuring 2 6 x 1 7 cm compatible with the patient's history of scalp abscess  Impression: Postoperative changes of a left parietal mass resection as described above  Mild smooth leptomeningeal enhancement at the resection cavity is likely related to patient's history of infection/abscess  However, there is no encapsulated intra-axial fluid collection  Mild smooth dural enhancement at the left supratentorial brain is likely postoperative in nature  A short-term follow-up MRI brain in 2-3 months is recommended for further evaluation  No mass effect or midline shift  Significant interval improvement in left parietal vasogenic edema when compared to the prior MRI brain  No restricted diffusion to suggest acute ischemia  Tiny left frontoparietal subdural hematoma, likely postoperative in nature  Workstation performed: [id number]     Ct Chest Abdomen Pelvis W Contrast    Result Date: 4/9/2018  Narrative: CT CHEST, ABDOMEN AND PELVIS WITH IV CONTRAST INDICATION:   Brain cancer/neoplasm, staging  COMPARISON: None  TECHNIQUE: CT examination of the chest, abdomen and pelvis was performed  Axial, sagittal, and coronal 2D reformatted images were created from the source data and submitted for interpretation  Radiation dose length product (DLP) for this visit:  931 98 mGy-cm   This examination, like all CT scans performed in the [facility], was performed utilizing techniques to minimize radiation dose exposure, including the use of iterative  reconstruction and automated exposure control  IV Contrast:  100 mL of iohexol (OMNIPAQUE) Enteric Contrast: Enteric contrast was administered  FINDINGS: CHEST LUNGS:  Right middle lobe 4 4 x 3 4 cm mass lesion/neoplasm obstructing the right middle lobe bronchus with distal atelectasis    Tiny 3 mm nodule right upper lobe series 3 image 25 possibly a metastatic nodule  PLEURA:  Unremarkable  HEART/GREAT VESSELS:  There is infiltration of the right middle lobe lung mass into the right middle lobe pulmonary artery  There is questionable right lower lobe pulmonary embolus  MEDIASTINUM AND EMRVIN:  Metastatic lymph nodes identified in the mediastinum including the anterior retrosternal space, right paratracheal space, subcarinal space  There is also a left-sided subcarinal 2 5 cm metastatic lymph node  Questionable partially visualized right supraclavicular 1 5 cm lymph node  CHEST WALL AND LOWER NECK:   There is questionable right lower lobe pulmonary embolus  ABDOMEN LIVER/BILIARY TREE:  Unremarkable  GALLBLADDER:  No calcified gallstones  No pericholecystic inflammatory change  SPLEEN:  Unremarkable  PANCREAS:  Unremarkable  ADRENAL GLANDS:  Unremarkable  KIDNEYS/URETERS:  Unremarkable  No hydronephrosis  STOMACH AND BOWEL:  There is colonic diverticulosis without evidence of acute diverticulitis  APPENDIX:  No findings to suggest appendicitis  ABDOMINOPELVIC CAVITY:  No ascites or free intraperitoneal air  No lymphadenopathy  VESSELS:  Unremarkable for patient's age  PELVIS REPRODUCTIVE ORGANS:  Unremarkable for patient's age  URINARY BLADDER:  Unremarkable  ABDOMINAL WALL/INGUINAL REGIONS:  Unremarkable  OSSEOUS STRUCTURES:  No acute fracture or destructive osseous lesion  Impression: Infiltrating right middle lobe lung mass with mediastinal metastatic lymph nodes as described above  There is questionable right lower lobe pulmonary artery filling defect/embolism which could also be artifactual  Workstation performed: [id number]     Cta Chest Pe Study    Result Date: 4/10/2018  Narrative: CTA - CHEST WITH IV CONTRAST - PULMONARY ANGIOGRAM INDICATION:   Chest pain, acute, PE suspected, high pretest prob    Excerpt from oncology progress note from earlier the same day: " past medical history of stage IV lung cancer who came in with a newly diagnosed brain metastases  He had a CT scan of the  chest abdomen pelvis done yesterday which showed a questionable filling defect" COMPARISON: CT chest and pelvis 4/9/2018 describing a potential right lower lobe pulmonary embolus  TECHNIQUE: CTA examination of the chest was performed using angiographic technique according to a protocol specifically tailored to evaluate for pulmonary embolism  Axial, sagittal, and coronal 2D reformatted images were created from the source data and  submitted for interpretation  In addition, coronal 3D MIP postprocessing was performed on the acquisition scanner  Radiation dose length product (DLP) for this visit:  600 87 mGy-cm   This examination, like all CT scans performed in the [facility], was performed utilizing techniques to minimize radiation dose exposure, including the use of iterative  reconstruction and automated exposure control  IV Contrast:  85 mL of iohexol (OMNIPAQUE)  350 Multi-dose  FINDINGS: PULMONARY ARTERIAL TREE: Obstructive, small focus of left upper lobe pulmonary arterial embolism in keeping with acute embolus  2 small foci in the right lower lobe are recanalized and more likely subacute than acute  The below described right middle lobe mass compresses but does not appear to extend into the right middle lobe artery  No definite intraluminal enhancing tumor thrombus  LUNGS:  Stable right middle lobe mass with postobstructive pneumonitis unchanged from yesterday's study  Previously described 3 mm right upper lobe nodule is stable  PLEURA:  Unremarkable  HEART/AORTA:  Unremarkable for patient's age  MEDIASTINUM AND MERVIN:  Advanced mediastinal adenopathy unchanged from yesterday's study  CHEST WALL AND LOWER NECK:   Partially imaged left chest wall port  VISUALIZED STRUCTURES IN THE UPPER ABDOMEN:  Unremarkable  OSSEOUS STRUCTURES:  No acute fracture or destructive osseous lesion       Impression: Small acute occlusive focus of left upper lobe pulmonary arterial embolism  Small foci of recanalized right lower lobe pulmonary arterial embolism are likely subacute  Compression of the right middle lobe pulmonary artery by surrounding mass  Stable right middle lobe mass with postobstructive pneumonitis  Stable 3 mm right upper lobe pulmonary nodule  Stable advanced mediastinal adenopathy  I personally discussed this study Loyjames Porrasalex on 4/10/2018 at 5:35 PM  Workstation performed: [id number]     Vas Lower Limb Venous Duplex Study, Complete Bilateral    Result Date: 4/11/2018  Narrative:  [facility] REPORT CLINICAL: Indications: Patient presents with bilateral lower extremity edema  Risk Factors: The patient has history of DVT and malignancy  FINDINGS:  Segment    Right            Left                  Impression       Impression                                  CFV        Normal (Patent)                                              FV Mid                      Subacute vs Chronic Non-occlusive Thrombus  FV Dist                     Subacute vs Chronic Non-occlusive Thrombus  Popliteal                   Subacute vs Chronic Non-occlusive Thrombus     CONCLUSION: Impression: RIGHT LOWER LIMB: No evidence of acute or chronic deep vein thrombosis  No evidence of superficial thrombophlebitis noted  Doppler evaluation shows a normal response to augmentation maneuvers  Popliteal, posterior tibial and anterior tibial arterial Doppler waveforms are triphasic  There is a well defined hypoechoic non-vascularized structure noted in the popliteal fossa consistent with a Bakers cyst   LEFT LOWER LIMB: Evidence of subacute vs chronic non-occlusive deep vein thrombosis in the mid femoral, distal femoral and popliteal veins  Evidence of superficial thrombophlebitis noted  Doppler evaluation shows a normal response to augmentation maneuvers  Popliteal, posterior tibial and anterior tibial arterial Doppler waveforms are triphasic    Technical findings were given to [facility] (RN) at the time of the exam   SIGNATURE: Electronically Signed by: Tyler Sotelo on 2018-04-11 12:20:37 PM    Ir Ivc Optional Filter Placement    Result Date: 4/12/2018  Narrative: INDICATION: DVT, pulmonary embolism, cancer, plan for craniotomy, unable to receive anticoagulation  COMPARISON: CT abdomen dated 4/9/2018 PROCEDURE: 1  Left common iliac vein and IVC venogram 2  Infrarenal temporary IVC filter placement 3  Post-filter placement IVC venogram PROCEDURE DETAILS: Operators: Dr Bruno Hurley, [facility], located within [facility] attending, performed the procedure  Anesthesia: Conscious sedation was provided throughout a total intra-service time of during which the patient's hemodynamic parameters were continuously monitored by an independent trained radiology nurse  1% lidocaine was injected in the skin and subcutaneous tissues overlying the access site  Medications: 1% lidocaine, fentanyl, Versed Contrast: 25 mL of Omnipaque 300 Fluoroscopy time: 4 1 minutes Images: 5 COMMENTS: Following the discussion of the risks, benefits and alternatives to the procedure, written informed consent was obtained from the patient  The patient was placed supine on the imaging table  A preprocedure timeout was performed per [facility] protocol  The right was prepped and draped in the usual sterile fashion  Under ultrasound guidance, the patent and compressible right internal jugular vein was punctured using a 21-gauge micropuncture needle  A static ultrasound image was saved to PACS  A microwire was advanced through the needle into the right atrium  The needle was exchanged for a microsheath allowing exchange of the microwire for a heavy wire which was advanced into the IVC  The IVC filter 9 Somali sheath was advanced over the wire into the left common iliac vein  The wire was removed and a small hand contrast injection confirmed placement  A left common iliac and IVC venogram was performed   The wire was removed, the convertible Vena Tech filter was advanced using the pusher through the sheath into appropriate position  The sheath was then withdrawn until the filter was deployed  The pusher was removed  A post filter deployment IVC venogram was then performed confirming appropriate positioning  The patient tolerated the procedure well and there are no immediate postprocedure complications  FINDINGS: 1  Patent nonduplicated IVC with no evidence of clot  No evidence of clot in the left common iliac vein  2   Successful placement of an infrarenal convertible Vena Tech IVC filter  Impression: Successful placement of a convertible Vena Tech infrarenal IVC filter  Workstation performed: [id number]     EKG, Pathology, and Other Studies: I have personally reviewed pertinent reports        VTE Pharmacologic Prophylaxis: Heparin    VTE Mechanical Prophylaxis: sequential compression device

## 2018-05-08 NOTE — CASE MANAGEMENT
Continued Stay Review    Date: 5-8-18      Vital Signs: /71 (BP Location: Left arm)   Pulse 88   Temp 99 3 °F (37 4 °C) (Oral)   Resp 19   Ht 5' 9" (1 753 m)   Wt 86 6 kg (190 lb 14 7 oz)   SpO2 94%   BMI 28 19 kg/m²     Medications:   Scheduled Meds:   Current Facility-Administered Medications:  acetaminophen 975 mg Oral Q8H   ARIPiprazole 10 mg Oral Daily   bisacodyl 10 mg Rectal Daily PRN   budesonide-formoterol 2 puff Inhalation BID   calcium carbonate 1,000 mg Oral Daily PRN   chlorhexidine 15 mL Swish & Spit E81Z [facility]   folic acid 1 mg Oral Daily   heparin (porcine) 5,000 Units Subcutaneous Q8H [facility]   influenza vaccine 0 5 mL Intramuscular Prior to discharge   insulin glargine 15 Units Subcutaneous QAM   insulin lispro 1-5 Units Subcutaneous HS   insulin lispro 1-6 Units Subcutaneous TID AC   ipratropium 0 5 mg Nebulization Q6H PRN   levalbuterol 1 25 mg Nebulization Q6H PRN   levETIRAcetam 1,000 mg Intravenous Q8H   multivitamin-minerals 1 tablet Oral Daily   nafcillin 2,000 mg Intravenous Q4H   nicotine 1 patch Transdermal Daily   omeprazole (PRILOSEC) suspension 2 mg/mL 20 mg Oral Early Morning   ondansetron 4 mg Intravenous Q4H PRN   oxyCODONE 5 mg Oral Q4H PRN   phenytoin 100 mg Intravenous Q8H [facility]   polyethylene glycol 17 g Oral Daily   senna 1 tablet Oral HS   senna-docusate sodium 2 tablet Oral Daily   sodium chloride 2 g Per NG Tube TID With Meals   thiamine 100 mg Oral Daily     Continuous Infusions:    PRN Meds: bisacodyl    calcium carbonate    influenza vaccine    ipratropium    levalbuterol    ondansetron    oxyCODONE    Abnormal Labs/Diagnostic Results:  Lab Units 05/08/18  0441 05/07/18  0508 05/06/18  0606 05/05/18  1212 05/05/18  0923 05/05/18  0329 05/04/18  1052   WBC Thousand/uL 14 54* 16 22* 11 64* 9 84  --  11 94* 11 09*   HEMOGLOBIN g/dL 8 9* 9 0* 8 6* 9 4*  --  10 2* 10 3*   I STAT HEMOGLOBIN g/dl  --   --   --   --  8 2*  --   --    HEMATOCRIT % 26 9* 27 4* 26 1* 27 8*  --  29 8* 31 5*   PLATELETS Thousands/uL 246 267 267 245  --  276 263   NEUTROS PCT % 89* 85* 79* 75  --   --  87*   MONOS PCT % 3* 4 6 8  --   --  5        Results from last 7 days  Lab Units 05/08/18  0441 05/07/18  0508 05/06/18  1550 05/06/18  0606 05/06/18  0004 05/05/18  1451 05/05/18  1212   SODIUM mmol/L 142 139 137 135* 135* 133* 130*   CHLORIDE mmol/L 113* 109* 109* 106 104 101 100   CO2 mmol/L 23 24 20* 22 24 23 24   CALCIUM mg/dL 6 9* 7 0* 6 8* 7 1* 7 2* 7 6* 8 0*   ANION GAP mmol/L 6 6 8 7 7 9 6   ALBUMIN g/dL 2 0* 2 2*  --   --   --   --  2 4*   GLUCOSE RANDOM mg/dL 108 181* 150* 82 103 171* 174*           Age/Sex: 63 y o  male     Assessment/Plan:        Neuro:    · Acute metabolic encephalopathy due to subural empyema - POD #3 s/p washout and debriedment of subgaleal collection with removal  Drain management per neurosurgical team  Continue antibiotics as detailed below  Monitor incision site  Neuro checks q4h  · Status epilepticus- Two seizures overnight  Ativan 1mg given after second episode  Keppra 1g q8h with and dilantin 100mg q8h  Corrected total level is 28 4 this AM  Neurology managing anti-epileptic agents  Continue EEG monitoring for today  CAM-ICU daily     · History bioplar disorder- continue home Abilify daily  Holding home Wellbutrin due to seizures  · Hx ETOH abuse- no sign of withdrawal  Continue thiamine/folate/MVI daily     CV:   · No acute issues  Maintain MAP >65  Continue close telemetry monitoring       Pulm:  · Acute respiratory failure- Resolved  Patient extubated yesterday without complication  Maintain SpO2 >88%  Continue pulmonary hygiene  Incentive spirometer q1h while awake, encourage coughing and deep breathing  · COPD without acute exacerbation- continue ATC xopenex/ipratropium  · Nicotine dependence- nicotine patch daily       GI:   · PPx- home omeprazole q24h, bowel regimen     :   · No acute issues  Creatine at baseline  Strict q4h I/O monitoring  Continue to follow renal function tests  No sonia       F/E/N:   · No maintenance IVF  · Replete potassium   · Carb control diet per nutrition recommendations      Heme/Onc:   · Stage IV squamous cell 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- prognosis remains poor  Palliative care following  Possible dispo to hospice on Thursday  · Anemia- remains stable without signs of blood loss  Continue to trend hemoglobin and platelets  · Recent DVT/PE- patient was previously on systemic ATC, which has been held to due surgical intervention  IVC filter in place  SCD's to BLE, SQH TID     Endo:   · Type 2 diabetes with hyperglycemia- Stop insulin gtt  Give 15u lantus now with SSI  Increase as needed to maintain goal -180     ID:   · 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

## 2018-05-08 NOTE — SOCIAL WORK
CM was informed by RN Nancy Edmonds that pt was changed to level 4  CM received consult for IP Hospice referral for pt  Pt is confused  CM called pt's brother Matty Pineda to inform of Palliative's recommendation for the pt  Betzaida Zayas reported that he was called and informed by Palliative  Betzaida Zayas was agreeable for CM to send the referral to [address] [place] [address], referral in [facility]  CM called and informed [facility]

## 2018-05-08 NOTE — SPEECH THERAPY NOTE
Speech Language/Pathology  Speech/Language Pathology  Assessment    Patient Name: Raquel WEBBAE'G Date: 5/8/2018     Problem List  Patient Active Problem List   Diagnosis    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     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)    Dependence on nicotine from cigarettes    Alcohol abuse    Essential hypertension    Insulin-dependent diabetes mellitus    Bipolar disorder    Leukocytosis    Deep vein thrombosis (DVT) of distal vein of left lower extremity (HCC)    PE (pulmonary thromboembolism) (HCC)    Cerebral edema (HCC)    Brain metastases (HCC)    MSSA (methicillin susceptible Staphylococcus aureus) infection    Supratherapeutic INR    Infection of bone flap (HCC)    Subdural empyema    Status epilepticus ([address]    [facility]    Goals of care, counseling/discussion    Brain abscess     Past Medical History  Past Medical History:   Diagnosis Date    Alcohol abuse     Anxiety     Bipolar disorder ([facility]     COPD (chronic obstructive pulmonary disease) [facility]     Depression     Diabetes mellitus ([address] )     Gastric ulcer     Hearing impaired person, bilateral     Hepatitis C     Hypertension     Lung cancer ([facility]     Psychiatric disorder     Tobacco abuse     Vision impairment      Past Surgical History  Past Surgical History:   Procedure Laterality Date    ADENOIDECTOMY      BRAIN HEMATOMA EVACUATION Left 5/5/2018    Procedure: CRANIECTOMY FOR EXTRAXIAL EMPYEMA  with washout and debridement of subgaleal collection with removal of bone flap;  Surgeon: Ute Fletcher MD;  [facility];  Service: Neurosurgery    BRONCHOSCOPY      CRANIOTOMY Left 4/13/2018    Procedure: Left image guided parietal craniotomy for resection of tumor;  Surgeon: Ute Fletcher MD;  [facility];  Service: Neurosurgery    LUNG BIOPSY      TONSILLECTOMY       Current Medical Status  Sirisha Archibald Bro Avila is a 63y o  year old male with history of alcohol abuse, diabetes, COPD, bipolar disorder, and stage 4 lung cancer with recent left frontal metastasis resection (April 2018)  That hospital stay also complicated by DVT/PE  He had begun anticoagulation 2 weeks postop  He failed to show up for his neuro surgical postop appointment  Evidently he is now transferred from [facility] where he presented with depressive symptoms  Investigation there included a head CT that identified scalp abscess, subdural empyema, prompting transfer to Replaced by [facility]  Although his initial code status was DNI/DNR, after discussion with Neurosurgery, he was agreeable to undergo evacuation and debridement  Surgery was performed yesterday  In the immediate postop period he developed multiple reported focal right-sided seizures, prompting an increase in Keppra as well as initiation of propofol  Continuous EEG was also begun  Around 8:00 p m  he had an additional seizure recurrence as propofol was being tapered  Keppra was further increased to 1 g t i d   This morning he again developed seizure recurrence, prompting the addition of fosphenytoin (given 1500 PE bolus, and placed on 100 t i d )  Per nursing, and per discussion with epileptologist, no further seizures at this point  Pt remains on continuous EEG  Infectious disease was consulted, and after initial treatment with vancomycin and cefepime, his antibiotics have now been tailored to nafcillin to cover MSSA  Additional medical issues have included coagulopathy present on admission, reversed  He has an IVC filter  History of alcohol abuse with unknown recent intake, on thiamine daily    There had been prior discussions regarding hospice    The patient was eating his breakfast, and had no difficulty with swallowing until he was eating his cereal   The patient initially was swallowing the cereal with no difficulty, but then began to cough and choke, which lasted for ~ 3 to 4 minutes  (I was present in the room with him when this happened)     The patient became tachycardic during the coughing/choking episode (heart rate ~ 144) but his oxygen saturation (on Room Air) maintained a minimum of 97% saturation reading  The patient was able to speak       The Critical Care Medicine Team was called to notify them of this  An order for a formal speech and swallowing evaluation has now been received        Special Studies:  CT-head: 4/14 Postsurgical changes of craniotomy and resection of left posterior parietal mass with scattered foci of air and hemorrhage at the resection site and unchanged vasogenic edema  MRI of brain of 5/4:  Postoperative changes of a left parietal mass resection as described above  Mild smooth leptomeningeal enhancement at the resection cavity is likely related to patient's history of infection/abscess  However, there is no encapsulated intra-axial fluid   collection  Mild smooth dural enhancement at the left supratentorial brain is likely postoperative in nature  A short-term follow-up MRI brain in 2-3 months is recommended for further evaluation  No mass effect or midline shift  Significant interval improvement in left parietal vasogenic edema when compared to the prior MRI brain  No restricted diffusion to suggest acute ischemia    Tiny left frontoparietal subdural hematoma, likely postoperative in nature      Social/Education/Vocational Hx:  Pt lives alone in [place]         Swallow Information   Current Risks for Dysphagia & Aspiration: known brain mets, recetn sx & reported impulsivity in feeding (pt & RN report same)     Current Symptoms/Concerns: prolonged coughing c raisIn bran cereal this am    Current Diet: regular diet and thin liquids      Baseline Diet: regular diet and thin liquids       Baseline Assessment   Behavior/Cognition: alert    Speech/Language Status: able to follow some simple commands and answer simple questions (albeit with some delay & anomia noted)     Patient Positioning: upright in bed        Swallow Mechanism Exam   Facial: symmetrical  Labial: WFL  Lingual: WFL  Velum: unable to visualize  Mandible: adequate ROM  Dentition: limited dentition on lower gums (upper plate in place for a portion of meal (not with mac/cheese)  Vocal quality:clear/adequate   Volitional Cough: strong volitional cough   Respiratory: on RAL     Consistencies Assessed and Performance   Consistencies Administered: puree (applesauce), mechanical soft (mac/cheese & stewed tomatoes) and solid foods (strawberries, bites of mealon & pineapple), as well as nectar thick juice & thin coffee    Oral Stage: Oral processing mildly impaired (incomplete bolus formation and transfer)  Pt fed self LARGE bites in succession as well (adding food to mouth prior to swallowing previously administered food)  Suspect reduced oral control  Pharyngeal Stage: Swallows appeared timely  Laryngeal rise was good by palpation  However, significant and repeated coughing episodes were encountered when pt took thin liquid despite my instructions for slowed rate  Coughing also noted c pineapple  No coughing, throat clearing, change in vocal quality or O2 sats c soft foods or nectar thick liquid  Esophageal Concerns: none reported    Summary  Pt presented with IMPULSIVE SELF FEEDING and s/s AT LEAST MILD OROPHARYNGEAL DYSPHAGIA s/p cran & szs  Tolerance for soft food and nectar thick liquids was good during evaluation  Pt would benefit from full supervision at all meals    Recommendations:  1  Level 3 dysphagia diet, nectar thick liquids, medications c NTL   2   Full supervision at all meals (instruct or assure slow rate of intake & average bite size)    STG Goals: see careplan      Results Reviewed with: patient and RN

## 2018-05-09 PROBLEM — A41.9 SEPSIS (HCC): Status: ACTIVE | Noted: 2018-05-09

## 2018-05-09 PROBLEM — R79.1 SUPRATHERAPEUTIC INR: Status: RESOLVED | Noted: 2018-05-03 | Resolved: 2018-05-09

## 2018-05-09 LAB
BACTERIA BLD CULT: NORMAL
BACTERIA BLD CULT: NORMAL

## 2018-05-09 PROCEDURE — 99232 SBSQ HOSP IP/OBS MODERATE 35: CPT | Performed by: NURSE PRACTITIONER

## 2018-05-09 PROCEDURE — 99233 SBSQ HOSP IP/OBS HIGH 50: CPT | Performed by: FAMILY MEDICINE

## 2018-05-09 PROCEDURE — 99231 SBSQ HOSP IP/OBS SF/LOW 25: CPT | Performed by: INTERNAL MEDICINE

## 2018-05-09 PROCEDURE — 99232 SBSQ HOSP IP/OBS MODERATE 35: CPT | Performed by: PSYCHIATRY & NEUROLOGY

## 2018-05-09 RX ADMIN — NICOTINE 1 PATCH: 21 PATCH, EXTENDED RELEASE TRANSDERMAL at 08:42

## 2018-05-09 RX ADMIN — PHENYTOIN SODIUM 100 MG: 50 INJECTION INTRAMUSCULAR; INTRAVENOUS at 06:07

## 2018-05-09 RX ADMIN — Medication 1000 MG: at 14:20

## 2018-05-09 RX ADMIN — LORAZEPAM 1 MG: 2 INJECTION INTRAMUSCULAR; INTRAVENOUS at 03:18

## 2018-05-09 RX ADMIN — PHENYTOIN SODIUM 100 MG: 50 INJECTION INTRAMUSCULAR; INTRAVENOUS at 14:20

## 2018-05-09 RX ADMIN — ARIPIPRAZOLE 10 MG: 10 TABLET ORAL at 16:04

## 2018-05-09 RX ADMIN — LORAZEPAM 1 MG: 2 INJECTION INTRAMUSCULAR; INTRAVENOUS at 22:30

## 2018-05-09 RX ADMIN — LORAZEPAM 1 MG: 2 INJECTION INTRAMUSCULAR; INTRAVENOUS at 13:59

## 2018-05-09 RX ADMIN — MORPHINE SULFATE 6 MG: 10 INJECTION, SOLUTION INTRAMUSCULAR; INTRAVENOUS at 16:05

## 2018-05-09 RX ADMIN — Medication 1000 MG: at 22:25

## 2018-05-09 RX ADMIN — Medication 1000 MG: at 06:07

## 2018-05-09 RX ADMIN — LORAZEPAM 1 MG: 2 INJECTION INTRAMUSCULAR; INTRAVENOUS at 06:18

## 2018-05-09 RX ADMIN — PHENYTOIN SODIUM 100 MG: 50 INJECTION INTRAMUSCULAR; INTRAVENOUS at 22:30

## 2018-05-09 NOTE — ASSESSMENT & PLAN NOTE
· Status post evacuation of subgaleal abscess and removal of left bone flap, by Neurosurgery  · Staples remain intact  · When bed available patient to be discharged to hospice House

## 2018-05-09 NOTE — POST OP PROGRESS NOTES
Progress Note - Neurosurgery   Devin Newby 63 y o  male MRN: [id number]  Unit/Bed#: [facility] 616-01 Encounter: [id number]    Assessment:  1  POD4 evacuation of subgaleal abscess and removal of left bone flap  2  Focal seizures  3  Brain abscess  4  Infection of bone flap   5  Acute metabolic encephaolpathy  6  Stage IV squamous call carcinoma of lung  7  Sepsis, POA  8  DVT/PE POA s/p IVC filter placement  9  HTN  10  Alcohol abuse  11  Diabetes     Plan:  · Exam GCS 14, E4, V4, M6  Speech clear and appropriate  Confused  MCKEON with good strength  · Imaging reviewed personally and by attending  Final results as below  · CT head wo 5/5/18: expected postoperative changes following washout and bone flap removal  Decreased extra-axial collection  · CHANELL drain discontinued 3/6/02 without complications  · Palliative following - patient has transitioned to level 4 comfort care with plan to transition to hospice house  · Continues Keppra 1 g Q 8H and Dilantin 100mg Q 12H  Ativan prn for breakthrough seizures  · Antibiotics since discontinued  · Pain control as needed per palliative  · Mobilize as able/tolerated  · DVT PPX:none currently  · Patient requesting a "real coke" - consider change in diet as he is now comfort care  · Call with questions/concerns  Subjective/Objective   Chief Complaint: postoperative follow-up    Subjective: Patient offers no complaints  Denies pain including HA  Objective:     I/O       05/08 0701 - 05/09 0700 05/09 0701 - 05/10 0700    P  O  710 240    I V  (mL/kg) 13 (0 2)     NG/GT      IV Piggyback 780     Feedings      Total Intake(mL/kg) 1503 (17 4) 240 (2 8)    Urine (mL/kg/hr) 2132 (1)     Drains      Stool      Total Output 2132      Net -629 +240          Unmeasured Urine Occurrence 2 x 3 x          Invasive Devices     Central Venous Catheter Line            Port A Cath 05/09/18 Left Chest less than 1 day                Physical Exam:  Vitals: Blood pressure 151/78, pulse 91, temperature 99 5 °F (37 5 °C), temperature source Oral, resp  rate 20, height 5' 9" (1 753 m), weight 86 6 kg (190 lb 14 7 oz), SpO2 97 %  ,Body mass index is 28 19 kg/m²  General appearance: alert, appears stated age, and no distress  Head: Normocephalic, incision CDI with staples  Soft cranial defect/pulsatile  Eyes: EOMI, PERRL  Neck: supple, symmetrical, trachea midline   Lungs: non labored breathing  Heart: regular heart rate  Neurologic:   Mental status: Alert, oriented xself  Cranial nerves: grossly intact (Cranial nerves II-XII)  Sensory: normal to LT  Motor: moving all extremities     Lab Results:    Results from last 7 days  Lab Units 05/08/18 0441 05/07/18  0508 05/06/18  0606   WBC Thousand/uL 14 54* 16 22* 11 64*   HEMOGLOBIN g/dL 8 9* 9 0* 8 6*   HEMATOCRIT % 26 9* 27 4* 26 1*   PLATELETS Thousands/uL 246 267 267   NEUTROS PCT % 89* 85* 79*   MONOS PCT % 3* 4 6       Results from last 7 days  Lab Units 05/08/18  0441 05/07/18  0508 05/06/18  1550  05/05/18  1212   SODIUM mmol/L 142 139 137  < > 130*   POTASSIUM mmol/L 3 6 4 3 3 6  < > 4 5   CHLORIDE mmol/L 113* 109* 109*  < > 100   CO2 mmol/L 23 24 20*  < > 24   BUN mg/dL 11 11 12  < > 23   CREATININE mg/dL 0 66 0 89 0 83  < > 0 93   CALCIUM mg/dL 6 9* 7 0* 6 8*  < > 8 0*   TOTAL PROTEIN g/dL 5 7*  --   --   --  6 4   BILIRUBIN TOTAL mg/dL 0 30  --   --   --  0 56   ALK PHOS U/L 73  --   --   --  59   ALT U/L 13  --   --   --  17   AST U/L 13  --   --   --  6   GLUCOSE RANDOM mg/dL 108 181* 150*  < > 174*   < > = values in this interval not displayed      Results from last 7 days  Lab Units 05/07/18  0508 05/06/18  1550   MAGNESIUM mg/dL 2 4 1 8       Results from last 7 days  Lab Units 05/07/18  0508 05/06/18  1550   PHOSPHORUS mg/dL 2 9 2 0*       Results from last 7 days  Lab Units 05/06/18  0606 05/05/18  0711 05/05/18  0355   INR  1 76* 1 37* 1 44*   PTT seconds 36*  --  33     No results found for: TROPONINT  ABG:  Lab Results Component Value Date    PHART 7 370 05/06/2018    QXE0CQA 34 5 (L) 05/06/2018    PO2ART 93 1 05/06/2018    WUJ1YBW 19 5 (L) 05/06/2018    BEART -5 2 05/06/2018    SOURCE Line, Arterial 05/06/2018       Imaging Studies: I have personally reviewed pertinent reports  and I have personally reviewed pertinent films in PACS    XR chest portable   Final Result by Naeem Erazo MD (05/06 0732)      Right IJ terminates in SVC  No pneumothorax  Increasing bibasal regions of atelectasis  Workstation performed: [id number]         XR chest portable   Final Result by Yimi Ha MD (05/05 1642)   1  Lines and tubes as above  Nasogastric tube should be advanced by at least 5 cm  Workstation performed: [id number]         CT head wo contrast   Final Result by Olvin Maddox MD (05/05 1818)      Revision of left parietal craniotomy, interval drainage of extra-axial and superficial scalp collections  No significant mass effect  Workstation performed: [id number]         MRI brain w wo contrast   Final Result by Margarito Treviño MD (05/04 1365)      Postoperative changes of a left parietal mass resection as described above  Mild smooth leptomeningeal enhancement at the resection cavity is likely related to patient's history of infection/abscess  However, there is no encapsulated intra-axial fluid    collection  Mild smooth dural enhancement at the left supratentorial brain is likely postoperative in nature  A short-term follow-up MRI brain in 2-3 months is recommended for further evaluation  No mass effect or midline shift  Significant interval improvement in left parietal vasogenic edema when compared to the prior MRI brain  No restricted diffusion to suggest acute ischemia  Tiny left frontoparietal subdural hematoma, likely postoperative in nature        Workstation performed: [id number]             EKG, Pathology, and Other Studies: I have personally reviewed pertinent reports        VTE Pharmacologic Prophylaxis: Reason for no pharmacologic prophylaxis comfort care    VTE Mechanical Prophylaxis: reason for no mechanical VTE prophylaxis comfort care

## 2018-05-09 NOTE — ASSESSMENT & PLAN NOTE
· Present on admission as evidenced by fever and leukocytosis suspected secondary to MSSA brain abscess/subdural empyema/left prior to all bone flap infection in the postoperative setting per ID  · Patient is comfort care level 4 DNR status antibiotics have been discontinued  · ID signed off, available if patient's condition changes

## 2018-05-09 NOTE — ASSESSMENT & PLAN NOTE
· Patient was made a level 4 DNR as per discussion with patient his brother and palliative/hospice  · Awaiting bed at inpatient hospice house  · Continue comfort care

## 2018-05-09 NOTE — SOCIAL WORK
CM informed by [facility]lia[facility], pt accepted for inpt hospice, however no beds available for today

## 2018-05-09 NOTE — PROGRESS NOTES
Progress Note - Neurology   Lori Sotomayor 63 y o  male MRN: [id number]  Unit/Bed#: [facility] 010-12 Encounter: [id number]    Assessment/ Plan:  1)  Focal seizures with secondary generalization and status epilepticus, likely 2/2 to #2 and #3   -Pt has been made comfort care  - Per discussion with epileptology the pt with no escalation of seizures prior to discontinuation of VEEG    -Continue current AEDs    -Agree with benzodiazepines for break through seizure at this point, recommend Ativan 2mg IV for seizure lasting >60 seconds    -No further recommendations, please call with questions     2  Subdural MSSA empyema now s/p craniectomy and wound washout POD 4      - Post-op management as per neurosurgery team      - Discontinued; pt to be discharges to [facility]       3  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    - Management as per primary team   - Palliateive care discussed goals of care with family, follow discussion to occur today      4  DVT/PE     - s/p IVC filter    - Anticoagulation on hold secondary to recent surgery       5  History of ETOH abuse     - Pt is on hospital course day 7    - Continue on thiamine and folic acid         Subjective:   Pt to be made comfort care only and to be admitted to IP hospice  No acute events overnight  On exam today, pt reports "I'm feeling depressed and I've been crying " Reports pain but does not elaborate  No additional complaints       ROS:  See Subjective     Medications:  Scheduled Meds:    Current Facility-Administered Medications:  acetaminophen 975 mg Oral Q8H PRN BRUCE Yung    ARIPiprazole 10 mg Oral Daily Álvaro Mendenhall MD    levETIRAcetam 1,000 mg Intravenous Q8H Travis Gates DO Last Rate: 1,000 mg (05/09/18 7226)   LORazepam 1 mg Intravenous Q3H PRN Mark Jones PA-C    morphine injection 6 mg Intravenous Q4H PRN Sandra Flores DO    nicotine 1 patch Transdermal Daily Álvaro Mendenhall MD    ondansetron 4 mg Intravenous Q4H PRCHON Rivero MD    oxyCODONE 5 mg Oral Q4H PRN Cesia Rivero MD    phenytoin 100 mg Intravenous [facility] BRUCE Grewal      Continuous Infusions:   PRN Meds:   acetaminophen    LORazepam    morphine injection    ondansetron    oxyCODONE      Vitals: Blood pressure 151/78, pulse 88, temperature 99 5 °F (37 5 °C), temperature source Oral, resp  rate 20, height 5' 9" (1 753 m), weight 86 6 kg (190 lb 14 7 oz), SpO2 98 %  ,Body mass index is 28 19 kg/m²  Physical Exam:   Physical Exam   Constitutional: He appears well-developed and well-nourished  No distress  HENT:   Head: Normocephalic  Right Ear: External ear normal    Left Ear: External ear normal    Poor dentition   Eyes: Conjunctivae are normal  Right eye exhibits no discharge  Left eye exhibits no discharge  No scleral icterus  Neck: Normal range of motion  Neck supple  Pulmonary/Chest: Effort normal  No respiratory distress  Musculoskeletal: Normal range of motion  He exhibits no edema, tenderness or deformity  Neurological: He has normal strength  Finger-nose-finger test:     Skin: Skin is warm and dry  No rash noted  He is not diaphoretic  No erythema  No pallor  Psychiatric: His behavior is normal    Reports depression    Nursing note and vitals reviewed  Neurologic Exam     Mental Status   Oriented to person  Disoriented to place  (Able to state he is in a hospital )  Disoriented to date  Oriented to year and month  Follows commands: Intermittently able to follow one step commands    Attention: normal  Concentration: normal    Level of consciousness: alert  Knowledge: poor  Unable to perform simple calculations  Abnormal comprehension  Pt became confused easily with multistep commands      Cranial Nerves   Cranial nerves II through XII intact  Motor Exam   Muscle bulk: normal  Overall muscle tone: normal    Strength   Strength 5/5 throughout       Sensory Exam   Light touch normal      Gait, Coordination, and Reflexes     Gait  Gait: (deferred for safety)    Coordination   Finger-nose-finger test:      Tremor   Resting tremor: absent         Lab, Imaging and other studies: I have personally reviewed pertinent reports  Recent Results (from the past 24 hour(s))   Fingerstick Glucose (POCT)    Collection Time: 05/08/18 12:10 PM   Result Value Ref Range    POC Glucose 311 (H) 65 - 140 mg/dl   Fingerstick Glucose (POCT)    Collection Time: 05/08/18  2:12 PM   Result Value Ref Range    POC Glucose 222 (H) 65 - 140 mg/dl   ]      VTE Prophylaxis: Sequential compression device (Venodyne)     Counseling / Coordination of Care  Total time spent today 20 minutes  Greater than 50% of total time was spent with the patient and / or family counseling and / or coordination of care  A description of the counseling / coordination of care: The patient was seen examined myself the attending physician  The chart was reviewed thoroughly  The patient was counseled the room

## 2018-05-09 NOTE — ASSESSMENT & PLAN NOTE
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

## 2018-05-09 NOTE — RESTORATIVE TECHNICIAN NOTE
Restorative Specialist Mobility Note       Activity: Other (Comment) (Educated/encouraged pt to ambulate with assistance 3-4 x's/day, pt refused 2* feeling too tired  Bed alarm on  Pt callbell, phone/tray within reach )              Repositioned: Other (Comment) (Rep /sat pt upright in bed   Posie on )          Haley MUELLER, Restorative Technician, [employer] [facility]

## 2018-05-09 NOTE — ASSESSMENT & PLAN NOTE
· Hospital day 6, patient is restless unclear if this is from his brain metastasis or from prior history of alcohol abuse  · Ativan p r n

## 2018-05-09 NOTE — PROGRESS NOTES
Patient report was called to [facility] from the [place] [address] Nursing unit  The patient will be transferred to Room 616 as a Level 4 Code Status/Comfort Care patient

## 2018-05-09 NOTE — PROGRESS NOTES
Progress note - Palliative and Supportive Care   Jeffery Chan 63 y o  male [id number]    Assessment:  -stage IV squamous cell carcinoma with partial neuroendocrine differentiation  -DVT/PE  -right middle lobe lung mass-  -COPD-asymptomatic  -diabetes mellitus type 2, on insulin-uncontrolled  -alcohol abuse/dependence  -poly substance abuse  -left parietal brain abscess status post craniotomy on April 13th, now status post washout on May 5  -cancer related pain-resolved  -bipolar disorder on Abilify    Plan:  1  Symptom management - continue with current anticonvulsant medications  Access medication port and discontinue triple lumen  Comfort care medications   -     2  Goals - admission to hospice house   -    Code Status: Level 4 - Comfort Care  Advance Directive and Living Will:     none  Power of :  presumed to be brother Kushal Groves as per PA   POLST:   none    Reason for visit:  Follow-up plans for hospice discharge    Interval history:  Patient seen, nursing notes reviewed, spoke briefly with his nurse Demetria Rahman  Spoke with Chuyita Escoto, hospice liaison  She states that consent forms have not been signed for hospice, and this will hopefully be accomplished later today  Most of the patient's non necessary medications have been discontinued, including antibiotics  Patient remains on IV Levetiracetam as well as IV Phenytoin  He has required 4 doses of Ativan 2 mg IV in the past 3 days for seizures  There are no reports of pain and no morphine has been given  He continues to be confused  He is incontinent of bladder as well as involuntary of loose stool  Dietary intake is %  He remains appropriate for inpatient level of care and hospice house  As of today, there currently are no beds available in the inpatient unit      MEDICATIONS / ALLERGIES:    all current active meds have been reviewed, current meds:   Current Facility-Administered Medications   Medication Dose Route Frequency    acetaminophen (TYLENOL) oral suspension 975 mg  975 mg Oral Q8H PRN    ARIPiprazole (ABILIFY) tablet 10 mg  10 mg Oral Daily    levETIRAcetam (KEPPRA) 1,000 mg in sodium chloride 0 9 % 100 mL IVPB  1,000 mg Intravenous Q8H    LORazepam (ATIVAN) 2 mg/mL injection 1 mg  1 mg Intravenous Q3H PRN    morphine (PF) 10 mg/mL injection 6 mg  6 mg Intravenous Q4H PRN    nicotine (NICODERM CQ) 21 mg/24 hr TD 24 hr patch 1 patch  1 patch Transdermal Daily    ondansetron (ZOFRAN) injection 4 mg  4 mg Intravenous Q4H PRN    oxyCODONE (ROXICODONE) IR tablet 5 mg  5 mg Oral Q4H PRN    phenytoin (DILANTIN) injection 100 mg  100 mg Intravenous Q8H [address]    and PTA meds:   Prior to Admission Medications   Prescriptions Last Dose Informant Patient Reported? Taking?    ARIPiprazole (ABILIFY) 10 mg tablet   Yes No   Sig: Take 10 mg by mouth daily   Multiple Vitamin (MULTIVITAMIN) capsule   Yes No   Sig: Take 1 capsule by mouth daily   acetaminophen (TYLENOL) 325 mg tablet   No No   Sig: Take 2 tablets (650 mg total) by mouth every 6 (six) hours as needed for mild pain, headaches or fever   albuterol (PROVENTIL HFA,VENTOLIN HFA) 90 mcg/act inhaler   Yes No   Sig: Inhale 2 puffs every 6 (six) hours as needed for wheezing   buPROPion (WELLBUTRIN) 75 mg tablet   Yes No   Sig: Take 75 mg by mouth 2 (two) times a day   budesonide-formoterol (SYMBICORT) 160-4 5 mcg/act inhaler   Yes No   Sig: Inhale 2 puffs 2 (two) times a day   calcium carbonate (TUMS) 500 mg chewable tablet   No No   Sig: Chew 2 tablets (1,000 mg total) daily as needed for indigestion or heartburn   docusate sodium (COLACE) 100 mg capsule   No No   Sig: Take 1 capsule (100 mg total) by mouth 2 (two) times a day   folic acid (FOLVITE) 1 mg tablet   No No   Sig: Take 1 tablet (1 mg total) by mouth daily   hydrOXYzine pamoate (VISTARIL) 50 mg capsule  Outside Facility (Specify) Yes Yes   Sig: Take 50 mg by mouth daily   insulin glargine (LANTUS) 100 units/mL subcutaneous injection   No No   Sig: Inject 15 Units under the skin daily at bedtime   insulin lispro (HumaLOG) 100 units/mL injection   No No   Sig: Inject 20 Units under the skin 3 (three) times a day with meals   levETIRAcetam (KEPPRA) 500 mg tablet   No No   Sig: Take 1 tablet (500 mg total) by mouth every 12 (twelve) hours   lisinopril (ZESTRIL) 5 mg tablet   Yes No   Sig: Take 5 mg by mouth daily   nicotine (NICODERM CQ) 21 mg/24 hr TD 24 hr patch   No No   Sig: Place 1 patch on the skin daily   omeprazole (PriLOSEC) 20 mg delayed release capsule   No No   Sig: Take 1 capsule (20 mg total) by mouth daily   simvastatin (ZOCOR) 20 mg tablet   Yes Yes   Sig: Take 20 mg by mouth daily at bedtime   sodium chloride 1 g tablet   Yes No   Sig: Take 1 g by mouth 2 (two) times a day   thiamine (VITAMIN B1) 100 mg tablet   No No   Sig: Take 1 tablet (100 mg total) by mouth daily      Facility-Administered Medications: None       Allergies   Allergen Reactions    Bee Venom Other (See Comments)     Unknown severity       OBJECTIVE:    Physical Exam    Vitals:    05/08/18 1600 05/08/18 1919 05/08/18 2300 05/09/18 0737   BP: 122/68 156/82 169/85 151/78   BP Location: Left arm Left arm Left arm    Pulse: 92 87 94 88   Resp: 20 20 20 20   Temp: 98 4 °F (36 9 °C) 99 1 °F (37 3 °C) 99 6 °F (37 6 °C) 99 5 °F (37 5 °C)   TempSrc: Oral Oral Oral Oral   SpO2: 98% 99% 99% 98%   Weight:       Height:           Intake/Output Summary (Last 24 hours) at 05/09/18 1155  Last data filed at 05/09/18 0607   Gross per 24 hour   Intake             1080 ml   Output             1475 ml   Net             -395 ml       Physical Exam   Constitutional: He appears well-developed and well-nourished  No distress  HENT:   Right Ear: External ear normal    Craniotomy incision with staples left frontoparietal  Serosanguineous drainage collecting in left ear auricle   Eyes: Conjunctivae and EOM are normal  No scleral icterus  Neck: No tracheal deviation present  Cardiovascular: Normal rate and normal heart sounds  No murmur heard  Pulmonary/Chest: Effort normal  No stridor  No respiratory distress  He has no wheezes  He has no rales  Abdominal: Soft  He exhibits distension  There is no tenderness  Genitourinary: Penis normal    Genitourinary Comments: Figueroa catheter   Musculoskeletal: He exhibits edema (Arms/fingers)  Skin: Skin is warm and dry  Ecchymosis noted  He is not diaphoretic  No cyanosis  Psychiatric: His speech is delayed and tangential  Cognition and memory are impaired  He expresses impulsivity and inappropriate judgment  Of note:  Patient in physical restraints: 2 soft wrist restraints and a soft chest restraint  He is uncovered in his bed, no brief in place  Triple lumen central line hanging at his neck, dressing had been pulled off  Labs:  Phenytoin therapeutic yesterday at 14 2, albumin only 2  Blood sugars ranging from mid 100 to Lower 300    Thank you kindly for allowing us to help provide care for your patient  Real Thornton  Sanya Singh MD  [facility]  [phone]    ** Please Note: This note may be constructed using a voice recognition dictation system   **

## 2018-05-09 NOTE — PLAN OF CARE
Problem: SAFETY ADULT  Goal: Patient will remain free of falls  INTERVENTIONS:  - Assess patient frequently for physical needs  -  Identify cognitive and physical deficits and behaviors that affect risk of falls  -  [place] fall precautions as indicated by assessment   - Educate patient/family on patient safety including physical limitations  - Instruct patient to call for assistance with activity based on assessment  - Modify environment to reduce risk of injury  - Consider OT/PT consult to assist with strengthening/mobility    Outcome: Progressing      Problem: Nutrition/Hydration-ADULT  Goal: Nutrient/Hydration intake appropriate for improving, restoring or maintaining nutritional needs  Monitor and assess patient's nutrition/hydration status for malnutrition (ex- brittle hair, bruises, dry skin, pale skin and conjunctiva, muscle wasting, smooth red tongue, and disorientation)  Collaborate with interdisciplinary team and initiate plan and interventions as ordered  Monitor patient's weight and dietary intake as ordered or per policy  Utilize nutrition screening tool and intervene per policy  Determine patient's food preferences and provide high-protein, high-caloric foods as appropriate       INTERVENTIONS:  - Monitor oral intake, urinary output, labs, and treatment plans  - Assess nutrition and hydration status and recommend course of action  - Evaluate amount of meals eaten  - Assist patient with eating if necessary   - Allow adequate time for meals  - Recommend/ encourage appropriate diets, oral nutritional supplements, and vitamin/mineral supplements  - Order, calculate, and assess calorie counts as needed  - Recommend, monitor, and adjust tube feedings and TPN/PPN based on assessed needs  - Assess need for intravenous fluids  - Provide specific nutrition/hydration education as appropriate  - Include patient/family/caregiver in decisions related to nutrition   Outcome: Progressing      Problem: SAFETY,RESTRAINT: NV/NON-SELF DESTRUCTIVE BEHAVIOR  Goal: Remains free of harm/injury (restraint for non violent/non self-detsructive behavior)  INTERVENTIONS:  - Instruct patient/family regarding restraint use   - Assess and monitor physiologic and psychological status   - Provide interventions and comfort measures to meet assessed patient needs   - Identify and implement measures to help patient regain control  - Assess readiness for release of restraint    Outcome: Progressing    Goal: Returns to optimal restraint-free functioning  INTERVENTIONS:  - Assess the patient's behavior and symptoms that indicate continued need for restraint  - Identify and implement measures to help patient regain control  - Assess readiness for release of restraint    Outcome: Not Progressing  Pt still interfering with medical treatment  Restraints continued    Problem: Potential for Falls  Goal: Patient will remain free of falls  INTERVENTIONS:  - Assess patient frequently for physical needs  -  Identify cognitive and physical deficits and behaviors that affect risk of falls    -  [place] fall precautions as indicated by assessment   - Educate patient/family on patient safety including physical limitations  - Instruct patient to call for assistance with activity based on assessment  - Modify environment to reduce risk of injury  - Consider OT/PT consult to assist with strengthening/mobility    Outcome: Progressing

## 2018-05-09 NOTE — ASSESSMENT & PLAN NOTE
· Focal seizures with secondary generalization in status epilepticus suspected secondary to subdural MSSA empyema status post craniectomy and wound washout and 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  · Continue Keppra and Dilantin  · Ativan p r n  · Per Neurology no further recommendations will follow up p r n    · Seizure precautions

## 2018-05-09 NOTE — HOSPICE NOTE
Called brother Drew Ramesh, he is willing to sign hospice consents  He is in process of finding a fax machine I can fax consents to  When consents signed will need to wait for bed to be available in our inpatient unit  REGIS iyer

## 2018-05-09 NOTE — PROGRESS NOTES
05/09/18 1400   Plan of Care   Comments  attempted to visited with pt  Assessment Completed by:  Other (Comment)  (Dept Referral)

## 2018-05-09 NOTE — PROGRESS NOTES
Progress Note - Russell Banks 1955, 63 y o  male MRN: [id number]    Unit/Bed#: [facility] 871-65 Encounter: [id number]    Primary Care Provider: Presley Cisneros MD   Date and time admitted to hospital: 5/3/2018  7:59 PM        Sepsis [facility])   Assessment & Plan    · Present on admission as evidenced by fever and leukocytosis suspected secondary to MSSA brain abscess/subdural empyema/left prior to all bone flap infection in the postoperative setting per ID  · Patient is comfort care level 4 DNR status antibiotics have been discontinued  · ID signed off, available if patient's condition changes        Goals of care, counseling/discussion   Assessment & Plan    · Patient was made a level 4 DNR as per discussion with patient his brother and palliative/hospice  · Awaiting bed at inpatient hospice house  · Continue comfort care        Status epilepticus [facility])   Assessment & Plan    · Focal seizures with secondary generalization in status epilepticus suspected secondary to subdural MSSA empyema status post craniectomy and wound washout and 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  · Continue Keppra and Dilantin  · Ativan p r n  · Per Neurology no further recommendations will follow up p r n  · Seizure precautions        PE (pulmonary thromboembolism) ([facility] [address] )   Assessment & Plan    · History of pulmonary embolism and deep vein thrombosis  · Status post IVC filter        Alcohol abuse   Assessment & Plan    · Hospital day 6, patient is restless unclear if this is from his brain metastasis or from prior history of alcohol abuse  · Ativan p r n  Dependence on nicotine from cigarettes   Assessment & Plan    · Nicotine patch        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)   Assessment & Plan    · Stage IV 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 poor prognosis  · Patient is level 4 comfort care DNR status   · Palliative medicine/hospice is following  Currently no beds available to be able to discharge patient to inpatient hospice  · Will continue comfort care/comfort measures while in the hospital        * Subdural empyema   Assessment & Plan    · Status post evacuation of subgaleal abscess and removal of left bone flap, by Neurosurgery  · Staples remain intact  · When bed available patient to be discharged to hospice House          VTE Pharmacologic Prophylaxis:   Pharmacologic: Pharmacologic VTE Prophylaxis contraindicated due to recent craniotomy anf patient comfort care  Mechanical VTE Prophylaxis in Place: No    Patient Centered Rounds: I have performed bedside rounds with nursing staff today  Discussions with Specialists or Other Care Team Provider: palliative care    Education and Discussions with Family / Patient:     Time Spent for Care: 30 minutes  More than 50% of total time spent on counseling and coordination of care as described above  Current Length of Stay: 6 day(s)    Current Patient Status: Inpatient   Certification Statement: The patient will continue to require additional inpatient hospital stay due to need for safe discharge plan, awaiting hospice bed    Discharge Plan: hospice house    Code Status: Level 4 - Comfort Care      Subjective:   Patient has no complaints, he is lethargic and confused  Per nursing, occas cough, restless at times, pulling things off and trying to get out of bed    Objective:     Vitals:   Temp (24hrs), Av 4 °F (37 4 °C), Min:99 1 °F (37 3 °C), Max:99 6 °F (37 6 °C)    HR:  [87-94] 88  Resp:  [20] 20  BP: (151-169)/(78-85) 151/78  SpO2:  [98 %-99 %] 98 %  Body mass index is 28 19 kg/m²  Input and Output Summary (last 24 hours):        Intake/Output Summary (Last 24 hours) at 18 1612  Last data filed at 18 1500   Gross per 24 hour   Intake              720 ml   Output              900 ml   Net             -180 ml       Physical Exam:     Physical Exam   Constitutional: He appears well-developed  Weak appearing  Restless at times   HENT:   Staples intact to parietal incision no erythema, discharge, area well approximated   Neck: Neck supple  Cardiovascular: Normal rate and regular rhythm  Pulmonary/Chest: Effort normal  No respiratory distress  He has decreased breath sounds  Abdominal: Soft  Bowel sounds are normal  He exhibits no distension  There is no tenderness  Musculoskeletal: He exhibits edema  BUE 1+ edema   Neurological:   Lethargic,opens eyes to verbal stim but closes them and fall asleep  Does not answer questions with a verbal responce but did shake head   Skin: Skin is warm and dry  Psychiatric: He has a normal mood and affect  His behavior is normal    Restless at times   Vitals reviewed  Additional Data:     Labs:      Results from last 7 days  Lab Units 05/08/18  0441   WBC Thousand/uL 14 54*   HEMOGLOBIN g/dL 8 9*   HEMATOCRIT % 26 9*   PLATELETS Thousands/uL 246   NEUTROS PCT % 89*   LYMPHS PCT % 7*   MONOS PCT % 3*   EOS PCT % 1       Results from last 7 days  Lab Units 05/08/18  0441   SODIUM mmol/L 142   POTASSIUM mmol/L 3 6   CHLORIDE mmol/L 113*   CO2 mmol/L 23   BUN mg/dL 11   CREATININE mg/dL 0 66   CALCIUM mg/dL 6 9*   TOTAL PROTEIN g/dL 5 7*   BILIRUBIN TOTAL mg/dL 0 30   ALK PHOS U/L 73   ALT U/L 13   AST U/L 13   GLUCOSE RANDOM mg/dL 108       Results from last 7 days  Lab Units 05/06/18  0606   INR  1 76*       * I Have Reviewed All Lab Data Listed Above  * Additional Pertinent Lab Tests Reviewed: No New Labs Available For Today    Imaging:    Imaging Reports Reviewed Today Include: MRI  Imaging Personally Reviewed by Myself Includes:  none    Recent Cultures (last 7 days):       Results from last 7 days  Lab Units 05/05/18  0920 05/04/18  1324 05/04/18  1028   BLOOD CULTURE   --  No Growth After 5 Days  No Growth After 5 Days    --    GRAM STAIN RESULT  No polys seen  Rare Gram positive cocci in pairs  --  No polys seen  Rare Gram positive cocci in clusters   WOUND CULTURE   --   --  2+ Growth of Staphylococcus aureus*       Last 24 Hours Medication List:     Current Facility-Administered Medications:  acetaminophen 975 mg Oral Q8H PRN BRUCE Jones    ARIPiprazole 10 mg Oral Daily Deni Ardon MD    levETIRAcetam 1,000 mg Intravenous Q8H Travis Gates DO Last Rate: 1,000 mg (05/09/18 1420)   LORazepam 1 mg Intravenous Q3H PRN Mark Jones PA-C    morphine injection 6 mg Intravenous Q4H PRN Sandra Flores DO    nicotine 1 patch Transdermal Daily Deni Ardon MD    ondansetron 4 mg Intravenous Q4H PRN Deni Ardon, MD    oxyCODONE 5 mg Oral Q4H PRN Deni Ardon, MD    phenytoin 100 mg Intravenous [facility] BRUCE Jones         Today, Patient Was Seen By: BRUCE Fowler    ** Please Note: Dictation voice to text software may have been used in the creation of this document   **

## 2018-05-09 NOTE — PROGRESS NOTES
Progress Note - Infectious Disease   Destiny Young 63 y o  male MRN: [id number]  Unit/Bed#: [facility] 616-01 Encounter: [id number]      Impression/Plan:  1  Isidor Galea  Fever and leukocytosis   Secondary to 2  Not bacteremic  Antibiotics have now been discontinued  -comfort care only     2   MSSA brain abscesses/subdural empyema/ left parietal bone flap infection-in the postoperative setting   Patient is now status post I and D with evacuation of the abscess and removal of the flap  Antibiotics have now been discontinued  -comfort measures only     3   Stage IV squamous cell carcinoma of the lung-prognosis is poor   Metastatic disease to the brain  Patient is now comfort care only     No active infectious disease issues  We will sign off  Please call if questions  Antibiotics:  None    Subjective:  Patient has no fever, chills, sweats; no reported nausea, vomiting, diarrhea; no reported cough, shortness of breath; no reported pain  No new symptoms  He was moved out of the intensive care unit and placed on comfort measures only    Objective:  Vitals:  HR:  [84-94] 88  Resp:  [18-24] 20  BP: (118-169)/(62-86) 151/78  SpO2:  [96 %-99 %] 98 %  Temp (24hrs), Av 2 °F (37 3 °C), Min:98 4 °F (36 9 °C), Max:99 6 °F (37 6 °C)  Current: Temperature: 99 5 °F (37 5 °C)    Physical Exam:   General Appearance:  Alert, minimally verbal   Throat: Oropharynx dry without lesions  Lungs:   Clear to auscultation bilaterally; no wheezes, rhonchi or rales; respirations unlabored   Heart:  RRR; no murmur, rub or gallop   Abdomen:   Soft, non-tender, non-distended, positive bowel sounds  Extremities: No clubbing, cyanosis or edema   Skin: No new rashes or lesions  No draining wounds noted         Labs, Imaging, & Other studies:   All pertinent labs and imaging studies were personally reviewed    Results from last 7 days  Lab Units 18  0441 18  0508 18  0606   WBC Thousand/uL 14 54* 16 22* 11 64* HEMOGLOBIN g/dL 8 9* 9 0* 8 6*   PLATELETS Thousands/uL 246 267 267       Results from last 7 days  Lab Units 05/08/18  0441 05/07/18  0508 05/06/18  1550  05/05/18  1212   SODIUM mmol/L 142 139 137  < > 130*   POTASSIUM mmol/L 3 6 4 3 3 6  < > 4 5   CHLORIDE mmol/L 113* 109* 109*  < > 100   CO2 mmol/L 23 24 20*  < > 24   ANION GAP mmol/L 6 6 8  < > 6   BUN mg/dL 11 11 12  < > 23   CREATININE mg/dL 0 66 0 89 0 83  < > 0 93   EGFR ml/min/1 73sq m 105 93 96  < > 89   GLUCOSE RANDOM mg/dL 108 181* 150*  < > 174*   CALCIUM mg/dL 6 9* 7 0* 6 8*  < > 8 0*   AST U/L 13  --   --   --  6   ALT U/L 13  --   --   --  17   ALK PHOS U/L 73  --   --   --  59   TOTAL PROTEIN g/dL 5 7*  --   --   --  6 4   BILIRUBIN TOTAL mg/dL 0 30  --   --   --  0 56   < > = values in this interval not displayed  Results from last 7 days  Lab Units 05/05/18  0920 05/04/18  1836 05/04/18  1324 05/04/18  1028   BLOOD CULTURE   --   --  No Growth After 4 Days  No Growth After 4 Days    --    GRAM STAIN RESULT  No polys seen  Rare Gram positive cocci in pairs  --   --  No polys seen  Rare Gram positive cocci in clusters   WOUND CULTURE   --   --   --  2+ Growth of Staphylococcus aureus*   MRSA CULTURE ONLY   --  No Methicillin Resistant Staphlyococcus aureus (MRSA) isolated  --   --

## 2018-05-10 LAB — PHENYTOIN FREE SERPL-MCNC: 2.5 UG/ML (ref 1–2)

## 2018-05-10 PROCEDURE — 99232 SBSQ HOSP IP/OBS MODERATE 35: CPT | Performed by: NURSE PRACTITIONER

## 2018-05-10 PROCEDURE — 99232 SBSQ HOSP IP/OBS MODERATE 35: CPT | Performed by: INTERNAL MEDICINE

## 2018-05-10 RX ORDER — MORPHINE SULFATE 10 MG/ML
6 INJECTION, SOLUTION INTRAMUSCULAR; INTRAVENOUS 3 TIMES DAILY
Status: DISCONTINUED | OUTPATIENT
Start: 2018-05-10 | End: 2018-05-11 | Stop reason: HOSPADM

## 2018-05-10 RX ORDER — PHENOBARBITAL SODIUM 65 MG/ML
0.75 INJECTION INTRAMUSCULAR ONCE
Status: COMPLETED | OUTPATIENT
Start: 2018-05-10 | End: 2018-05-10

## 2018-05-10 RX ADMIN — Medication 1000 MG: at 14:10

## 2018-05-10 RX ADMIN — Medication 1000 MG: at 22:58

## 2018-05-10 RX ADMIN — MORPHINE SULFATE 6 MG: 10 INJECTION, SOLUTION INTRAMUSCULAR; INTRAVENOUS at 11:54

## 2018-05-10 RX ADMIN — MORPHINE SULFATE 6 MG: 10 INJECTION, SOLUTION INTRAMUSCULAR; INTRAVENOUS at 17:44

## 2018-05-10 RX ADMIN — Medication 1000 MG: at 05:54

## 2018-05-10 RX ADMIN — PHENYTOIN SODIUM 100 MG: 50 INJECTION INTRAMUSCULAR; INTRAVENOUS at 14:10

## 2018-05-10 RX ADMIN — PHENYTOIN SODIUM 100 MG: 50 INJECTION INTRAMUSCULAR; INTRAVENOUS at 06:13

## 2018-05-10 RX ADMIN — NICOTINE 1 PATCH: 21 PATCH, EXTENDED RELEASE TRANSDERMAL at 11:54

## 2018-05-10 RX ADMIN — PHENOBARBITAL SODIUM 65 MG: 65 INJECTION INTRAMUSCULAR; INTRAVENOUS at 20:37

## 2018-05-10 RX ADMIN — MORPHINE SULFATE 6 MG: 10 INJECTION, SOLUTION INTRAMUSCULAR; INTRAVENOUS at 22:49

## 2018-05-10 RX ADMIN — PHENYTOIN SODIUM 100 MG: 50 INJECTION INTRAMUSCULAR; INTRAVENOUS at 23:58

## 2018-05-10 NOTE — PROGRESS NOTES
05/10/18 1000   Spiritual Beliefs/Perceptions   Concept of God Accepting   Relationship with God Distant   Spiritual Strengths Miley   Plan of Care   Comments  introduced self and beging to establish a caring relationship through [facility] the pt  is helped  Provided a safe space for the pt to express his thoughts and feelings  The  and pt prayed together  Assessment Completed by:  Other (Comment)  (Dept Referral comfort care pt )

## 2018-05-10 NOTE — PROGRESS NOTES
05/10/18 1100   Clinical Encounter Type   Visited With Patient   Routine Visit Follow-up  (Comfort Care Pt)   Continue Visiting Yes   Referral From Departmental follow-up   Referral To    Consult Patient care

## 2018-05-10 NOTE — PROGRESS NOTES
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  -DVT/PE  -right middle lobe lung mass-  -COPD-asymptomatic  -diabetes mellitus type 2, on insulin-uncontrolled  -alcohol abuse/dependence  -poly substance abuse  -left parietal brain abscess status post craniotomy on April 13th, now status post washout on May 5  -cancer related pain-resolved  -bipolar disorder on Abilify    Plan:  1  Symptom management - continue with current anticonvulsant medications  Access medication port and discontinue triple lumen  Comfort care medications  Schedule IV morphine as he appears to be in distress but unable to communicate his needs  2  Goals - admission to hospice house- hopefully today   -    Code Status: Level 4 - Comfort Care  Advance Directive and Living Will:     none  Power of :  presumed to be brother Koko Parent as per PA   POLST:   none    Reason for visit:  Follow-up plans for hospice discharge    Interval history:  Patient seen and examined  He is lying low in the bed with his feet to one side  He states he is uncomfortable but refuses re-positioning  He answers yes/no and recognizes outpatient CM- Danielito  However, it appears he is unable to communicate and make his needs known       MEDICATIONS / ALLERGIES:    all current active meds have been reviewed, current meds:   Current Facility-Administered Medications   Medication Dose Route Frequency    acetaminophen (TYLENOL) oral suspension 975 mg  975 mg Oral Q8H PRN    ARIPiprazole (ABILIFY) tablet 10 mg  10 mg Oral Daily    levETIRAcetam (KEPPRA) 1,000 mg in sodium chloride 0 9 % 100 mL IVPB  1,000 mg Intravenous Q8H    LORazepam (ATIVAN) 2 mg/mL injection 1 mg  1 mg Intravenous Q3H PRN    morphine (PF) 10 mg/mL injection 6 mg  6 mg Intravenous Q4H PRN    nicotine (NICODERM CQ) 21 mg/24 hr TD 24 hr patch 1 patch  1 patch Transdermal Daily    ondansetron (ZOFRAN) injection 4 mg  4 mg Intravenous Q4H PRN    oxyCODONE (ROXICODONE) IR tablet 5 mg  5 mg Oral Q4H PRN    phenytoin (DILANTIN) injection 100 mg  100 mg Intravenous Q8H [address]    and PTA meds:   Prior to Admission Medications   Prescriptions Last Dose Informant Patient Reported? Taking?    ARIPiprazole (ABILIFY) 10 mg tablet   Yes No   Sig: Take 10 mg by mouth daily   Multiple Vitamin (MULTIVITAMIN) capsule   Yes No   Sig: Take 1 capsule by mouth daily   acetaminophen (TYLENOL) 325 mg tablet   No No   Sig: Take 2 tablets (650 mg total) by mouth every 6 (six) hours as needed for mild pain, headaches or fever   albuterol (PROVENTIL HFA,VENTOLIN HFA) 90 mcg/act inhaler   Yes No   Sig: Inhale 2 puffs every 6 (six) hours as needed for wheezing   buPROPion (WELLBUTRIN) 75 mg tablet   Yes No   Sig: Take 75 mg by mouth 2 (two) times a day   budesonide-formoterol (SYMBICORT) 160-4 5 mcg/act inhaler   Yes No   Sig: Inhale 2 puffs 2 (two) times a day   calcium carbonate (TUMS) 500 mg chewable tablet   No No   Sig: Chew 2 tablets (1,000 mg total) daily as needed for indigestion or heartburn   docusate sodium (COLACE) 100 mg capsule   No No   Sig: Take 1 capsule (100 mg total) by mouth 2 (two) times a day   folic acid (FOLVITE) 1 mg tablet   No No   Sig: Take 1 tablet (1 mg total) by mouth daily   hydrOXYzine pamoate (VISTARIL) 50 mg capsule  Outside Facility (Specify) Yes Yes   Sig: Take 50 mg by mouth daily   insulin glargine (LANTUS) 100 units/mL subcutaneous injection   No No   Sig: Inject 15 Units under the skin daily at bedtime   insulin lispro (HumaLOG) 100 units/mL injection   No No   Sig: Inject 20 Units under the skin 3 (three) times a day with meals   levETIRAcetam (KEPPRA) 500 mg tablet   No No   Sig: Take 1 tablet (500 mg total) by mouth every 12 (twelve) hours   lisinopril (ZESTRIL) 5 mg tablet   Yes No   Sig: Take 5 mg by mouth daily   nicotine (NICODERM CQ) 21 mg/24 hr TD 24 hr patch   No No   Sig: Place 1 patch on the skin daily   omeprazole (PriLOSEC) 20 mg delayed release capsule   No No   Sig: Take 1 capsule (20 mg total) by mouth daily   simvastatin (ZOCOR) 20 mg tablet   Yes Yes   Sig: Take 20 mg by mouth daily at bedtime   sodium chloride 1 g tablet   Yes No   Sig: Take 1 g by mouth 2 (two) times a day   thiamine (VITAMIN B1) 100 mg tablet   No No   Sig: Take 1 tablet (100 mg total) by mouth daily      Facility-Administered Medications: None       Allergies   Allergen Reactions    Bee Venom Other (See Comments)     Unknown severity       OBJECTIVE:    Physical Exam    Vitals:    05/09/18 0737 05/09/18 1122 05/09/18 1500 05/10/18 0805   BP: 151/78 169/76  136/66   BP Location:  Right arm     Pulse: 88 99 91 88   Resp: 20 20 20 20   Temp: 99 5 °F (37 5 °C) 98 5 °F (36 9 °C)  98 8 °F (37 1 °C)   TempSrc: Oral Oral  Oral   SpO2: 98% 94% 97% 99%   Weight:       Height:           Intake/Output Summary (Last 24 hours) at 05/10/18 1052  Last data filed at 05/10/18 0956   Gross per 24 hour   Intake             1180 ml   Output              500 ml   Net              680 ml       Physical Exam   Constitutional:   Appears uncomfortable, chronically ill     HENT:   Head: Normocephalic and atraumatic  Eyes: Right eye exhibits no discharge  Left eye exhibits no discharge  No scleral icterus  Cardiovascular: Normal rate, regular rhythm and intact distal pulses  Pulmonary/Chest: Effort normal and breath sounds normal  No respiratory distress  Abdominal: Soft  Bowel sounds are normal  He exhibits no distension  Musculoskeletal: He exhibits no edema  Neurological: He is alert  To name  Recognizes his outpatient CM, unable to make needs known   Skin: Skin is warm and dry  There is pallor  Nursing note and vitals reviewed        Labs:  reviewed    Sony Drummond DO  Palliative and Supportive Care  [phone]

## 2018-05-10 NOTE — PROGRESS NOTES
Progress Note - Denisa Carmona 1955, 63 y o  male MRN: [id number]    Unit/Bed#: [facility] 455-89 Encounter: [id number]    Primary Care Provider: Joanne Sierra MD   Date and time admitted to hospital: 5/3/2018  7:59 PM        Sepsis [facility])   Assessment & Plan    · Present on admission as evidenced by fever and leukocytosis suspected secondary to MSSA brain abscess/subdural empyema/left prior to all bone flap infection in the postoperative setting per ID  · Patient is comfort care level 4 DNR status antibiotics have been discontinued  · ID signed off, available if patient's condition changes        Goals of care, counseling/discussion   Assessment & Plan    · Patient was made a level 4 DNR as per discussion with patient his brother and palliative/hospice  · Awaiting bed at inpatient hospice house  · Continue comfort care        Status epilepticus [facility])   Assessment & Plan    · Focal seizures with secondary generalization in status epilepticus suspected secondary to subdural MSSA empyema status post craniectomy and wound washout and 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  · Continue Keppra and Dilantin  · Ativan p r n  · Per Neurology no further recommendations will follow up p r n  · Seizure precautions        PE (pulmonary thromboembolism) ([facility] [address] )   Assessment & Plan    · History of pulmonary embolism and deep vein thrombosis  · Status post IVC filter        Alcohol abuse   Assessment & Plan    · Hospital day 7, patient is restless disoriented unclear if this is from his brain metastasis or from prior history of alcohol abuse  · Ativan p r n          Dependence on nicotine from cigarettes   Assessment & Plan    · Nicotine patch        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)   Assessment & Plan    · Stage IV 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 poor prognosis  · Patient is level 4 comfort care DNR status   · Palliative medicine/hospice is following  Currently no beds available to be able to discharge patient to inpatient hospice  · Will continue comfort care/comfort measures while in the hospital        * Subdural empyema   Assessment & Plan    · Status post evacuation of subgaleal abscess and removal of left bone flap, by Neurosurgery  · Staples remain intact  · When bed available patient to be discharged to hospice House          VTE Pharmacologic Prophylaxis:   Pharmacologic: hospice care  Mechanical VTE Prophylaxis in Place: No    Patient Centered Rounds: I have performed bedside rounds with nursing staff today  Discussions with Specialists or Other Care Team Provider: palliative    Education and Discussions with Family / Patient:     Time Spent for Care: 20 minutes  More than 50% of total time spent on counseling and coordination of care as described above  Current Length of Stay: 7 day(s)    Current Patient Status: Inpatient   Certification Statement: The patient will continue to require additional inpatient hospital stay due to awaiting hospice bed at hospice house    Discharge Plan: hospice house when bed available    Code Status: Level 4 - Comfort Care      Subjective:   Patient lethargic, ROS unable to be obtained  Per nursing can be restless, when he does speak or answer questions he is confused  incont urine  occas oral intake    Objective:     Vitals:   Temp (24hrs), Av 8 °F (37 1 °C), Min:98 8 °F (37 1 °C), Max:98 8 °F (37 1 °C)    HR:  [88-94] 94  Resp:  [20] 20  BP: (136)/(66) 136/66  SpO2:  [99 %] 99 %  Body mass index is 28 19 kg/m²  Input and Output Summary (last 24 hours): Intake/Output Summary (Last 24 hours) at 05/10/18 1701  Last data filed at 05/10/18 1347   Gross per 24 hour   Intake             1420 ml   Output             1030 ml   Net              390 ml       Physical Exam:     Physical Exam   Constitutional: He appears well-developed  No distress  Sick appearing   Cardiovascular: Normal rate  Pulmonary/Chest: No respiratory distress  Poor effort, lungs appear clear   Abdominal: Soft  Bowel sounds are normal  He exhibits no distension  There is no tenderness  Musculoskeletal: He exhibits edema  Trace arm edema bilat   Neurological:   Lethargic, arouses to verbal stim, verbal response at times but not appropriate    Skin: Skin is warm and dry  [place] intact to parietal incision   Psychiatric:   Restless at times   Vitals reviewed  Additional Data:     Labs:      Results from last 7 days  Lab Units 05/08/18  0441   WBC Thousand/uL 14 54*   HEMOGLOBIN g/dL 8 9*   HEMATOCRIT % 26 9*   PLATELETS Thousands/uL 246   NEUTROS PCT % 89*   LYMPHS PCT % 7*   MONOS PCT % 3*   EOS PCT % 1       Results from last 7 days  Lab Units 05/08/18  0441   SODIUM mmol/L 142   POTASSIUM mmol/L 3 6   CHLORIDE mmol/L 113*   CO2 mmol/L 23   BUN mg/dL 11   CREATININE mg/dL 0 66   CALCIUM mg/dL 6 9*   TOTAL PROTEIN g/dL 5 7*   BILIRUBIN TOTAL mg/dL 0 30   ALK PHOS U/L 73   ALT U/L 13   AST U/L 13   GLUCOSE RANDOM mg/dL 108       Results from last 7 days  Lab Units 05/06/18  0606   INR  1 76*       * I Have Reviewed All Lab Data Listed Above  * Additional Pertinent Lab Tests Reviewed: No New Labs Available For Today    Imaging:    Imaging Reports Reviewed Today Include: none  Imaging Personally Reviewed by Myself Includes:  none    Recent Cultures (last 7 days):       Results from last 7 days  Lab Units 05/05/18  0920 05/04/18  1324 05/04/18  1028   BLOOD CULTURE   --  No Growth After 5 Days  No Growth After 5 Days    --    GRAM STAIN RESULT  No polys seen  Rare Gram positive cocci in pairs  --  No polys seen  Rare Gram positive cocci in clusters   WOUND CULTURE   --   --  2+ Growth of Staphylococcus aureus*       Last 24 Hours Medication List:     Current Facility-Administered Medications:  acetaminophen 975 mg Oral Q8H PRN BRUCE Mcknight    ARIPiprazole 10 mg Oral Daily Serjio Lowry MD levETIRAcetam 1,000 mg Intravenous Q8H Travis Gates,  Last Rate: 1,000 mg (05/10/18 1410)   LORazepam 1 mg Intravenous Q3H PRN Mark Jones PA-C    morphine injection 6 mg Intravenous Q4H PRN Sandra M Sandra, DO    morphine injection 6 mg Intravenous TID Sandra M Bendas, DO    nicotine 1 patch Transdermal Daily Alycia Ganser, MD    ondansetron 4 mg Intravenous Q4H PRN Alycia Ganser, MD    oxyCODONE 5 mg Oral Q4H PRN Alycia Ganser, MD    phenytoin 100 mg Intravenous [facility] BRUCE Kearns         Today, Patient Was Seen By: BRUCE Love    ** Please Note: Dictation voice to text software may have been used in the creation of this document   **

## 2018-05-10 NOTE — PROGRESS NOTES
05/10/18 1000   Spiritual Beliefs/Perceptions   Concept of God Accepting   Relationship with God Distant   Spiritual Strengths Miley   Plan of Care   Comments  introdu[facility] and [facility]  Provided a safe space for the pt to express his thoughts and feelings  The  and pt prayed together  Assessment Completed by:  Other (Comment)

## 2018-05-10 NOTE — ASSESSMENT & PLAN NOTE
· Hospital day 7, patient is restless disoriented unclear if this is from his brain metastasis or from prior history of alcohol abuse  · Ativan p r n

## 2018-05-10 NOTE — HOSPICE NOTE
Received consents back from brother today at 400pm  No inpatient beds currently available  Will advise when bed is available  REGIS iyer

## 2018-05-10 NOTE — SOCIAL WORK
CM t/c to [facility]  No available bed  Guillermo Mom awaiting consents from pt's brother  VM left re: same  CM met w/Danielito Schwarz, pt's Blended  @ [address] [facility] [phone]  Provided update on plan for transfer to [facility]  Tadeo Salomon should be updated when pt transfers

## 2018-05-11 VITALS
RESPIRATION RATE: 20 BRPM | HEIGHT: 69 IN | TEMPERATURE: 99.5 F | HEART RATE: 96 BPM | SYSTOLIC BLOOD PRESSURE: 162 MMHG | WEIGHT: 190.92 LBS | DIASTOLIC BLOOD PRESSURE: 88 MMHG | BODY MASS INDEX: 28.28 KG/M2 | OXYGEN SATURATION: 96 %

## 2018-05-11 PROBLEM — D72.829 LEUKOCYTOSIS: Status: RESOLVED | Noted: 2018-04-07 | Resolved: 2018-05-11

## 2018-05-11 PROCEDURE — 99239 HOSP IP/OBS DSCHRG MGMT >30: CPT | Performed by: NURSE PRACTITIONER

## 2018-05-11 RX ORDER — ARIPIPRAZOLE 10 MG/1
10 TABLET ORAL DAILY
Status: CANCELLED | OUTPATIENT
Start: 2018-05-12

## 2018-05-11 RX ORDER — PHENOBARBITAL SODIUM 65 MG/ML
0.75 INJECTION INTRAMUSCULAR ONCE
Status: COMPLETED | OUTPATIENT
Start: 2018-05-11 | End: 2018-05-11

## 2018-05-11 RX ORDER — MORPHINE SULFATE 10 MG/ML
6 INJECTION, SOLUTION INTRAMUSCULAR; INTRAVENOUS EVERY 4 HOURS PRN
Status: CANCELLED | OUTPATIENT
Start: 2018-05-11

## 2018-05-11 RX ORDER — PHENYTOIN SODIUM 50 MG/ML
100 INJECTION, SOLUTION INTRAMUSCULAR; INTRAVENOUS EVERY 8 HOURS SCHEDULED
Status: CANCELLED | OUTPATIENT
Start: 2018-05-11

## 2018-05-11 RX ORDER — LORAZEPAM 2 MG/ML
1 INJECTION INTRAMUSCULAR
Status: CANCELLED | OUTPATIENT
Start: 2018-05-11

## 2018-05-11 RX ORDER — POLYETHYLENE GLYCOL 3350 17 G/17G
17 POWDER, FOR SOLUTION ORAL 2 TIMES DAILY
Status: DISCONTINUED | OUTPATIENT
Start: 2018-05-11 | End: 2018-05-11 | Stop reason: HOSPADM

## 2018-05-11 RX ORDER — PHENOBARBITAL SODIUM 65 MG/ML
0.75 INJECTION INTRAMUSCULAR ONCE
Status: CANCELLED | OUTPATIENT
Start: 2018-05-11

## 2018-05-11 RX ORDER — ONDANSETRON 2 MG/ML
4 INJECTION INTRAMUSCULAR; INTRAVENOUS EVERY 4 HOURS PRN
Status: CANCELLED | OUTPATIENT
Start: 2018-05-11

## 2018-05-11 RX ORDER — OXYCODONE HYDROCHLORIDE 5 MG/1
5 TABLET ORAL EVERY 4 HOURS PRN
Status: CANCELLED | OUTPATIENT
Start: 2018-05-11

## 2018-05-11 RX ORDER — ACETAMINOPHEN 160 MG/5ML
975 SUSPENSION, ORAL (FINAL DOSE FORM) ORAL EVERY 8 HOURS PRN
Status: CANCELLED | OUTPATIENT
Start: 2018-05-11

## 2018-05-11 RX ORDER — POLYETHYLENE GLYCOL 3350 17 G/17G
17 POWDER, FOR SOLUTION ORAL 2 TIMES DAILY
Status: CANCELLED | OUTPATIENT
Start: 2018-05-11

## 2018-05-11 RX ORDER — MORPHINE SULFATE 10 MG/ML
6 INJECTION, SOLUTION INTRAMUSCULAR; INTRAVENOUS 3 TIMES DAILY
Status: CANCELLED | OUTPATIENT
Start: 2018-05-11

## 2018-05-11 RX ADMIN — ARIPIPRAZOLE 10 MG: 10 TABLET ORAL at 11:25

## 2018-05-11 RX ADMIN — POLYETHYLENE GLYCOL 3350 17 G: 17 POWDER, FOR SOLUTION ORAL at 11:24

## 2018-05-11 RX ADMIN — NICOTINE 1 PATCH: 21 PATCH, EXTENDED RELEASE TRANSDERMAL at 11:13

## 2018-05-11 RX ADMIN — MORPHINE SULFATE 6 MG: 10 INJECTION, SOLUTION INTRAMUSCULAR; INTRAVENOUS at 11:24

## 2018-05-11 RX ADMIN — Medication 1000 MG: at 06:28

## 2018-05-11 RX ADMIN — PHENYTOIN SODIUM 100 MG: 50 INJECTION INTRAMUSCULAR; INTRAVENOUS at 06:28

## 2018-05-11 RX ADMIN — LORAZEPAM 1 MG: 2 INJECTION INTRAMUSCULAR; INTRAVENOUS at 14:22

## 2018-05-11 RX ADMIN — Medication 1000 MG: at 13:58

## 2018-05-11 RX ADMIN — PHENOBARBITAL SODIUM 65 MG: 65 INJECTION INTRAMUSCULAR; INTRAVENOUS at 14:22

## 2018-05-11 RX ADMIN — LORAZEPAM 1 MG: 2 INJECTION INTRAMUSCULAR; INTRAVENOUS at 00:36

## 2018-05-11 RX ADMIN — PHENYTOIN SODIUM 100 MG: 50 INJECTION INTRAMUSCULAR; INTRAVENOUS at 13:58

## 2018-05-11 NOTE — ASSESSMENT & PLAN NOTE
· Stage IV 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 poor prognosis  · Patient is level 4 comfort care DNR status   · Palliative medicine/hospice is following    Plan to transfer to [address] [place] [address] Dr roberts  · Patient does appear comfortable

## 2018-05-11 NOTE — PROGRESS NOTES
05/11/18 1000   Plan of Care   Comments  visited with pt and provided a safe space for the pt to express his thoughts and feelings  Pt is legally blind       Assessment Completed by: Unit visit

## 2018-05-11 NOTE — ASSESSMENT & PLAN NOTE
· Focal seizures with secondary generalization in status epilepticus suspected secondary to subdural MSSA empyema status post craniectomy and wound washout and 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  · Continue Keppra and Dilantin  · Ativan p r n  · Per Neurology no further recommendations will follow up p r n    · Seizure precautions  · Patient without witnessed seizure activity

## 2018-05-11 NOTE — DISCHARGE SUMMARY
Discharge- Lori Sotomayor 1955, 63 y o  male MRN: [id number]    Unit/Bed#: [facility] 616-01 Encounter: [id number]    Primary Care Provider: Nicolle Red MD   Date and time admitted to hospital: 5/3/2018  7:59 PM        Goals of care, counseling/discussion   Assessment & Plan    · Patient was made a level 4 DNR as per discussion with patient his brother and palliative/hospice  · Plan to transfer to hospice House today  · Continue comfort care        Sepsis [facility])   Assessment & Plan    · Present on admission as evidenced by fever and leukocytosis suspected secondary to MSSA brain abscess/subdural empyema/left prior to all bone flap infection in the postoperative setting per ID  · Patient is comfort care level 4 DNR status antibiotics have been discontinued  · ID signed off, available if patient's condition changes        Status epilepticus ([facility] [address] )   Assessment & Plan    · Focal seizures with secondary generalization in status epilepticus suspected secondary to subdural MSSA empyema status post craniectomy and wound washout and 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  · Continue Keppra and Dilantin  · Ativan p r n  · Per Neurology no further recommendations will follow up p r n  · Seizure precautions  · Patient without witnessed seizure activity        PE (pulmonary thromboembolism) ([facility] [address] )   Assessment & Plan    · History of pulmonary embolism and deep vein thrombosis  · Status post IVC filter        Alcohol abuse   Assessment & Plan    · Hospital day 8, patient alternating between being alert and responding appropriately and then being lethargic and disoriented     · Continues with restlessness at times, does not appear to have any distinct tremor and no obvious signs of alcohol withdrawal  · Continue Ativan         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)   Assessment & Plan    · Stage IV 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 poor prognosis  · Patient is level 4 comfort care DNR status   · Palliative medicine/hospice is following  Plan to transfer to [address] [place] [address] Dr roberts  · Patient does appear comfortable        * Subdural empyema   Assessment & Plan    · Status post evacuation of subgaleal abscess and removal of left bone flap, by Neurosurgery  · Staples remain intact  · Patient to be transferred to [address] [place] [address] Dr roberts            Discharging Physician / Practitioner: Teofilo Bennett  PCP: Heber Trinh MD  Admission Date:   Admission Orders     Ordered        05/03/18 2021  Inpatient Admission  Once             Discharge Date: 05/11/18    Resolved Problems  Date Reviewed: 5/11/2018          Resolved    Leukocytosis 5/11/2018     Resolved by  BRUCE Bennett    Supratherapeutic INR 5/9/2018     Resolved by  BRUCE Bennett    Hypotension 5/7/2018     Resolved by  BRUCE Gibbs    Overview Deleted 5/7/2018  8:19 AM by BRUCE SIBLEY            Acute respiratory failure ([address] ) 5/8/2018     Resolved by  Mily Boothe  [address] [facility],  Katheryn Vergara          Consultations During Hospital Stay:  · Neurology  · Critical care  · Infectious Disease  · Neurosurgery  · Palliative medicine    Procedures Performed:     · May 5, 2018 intubation  · May 5, 2018 craniectomy for extra axial empyema with washout and debridement of subgaleal collection and removal of bone flap    Significant Findings / Test Results:     Xr Chest Portable Result Date: 5/6/2018  Impression: Right IJ terminates in SVC  No pneumothorax  Increasing bibasal regions of atelectasis  Workstation performed: [id number]     Xr Chest Portable Result Date: 5/5/2018Impression: 1  Lines and tubes as above  Nasogastric tube should be advanced by at least 5 cm  Workstation performed: [id number]     Ct Head Wo Contrast Result Date: 5/5/2018  Impression: Revision of left parietal craniotomy, interval drainage of extra-axial and superficial scalp collections    No significant mass effect  Workstation performed: [id number]     Ct Head Wo Contrast Result Date: 4/14/2018  Impression: Postsurgical changes of craniotomy and resection of left posterior parietal mass with scattered foci of air and hemorrhage at the resection site and unchanged vasogenic edema  Pneumocephalus also noted along the left frontal lobe and adjacent to craniotomy site  Workstation performed: [id number]     Mri Brain W Wo Contrast Impression: Postoperative changes of a left parietal mass resection as described above  Mild smooth leptomeningeal enhancement at the resection cavity is likely related to patient's history of infection/abscess  However, there is no encapsulated intra-axial fluid collection  Mild smooth dural enhancement at the left supratentorial brain is likely postoperative in nature  A short-term follow-up MRI brain in 2-3 months is recommended for further evaluation  No mass effect or midline shift  Significant interval improvement in left parietal vasogenic edema when compared to the prior MRI brain  No restricted diffusion to suggest acute ischemia  Tiny left frontoparietal subdural hematoma, likely postoperative in nature  Workstation performed: [id number]       Incidental Findings:   · Not applicable    Test Results Pending at Discharge (will require follow up): · None     Outpatient Tests Requested:  · None    Complications:  As noted in hospital course    Reason for Admission:  Abnormal CAT Scan of the head    Hospital Course:     Luis Canada is a 63 y o  male patient with a past medical history of alcohol abuse, stage IV lung cancer, hypertension, diabetes, chronic obstructive pulmonary disease, bipolar and anxiety along with depression who originally presented to the hospital on 5/3/2018 due to abnormal CAT Scan of the head indicating wound infection    On presentation to the hospital patient was unable to provide his history and everything was obtained from review of records  In April of 2018 he presented with right-sided numbness and weakness which improved with Decadron at that time he was found to have a solitary left parietal mass with associated vasogenic edema  At that time he was diagnosed with a deep vein thrombosis and pulmonary embolism of which she had an IVC filter placed  He underwent a left image guided parietal craniectomy for tumor resection on April 13, 2018  Pathology indicated left parietal mass consistent with 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  He was discharged home with VNA services  He then re-presented to [facility] for depression  At that time he was noted to have a left cranial wound drainage from his incision of which a CT of the head was ordered  There was concerns for scalp abscess with enhancement and air  He was transferred to [facility] for neurosurgical evaluation  On May 5, 2018 he underwent a craniectomy for extra-axial empyema with washout and debridement of subgaleal collection with removal of bone flap  Postoperatively patient was admitted to the ICU after craniectomy and washout of subdural empyema  He was intubated for airway protection and was later to found to have a right-sided clonic tonic seizure  He was started on Keppra and placed on a monitoring  He was monitored for 48 hours his seizures improved and he was able to be extubated  His infection improved but overall his prognosis was thought to be poor secondary to stage IV lung cancer with metastatic disease  Palliative care had been following him  and had further discussions with patient's brother who agree to transition him to hospice care  The patient was aware of this transfer and according to documentation was understanding  Patient was transferred to inpatient hospice unit    At time of transfer he was awake with periods of lethargy, alternating level of mentation from oriented to confused and periods of agitation and restlessness  Respiratory status was stable nonlabored  Please see above list of diagnoses and related plan for additional information  Condition at Discharge: poor     Discharge Day Visit / Exam:     Subjective:  Unable to obtain reliable secondary to disorientation but does not appear to be in any distress he is lying in bed does not appear to be in distress respirations are easy nonlabored  Vitals: Blood Pressure: 162/88 (05/10/18 2351)  Pulse: 96 (05/11/18 0726)  Temperature: 99 5 °F (37 5 °C) (05/11/18 0726)  Temp Source: Oral (05/11/18 0726)  Respirations: 20 (05/11/18 0726)  Height: 5' 9" (175 3 cm) (05/07/18 1717)  Weight - Scale: 86 6 kg (190 lb 14 7 oz) (05/08/18 0559)  SpO2: 96 % (05/11/18 0726)  Exam:   Physical Exam   Constitutional: He appears well-developed and well-nourished  No distress  HENT:   Left parietal incision with staples intact well-approximated no erythema   Eyes: Pupils are equal, round, and reactive to light  Right eye exhibits nystagmus  Left eye exhibits nystagmus  Neck: Neck supple  Cardiovascular: Normal rate  Pulmonary/Chest: Effort normal and breath sounds normal  No respiratory distress  Abdominal: Soft  Bowel sounds are normal  He exhibits distension  There is no tenderness  Obese abdomen   Musculoskeletal: He exhibits no edema  Neurological:   Alert at times but other times is lethargic, arouses to verbal stimuli  Aware of the year and aware that he was here for an abscess in his brain but per nursing is disoriented at times   Skin: Skin is warm and dry  Psychiatric:   Restless at times  Can be agitated at times   Vitals reviewed  Discussion with Family:  Brother where of transfer    Discharge instructions/Information to patient and family:   See after visit summary for information provided to patient and family        Provisions for Follow-Up Care:  See after visit summary for information related to follow-up care and any pertinent home health orders  Disposition:     Other: [place] to [facility] SNF:   · Not Applicable to this Patient - Not Applicable to this Patient    Planned Readmission:  Not anticipated     Discharge Statement:  I spent 40 minutes discharging the patient  This time was spent on the day of discharge  I had direct contact with the patient on the day of discharge  Greater than 50% of the total time was spent examining patient, answering all patient questions, arranging and discussing plan of care with patient as well as directly providing post-discharge instructions  Additional time then spent on discharge activities  Discharge Medications:  See after visit summary for reconciled discharge medications provided to patient and family        ** Please Note: This note has been constructed using a voice recognition system **

## 2018-05-11 NOTE — PROGRESS NOTES
Brief Palliative Progress Note:    Order placed for Phenobarbital 65 mg to be given prior to transport  D/W HEATH Cardona,   Palliative and Supportive Care  [phone]

## 2018-05-11 NOTE — ASSESSMENT & PLAN NOTE
· Patient was made a level 4 DNR as per discussion with patient his brother and palliative/hospice  · Plan to transfer to hospice House today  · Continue comfort care

## 2018-05-11 NOTE — SOCIAL WORK
CM was informed by [facility] hospice liaison that an IP hospice bed is available this afternoon  CM arranged with [facility] for a 2pm dc to [facility]  CM notified pt's bedside RN Miriam Rain, pts brother Swati Cotter [phone], and [facility] hospice liaison of dc time  Facility transfer form and CMN completed  Chart copy requested  Out of hospital DNR placed on pt's chart copy for EMS  [place] [employer] does not require authorization for transport--CMN completed

## 2018-05-11 NOTE — ASSESSMENT & PLAN NOTE
· Status post evacuation of subgaleal abscess and removal of left bone flap, by Neurosurgery  · Staples remain intact  · Patient to be transferred to hospice House today

## 2018-05-16 LAB
ATRIAL RATE: 96 BPM
P AXIS: 41 DEGREES
PR INTERVAL: 122 MS
QRS AXIS: 21 DEGREES
QRSD INTERVAL: 96 MS
QT INTERVAL: 326 MS
QTC INTERVAL: 411 MS
T WAVE AXIS: 79 DEGREES
VENTRICULAR RATE: 96 BPM

## 2018-05-16 PROCEDURE — 93010 ELECTROCARDIOGRAM REPORT: CPT | Performed by: INTERNAL MEDICINE

## 2019-01-26 NOTE — ASSESSMENT & PLAN NOTE
- counseled on cessation although unlikely to comply - per patient, last drink was on 4/5  - monitor for signs/symptoms of withdrawal - PRN IV Ativan   - started on folic acid/thiamine supplementation   -no signs of Alcohol withdrawal patient

## 2024-09-24 NOTE — ASSESSMENT & PLAN NOTE
Positive subacute DVT in LLE  IVC filter  Lifelong AC post brain biopsy Preoccupations/Ruminations Preoccupations/Ruminations/Hopelessness

## (undated) DEVICE — TELFA NON-ADHERENT ABSORBENT DRESSING: Brand: TELFA

## (undated) DEVICE — 3M™ STERI-STRIP™ REINFORCED ADHESIVE SKIN CLOSURES, R1547, 1/2 IN X 4 IN (12 MM X 100 MM), 6 STRIPS/ENVELOPE: Brand: 3M™ STERI-STRIP™

## (undated) DEVICE — DRAPE SHEET THREE QUARTER

## (undated) DEVICE — SURGICEL 4 X 8

## (undated) DEVICE — PROXIMATE PLUS MD MULTI-DIRECTIONAL RELEASE SKIN STAPLERS CONTAINS 35 STAINLESS STEEL STAPLES APPROXIMATE CLOSED DIMENSIONS: 6.9MM X 3.9MM WIDE: Brand: PROXIMATE

## (undated) DEVICE — TELFA 1/2" X 3": Brand: TELFA

## (undated) DEVICE — TIBURON SPLIT SHEET: Brand: CONVERTORS

## (undated) DEVICE — SPECIMEN CONTAINER STERILE PEEL PACK

## (undated) DEVICE — RANEY SCALP CLIP STERILE: Brand: AESCULAP

## (undated) DEVICE — GLOVE SRG BIOGEL 8

## (undated) DEVICE — Device: Brand: IQ SYSTEM

## (undated) DEVICE — IV SET 15 DROP STERILE 0/Y GRAVITY

## (undated) DEVICE — SUT NUROLON 4-0 TF CR/8 18 IN C584D

## (undated) DEVICE — SUT MONOCRYL 4-0 PS-2 18 IN Y496G

## (undated) DEVICE — CUSA® EXCEL 36KHZ CUSA® MANIFOLD TUBING SET: Brand: CUSA® EXCEL

## (undated) DEVICE — DRAPE EQUIPMENT RF WAND

## (undated) DEVICE — SURGIFOAM 8.5 X 12.5

## (undated) DEVICE — PREP SURGICAL PURPREP 26ML

## (undated) DEVICE — INTENDED FOR TISSUE SEPARATION, AND OTHER PROCEDURES THAT REQUIRE A SHARP SURGICAL BLADE TO PUNCTURE OR CUT.: Brand: BARD-PARKER SAFETY BLADES SIZE 15, STERILE

## (undated) DEVICE — DRAPE INTESTINAL ISOLATION BAG

## (undated) DEVICE — TOOL F2/8TA23 LEGEND 8CM 2.3MM TAPER: Brand: MIDAS REX™

## (undated) DEVICE — NEURO PATTIES 1/4 X 1/4

## (undated) DEVICE — SUT PROLENE 3-0 V-7 36 IN 8976H

## (undated) DEVICE — JACKSON-PRATT 100CC BULB RESERVOIR: Brand: CARDINAL HEALTH

## (undated) DEVICE — FLOSEAL MATRIX IS INDICATED IN SURGICAL PROCEDURES (OTHER THAN IN OPHTHALMIC) AS AN ADJUNCT TO HEMOSTASIS WHEN CONTROL OF BLEEDING BY LIGATURE OR CONVENTIONAL PROCEDURES IS INEFFECTIVE OR IMPRACTICAL.: Brand: FLOSEAL HEMOSTATIC MATRIX

## (undated) DEVICE — SUT MONOCRYL 2-0 SH 27 IN Y417H

## (undated) DEVICE — LIGHT HANDLE COVER SLEEVE DISP BLUE STELLAR

## (undated) DEVICE — GLOVE INDICATOR PI UNDERGLOVE SZ 8.5 BLUE

## (undated) DEVICE — BIPOLAR IRRIGATOR INTEGRATED TUBING AND BIPOLAR CORD SET, DISPOSABLE

## (undated) DEVICE — PACK CRANIOTOMY PBDS RF

## (undated) DEVICE — JP PERF DRN SIL FLT 10MM FULL: Brand: CARDINAL HEALTH

## (undated) DEVICE — SUT SILK 2-0 SH CR/8 18 IN C012D

## (undated) DEVICE — ANTIBACTERIAL VIOLET BRAIDED (POLYGLACTIN 910), SYNTHETIC ABSORBABLE SUTURE: Brand: COATED VICRYL

## (undated) DEVICE — 3M™ IOBAN™ 2 ANTIMICROBIAL INCISE DRAPE 6650EZ: Brand: IOBAN™ 2

## (undated) DEVICE — ADHESIVE SKN CLSR HISTOACRYL FLEX 0.5ML LF

## (undated) DEVICE — MARKER REFLECTIVE RADIOPAQUE SPHERE

## (undated) DEVICE — TOOL 9MH30 LEGEND 9CM 3MM MH: Brand: MIDAS REX

## (undated) DEVICE — INTENDED FOR TISSUE SEPARATION, AND OTHER PROCEDURES THAT REQUIRE A SHARP SURGICAL BLADE TO PUNCTURE OR CUT.: Brand: BARD-PARKER ® CARBON RIB-BACK BLADES

## (undated) DEVICE — CUSA® EXCEL 36 KHZ CEM™ NOSECONE: Brand: CUSA® EXCEL

## (undated) DEVICE — TRAY FOLEY 16FR URIMETER SURESTEP

## (undated) DEVICE — DRAPE CAMERA/LASER

## (undated) DEVICE — SPONGE PVP SCRUB WING STERILE

## (undated) DEVICE — REM POLYHESIVE ADULT PATIENT RETURN ELECTRODE: Brand: VALLEYLAB

## (undated) DEVICE — IV CATH INTROCAN 18G X 1 1/4 SAFETY

## (undated) DEVICE — MAYFIELD® DISPOSABLE ADULT SKULL PIN (PLASTIC BASE): Brand: MAYFIELD®

## (undated) DEVICE — PETRI DISH STERILE

## (undated) DEVICE — GOWN,SLEEVE,STERILE,W/CSR WRAP,1/P: Brand: MEDLINE

## (undated) DEVICE — CUSA® EXCEL 36KHZ 1.57MM MICROTIP™ CURVED EXTENDED TIP: Brand: CUSA® EXCEL

## (undated) DEVICE — DURASEAL 5ML

## (undated) DEVICE — DRAPE MICROSCOPE OPMI PENTERO

## (undated) DEVICE — BETADINE OINTMENT FOIL PACK